# Patient Record
Sex: FEMALE | Race: WHITE | Employment: OTHER | URBAN - METROPOLITAN AREA
[De-identification: names, ages, dates, MRNs, and addresses within clinical notes are randomized per-mention and may not be internally consistent; named-entity substitution may affect disease eponyms.]

---

## 2017-08-23 PROBLEM — R41.82 ALTERED MENTAL STATUS: Status: ACTIVE | Noted: 2017-08-23

## 2017-12-19 PROBLEM — R26.9 GAIT DISTURBANCE: Status: ACTIVE | Noted: 2017-12-19

## 2019-02-18 ENCOUNTER — HOSPITAL ENCOUNTER (INPATIENT)
Age: 79
LOS: 3 days | Discharge: NURSING, REHAB OR LONG-TERM CARE | DRG: 866 | End: 2019-02-21
Attending: EMERGENCY MEDICINE | Admitting: INTERNAL MEDICINE
Payer: MEDICARE

## 2019-02-18 ENCOUNTER — APPOINTMENT (OUTPATIENT)
Dept: CT IMAGING | Age: 79
DRG: 866 | End: 2019-02-18
Payer: MEDICARE

## 2019-02-18 ENCOUNTER — APPOINTMENT (OUTPATIENT)
Dept: GENERAL RADIOLOGY | Age: 79
DRG: 866 | End: 2019-02-18
Payer: MEDICARE

## 2019-02-18 DIAGNOSIS — E11.40 TYPE 2 DIABETES MELLITUS WITH DIABETIC NEUROPATHY, WITHOUT LONG-TERM CURRENT USE OF INSULIN (HCC): ICD-10-CM

## 2019-02-18 DIAGNOSIS — G93.41 METABOLIC ENCEPHALOPATHY: Primary | ICD-10-CM

## 2019-02-18 DIAGNOSIS — E03.9 HYPOTHYROIDISM, UNSPECIFIED TYPE: ICD-10-CM

## 2019-02-18 DIAGNOSIS — Z91.14 NONCOMPLIANCE WITH MEDICATIONS: ICD-10-CM

## 2019-02-18 PROBLEM — I10 HTN (HYPERTENSION), BENIGN: Chronic | Status: ACTIVE | Noted: 2019-02-18

## 2019-02-18 PROBLEM — J10.1 INFLUENZA A: Status: ACTIVE | Noted: 2019-02-18

## 2019-02-18 PROBLEM — R62.7 FAILURE TO THRIVE IN ADULT: Status: ACTIVE | Noted: 2019-02-18

## 2019-02-18 LAB
ACETAMINOPHEN LEVEL: <5 MCG/ML (ref 10–30)
ALBUMIN SERPL-MCNC: 4.2 G/DL (ref 3.5–5.2)
ALP BLD-CCNC: 69 U/L (ref 35–104)
ALT SERPL-CCNC: 15 U/L (ref 0–32)
AMMONIA: 20 UMOL/L (ref 11–51)
AMPHETAMINE SCREEN, URINE: NOT DETECTED
ANION GAP SERPL CALCULATED.3IONS-SCNC: 14 MMOL/L (ref 7–16)
AST SERPL-CCNC: 29 U/L (ref 0–31)
BACTERIA: ABNORMAL /HPF
BARBITURATE SCREEN URINE: NOT DETECTED
BASOPHILS ABSOLUTE: 0 E9/L (ref 0–0.2)
BASOPHILS RELATIVE PERCENT: 0.6 % (ref 0–2)
BENZODIAZEPINE SCREEN, URINE: NOT DETECTED
BILIRUB SERPL-MCNC: 1.1 MG/DL (ref 0–1.2)
BILIRUBIN URINE: NEGATIVE
BLOOD, URINE: NORMAL
BUN BLDV-MCNC: 14 MG/DL (ref 8–23)
CALCIUM SERPL-MCNC: 9.4 MG/DL (ref 8.6–10.2)
CANNABINOID SCREEN URINE: NOT DETECTED
CHLORIDE BLD-SCNC: 98 MMOL/L (ref 98–107)
CHP ED QC CHECK: YES
CLARITY: CLEAR
CO2: 25 MMOL/L (ref 22–29)
COCAINE METABOLITE SCREEN URINE: NOT DETECTED
COLOR: YELLOW
CREAT SERPL-MCNC: 1.3 MG/DL (ref 0.5–1)
EOSINOPHILS ABSOLUTE: 0 E9/L (ref 0.05–0.5)
EOSINOPHILS RELATIVE PERCENT: 0.1 % (ref 0–6)
ETHANOL: <10 MG/DL (ref 0–0.08)
FILM ARRAY ADENOVIRUS: ABNORMAL
FILM ARRAY BORDETELLA PERTUSSIS: ABNORMAL
FILM ARRAY CHLAMYDOPHILIA PNEUMONIAE: ABNORMAL
FILM ARRAY CORONAVIRUS 229E: ABNORMAL
FILM ARRAY CORONAVIRUS HKU1: ABNORMAL
FILM ARRAY CORONAVIRUS NL63: ABNORMAL
FILM ARRAY CORONAVIRUS OC43: ABNORMAL
FILM ARRAY INFLUENZA A VIRUS H3: ABNORMAL
FILM ARRAY INFLUENZA B: ABNORMAL
FILM ARRAY METAPNEUMOVIRUS: ABNORMAL
FILM ARRAY MYCOPLASMA PNEUMONIAE: ABNORMAL
FILM ARRAY PARAINFLUENZA VIRUS 1: ABNORMAL
FILM ARRAY PARAINFLUENZA VIRUS 2: ABNORMAL
FILM ARRAY PARAINFLUENZA VIRUS 3: ABNORMAL
FILM ARRAY PARAINFLUENZA VIRUS 4: ABNORMAL
FILM ARRAY RESPIRATORY SYNCITIAL VIRUS: ABNORMAL
FILM ARRAY RHINOVIRUS/ENTEROVIRUS: ABNORMAL
GFR AFRICAN AMERICAN: 48
GFR NON-AFRICAN AMERICAN: 40 ML/MIN/1.73
GLUCOSE BLD-MCNC: 125 MG/DL (ref 74–99)
GLUCOSE BLD-MCNC: 129 MG/DL
GLUCOSE URINE: NEGATIVE MG/DL
HCT VFR BLD CALC: 40.1 % (ref 34–48)
HEMOGLOBIN: 13.9 G/DL (ref 11.5–15.5)
INFLUENZA A BY PCR: DETECTED
INFLUENZA B BY PCR: NOT DETECTED
INR BLD: 1.2
KETONES, URINE: NEGATIVE MG/DL
LACTIC ACID: 1.3 MMOL/L (ref 0.5–2.2)
LEUKOCYTE ESTERASE, URINE: NEGATIVE
LYMPHOCYTES ABSOLUTE: 0.56 E9/L (ref 1.5–4)
LYMPHOCYTES RELATIVE PERCENT: 7 % (ref 20–42)
MCH RBC QN AUTO: 31.9 PG (ref 26–35)
MCHC RBC AUTO-ENTMCNC: 34.7 % (ref 32–34.5)
MCV RBC AUTO: 92 FL (ref 80–99.9)
METER GLUCOSE: 110 MG/DL (ref 74–99)
METER GLUCOSE: 114 MG/DL (ref 74–99)
METER GLUCOSE: 127 MG/DL (ref 74–99)
METER GLUCOSE: 172 MG/DL (ref 74–99)
METER GLUCOSE: 94 MG/DL (ref 74–99)
METHADONE SCREEN, URINE: NOT DETECTED
MONOCYTES ABSOLUTE: 0.4 E9/L (ref 0.1–0.95)
MONOCYTES RELATIVE PERCENT: 5.2 % (ref 2–12)
MUCUS: PRESENT
NEUTROPHILS ABSOLUTE: 7.04 E9/L (ref 1.8–7.3)
NEUTROPHILS RELATIVE PERCENT: 87.8 % (ref 43–80)
NITRITE, URINE: NEGATIVE
OPIATE SCREEN URINE: NOT DETECTED
ORGANISM: ABNORMAL
PDW BLD-RTO: 13.4 FL (ref 11.5–15)
PH UA: 6 (ref 5–9)
PHENCYCLIDINE SCREEN URINE: NOT DETECTED
PLATELET # BLD: 138 E9/L (ref 130–450)
PMV BLD AUTO: 10.9 FL (ref 7–12)
POTASSIUM SERPL-SCNC: 3.5 MMOL/L (ref 3.5–5)
PROPOXYPHENE SCREEN: NOT DETECTED
PROTEIN UA: NORMAL MG/DL
PROTHROMBIN TIME: 13.7 SEC (ref 9.3–12.4)
RBC # BLD: 4.36 E12/L (ref 3.5–5.5)
RBC # BLD: NORMAL 10*6/UL
RBC UA: ABNORMAL /HPF (ref 0–2)
SALICYLATE, SERUM: <0.3 MG/DL (ref 0–30)
SODIUM BLD-SCNC: 137 MMOL/L (ref 132–146)
SPECIFIC GRAVITY UA: 1.02 (ref 1–1.03)
TOTAL PROTEIN: 7.5 G/DL (ref 6.4–8.3)
TRICYCLIC ANTIDEPRESSANTS SCREEN SERUM: NEGATIVE NG/ML
TROPONIN: 0.02 NG/ML (ref 0–0.03)
TSH SERPL DL<=0.05 MIU/L-ACNC: 28.78 UIU/ML (ref 0.27–4.2)
UROBILINOGEN, URINE: 1 E.U./DL
WBC # BLD: 8 E9/L (ref 4.5–11.5)
WBC UA: ABNORMAL /HPF (ref 0–5)

## 2019-02-18 PROCEDURE — 87633 RESP VIRUS 12-25 TARGETS: CPT

## 2019-02-18 PROCEDURE — 87581 M.PNEUMON DNA AMP PROBE: CPT

## 2019-02-18 PROCEDURE — 6360000002 HC RX W HCPCS: Performed by: INTERNAL MEDICINE

## 2019-02-18 PROCEDURE — 6370000000 HC RX 637 (ALT 250 FOR IP): Performed by: INTERNAL MEDICINE

## 2019-02-18 PROCEDURE — 87486 CHLMYD PNEUM DNA AMP PROBE: CPT

## 2019-02-18 PROCEDURE — 87798 DETECT AGENT NOS DNA AMP: CPT

## 2019-02-18 PROCEDURE — 70450 CT HEAD/BRAIN W/O DYE: CPT

## 2019-02-18 PROCEDURE — 97530 THERAPEUTIC ACTIVITIES: CPT

## 2019-02-18 PROCEDURE — 71045 X-RAY EXAM CHEST 1 VIEW: CPT

## 2019-02-18 PROCEDURE — 97166 OT EVAL MOD COMPLEX 45 MIN: CPT

## 2019-02-18 PROCEDURE — 94664 DEMO&/EVAL PT USE INHALER: CPT

## 2019-02-18 PROCEDURE — 94640 AIRWAY INHALATION TREATMENT: CPT

## 2019-02-18 PROCEDURE — 84484 ASSAY OF TROPONIN QUANT: CPT

## 2019-02-18 PROCEDURE — 80053 COMPREHEN METABOLIC PANEL: CPT

## 2019-02-18 PROCEDURE — 97535 SELF CARE MNGMENT TRAINING: CPT

## 2019-02-18 PROCEDURE — 85610 PROTHROMBIN TIME: CPT

## 2019-02-18 PROCEDURE — 82140 ASSAY OF AMMONIA: CPT

## 2019-02-18 PROCEDURE — G0480 DRUG TEST DEF 1-7 CLASSES: HCPCS

## 2019-02-18 PROCEDURE — 1200000000 HC SEMI PRIVATE

## 2019-02-18 PROCEDURE — 87040 BLOOD CULTURE FOR BACTERIA: CPT

## 2019-02-18 PROCEDURE — 2580000003 HC RX 258: Performed by: INTERNAL MEDICINE

## 2019-02-18 PROCEDURE — 84443 ASSAY THYROID STIM HORMONE: CPT

## 2019-02-18 PROCEDURE — 87088 URINE BACTERIA CULTURE: CPT

## 2019-02-18 PROCEDURE — 99285 EMERGENCY DEPT VISIT HI MDM: CPT

## 2019-02-18 PROCEDURE — 2580000003 HC RX 258: Performed by: EMERGENCY MEDICINE

## 2019-02-18 PROCEDURE — 81001 URINALYSIS AUTO W/SCOPE: CPT

## 2019-02-18 PROCEDURE — 97161 PT EVAL LOW COMPLEX 20 MIN: CPT

## 2019-02-18 PROCEDURE — 36415 COLL VENOUS BLD VENIPUNCTURE: CPT

## 2019-02-18 PROCEDURE — 83605 ASSAY OF LACTIC ACID: CPT

## 2019-02-18 PROCEDURE — 87502 INFLUENZA DNA AMP PROBE: CPT

## 2019-02-18 PROCEDURE — 80307 DRUG TEST PRSMV CHEM ANLYZR: CPT

## 2019-02-18 PROCEDURE — 82962 GLUCOSE BLOOD TEST: CPT

## 2019-02-18 PROCEDURE — 85025 COMPLETE CBC W/AUTO DIFF WBC: CPT

## 2019-02-18 RX ORDER — ONDANSETRON 2 MG/ML
4 INJECTION INTRAMUSCULAR; INTRAVENOUS EVERY 6 HOURS PRN
Status: DISCONTINUED | OUTPATIENT
Start: 2019-02-18 | End: 2019-02-21 | Stop reason: HOSPADM

## 2019-02-18 RX ORDER — SODIUM CHLORIDE 0.9 % (FLUSH) 0.9 %
10 SYRINGE (ML) INJECTION PRN
Status: DISCONTINUED | OUTPATIENT
Start: 2019-02-18 | End: 2019-02-21 | Stop reason: HOSPADM

## 2019-02-18 RX ORDER — ALPRAZOLAM 0.5 MG/1
TABLET ORAL
Status: ON HOLD | COMMUNITY
Start: 2012-10-01 | End: 2019-02-21 | Stop reason: HOSPADM

## 2019-02-18 RX ORDER — RAMIPRIL 5 MG/1
10 CAPSULE ORAL DAILY
Status: DISCONTINUED | OUTPATIENT
Start: 2019-02-18 | End: 2019-02-21 | Stop reason: HOSPADM

## 2019-02-18 RX ORDER — DIPHENOXYLATE HYDROCHLORIDE AND ATROPINE SULFATE 2.5; .025 MG/1; MG/1
TABLET ORAL
Status: ON HOLD | COMMUNITY
Start: 2013-08-23 | End: 2019-02-18 | Stop reason: SDUPTHER

## 2019-02-18 RX ORDER — BUMETANIDE 1 MG/1
TABLET ORAL
Status: ON HOLD | COMMUNITY
Start: 2006-08-05 | End: 2019-02-18 | Stop reason: SDUPTHER

## 2019-02-18 RX ORDER — ANASTROZOLE 1 MG/1
TABLET ORAL
Status: ON HOLD | COMMUNITY
Start: 2014-10-07 | End: 2019-02-18 | Stop reason: SDUPTHER

## 2019-02-18 RX ORDER — ACETAMINOPHEN 325 MG/1
650 TABLET ORAL EVERY 4 HOURS PRN
Status: DISCONTINUED | OUTPATIENT
Start: 2019-02-18 | End: 2019-02-21 | Stop reason: HOSPADM

## 2019-02-18 RX ORDER — RAMIPRIL 5 MG/1
5 CAPSULE ORAL DAILY
Status: DISCONTINUED | OUTPATIENT
Start: 2019-02-18 | End: 2019-02-18

## 2019-02-18 RX ORDER — IPRATROPIUM BROMIDE AND ALBUTEROL SULFATE 2.5; .5 MG/3ML; MG/3ML
1 SOLUTION RESPIRATORY (INHALATION) 4 TIMES DAILY
Status: DISCONTINUED | OUTPATIENT
Start: 2019-02-18 | End: 2019-02-21 | Stop reason: HOSPADM

## 2019-02-18 RX ORDER — NICOTINE POLACRILEX 4 MG
15 LOZENGE BUCCAL PRN
Status: DISCONTINUED | OUTPATIENT
Start: 2019-02-18 | End: 2019-02-21 | Stop reason: HOSPADM

## 2019-02-18 RX ORDER — RAMIPRIL 5 MG/1
CAPSULE ORAL
Status: ON HOLD | COMMUNITY
Start: 2006-07-18 | End: 2019-02-18 | Stop reason: SDUPTHER

## 2019-02-18 RX ORDER — SODIUM CHLORIDE 0.9 % (FLUSH) 0.9 %
10 SYRINGE (ML) INJECTION EVERY 12 HOURS SCHEDULED
Status: DISCONTINUED | OUTPATIENT
Start: 2019-02-18 | End: 2019-02-21 | Stop reason: HOSPADM

## 2019-02-18 RX ORDER — SODIUM CHLORIDE 9 MG/ML
INJECTION, SOLUTION INTRAVENOUS CONTINUOUS
Status: DISCONTINUED | OUTPATIENT
Start: 2019-02-18 | End: 2019-02-18

## 2019-02-18 RX ORDER — GLIMEPIRIDE 1 MG/1
TABLET ORAL
Status: ON HOLD | COMMUNITY
Start: 2014-03-03 | End: 2019-02-18 | Stop reason: SDUPTHER

## 2019-02-18 RX ORDER — HYDROCODONE BITARTRATE AND ACETAMINOPHEN 5; 325 MG/1; MG/1
TABLET ORAL
Status: ON HOLD | COMMUNITY
Start: 2014-02-04 | End: 2019-02-21 | Stop reason: HOSPADM

## 2019-02-18 RX ORDER — MONTELUKAST SODIUM 10 MG/1
TABLET ORAL
Status: ON HOLD | COMMUNITY
Start: 2016-05-16 | End: 2019-02-21 | Stop reason: HOSPADM

## 2019-02-18 RX ORDER — ALBUTEROL SULFATE 90 UG/1
AEROSOL, METERED RESPIRATORY (INHALATION)
Status: ON HOLD | COMMUNITY
Start: 2007-09-05 | End: 2019-02-21 | Stop reason: HOSPADM

## 2019-02-18 RX ORDER — AMOXICILLIN AND CLAVULANATE POTASSIUM 875; 125 MG/1; MG/1
TABLET, FILM COATED ORAL
Status: ON HOLD | COMMUNITY
Start: 2016-05-16 | End: 2019-02-18

## 2019-02-18 RX ORDER — LEVOTHYROXINE SODIUM 0.15 MG/1
150 TABLET ORAL DAILY
Status: DISCONTINUED | OUTPATIENT
Start: 2019-02-18 | End: 2019-02-21 | Stop reason: HOSPADM

## 2019-02-18 RX ORDER — ALBUTEROL SULFATE 2.5 MG/3ML
SOLUTION RESPIRATORY (INHALATION)
Status: ON HOLD | COMMUNITY
Start: 2013-04-30 | End: 2019-02-21 | Stop reason: HOSPADM

## 2019-02-18 RX ORDER — OSELTAMIVIR PHOSPHATE 30 MG/1
30 CAPSULE ORAL 2 TIMES DAILY
Status: DISCONTINUED | OUTPATIENT
Start: 2019-02-18 | End: 2019-02-21 | Stop reason: HOSPADM

## 2019-02-18 RX ORDER — DICYCLOMINE HYDROCHLORIDE 10 MG/1
CAPSULE ORAL
Status: ON HOLD | COMMUNITY
Start: 2013-08-23 | End: 2019-02-18 | Stop reason: SDUPTHER

## 2019-02-18 RX ORDER — DEXTROSE MONOHYDRATE 50 MG/ML
100 INJECTION, SOLUTION INTRAVENOUS PRN
Status: DISCONTINUED | OUTPATIENT
Start: 2019-02-18 | End: 2019-02-21 | Stop reason: HOSPADM

## 2019-02-18 RX ORDER — HYDRALAZINE HYDROCHLORIDE 20 MG/ML
10 INJECTION INTRAMUSCULAR; INTRAVENOUS EVERY 6 HOURS PRN
Status: DISCONTINUED | OUTPATIENT
Start: 2019-02-18 | End: 2019-02-21 | Stop reason: HOSPADM

## 2019-02-18 RX ORDER — DEXTROSE MONOHYDRATE 25 G/50ML
12.5 INJECTION, SOLUTION INTRAVENOUS PRN
Status: DISCONTINUED | OUTPATIENT
Start: 2019-02-18 | End: 2019-02-21 | Stop reason: HOSPADM

## 2019-02-18 RX ORDER — SPIRONOLACTONE 25 MG/1
TABLET ORAL
Status: ON HOLD | COMMUNITY
Start: 2006-07-18 | End: 2019-02-21 | Stop reason: HOSPADM

## 2019-02-18 RX ORDER — SODIUM CHLORIDE 9 MG/ML
INJECTION, SOLUTION INTRAVENOUS CONTINUOUS
Status: DISCONTINUED | OUTPATIENT
Start: 2019-02-18 | End: 2019-02-19

## 2019-02-18 RX ORDER — GUAIFENESIN/DEXTROMETHORPHAN 100-10MG/5
5 SYRUP ORAL EVERY 4 HOURS PRN
Status: DISCONTINUED | OUTPATIENT
Start: 2019-02-18 | End: 2019-02-21 | Stop reason: HOSPADM

## 2019-02-18 RX ORDER — PREDNISONE 2.5 MG
TABLET ORAL
Status: ON HOLD | COMMUNITY
Start: 2008-02-07 | End: 2019-02-18 | Stop reason: SDUPTHER

## 2019-02-18 RX ORDER — 0.9 % SODIUM CHLORIDE 0.9 %
1000 INTRAVENOUS SOLUTION INTRAVENOUS ONCE
Status: COMPLETED | OUTPATIENT
Start: 2019-02-18 | End: 2019-02-18

## 2019-02-18 RX ORDER — PREDNISONE 1 MG/1
2.5 TABLET ORAL DAILY
Status: DISCONTINUED | OUTPATIENT
Start: 2019-02-18 | End: 2019-02-18

## 2019-02-18 RX ORDER — LEVOTHYROXINE SODIUM 0.2 MG/1
TABLET ORAL
Status: ON HOLD | COMMUNITY
Start: 2015-04-28 | End: 2019-02-21 | Stop reason: HOSPADM

## 2019-02-18 RX ORDER — ANASTROZOLE 1 MG/1
1 TABLET ORAL DAILY
Status: DISCONTINUED | OUTPATIENT
Start: 2019-02-18 | End: 2019-02-18

## 2019-02-18 RX ADMIN — Medication 10 ML: at 09:46

## 2019-02-18 RX ADMIN — SODIUM CHLORIDE: 9 INJECTION, SOLUTION INTRAVENOUS at 20:43

## 2019-02-18 RX ADMIN — OSELTAMIVIR PHOSPHATE 30 MG: 30 CAPSULE ORAL at 09:45

## 2019-02-18 RX ADMIN — RAMIPRIL 10 MG: 5 CAPSULE ORAL at 09:45

## 2019-02-18 RX ADMIN — IPRATROPIUM BROMIDE AND ALBUTEROL SULFATE 1 AMPULE: .5; 3 SOLUTION RESPIRATORY (INHALATION) at 12:16

## 2019-02-18 RX ADMIN — SODIUM CHLORIDE: 9 INJECTION, SOLUTION INTRAVENOUS at 09:18

## 2019-02-18 RX ADMIN — LEVOTHYROXINE SODIUM 150 MCG: 150 TABLET ORAL at 09:45

## 2019-02-18 RX ADMIN — ENOXAPARIN SODIUM 40 MG: 40 INJECTION, SOLUTION INTRAVENOUS; SUBCUTANEOUS at 09:46

## 2019-02-18 RX ADMIN — IPRATROPIUM BROMIDE AND ALBUTEROL SULFATE 1 AMPULE: .5; 3 SOLUTION RESPIRATORY (INHALATION) at 15:50

## 2019-02-18 RX ADMIN — OSELTAMIVIR PHOSPHATE 30 MG: 30 CAPSULE ORAL at 20:42

## 2019-02-18 RX ADMIN — IPRATROPIUM BROMIDE AND ALBUTEROL SULFATE 1 AMPULE: .5; 3 SOLUTION RESPIRATORY (INHALATION) at 08:57

## 2019-02-18 RX ADMIN — IPRATROPIUM BROMIDE AND ALBUTEROL SULFATE 1 AMPULE: .5; 3 SOLUTION RESPIRATORY (INHALATION) at 21:16

## 2019-02-18 RX ADMIN — INSULIN LISPRO 1 UNITS: 100 INJECTION, SOLUTION INTRAVENOUS; SUBCUTANEOUS at 12:23

## 2019-02-18 RX ADMIN — SODIUM CHLORIDE 1000 ML: 9 INJECTION, SOLUTION INTRAVENOUS at 02:44

## 2019-02-18 RX ADMIN — GUAIFENESIN AND DEXTROMETHORPHAN 5 ML: 100; 10 SYRUP ORAL at 09:45

## 2019-02-18 ASSESSMENT — PAIN SCALES - GENERAL
PAINLEVEL_OUTOF10: 0

## 2019-02-19 LAB
ANION GAP SERPL CALCULATED.3IONS-SCNC: 12 MMOL/L (ref 7–16)
BUN BLDV-MCNC: 13 MG/DL (ref 8–23)
CALCIUM SERPL-MCNC: 8 MG/DL (ref 8.6–10.2)
CHLORIDE BLD-SCNC: 101 MMOL/L (ref 98–107)
CO2: 23 MMOL/L (ref 22–29)
CREAT SERPL-MCNC: 1.2 MG/DL (ref 0.5–1)
GFR AFRICAN AMERICAN: 53
GFR NON-AFRICAN AMERICAN: 43 ML/MIN/1.73
GLUCOSE BLD-MCNC: 90 MG/DL (ref 74–99)
HCT VFR BLD CALC: 32.2 % (ref 34–48)
HEMOGLOBIN: 10.9 G/DL (ref 11.5–15.5)
MCH RBC QN AUTO: 31.2 PG (ref 26–35)
MCHC RBC AUTO-ENTMCNC: 33.9 % (ref 32–34.5)
MCV RBC AUTO: 92.3 FL (ref 80–99.9)
METER GLUCOSE: 115 MG/DL (ref 74–99)
METER GLUCOSE: 128 MG/DL (ref 74–99)
METER GLUCOSE: 175 MG/DL (ref 74–99)
METER GLUCOSE: 82 MG/DL (ref 74–99)
PDW BLD-RTO: 13.5 FL (ref 11.5–15)
PLATELET # BLD: 86 E9/L (ref 130–450)
PLATELET CONFIRMATION: NORMAL
PMV BLD AUTO: 10.1 FL (ref 7–12)
POTASSIUM SERPL-SCNC: 2.9 MMOL/L (ref 3.5–5)
RBC # BLD: 3.49 E12/L (ref 3.5–5.5)
SODIUM BLD-SCNC: 136 MMOL/L (ref 132–146)
WBC # BLD: 4.8 E9/L (ref 4.5–11.5)

## 2019-02-19 PROCEDURE — 97110 THERAPEUTIC EXERCISES: CPT

## 2019-02-19 PROCEDURE — 1200000000 HC SEMI PRIVATE

## 2019-02-19 PROCEDURE — 2580000003 HC RX 258: Performed by: INTERNAL MEDICINE

## 2019-02-19 PROCEDURE — 97530 THERAPEUTIC ACTIVITIES: CPT

## 2019-02-19 PROCEDURE — 97535 SELF CARE MNGMENT TRAINING: CPT

## 2019-02-19 PROCEDURE — 6360000002 HC RX W HCPCS: Performed by: INTERNAL MEDICINE

## 2019-02-19 PROCEDURE — 6370000000 HC RX 637 (ALT 250 FOR IP): Performed by: INTERNAL MEDICINE

## 2019-02-19 PROCEDURE — 36415 COLL VENOUS BLD VENIPUNCTURE: CPT

## 2019-02-19 PROCEDURE — 94640 AIRWAY INHALATION TREATMENT: CPT

## 2019-02-19 PROCEDURE — 80048 BASIC METABOLIC PNL TOTAL CA: CPT

## 2019-02-19 PROCEDURE — 82962 GLUCOSE BLOOD TEST: CPT

## 2019-02-19 PROCEDURE — 85027 COMPLETE CBC AUTOMATED: CPT

## 2019-02-19 RX ORDER — POTASSIUM CHLORIDE 20 MEQ/1
40 TABLET, EXTENDED RELEASE ORAL 2 TIMES DAILY
Status: COMPLETED | OUTPATIENT
Start: 2019-02-19 | End: 2019-02-19

## 2019-02-19 RX ADMIN — IPRATROPIUM BROMIDE AND ALBUTEROL SULFATE 1 AMPULE: .5; 3 SOLUTION RESPIRATORY (INHALATION) at 20:35

## 2019-02-19 RX ADMIN — RAMIPRIL 10 MG: 5 CAPSULE ORAL at 08:36

## 2019-02-19 RX ADMIN — Medication 10 ML: at 10:27

## 2019-02-19 RX ADMIN — IPRATROPIUM BROMIDE AND ALBUTEROL SULFATE 1 AMPULE: .5; 3 SOLUTION RESPIRATORY (INHALATION) at 16:27

## 2019-02-19 RX ADMIN — POTASSIUM CHLORIDE 40 MEQ: 20 TABLET, EXTENDED RELEASE ORAL at 10:26

## 2019-02-19 RX ADMIN — OSELTAMIVIR PHOSPHATE 30 MG: 30 CAPSULE ORAL at 21:05

## 2019-02-19 RX ADMIN — IPRATROPIUM BROMIDE AND ALBUTEROL SULFATE 1 AMPULE: .5; 3 SOLUTION RESPIRATORY (INHALATION) at 07:43

## 2019-02-19 RX ADMIN — LEVOTHYROXINE SODIUM 150 MCG: 150 TABLET ORAL at 06:49

## 2019-02-19 RX ADMIN — Medication 10 ML: at 21:06

## 2019-02-19 RX ADMIN — SODIUM CHLORIDE: 9 INJECTION, SOLUTION INTRAVENOUS at 06:49

## 2019-02-19 RX ADMIN — GUAIFENESIN AND DEXTROMETHORPHAN 5 ML: 100; 10 SYRUP ORAL at 08:36

## 2019-02-19 RX ADMIN — OSELTAMIVIR PHOSPHATE 30 MG: 30 CAPSULE ORAL at 08:36

## 2019-02-19 RX ADMIN — POTASSIUM CHLORIDE 40 MEQ: 20 TABLET, EXTENDED RELEASE ORAL at 21:05

## 2019-02-19 RX ADMIN — IPRATROPIUM BROMIDE AND ALBUTEROL SULFATE 1 AMPULE: .5; 3 SOLUTION RESPIRATORY (INHALATION) at 11:12

## 2019-02-19 RX ADMIN — ENOXAPARIN SODIUM 40 MG: 40 INJECTION, SOLUTION INTRAVENOUS; SUBCUTANEOUS at 08:35

## 2019-02-19 RX ADMIN — INSULIN LISPRO 1 UNITS: 100 INJECTION, SOLUTION INTRAVENOUS; SUBCUTANEOUS at 11:47

## 2019-02-19 ASSESSMENT — PAIN SCALES - GENERAL
PAINLEVEL_OUTOF10: 0
PAINLEVEL_OUTOF10: 2

## 2019-02-19 ASSESSMENT — PAIN DESCRIPTION - PROGRESSION: CLINICAL_PROGRESSION: NOT CHANGED

## 2019-02-19 ASSESSMENT — PAIN DESCRIPTION - PAIN TYPE: TYPE: ACUTE PAIN

## 2019-02-19 ASSESSMENT — PAIN DESCRIPTION - LOCATION: LOCATION: LEG

## 2019-02-19 ASSESSMENT — PAIN DESCRIPTION - ONSET: ONSET: OTHER (COMMENT)

## 2019-02-19 ASSESSMENT — PAIN - FUNCTIONAL ASSESSMENT: PAIN_FUNCTIONAL_ASSESSMENT: PREVENTS OR INTERFERES SOME ACTIVE ACTIVITIES AND ADLS

## 2019-02-19 ASSESSMENT — PAIN DESCRIPTION - FREQUENCY: FREQUENCY: OTHER (COMMENT)

## 2019-02-19 ASSESSMENT — PAIN DESCRIPTION - ORIENTATION: ORIENTATION: RIGHT

## 2019-02-20 ENCOUNTER — APPOINTMENT (OUTPATIENT)
Dept: GENERAL RADIOLOGY | Age: 79
DRG: 866 | End: 2019-02-20
Payer: MEDICARE

## 2019-02-20 LAB
ANION GAP SERPL CALCULATED.3IONS-SCNC: 8 MMOL/L (ref 7–16)
BUN BLDV-MCNC: 12 MG/DL (ref 8–23)
CALCIUM SERPL-MCNC: 8.3 MG/DL (ref 8.6–10.2)
CHLORIDE BLD-SCNC: 102 MMOL/L (ref 98–107)
CO2: 25 MMOL/L (ref 22–29)
CREAT SERPL-MCNC: 1.1 MG/DL (ref 0.5–1)
GFR AFRICAN AMERICAN: 58
GFR NON-AFRICAN AMERICAN: 48 ML/MIN/1.73
GLUCOSE BLD-MCNC: 87 MG/DL (ref 74–99)
HCT VFR BLD CALC: 31.6 % (ref 34–48)
HEMOGLOBIN: 10.9 G/DL (ref 11.5–15.5)
MCH RBC QN AUTO: 31.5 PG (ref 26–35)
MCHC RBC AUTO-ENTMCNC: 34.5 % (ref 32–34.5)
MCV RBC AUTO: 91.3 FL (ref 80–99.9)
METER GLUCOSE: 134 MG/DL (ref 74–99)
METER GLUCOSE: 223 MG/DL (ref 74–99)
METER GLUCOSE: 264 MG/DL (ref 74–99)
METER GLUCOSE: 91 MG/DL (ref 74–99)
PDW BLD-RTO: 13.3 FL (ref 11.5–15)
PLATELET # BLD: 88 E9/L (ref 130–450)
PLATELET CONFIRMATION: NORMAL
PMV BLD AUTO: 10.5 FL (ref 7–12)
POTASSIUM SERPL-SCNC: 3.9 MMOL/L (ref 3.5–5)
RBC # BLD: 3.46 E12/L (ref 3.5–5.5)
SODIUM BLD-SCNC: 135 MMOL/L (ref 132–146)
URINE CULTURE, ROUTINE: NORMAL
WBC # BLD: 4.3 E9/L (ref 4.5–11.5)

## 2019-02-20 PROCEDURE — 71045 X-RAY EXAM CHEST 1 VIEW: CPT

## 2019-02-20 PROCEDURE — 36415 COLL VENOUS BLD VENIPUNCTURE: CPT

## 2019-02-20 PROCEDURE — 6370000000 HC RX 637 (ALT 250 FOR IP): Performed by: INTERNAL MEDICINE

## 2019-02-20 PROCEDURE — 2580000003 HC RX 258: Performed by: INTERNAL MEDICINE

## 2019-02-20 PROCEDURE — 85027 COMPLETE CBC AUTOMATED: CPT

## 2019-02-20 PROCEDURE — 80048 BASIC METABOLIC PNL TOTAL CA: CPT

## 2019-02-20 PROCEDURE — 6360000002 HC RX W HCPCS: Performed by: INTERNAL MEDICINE

## 2019-02-20 PROCEDURE — 82962 GLUCOSE BLOOD TEST: CPT

## 2019-02-20 PROCEDURE — 1200000000 HC SEMI PRIVATE

## 2019-02-20 PROCEDURE — 94640 AIRWAY INHALATION TREATMENT: CPT

## 2019-02-20 RX ORDER — METHYLPREDNISOLONE SODIUM SUCCINATE 40 MG/ML
40 INJECTION, POWDER, LYOPHILIZED, FOR SOLUTION INTRAMUSCULAR; INTRAVENOUS ONCE
Status: COMPLETED | OUTPATIENT
Start: 2019-02-20 | End: 2019-02-20

## 2019-02-20 RX ADMIN — RAMIPRIL 10 MG: 5 CAPSULE ORAL at 10:56

## 2019-02-20 RX ADMIN — IPRATROPIUM BROMIDE AND ALBUTEROL SULFATE 1 AMPULE: .5; 3 SOLUTION RESPIRATORY (INHALATION) at 08:17

## 2019-02-20 RX ADMIN — INSULIN LISPRO 2 UNITS: 100 INJECTION, SOLUTION INTRAVENOUS; SUBCUTANEOUS at 17:39

## 2019-02-20 RX ADMIN — METHYLPREDNISOLONE SODIUM SUCCINATE 40 MG: 40 INJECTION, POWDER, FOR SOLUTION INTRAMUSCULAR; INTRAVENOUS at 11:03

## 2019-02-20 RX ADMIN — Medication 10 ML: at 21:03

## 2019-02-20 RX ADMIN — OSELTAMIVIR PHOSPHATE 30 MG: 30 CAPSULE ORAL at 10:57

## 2019-02-20 RX ADMIN — LEVOTHYROXINE SODIUM 150 MCG: 150 TABLET ORAL at 06:25

## 2019-02-20 RX ADMIN — INSULIN LISPRO 2 UNITS: 100 INJECTION, SOLUTION INTRAVENOUS; SUBCUTANEOUS at 21:04

## 2019-02-20 RX ADMIN — Medication 10 ML: at 10:57

## 2019-02-20 RX ADMIN — OSELTAMIVIR PHOSPHATE 30 MG: 30 CAPSULE ORAL at 21:03

## 2019-02-20 ASSESSMENT — PAIN SCALES - GENERAL
PAINLEVEL_OUTOF10: 0
PAINLEVEL_OUTOF10: 0

## 2019-02-21 ENCOUNTER — APPOINTMENT (OUTPATIENT)
Dept: GENERAL RADIOLOGY | Age: 79
DRG: 866 | End: 2019-02-21
Payer: MEDICARE

## 2019-02-21 VITALS
BODY MASS INDEX: 35.61 KG/M2 | HEART RATE: 84 BPM | HEIGHT: 63 IN | OXYGEN SATURATION: 97 % | DIASTOLIC BLOOD PRESSURE: 72 MMHG | WEIGHT: 201 LBS | TEMPERATURE: 98.4 F | RESPIRATION RATE: 18 BRPM | SYSTOLIC BLOOD PRESSURE: 145 MMHG

## 2019-02-21 PROBLEM — R26.9 GAIT DISTURBANCE: Status: RESOLVED | Noted: 2017-12-19 | Resolved: 2019-02-21

## 2019-02-21 LAB
METER GLUCOSE: 115 MG/DL (ref 74–99)
METER GLUCOSE: 138 MG/DL (ref 74–99)
METER GLUCOSE: 184 MG/DL (ref 74–99)

## 2019-02-21 PROCEDURE — 97530 THERAPEUTIC ACTIVITIES: CPT

## 2019-02-21 PROCEDURE — 6360000002 HC RX W HCPCS: Performed by: INTERNAL MEDICINE

## 2019-02-21 PROCEDURE — 82962 GLUCOSE BLOOD TEST: CPT

## 2019-02-21 PROCEDURE — 6370000000 HC RX 637 (ALT 250 FOR IP): Performed by: INTERNAL MEDICINE

## 2019-02-21 PROCEDURE — 73590 X-RAY EXAM OF LOWER LEG: CPT

## 2019-02-21 PROCEDURE — 97535 SELF CARE MNGMENT TRAINING: CPT

## 2019-02-21 PROCEDURE — 2580000003 HC RX 258: Performed by: INTERNAL MEDICINE

## 2019-02-21 PROCEDURE — 94640 AIRWAY INHALATION TREATMENT: CPT

## 2019-02-21 RX ORDER — IPRATROPIUM BROMIDE AND ALBUTEROL SULFATE 2.5; .5 MG/3ML; MG/3ML
3 SOLUTION RESPIRATORY (INHALATION) 4 TIMES DAILY
Qty: 360 ML | DISCHARGE
Start: 2019-02-21 | End: 2020-07-21

## 2019-02-21 RX ORDER — OSELTAMIVIR PHOSPHATE 30 MG/1
30 CAPSULE ORAL 2 TIMES DAILY
DISCHARGE
Start: 2019-02-21 | End: 2019-02-23

## 2019-02-21 RX ORDER — LEVOTHYROXINE SODIUM 0.15 MG/1
150 TABLET ORAL DAILY
Qty: 30 TABLET | Refills: 3 | DISCHARGE
Start: 2019-02-22 | End: 2019-04-15 | Stop reason: SDUPTHER

## 2019-02-21 RX ORDER — RAMIPRIL 10 MG/1
10 CAPSULE ORAL DAILY
Qty: 30 CAPSULE | Refills: 3 | DISCHARGE
Start: 2019-02-21 | End: 2019-04-15 | Stop reason: SDUPTHER

## 2019-02-21 RX ADMIN — IPRATROPIUM BROMIDE AND ALBUTEROL SULFATE 1 AMPULE: .5; 3 SOLUTION RESPIRATORY (INHALATION) at 12:12

## 2019-02-21 RX ADMIN — OSELTAMIVIR PHOSPHATE 30 MG: 30 CAPSULE ORAL at 09:00

## 2019-02-21 RX ADMIN — ENOXAPARIN SODIUM 40 MG: 40 INJECTION, SOLUTION INTRAVENOUS; SUBCUTANEOUS at 09:00

## 2019-02-21 RX ADMIN — Medication 10 ML: at 09:00

## 2019-02-21 RX ADMIN — IPRATROPIUM BROMIDE AND ALBUTEROL SULFATE 1 AMPULE: .5; 3 SOLUTION RESPIRATORY (INHALATION) at 15:39

## 2019-02-21 RX ADMIN — LEVOTHYROXINE SODIUM 150 MCG: 150 TABLET ORAL at 06:23

## 2019-02-21 RX ADMIN — IPRATROPIUM BROMIDE AND ALBUTEROL SULFATE 1 AMPULE: .5; 3 SOLUTION RESPIRATORY (INHALATION) at 09:06

## 2019-02-21 RX ADMIN — RAMIPRIL 10 MG: 5 CAPSULE ORAL at 09:00

## 2019-02-21 ASSESSMENT — PAIN SCALES - GENERAL
PAINLEVEL_OUTOF10: 0
PAINLEVEL_OUTOF10: 0

## 2019-02-22 LAB
EKG ATRIAL RATE: 88 BPM
EKG P AXIS: 35 DEGREES
EKG P-R INTERVAL: 192 MS
EKG Q-T INTERVAL: 420 MS
EKG QRS DURATION: 138 MS
EKG QTC CALCULATION (BAZETT): 508 MS
EKG R AXIS: -55 DEGREES
EKG T AXIS: 25 DEGREES
EKG VENTRICULAR RATE: 88 BPM

## 2019-02-23 LAB
BLOOD CULTURE, ROUTINE: NORMAL
CULTURE, BLOOD 2: NORMAL

## 2019-03-23 PROBLEM — J10.1 INFLUENZA A: Status: RESOLVED | Noted: 2019-02-18 | Resolved: 2019-03-23

## 2019-04-09 ENCOUNTER — APPOINTMENT (OUTPATIENT)
Dept: ULTRASOUND IMAGING | Age: 79
End: 2019-04-09
Payer: MEDICARE

## 2019-04-09 ENCOUNTER — APPOINTMENT (OUTPATIENT)
Dept: CT IMAGING | Age: 79
End: 2019-04-09
Payer: MEDICARE

## 2019-04-09 ENCOUNTER — APPOINTMENT (OUTPATIENT)
Dept: GENERAL RADIOLOGY | Age: 79
End: 2019-04-09
Payer: MEDICARE

## 2019-04-09 ENCOUNTER — HOSPITAL ENCOUNTER (OUTPATIENT)
Age: 79
Setting detail: OBSERVATION
Discharge: HOME | End: 2019-04-11
Attending: EMERGENCY MEDICINE | Admitting: INTERNAL MEDICINE
Payer: MEDICARE

## 2019-04-09 DIAGNOSIS — I63.9 CEREBROVASCULAR ACCIDENT (CVA), UNSPECIFIED MECHANISM (HCC): Primary | ICD-10-CM

## 2019-04-09 LAB
ALBUMIN SERPL-MCNC: 3.3 G/DL (ref 3.5–5.2)
ALP BLD-CCNC: 79 U/L (ref 35–104)
ALT SERPL-CCNC: 8 U/L (ref 0–32)
ANION GAP SERPL CALCULATED.3IONS-SCNC: 8 MMOL/L (ref 7–16)
AST SERPL-CCNC: 11 U/L (ref 0–31)
BACTERIA: ABNORMAL /HPF
BASOPHILS ABSOLUTE: 0.06 E9/L (ref 0–0.2)
BASOPHILS RELATIVE PERCENT: 0.9 % (ref 0–2)
BILIRUB SERPL-MCNC: 0.4 MG/DL (ref 0–1.2)
BILIRUBIN URINE: NEGATIVE
BLOOD, URINE: NEGATIVE
BUN BLDV-MCNC: 12 MG/DL (ref 8–23)
CALCIUM SERPL-MCNC: 9.2 MG/DL (ref 8.6–10.2)
CHLORIDE BLD-SCNC: 111 MMOL/L (ref 98–107)
CLARITY: CLEAR
CO2: 25 MMOL/L (ref 22–29)
COLOR: YELLOW
CREAT SERPL-MCNC: 1 MG/DL (ref 0.5–1)
EOSINOPHILS ABSOLUTE: 0.33 E9/L (ref 0.05–0.5)
EOSINOPHILS RELATIVE PERCENT: 4.8 % (ref 0–6)
GFR AFRICAN AMERICAN: >60
GFR NON-AFRICAN AMERICAN: 54 ML/MIN/1.73
GLUCOSE BLD-MCNC: 130 MG/DL (ref 74–99)
GLUCOSE URINE: NEGATIVE MG/DL
HCT VFR BLD CALC: 36.2 % (ref 34–48)
HEMOGLOBIN: 12.1 G/DL (ref 11.5–15.5)
IMMATURE GRANULOCYTES #: 0.02 E9/L
IMMATURE GRANULOCYTES %: 0.3 % (ref 0–5)
INR BLD: 1
KETONES, URINE: NEGATIVE MG/DL
LACTIC ACID: 1.5 MMOL/L (ref 0.5–2.2)
LEUKOCYTE ESTERASE, URINE: ABNORMAL
LIPASE: 21 U/L (ref 13–60)
LYMPHOCYTES ABSOLUTE: 1.33 E9/L (ref 1.5–4)
LYMPHOCYTES RELATIVE PERCENT: 19.5 % (ref 20–42)
MAGNESIUM: 1.6 MG/DL (ref 1.6–2.6)
MCH RBC QN AUTO: 30.5 PG (ref 26–35)
MCHC RBC AUTO-ENTMCNC: 33.4 % (ref 32–34.5)
MCV RBC AUTO: 91.2 FL (ref 80–99.9)
METER GLUCOSE: 139 MG/DL (ref 74–99)
MONOCYTES ABSOLUTE: 0.42 E9/L (ref 0.1–0.95)
MONOCYTES RELATIVE PERCENT: 6.2 % (ref 2–12)
NEUTROPHILS ABSOLUTE: 4.66 E9/L (ref 1.8–7.3)
NEUTROPHILS RELATIVE PERCENT: 68.3 % (ref 43–80)
NITRITE, URINE: POSITIVE
PDW BLD-RTO: 12.9 FL (ref 11.5–15)
PH UA: 6.5 (ref 5–9)
PLATELET # BLD: 170 E9/L (ref 130–450)
PMV BLD AUTO: 9.9 FL (ref 7–12)
POTASSIUM REFLEX MAGNESIUM: 3.5 MMOL/L (ref 3.5–5)
PROTEIN UA: NEGATIVE MG/DL
PROTHROMBIN TIME: 12 SEC (ref 9.3–12.4)
RBC # BLD: 3.97 E12/L (ref 3.5–5.5)
RBC UA: ABNORMAL /HPF (ref 0–2)
SODIUM BLD-SCNC: 144 MMOL/L (ref 132–146)
SPECIFIC GRAVITY UA: 1.01 (ref 1–1.03)
TOTAL PROTEIN: 6.5 G/DL (ref 6.4–8.3)
TROPONIN: <0.01 NG/ML (ref 0–0.03)
TSH SERPL DL<=0.05 MIU/L-ACNC: 4.6 UIU/ML (ref 0.27–4.2)
UROBILINOGEN, URINE: 0.2 E.U./DL
WBC # BLD: 6.8 E9/L (ref 4.5–11.5)
WBC UA: ABNORMAL /HPF (ref 0–5)

## 2019-04-09 PROCEDURE — 84443 ASSAY THYROID STIM HORMONE: CPT

## 2019-04-09 PROCEDURE — 93005 ELECTROCARDIOGRAM TRACING: CPT | Performed by: EMERGENCY MEDICINE

## 2019-04-09 PROCEDURE — 2580000003 HC RX 258: Performed by: EMERGENCY MEDICINE

## 2019-04-09 PROCEDURE — 93880 EXTRACRANIAL BILAT STUDY: CPT

## 2019-04-09 PROCEDURE — 80053 COMPREHEN METABOLIC PANEL: CPT

## 2019-04-09 PROCEDURE — 2580000003 HC RX 258: Performed by: INTERNAL MEDICINE

## 2019-04-09 PROCEDURE — 6370000000 HC RX 637 (ALT 250 FOR IP): Performed by: INTERNAL MEDICINE

## 2019-04-09 PROCEDURE — 83605 ASSAY OF LACTIC ACID: CPT

## 2019-04-09 PROCEDURE — 94664 DEMO&/EVAL PT USE INHALER: CPT

## 2019-04-09 PROCEDURE — 96365 THER/PROPH/DIAG IV INF INIT: CPT

## 2019-04-09 PROCEDURE — 83735 ASSAY OF MAGNESIUM: CPT

## 2019-04-09 PROCEDURE — G0378 HOSPITAL OBSERVATION PER HR: HCPCS

## 2019-04-09 PROCEDURE — 70450 CT HEAD/BRAIN W/O DYE: CPT

## 2019-04-09 PROCEDURE — 94640 AIRWAY INHALATION TREATMENT: CPT

## 2019-04-09 PROCEDURE — 83690 ASSAY OF LIPASE: CPT

## 2019-04-09 PROCEDURE — 71045 X-RAY EXAM CHEST 1 VIEW: CPT

## 2019-04-09 PROCEDURE — 84484 ASSAY OF TROPONIN QUANT: CPT

## 2019-04-09 PROCEDURE — 99285 EMERGENCY DEPT VISIT HI MDM: CPT

## 2019-04-09 PROCEDURE — 81001 URINALYSIS AUTO W/SCOPE: CPT

## 2019-04-09 PROCEDURE — 85610 PROTHROMBIN TIME: CPT

## 2019-04-09 PROCEDURE — 94760 N-INVAS EAR/PLS OXIMETRY 1: CPT

## 2019-04-09 PROCEDURE — 85025 COMPLETE CBC W/AUTO DIFF WBC: CPT

## 2019-04-09 PROCEDURE — 6360000002 HC RX W HCPCS: Performed by: EMERGENCY MEDICINE

## 2019-04-09 PROCEDURE — 82962 GLUCOSE BLOOD TEST: CPT

## 2019-04-09 PROCEDURE — 36415 COLL VENOUS BLD VENIPUNCTURE: CPT

## 2019-04-09 RX ORDER — LEVOTHYROXINE SODIUM 0.15 MG/1
150 TABLET ORAL DAILY
Status: DISCONTINUED | OUTPATIENT
Start: 2019-04-10 | End: 2019-04-11 | Stop reason: HOSPADM

## 2019-04-09 RX ORDER — MIRTAZAPINE 15 MG/1
30 TABLET, FILM COATED ORAL NIGHTLY
Status: DISCONTINUED | OUTPATIENT
Start: 2019-04-09 | End: 2019-04-11 | Stop reason: HOSPADM

## 2019-04-09 RX ORDER — NICOTINE POLACRILEX 4 MG
15 LOZENGE BUCCAL PRN
Status: DISCONTINUED | OUTPATIENT
Start: 2019-04-09 | End: 2019-04-11 | Stop reason: HOSPADM

## 2019-04-09 RX ORDER — MIRTAZAPINE 30 MG/1
1 TABLET, FILM COATED ORAL NIGHTLY
Refills: 0 | COMMUNITY
Start: 2019-04-03 | End: 2019-04-15 | Stop reason: SDUPTHER

## 2019-04-09 RX ORDER — DEXTROSE MONOHYDRATE 25 G/50ML
12.5 INJECTION, SOLUTION INTRAVENOUS PRN
Status: DISCONTINUED | OUTPATIENT
Start: 2019-04-09 | End: 2019-04-11 | Stop reason: HOSPADM

## 2019-04-09 RX ORDER — DEXTROSE MONOHYDRATE 50 MG/ML
100 INJECTION, SOLUTION INTRAVENOUS PRN
Status: DISCONTINUED | OUTPATIENT
Start: 2019-04-09 | End: 2019-04-11 | Stop reason: HOSPADM

## 2019-04-09 RX ORDER — ONDANSETRON 2 MG/ML
4 INJECTION INTRAMUSCULAR; INTRAVENOUS EVERY 6 HOURS PRN
Status: DISCONTINUED | OUTPATIENT
Start: 2019-04-09 | End: 2019-04-11 | Stop reason: HOSPADM

## 2019-04-09 RX ORDER — ACETAMINOPHEN 325 MG/1
650 TABLET ORAL EVERY 4 HOURS PRN
Status: DISCONTINUED | OUTPATIENT
Start: 2019-04-09 | End: 2019-04-11 | Stop reason: HOSPADM

## 2019-04-09 RX ORDER — SODIUM CHLORIDE 0.9 % (FLUSH) 0.9 %
10 SYRINGE (ML) INJECTION PRN
Status: DISCONTINUED | OUTPATIENT
Start: 2019-04-09 | End: 2019-04-11 | Stop reason: HOSPADM

## 2019-04-09 RX ORDER — HYDRALAZINE HYDROCHLORIDE 20 MG/ML
10 INJECTION INTRAMUSCULAR; INTRAVENOUS EVERY 6 HOURS PRN
Status: DISCONTINUED | OUTPATIENT
Start: 2019-04-09 | End: 2019-04-11 | Stop reason: HOSPADM

## 2019-04-09 RX ORDER — SODIUM CHLORIDE 0.9 % (FLUSH) 0.9 %
10 SYRINGE (ML) INJECTION EVERY 12 HOURS SCHEDULED
Status: DISCONTINUED | OUTPATIENT
Start: 2019-04-09 | End: 2019-04-11 | Stop reason: HOSPADM

## 2019-04-09 RX ORDER — IPRATROPIUM BROMIDE AND ALBUTEROL SULFATE 2.5; .5 MG/3ML; MG/3ML
3 SOLUTION RESPIRATORY (INHALATION) 4 TIMES DAILY
Status: DISCONTINUED | OUTPATIENT
Start: 2019-04-09 | End: 2019-04-11 | Stop reason: HOSPADM

## 2019-04-09 RX ORDER — RAMIPRIL 5 MG/1
10 CAPSULE ORAL DAILY
Status: DISCONTINUED | OUTPATIENT
Start: 2019-04-09 | End: 2019-04-11 | Stop reason: HOSPADM

## 2019-04-09 RX ADMIN — CEFTRIAXONE 2 G: 2 INJECTION, POWDER, FOR SOLUTION INTRAMUSCULAR; INTRAVENOUS at 11:25

## 2019-04-09 RX ADMIN — RAMIPRIL 10 MG: 5 CAPSULE ORAL at 23:43

## 2019-04-09 RX ADMIN — MIRTAZAPINE 30 MG: 15 TABLET, FILM COATED ORAL at 23:43

## 2019-04-09 RX ADMIN — IPRATROPIUM BROMIDE AND ALBUTEROL SULFATE 3 ML: .5; 3 SOLUTION RESPIRATORY (INHALATION) at 20:03

## 2019-04-09 RX ADMIN — Medication 10 ML: at 23:37

## 2019-04-09 ASSESSMENT — ENCOUNTER SYMPTOMS
SINUS PRESSURE: 0
WHEEZING: 0
BACK PAIN: 0
EYE REDNESS: 0
EYE PAIN: 0
DIARRHEA: 0
VOMITING: 0
NAUSEA: 0
ABDOMINAL DISTENTION: 0
EYE DISCHARGE: 0
COUGH: 0
SHORTNESS OF BREATH: 0
SORE THROAT: 0

## 2019-04-09 ASSESSMENT — PAIN DESCRIPTION - DESCRIPTORS: DESCRIPTORS: NUMBNESS;TINGLING

## 2019-04-09 ASSESSMENT — PAIN DESCRIPTION - PAIN TYPE: TYPE: ACUTE PAIN

## 2019-04-09 ASSESSMENT — PAIN SCALES - GENERAL
PAINLEVEL_OUTOF10: 3
PAINLEVEL_OUTOF10: 0
PAINLEVEL_OUTOF10: 0

## 2019-04-09 ASSESSMENT — PAIN DESCRIPTION - ORIENTATION: ORIENTATION: RIGHT;LEFT

## 2019-04-09 ASSESSMENT — PAIN DESCRIPTION - LOCATION: LOCATION: HAND;LEG

## 2019-04-09 NOTE — ED PROVIDER NOTES
71-year-old female history of rheumatoid arthritis, diabetic peripheral neuropathy, type II diabetes, hypertension, presenting to the emergency department by EMS for primary complaint of limb numbness and tingling. Patient states she woke around 3 AM this morning with sensation of right upper and lower extremity numbness and tingling. She states that she normally has baseline diabetic peripheral neuropathy, however it was much worse this morning. She denies any chest pain, shortness of breath, nausea or vomiting. She denies any recent illness. Upon evaluation in emergency department, the numbness and tingling are still present. The history is provided by the patient. Review of Systems   Constitutional: Negative for chills and fever. HENT: Negative for ear pain, sinus pressure and sore throat. Eyes: Negative for pain, discharge and redness. Respiratory: Negative for cough, shortness of breath and wheezing. Cardiovascular: Negative for chest pain. Gastrointestinal: Negative for abdominal distention, diarrhea, nausea and vomiting. Genitourinary: Negative for dysuria and frequency. Musculoskeletal: Negative for arthralgias and back pain. Skin: Negative for rash and wound. Neurological: Positive for numbness. Negative for weakness and headaches. Hematological: Negative for adenopathy. All other systems reviewed and are negative. Physical Exam   Constitutional: She is oriented to person, place, and time. She appears well-developed and well-nourished. HENT:   Head: Normocephalic and atraumatic. Eyes: Pupils are equal, round, and reactive to light. Neck: Normal range of motion. Neck supple. Cardiovascular: Normal rate, regular rhythm and normal heart sounds. No murmur heard. Pulmonary/Chest: Effort normal and breath sounds normal. No respiratory distress. She has no wheezes. She has no rales. Abdominal: Soft. Bowel sounds are normal. There is no tenderness.  There is no arthritis) [facility] and RBBB. Past Surgical History:  has a past surgical history that includes Thyroid surgery; Cholecystectomy; Hysterectomy; Carpal tunnel release; lumbar fusion; other surgical history; Breast lumpectomy ([id number]); and joint replacement (Bilateral). Social History:  reports that she has never smoked. She has never used smokeless tobacco. She reports that she does not drink alcohol or use drugs. Family History: family history includes Cancer in her brother and father; Cancer (age of onset: 36) in her mother; Diabetes in her son; High Blood Pressure in her son; High Cholesterol in her son; Hypertension in her son; Neuropathy in her son. The patients home medications have been reviewed.     Allergies: Aspirin    -------------------------------------------------- RESULTS -------------------------------------------------    LABS:  Results for orders placed or performed during the hospital encounter of 04/09/19   CBC Auto Differential   Result Value Ref Range    WBC 6.8 4.5 - 11.5 E9/L    RBC 3.97 3.50 - 5.50 E12/L    Hemoglobin 12.1 11.5 - 15.5 g/dL    Hematocrit 36.2 34.0 - 48.0 %    MCV 91.2 80.0 - 99.9 fL    MCH 30.5 26.0 - 35.0 pg    MCHC 33.4 32.0 - 34.5 %    RDW 12.9 11.5 - 15.0 fL    Platelets 576 242 - 705 E9/L    MPV 9.9 7.0 - 12.0 fL    Neutrophils % 68.3 43.0 - 80.0 %    Immature Granulocytes % 0.3 0.0 - 5.0 %    Lymphocytes % 19.5 (L) 20.0 - 42.0 %    Monocytes % 6.2 2.0 - 12.0 %    Eosinophils % 4.8 0.0 - 6.0 %    Basophils % 0.9 0.0 - 2.0 %    Neutrophils # 4.66 1.80 - 7.30 E9/L    Immature Granulocytes # 0.02 E9/L    Lymphocytes # 1.33 (L) 1.50 - 4.00 E9/L    Monocytes # 0.42 0.10 - 0.95 E9/L    Eosinophils # 0.33 0.05 - 0.50 E9/L    Basophils # 0.06 0.00 - 0.20 E9/L   Comprehensive Metabolic Panel w/ Reflex to MG   Result Value Ref Range    Sodium 144 132 - 146 mmol/L    Potassium reflex Magnesium 3.5 3.5 - 5.0 mmol/L    Chloride 111 (H) 98 - 107 mmol/L    CO2 25 22 - 29 mmol/L    Anion Gap 8 7 - 16 mmol/L    Glucose 130 (H) 74 - 99 mg/dL    BUN 12 8 - 23 mg/dL    CREATININE 1.0 0.5 - 1.0 mg/dL    GFR Non-African American 54 >=60 mL/min/1.73    GFR African American >60     Calcium 9.2 8.6 - 10.2 mg/dL    Total Protein 6.5 6.4 - 8.3 g/dL    Alb 3.3 (L) 3.5 - 5.2 g/dL    Total Bilirubin 0.4 0.0 - 1.2 mg/dL    Alkaline Phosphatase 79 35 - 104 U/L    ALT 8 0 - 32 U/L    AST 11 0 - 31 U/L   Lactic Acid, Plasma   Result Value Ref Range    Lactic Acid 1.5 0.5 - 2.2 mmol/L   Troponin   Result Value Ref Range    Troponin <0.01 0.00 - 0.03 ng/mL   Lipase   Result Value Ref Range    Lipase 21 13 - 60 U/L   Protime-INR   Result Value Ref Range    Protime 12.0 9.3 - 12.4 sec    INR 1.0    TSH without Reflex   Result Value Ref Range    TSH 4.600 (H) 0.270 - 4.200 uIU/mL   Urinalysis, reflex to microscopic   Result Value Ref Range    Color, UA Yellow Straw/Yellow    Clarity, UA Clear Clear    Glucose, Ur Negative Negative mg/dL    Bilirubin Urine Negative Negative    Ketones, Urine Negative Negative mg/dL    Specific Gravity, UA 1.010 1.005 - 1.030    Blood, Urine Negative Negative    pH, UA 6.5 5.0 - 9.0    Protein, UA Negative Negative mg/dL    Urobilinogen, Urine 0.2 <2.0 E.U./dL    Nitrite, Urine POSITIVE (A) Negative    Leukocyte Esterase, Urine SMALL (A) Negative   Magnesium   Result Value Ref Range    Magnesium 1.6 1.6 - 2.6 mg/dL   Microscopic Urinalysis   Result Value Ref Range    WBC, UA 2-5 0 - 5 /HPF    RBC, UA NONE 0 - 2 /HPF    Bacteria, UA FEW (A) /HPF       RADIOLOGY:  Ct Head Wo Contrast    Result Date: 2019  Patient MRN:  [id number] : 1940 Age: 67 years Gender: Female Order Date:  2019 8:30 AM EXAM: CT HEAD WO CONTRAST Technique: Low-dose CT  acquisition technique included one of following options; 1 . Automated exposure control, 2. Adjustment of MA and or KV according to patient's size. 3. Use of interactive reconstruction. Dosage: 1057.6 mGy-cm.  INDICATION: weakness, eval for cva  COMPARISON: 2019 FINDINGS:  There is mild chronic ischemic/degenerative changes in the white matter. There is no mass effect, edema or hemorrhage. No extra-axial fluid collections are noted. There are no acute infarcts. No skull or sinus abnormality is noted. No acute process. There is mild chronic ischemic/degenerative changes in the white matter     Xr Chest Portable    Result Date: 2019  Patient MRN:  [id number] : 1940 Age: 67 years Gender: Female Order Date:  2019 8:30 AM Exam: XR CHEST PORTABLE Number of views: Portable upright AP view of the chest, 1 image(s) Indication: Weakness Comparison: Chest radiograph dated 2019. FINDINGS:  The cardiomediastinal silhouette is unremarkable. There are nodular opacities in the right lung base, similar to the prior study, which may be secondary to atelectasis. Otherwise, no airspace consolidation is appreciated. There is diffuse pulmonary interstitial prominence, which may be due to edema or chronic lung disease. There is no pleural effusion or pneumothorax. Persistent opacities at the right lung base, which may be due to atelectasis. Otherwise, no sign of acute airspace disease. Bilateral diffuse pulmonary interstitial prominence, suggestive of mild pulmonary edema or chronic lung disease.          ------------------------- NURSING NOTES AND VITALS REVIEWED ---------------------------  Date / Time Roomed:  2019  8:14 AM  ED Bed Assignment:      The nursing notes within the ED encounter and vital signs as below have been reviewed.      Patient Vitals for the past 24 hrs:   BP Temp Pulse Resp SpO2 Height Weight   19 1026 139/80 -- 90 16 97 % -- --   19 0822 (!) 149/86 96.7 °F (35.9 °C) 84 -- 98 % 5' 4\" (1.626 m) 190 lb (86.2 kg)       Oxygen Saturation Interpretation: Normal    ------------------------------------------ PROGRESS NOTES ------------------------------------------         Counseling:  RAMIRO have spoken with the patient and discussed todays results, in addition to providing specific details for the plan of care and counseling regarding the diagnosis and prognosis. Their questions are answered at this time and they are agreeable with the plan of admission.    --------------------------------- ADDITIONAL PROVIDER NOTES ---------------------------------  Consultations:  Spoke with Dr. Ezra Knutson. Discussed case. They will admit the patient. This patient's ED course included: a personal history and physicial examination    This patient has remained hemodynamically stable during their ED course. Diagnosis:  1. Cerebrovascular accident (CVA), unspecified mechanism ([facility]        Disposition:  Patient's disposition: Admit to telemetry  Patient's condition is stable.            Luz Elena Topete DO  Resident  04/09/19 [id number]

## 2019-04-10 ENCOUNTER — TELEPHONE (OUTPATIENT)
Dept: ADMINISTRATIVE | Age: 79
End: 2019-04-10

## 2019-04-10 ENCOUNTER — APPOINTMENT (OUTPATIENT)
Dept: MRI IMAGING | Age: 79
End: 2019-04-10
Payer: MEDICARE

## 2019-04-10 PROBLEM — G93.41 METABOLIC ENCEPHALOPATHY: Status: RESOLVED | Noted: 2019-02-18 | Resolved: 2019-04-10

## 2019-04-10 LAB
ANION GAP SERPL CALCULATED.3IONS-SCNC: 10 MMOL/L (ref 7–16)
BASOPHILS ABSOLUTE: 0.06 E9/L (ref 0–0.2)
BASOPHILS RELATIVE PERCENT: 0.8 % (ref 0–2)
BUN BLDV-MCNC: 10 MG/DL (ref 8–23)
CALCIUM SERPL-MCNC: 9.1 MG/DL (ref 8.6–10.2)
CHLORIDE BLD-SCNC: 107 MMOL/L (ref 98–107)
CHOLESTEROL, TOTAL: 148 MG/DL (ref 0–199)
CO2: 26 MMOL/L (ref 22–29)
CREAT SERPL-MCNC: 1 MG/DL (ref 0.5–1)
EKG ATRIAL RATE: 73 BPM
EKG P AXIS: 73 DEGREES
EKG P-R INTERVAL: 182 MS
EKG Q-T INTERVAL: 414 MS
EKG QRS DURATION: 134 MS
EKG QTC CALCULATION (BAZETT): 456 MS
EKG R AXIS: -47 DEGREES
EKG T AXIS: 62 DEGREES
EKG VENTRICULAR RATE: 73 BPM
EOSINOPHILS ABSOLUTE: 0.41 E9/L (ref 0.05–0.5)
EOSINOPHILS RELATIVE PERCENT: 5.6 % (ref 0–6)
GFR AFRICAN AMERICAN: >60
GFR NON-AFRICAN AMERICAN: 54 ML/MIN/1.73
GLUCOSE BLD-MCNC: 102 MG/DL (ref 74–99)
HBA1C MFR BLD: 5.4 % (ref 4–5.6)
HCT VFR BLD CALC: 34.6 % (ref 34–48)
HDLC SERPL-MCNC: 31 MG/DL
HEMOGLOBIN: 11.6 G/DL (ref 11.5–15.5)
IMMATURE GRANULOCYTES #: 0.02 E9/L
IMMATURE GRANULOCYTES %: 0.3 % (ref 0–5)
LDL CHOLESTEROL CALCULATED: 91 MG/DL (ref 0–99)
LYMPHOCYTES ABSOLUTE: 1.49 E9/L (ref 1.5–4)
LYMPHOCYTES RELATIVE PERCENT: 20.4 % (ref 20–42)
MAGNESIUM: 1.6 MG/DL (ref 1.6–2.6)
MCH RBC QN AUTO: 30.6 PG (ref 26–35)
MCHC RBC AUTO-ENTMCNC: 33.5 % (ref 32–34.5)
MCV RBC AUTO: 91.3 FL (ref 80–99.9)
METER GLUCOSE: 111 MG/DL (ref 74–99)
METER GLUCOSE: 136 MG/DL (ref 74–99)
METER GLUCOSE: 152 MG/DL (ref 74–99)
METER GLUCOSE: 200 MG/DL (ref 74–99)
MONOCYTES ABSOLUTE: 0.49 E9/L (ref 0.1–0.95)
MONOCYTES RELATIVE PERCENT: 6.7 % (ref 2–12)
NEUTROPHILS ABSOLUTE: 4.82 E9/L (ref 1.8–7.3)
NEUTROPHILS RELATIVE PERCENT: 66.2 % (ref 43–80)
PDW BLD-RTO: 13 FL (ref 11.5–15)
PLATELET # BLD: 183 E9/L (ref 130–450)
PMV BLD AUTO: 10.5 FL (ref 7–12)
POTASSIUM REFLEX MAGNESIUM: 3.2 MMOL/L (ref 3.5–5)
RBC # BLD: 3.79 E12/L (ref 3.5–5.5)
RHEUMATOID FACTOR: <10 IU/ML (ref 0–13)
SODIUM BLD-SCNC: 143 MMOL/L (ref 132–146)
TRIGL SERPL-MCNC: 129 MG/DL (ref 0–149)
VITAMIN B-12: 278 PG/ML (ref 211–946)
VLDLC SERPL CALC-MCNC: 26 MG/DL
WBC # BLD: 7.3 E9/L (ref 4.5–11.5)

## 2019-04-10 PROCEDURE — 6370000000 HC RX 637 (ALT 250 FOR IP): Performed by: INTERNAL MEDICINE

## 2019-04-10 PROCEDURE — 2580000003 HC RX 258: Performed by: INTERNAL MEDICINE

## 2019-04-10 PROCEDURE — 72141 MRI NECK SPINE W/O DYE: CPT

## 2019-04-10 PROCEDURE — 80061 LIPID PANEL: CPT

## 2019-04-10 PROCEDURE — 94640 AIRWAY INHALATION TREATMENT: CPT

## 2019-04-10 PROCEDURE — 97161 PT EVAL LOW COMPLEX 20 MIN: CPT | Performed by: PHYSICAL THERAPIST

## 2019-04-10 PROCEDURE — 36415 COLL VENOUS BLD VENIPUNCTURE: CPT

## 2019-04-10 PROCEDURE — 96366 THER/PROPH/DIAG IV INF ADDON: CPT

## 2019-04-10 PROCEDURE — 97165 OT EVAL LOW COMPLEX 30 MIN: CPT

## 2019-04-10 PROCEDURE — 96367 TX/PROPH/DG ADDL SEQ IV INF: CPT

## 2019-04-10 PROCEDURE — 96376 TX/PRO/DX INJ SAME DRUG ADON: CPT

## 2019-04-10 PROCEDURE — 86431 RHEUMATOID FACTOR QUANT: CPT

## 2019-04-10 PROCEDURE — 6360000002 HC RX W HCPCS: Performed by: INTERNAL MEDICINE

## 2019-04-10 PROCEDURE — 72146 MRI CHEST SPINE W/O DYE: CPT

## 2019-04-10 PROCEDURE — 70551 MRI BRAIN STEM W/O DYE: CPT

## 2019-04-10 PROCEDURE — G0378 HOSPITAL OBSERVATION PER HR: HCPCS

## 2019-04-10 PROCEDURE — 82607 VITAMIN B-12: CPT

## 2019-04-10 PROCEDURE — 97530 THERAPEUTIC ACTIVITIES: CPT

## 2019-04-10 PROCEDURE — 99218 PR INITIAL OBSERVATION CARE/DAY 30 MINUTES: CPT | Performed by: PSYCHIATRY & NEUROLOGY

## 2019-04-10 PROCEDURE — 72148 MRI LUMBAR SPINE W/O DYE: CPT

## 2019-04-10 PROCEDURE — 80048 BASIC METABOLIC PNL TOTAL CA: CPT

## 2019-04-10 PROCEDURE — 85025 COMPLETE CBC W/AUTO DIFF WBC: CPT

## 2019-04-10 PROCEDURE — 82962 GLUCOSE BLOOD TEST: CPT

## 2019-04-10 PROCEDURE — 83036 HEMOGLOBIN GLYCOSYLATED A1C: CPT

## 2019-04-10 PROCEDURE — 97530 THERAPEUTIC ACTIVITIES: CPT | Performed by: PHYSICAL THERAPIST

## 2019-04-10 PROCEDURE — 83735 ASSAY OF MAGNESIUM: CPT

## 2019-04-10 RX ORDER — ATORVASTATIN CALCIUM 10 MG/1
10 TABLET, FILM COATED ORAL DAILY
Qty: 30 TABLET | Refills: 0 | Status: SHIPPED | OUTPATIENT
Start: 2019-04-10 | End: 2019-04-15 | Stop reason: SDUPTHER

## 2019-04-10 RX ORDER — ASPIRIN 81 MG/1
81 TABLET ORAL DAILY
Qty: 30 TABLET | Refills: 0 | Status: SHIPPED | OUTPATIENT
Start: 2019-04-10 | End: 2020-07-21

## 2019-04-10 RX ORDER — POTASSIUM CHLORIDE 20 MEQ/1
40 TABLET, EXTENDED RELEASE ORAL ONCE
Status: COMPLETED | OUTPATIENT
Start: 2019-04-10 | End: 2019-04-10

## 2019-04-10 RX ORDER — MAGNESIUM SULFATE IN WATER 40 MG/ML
2 INJECTION, SOLUTION INTRAVENOUS ONCE
Status: COMPLETED | OUTPATIENT
Start: 2019-04-10 | End: 2019-04-10

## 2019-04-10 RX ADMIN — IPRATROPIUM BROMIDE AND ALBUTEROL SULFATE 3 ML: .5; 3 SOLUTION RESPIRATORY (INHALATION) at 19:39

## 2019-04-10 RX ADMIN — CEFTRIAXONE SODIUM 1 G: 1 INJECTION, POWDER, FOR SOLUTION INTRAMUSCULAR; INTRAVENOUS at 13:47

## 2019-04-10 RX ADMIN — INSULIN LISPRO 1 UNITS: 100 INJECTION, SOLUTION INTRAVENOUS; SUBCUTANEOUS at 23:42

## 2019-04-10 RX ADMIN — Medication 10 ML: at 23:40

## 2019-04-10 RX ADMIN — IPRATROPIUM BROMIDE AND ALBUTEROL SULFATE 3 ML: .5; 3 SOLUTION RESPIRATORY (INHALATION) at 10:15

## 2019-04-10 RX ADMIN — MIRTAZAPINE 30 MG: 15 TABLET, FILM COATED ORAL at 23:39

## 2019-04-10 RX ADMIN — MAGNESIUM SULFATE HEPTAHYDRATE 2 G: 40 INJECTION, SOLUTION INTRAVENOUS at 13:48

## 2019-04-10 RX ADMIN — IPRATROPIUM BROMIDE AND ALBUTEROL SULFATE 3 ML: .5; 3 SOLUTION RESPIRATORY (INHALATION) at 15:56

## 2019-04-10 RX ADMIN — POTASSIUM CHLORIDE 40 MEQ: 20 TABLET, EXTENDED RELEASE ORAL at 13:47

## 2019-04-10 RX ADMIN — Medication 10 ML: at 10:09

## 2019-04-10 RX ADMIN — METFORMIN HYDROCHLORIDE 500 MG: 500 TABLET ORAL at 10:00

## 2019-04-10 RX ADMIN — LEVOTHYROXINE SODIUM 150 MCG: 150 TABLET ORAL at 06:16

## 2019-04-10 RX ADMIN — RAMIPRIL 10 MG: 5 CAPSULE ORAL at 09:59

## 2019-04-10 ASSESSMENT — PAIN SCALES - GENERAL: PAINLEVEL_OUTOF10: 0

## 2019-04-10 NOTE — CARE COORDINATION
PATRICIA Discharge planning:    SW met with patient. Patient lives with her son and grandson in a 1 story home with no steps to enter. Patient owns a wc, ww, shower chair, and bsc. Patient has no history of conchita or hhc. Patients plan is to return home when medically stable and would like to have [facility] hhc. Will need hhc orders. Patients son to provide the transportation. Will follow.  Tee [place], [place]

## 2019-04-10 NOTE — PROGRESS NOTES
Elina served Dr. Olson Captain left message about patient having discharge order in, and testing not complete at this time. Awaiting return call or orders in chart.

## 2019-04-10 NOTE — PROCEDURES
Called floor to inform RN that we need the MRI screening form completed for this patient but the phone rang more then 7 times with no answer.  4/9/19 at 9:45 pm

## 2019-04-10 NOTE — PROGRESS NOTES
[facility]  Physical Therapy Initial Evaluation    Name: Sonia Rhodes  : [birth year]  MRN: [id number]    Date of Service: 4/10/2019        Evaluating Therapist: Rashida Gerber PT, DPT       Equipment Recommendations: w/w     Room #: 0620/1844-H  DIAGNOSIS: CVa  PRECAUTIONS: fall risk    Social:  Pt lives with son and grandson in a 1st floor plan 7 steps and 1 rails to enter. Prior to admission pt walked with w/w. Initial Evaluation  Date: 4/10 Treatment      Short Term/ Long Term   Goals   Was pt agreeable to Eval/treatment? yes     Does pt have pain? Hands and feet 'now and then'     Bed Mobility  Rolling: SBA  Supine to sit: SBA with HOB completely elevated and with significantly increased time and effort. Sit to supine: SBA  Scooting: NT  Modified independent   Transfers Sit to stand: mod A  Stand to sit: SBA  Stand pivot: NT  CGA   Ambulation    75 feet with w/w with SB/CGA  100+ feet with w/w with SBA   Stair negotiation: ascended and descended  NT  4+ steps with 1 rail with min A    AM-PAC Raw Score 14/24       Pt is alert and Oriented x2   ROM:  L LE grossly WFL  R LE grossly WFL hip and knee, ankle limited. Strength:   L LE grossly 4/5  R LE hip and knee grossly 4/5, ankle DF 2/5, ankle PF 3+/5  Balance: standing SB/CGA with w/w   Sensation: pt reports numbness in hands R worse than L. Pt c/o numbness/tingling in feet but less than in hands. Endurance: fair +       ASSESSMENT  Pt displays functional ability as noted in the objective portion of this evaluation. Comments/Treatment:  Pt found laying in bed agreeable to evaluation. Pt instructed in mobility. Pt demonstrates difficulty with supine to sit. Noted pt has R foot drop with ambulation. Pt reports this is chronic but worse than it was. Pt reports she doesn't have a brace. Recommend continued physical therapy and also orthotist consult. Pt left laying in bed with call button in reach end of evaluation.       Patient education  Pt educated on mobility. Patient response to education:   Pt verbalized understanding Pt demonstrated skill Pt requires further education in this area     x     Rehab potential is Good for reaching above PT goals. Pts/ family goals   1. None stated. Patient and or family understand(s) diagnosis, prognosis, and plan of care. PLAN  PT care will be provided in accordance with the objectives noted above. Whenever appropriate, clear delegation orders will be provided for nursing staff. Exercises and functional mobility practice will be used as well as appropriate assistive devices or modalities to obtain goals. Patient and family education will also be administered as needed. Frequency of treatments will be 2-5x/week x 4-5 days.     Time in: 1315  Time out: 1342  Evaluation + 10  minutes tx time    Rene Lew PT, DPT   License number:  [id number]

## 2019-04-10 NOTE — HOME CARE
Referral received from [facility] for hhc due to CVA. Call placed to patient who verified all demos. Call placed to Dr Naseem Acevedo office and spoke with Shruti Cobian who explained that doctor has discharged patient and is no longer her pcp. Call placed to , Oneil Rizzo, to inform that patient does not have pcp to follow home care and [facility] is unable to see patient until after patient has been established with pcp who will agree to follow home care. Oneil Rizzo verbalized full understanding and explained that she will message nursing to set patient up with pcp appt.  Yuliya Monday dori

## 2019-04-10 NOTE — PROGRESS NOTES
OCCUPATIONAL THERAPY INITIAL EVALUATION      Date:4/10/2019  Patient Name: Pro Duran  MRN: [id number]  : 1940  Room: [address] [place] [address]-A      Evaluating OT: Charlene Min, OTR/L #[id number]    AM-PAC Daily Activity Raw Score:     Recommended Adaptive Equipment: To be further assessed      Diagnosis: CVA       Pertinent Medical History: DM, RA, COPD, OA     Precautions:  Falls,      Home Living: Pt lives with son, grandson and greatgrandson in a 1 story with 7 step(s) to enter from garage,1 rail(s); bed/bath on 1st   Bathroom setup: walk in shower with shower chair and grab bars,   Equipment owned: BSC, ww, w/c,shower chair  Prior Level of Function: Modified Hendricks  with ADLs , Modified Hendricks  with IADLs; using ww for ambulation.      Pain Level: hands and feet  Cognition: A&O: 3/4; Follows 2 step directions   Memory:  fair    Sequencing:  fair    Problem solving:  fair    Judgement/safety:  fair      Functional Assessment:   Initial Eval Status  Date: 4/10/19 Treatment Status  Date: Short Term Goals  Treatment frequency: PRN    Feeding Independent       Grooming Stand by Assist   Modified Hendricks    UB Dressing Stand by Assist   Modified Hendricks    LB Dressing Minimal Assist  Pt has AE at home   Modified Hendricks    Bathing Minimal Assist  Modified Hendricks    Toileting Stand by Assist   Modified Hendricks    Bed Mobility  Supine to sit: Stand by Assist and increased effort with HOB elevated  Sit to supine: Stand by Assist   Supine to sit: Modified Hendricks   Sit to supine: Modified Hendricks    Functional Transfers Sit to stand: Stand by Assist   Stand to sit: Stand by Assist   Stand pivot: Stand by Assist   Modified Hendricks    Functional Mobility SBA with [employer]  Functional distance  Mod indep with ww  Good activity tolerance   Balance Sitting:     Static:  wfl    Dynamic:SBA  Standing: SBA     Activity Tolerance fair  good   Visual/  Perceptual Glasses: yes Hand dominance: R  UE ROM: RUE:  WFL  LUE:  WFL  Strength: RUE: grossly 4/5 LUE: grossly 4/5   Strength: B WFL  Fine Motor Coordination:  B WFL    Hearing: WFL  Sensation:  c/o numbness or tingling B hands and feet  Tone:  WFL  Edema: None noted                            Comments/Treatment: Upon arrival, patient in bed. Therapist educated and facilitated pt on techniques to increase safety and independence with bed mobility, ADLs,  functional transfers, and functional mobility. Pt demonstrated difficulty with transfers supine to sit EOB. Pt demonstrating gppd understanding of education/techniques,  requiring additional training / education to improve indep with self care and mobility. At end of session, patient in bed with call light and phone within reach, all lines and tubes intact. Pt would benefit from continued OT to increase functional independence and quality of life. Eval Complexity: Low    Assessment of current deficits   Functional mobility ? ADLs ? Strength ? Cognition ? Functional transfers  ? IADLs ? Safety Awareness ? Endurance ? Fine Motor Coordination ? Balance ? Vision/perception ? Sensation ? Gross Motor Coordination ? ROM ? Delirium ? Motor Control ? Plan of Care:   ADL retraining ? Equipment needs ? Neuromuscular re-education ? Energy Conservation Techniques ? Functional Transfer training ? Patient and/or Family Education ? Functional Mobility training ? Environmental Modifications ? Cognitive re-training ? Compensatory techniques for ADLs ? Splinting Needs ? Positioning to improve overall function ? Therapeutic Activity ? Therapeutic Exercise  ? Visual/Perceptual: ?    Delirium prevention/treatment  ? Other:  ?    Rehab Potential: Good for established goals    Patient / Family Goal:  Not stated     Patient and/or family were instructed diagnosis, prognosis/goals and plan of care. Demonstrated fair understanding.     ?

## 2019-04-10 NOTE — CONSULTS
Amy Pool is a 67 y.o. right handed female    Neurology was consulted for CVA    Past Medical History:     Past Medical History:   Diagnosis Date    Asthma     Blood transfusion     Breast cancer ([facility]     COPD (chronic obstructive pulmonary disease) ([facility]     Diabetes mellitus ([facility]     Diabetic peripheral neuropathy ([facility]     DM type 2 (diabetes mellitus, type 2) ([facility]     Hypertension     Hypothyroidism     Irritable bowel syndrome     Obesity (BMI 30-39. 9)     Osteoarthritis     rheumatoid and osteo    RA (rheumatoid arthritis) ([facility]     RBBB        Past Surgical History:     Past Surgical History:   Procedure Laterality Date    BREAST LUMPECTOMY  [id number]    RIGHT    CARPAL TUNNEL RELEASE      b/l    CHOLECYSTECTOMY      HYSTERECTOMY      partial    JOINT REPLACEMENT Bilateral     bilateral TKR    LUMBAR FUSION      L4L5    OTHER SURGICAL HISTORY      thumb joint replacement rt     THYROID SURGERY         Allergies:     Aspirin    Medications:     Prior to Admission medications    Medication Sig Start Date End Date Taking?  Authorizing Provider   metFORMIN (GLUCOPHAGE) 500 MG tablet Take 1 tablet by mouth 2 times daily (with meals)  Patient taking differently: Take 500 mg by mouth daily (with breakfast) ONLY TAKES 2ND DOSE IF SUGAR IS HIGH 2/21/19  Yes Vadim Ramos MD   ramipril (ALTACE) 10 MG capsule Take 1 capsule by mouth daily 2/21/19  Yes Vadim Ramos MD   levothyroxine (SYNTHROID) 150 MCG tablet Take 1 tablet by mouth Daily 2/22/19  Yes Vadim Ramos MD   mirtazapine (REMERON) 30 MG tablet Take 1 tablet by mouth nightly 4/3/19   Historical Provider, MD   ipratropium-albuterol (DUONEB) 0.5-2.5 (3) MG/3ML SOLN nebulizer solution Inhale 3 mLs into the lungs 4 times daily 2/21/19   Vadim Ramos MD       Social History:     Denies ETOH, tobacco, or illicit drugs    Review of Systems:     No chest pain or palpitations  No SOB  No vertigo, lightheadedness or loss of consciousness  No falls, tripping or stumbling  No incontinence of bowels or bladder  No itching or bruising appreciated  Positive for bilateral below knees and hand weakness and numbness/tingling    ROS is otherwise negative     Family History:     Family History   Problem Relation Age of Onset    Cancer Mother 36        breast    Cancer Father         bone marrow    Cancer Brother         prostate    Diabetes Son     Neuropathy Son     High Blood Pressure Son     High Cholesterol Son     Hypertension Son         History of Present Illness:     Patient is a 67years old female w/hx of RA, DM, HTN here due to below knees and hand weakness and numbness/tingling. Patient states that starting around 3 am she has weakness below both of her knees worse and her hands worse on the right side. She denies previous similar episodes. She states that nothing made her symptoms worse or better. She states that her symptoms have improved but not resolved. She denies speech difficulties, change in vision/hearing, recent falls/injuries, headache, neck pain/stiffness, N/V, chest pain, or sob. Objective:     BP (!) 148/75   Pulse 77   Temp 97.5 °F (36.4 °C) (Temporal)   Resp 18   Ht 5' 4\" (1.626 m)   Wt 185 lb 6.4 oz (84.1 kg)   SpO2 96%   BMI 31.82 kg/m²     General appearance: alert, appears stated age, cooperative and no distress  Head: normocephalic, without obvious abnormality, atraumatic  Eyes: conjunctivae/corneas clear.  .  Neck: no adenopathy, no carotid bruit, supple, symmetrical, trachea midline and thyroid not enlarged, symmetric, no tenderness/mass/nodules  Lungs: clear to auscultation bilaterally  Heart: regular rate and rhythm, S1, S2 normal  Extremities: edema BLEs, atraumatic, no cyanosis  Pulses: 2+ and symmetric  Skin: color, texture, turgor normal    Mental Status: alert, oriented x1 to self only, thought content appropriate    Speech: no dysarthria  Language: no aphasias      NIH Stroke Scale: 6    1A: Level of Consciousness 0 - alert; keenly responsive   1B: Ask Month and Age 2 - answers neither question correctly   1C:  Tell Patient To Open and Close Eyes, then Hand  Squeeze 0 - performs both tasks correctly   2: Test Horizontal Extraocular Movements 0 - normal   3: Test Visual Fields 0 - no visual loss   4: Test Facial Palsy 0 - normal symmetric movement   5A: Test Left Arm Motor Drift 0 - no drift, limb holds 90 (or 45) degrees for full 10 seconds   5B: Test Right Arm Motor Drift 0 - no drift, limb holds 90 (or 45) degrees for full 10 seconds   6A: Test Left Leg Motor Drift 2 - some effort against gravity; leg falls to bed by 5 seconds but has some effort against gravity   6B: Test Right Leg Motor Drift 2 - some effort against gravity; leg falls to bed by 5 seconds but has some effort against gravity   7: Test Limb Ataxia   (FNF/Heel-Shin) 0 - absent   8: Test Sensation 0 - normal; no sensory loss   9: Test Language/Aphasia 0 - no aphasia, normal   10: Test Dysarthria 0 - normal   11: Test Extinction/Inattention 0 - no abnormality   Total 6       Cranial Nerves:  I: smell    II: visual acuity     II: visual fields Full    II: pupils STIVEN   III,VII: ptosis None   III,IV,VI: extraocular muscles  EOMI without nystagmus    V: mastication Normal   V: facial light touch sensation  Normal       VII: facial muscle function - upper  Normal   VII: facial muscle function - lower Normal   VIII: hearing Normal   IX: soft palate elevation  Normal       XI: trapezius strength  5/5   XI: sternocleidomastoid strength 5/5   XI: neck extension strength  5/5   XII: tongue strength  Normal     Motor:  Muscle strength 5/5 BUEs, 3/5 BLEs    Sensory:  Sensation intact and equal bilateral upper and lower extremities    Coordination:   Finger/nose unremarkable    DTR:   DTRs 2/4 bilateral upper and lower extremities      Laboratory/Radiology:     CBC with Differential:    Lab Results   Component Value Date    WBC 7.3 04/10/2019    RBC 3.79 04/10/2019    HGB 11.6 04/10/2019    HCT 34.6 04/10/2019     04/10/2019    MCV 91.3 04/10/2019    MCH 30.6 04/10/2019    MCHC 33.5 04/10/2019    RDW 13.0 04/10/2019    LYMPHOPCT 20.4 04/10/2019    MONOPCT 6.7 04/10/2019    BASOPCT 0.8 04/10/2019    MONOSABS 0.49 04/10/2019    LYMPHSABS 1.49 04/10/2019    EOSABS 0.41 04/10/2019    BASOSABS 0.06 04/10/2019     CMP:    Lab Results   Component Value Date     04/10/2019    K 3.2 04/10/2019     04/10/2019    CO2 26 04/10/2019    BUN 10 04/10/2019    CREATININE 1.0 04/10/2019    GFRAA >60 04/10/2019    LABGLOM 54 04/10/2019    GLUCOSE 102 04/10/2019    PROT 6.5 04/09/2019    LABALBU 3.3 04/09/2019    CALCIUM 9.1 04/10/2019    BILITOT 0.4 04/09/2019    ALKPHOS 79 04/09/2019    AST 11 04/09/2019    ALT 8 04/09/2019     BMP:    Lab Results   Component Value Date     04/10/2019    K 3.2 04/10/2019     04/10/2019    CO2 26 04/10/2019    BUN 10 04/10/2019    LABALBU 3.3 04/09/2019    CREATININE 1.0 04/10/2019    CALCIUM 9.1 04/10/2019    GFRAA >60 04/10/2019    LABGLOM 54 04/10/2019    GLUCOSE 102 04/10/2019     Hepatic Function Panel:    Lab Results   Component Value Date    ALKPHOS 79 04/09/2019    ALT 8 04/09/2019    AST 11 04/09/2019    PROT 6.5 04/09/2019    BILITOT 0.4 04/09/2019    LABALBU 3.3 04/09/2019     Calcium:    Lab Results   Component Value Date    CALCIUM 9.1 04/10/2019     Ionized Calcium:  No results found for: IONCA  Magnesium:    Lab Results   Component Value Date    MG 1.6 04/10/2019     Phosphorus:  No results found for: PHOS  LDH:  No results found for: LDH  PT/INR:    Lab Results   Component Value Date    PROTIME 12.0 04/09/2019    INR 1.0 04/09/2019     Warfarin PT/INR:  No components found for: PTPATWAR, PTINRWAR  PTT:    Lab Results   Component Value Date    APTT 28.3 08/22/2017   [APTT}  Troponin:    Lab Results   Component Value Date    TROPONINI <0.01 04/09/2019     Last 3 Troponin:    Lab Results   Component Value Date    TROPONINI <0.01 04/09/2019    TROPONINI 0.02 02/18/2019    TROPONINI 0.08 08/22/2017     U/A:    Lab Results   Component Value Date    COLORU Yellow 04/09/2019    PROTEINU Negative 04/09/2019    PHUR 6.5 04/09/2019    LABCAST MODERATE 08/23/2017    WBCUA 2-5 04/09/2019    RBCUA NONE 04/09/2019    MUCUS Present 02/18/2019    BACTERIA FEW 04/09/2019    CLARITYU Clear 04/09/2019    SPECGRAV 1.010 04/09/2019    LEUKOCYTESUR SMALL 04/09/2019    UROBILINOGEN 0.2 04/09/2019    BILIRUBINUR Negative 04/09/2019    BLOODU Negative 04/09/2019    GLUCOSEU Negative 04/09/2019     ABG:  No results found for: PH, PCO2, PO2, HCO3, BE, THGB, TCO2, O2SAT  VBG:  No results found for: Wandalee Bellis, BEVEN, [id number]    I independently reviewed the labs and imaging studies today    Assessment / Plan:     -stroke-like symptoms / hx of DM, HTN, RA  -CT head wo 4/9/19 - unremarkable for acute changes  -MRI brain, c/t/l-spines wo pending  -US duplex bilateral carotids - 0 to 49% bilaterally w/o hemodynamically significant stenosis  -B12 level ordered  -Rheumatoid factor level ordered  -start Anti-platelet therapy  -monitor neurological status  -discuss updates/plan w/patient/family      Champ Bunn DO  10:23 AM  4/10/2019

## 2019-04-10 NOTE — H&P
capsule, Take 1 capsule by mouth daily  levothyroxine (SYNTHROID) 150 MCG tablet, Take 1 tablet by mouth Daily  mirtazapine (REMERON) 30 MG tablet, Take 1 tablet by mouth nightly  ipratropium-albuterol (DUONEB) 0.5-2.5 (3) MG/3ML SOLN nebulizer solution, Inhale 3 mLs into the lungs 4 times daily  [DISCONTINUED] insulin lispro (HUMALOG) 100 UNIT/ML injection vial, Inject 0-6 Units into the skin 3 times daily (with meals) **Low Dose Correction Algorithm** Glucose:  Dose:    No Insulin 140-199  1 Unit 200-249  2 Units 250-299  3 Units 300-349  4 Units 350-399  5 Units 400 and above 6 Units    Allergies:    Aspirin    Social History:    reports that she has never smoked. She has never used smokeless tobacco. She reports that she does not drink alcohol or use drugs. Family History:   family history includes Cancer in her brother and father; Cancer (age of onset: 36) in her mother; Diabetes in her son; High Blood Pressure in her son; High Cholesterol in her son; Hypertension in her son; Neuropathy in her son. REVIEW OF SYSTEMS:  As above in the HPI, otherwise negative    PHYSICAL EXAM:    Vitals:  BP (!) 148/75   Pulse 77   Temp 97.5 °F (36.4 °C) (Temporal)   Resp 18   Ht 5' 4\" (1.626 m)   Wt 185 lb 6.4 oz (84.1 kg)   SpO2 96%   BMI 31.82 kg/m²     General:  Awake, alert, oriented X 3. Well developed, well nourished, well groomed. No apparent distress. HEENT:  Normocephalic, atraumatic. Pupils equal, round, reactive to light. No scleral icterus. No conjunctival injection. Normal lips, teeth, and gums. No nasal discharge. Neck:  Supple  Heart:  RRR, no murmurs, gallops, rubs  Lungs:  CTA bilaterally, bilat symmetrical expansion, no wheeze, rales, or rhonchi  Abdomen:   Bowel sounds present, soft, nontender, no masses, no organomegaly, no peritoneal signs  Extremities:  No clubbing, cyanosis, or edema  Skin:  Warm and dry, no open lesions or rash  Neuro:  Cranial nerves 2-12 intact, no focal deficits. General physical deconditioning. Decreased sensation to both feet to filament testing.     Breast: deferred  Rectal: deferred  Genitalia:  deferred    LABS:    CBC with Differential:    Lab Results   Component Value Date    WBC 7.3 04/10/2019    RBC 3.79 04/10/2019    HGB 11.6 04/10/2019    HCT 34.6 04/10/2019     04/10/2019    MCV 91.3 04/10/2019    MCH 30.6 04/10/2019    MCHC 33.5 04/10/2019    RDW 13.0 04/10/2019    LYMPHOPCT 20.4 04/10/2019    MONOPCT 6.7 04/10/2019    BASOPCT 0.8 04/10/2019    MONOSABS 0.49 04/10/2019    LYMPHSABS 1.49 04/10/2019    EOSABS 0.41 04/10/2019    BASOSABS 0.06 04/10/2019     CMP:    Lab Results   Component Value Date     04/10/2019    K 3.2 04/10/2019     04/10/2019    CO2 26 04/10/2019    BUN 10 04/10/2019    CREATININE 1.0 04/10/2019    GFRAA >60 04/10/2019    LABGLOM 54 04/10/2019    GLUCOSE 102 04/10/2019    PROT 6.5 04/09/2019    LABALBU 3.3 04/09/2019    CALCIUM 9.1 04/10/2019    BILITOT 0.4 04/09/2019    ALKPHOS 79 04/09/2019    AST 11 04/09/2019    ALT 8 04/09/2019     BMP:    Lab Results   Component Value Date     04/10/2019    K 3.2 04/10/2019     04/10/2019    CO2 26 04/10/2019    BUN 10 04/10/2019    LABALBU 3.3 04/09/2019    CREATININE 1.0 04/10/2019    CALCIUM 9.1 04/10/2019    GFRAA >60 04/10/2019    LABGLOM 54 04/10/2019    GLUCOSE 102 04/10/2019     Magnesium:    Lab Results   Component Value Date    MG 1.6 04/10/2019     Phosphorus:  No results found for: PHOS  PT/INR:    Lab Results   Component Value Date    PROTIME 12.0 04/09/2019    INR 1.0 04/09/2019     PTT:    Lab Results   Component Value Date    APTT 28.3 08/22/2017   [APTT}  Troponin:    Lab Results   Component Value Date    TROPONINI <0.01 04/09/2019     Last 3 Troponin:    Lab Results   Component Value Date    TROPONINI <0.01 04/09/2019    TROPONINI 0.02 02/18/2019    TROPONINI 0.08 08/22/2017     U/A:    Lab Results   Component Value Date    COLORU Yellow 04/09/2019    PROTEINU Negative 04/09/2019    PHUR 6.5 04/09/2019    LABCAST MODERATE 08/23/2017    WBCUA 2-5 04/09/2019    RBCUA NONE 04/09/2019    MUCUS Present 02/18/2019    BACTERIA FEW 04/09/2019    CLARITYU Clear 04/09/2019    SPECGRAV 1.010 04/09/2019    LEUKOCYTESUR SMALL 04/09/2019    UROBILINOGEN 0.2 04/09/2019    BILIRUBINUR Negative 04/09/2019    BLOODU Negative 04/09/2019    GLUCOSEU Negative 04/09/2019     HgBA1c:    Lab Results   Component Value Date    LABA1C 5.4 04/10/2019     FLP:    Lab Results   Component Value Date    TRIG 129 04/10/2019    HDL 31 04/10/2019    LDLCALC 91 04/10/2019    LABVLDL 26 04/10/2019     TSH:    Lab Results   Component Value Date    TSH 4.600 04/09/2019       ASSESSMENT:      Patient Active Problem List   Diagnosis    [facility]    [facility]iatic [facility]    RA [facility]rheumatoid arthritis) [facility]    Obesity (BMI 30-39. 9)    Hypothyroidism    Irritable bowel syndrome    HTN (hypertension), benign    Diabetes mellitus type 2, uncontrolled ([address]    Acute cerebrovascular accident (CVA) ([address]         PLAN:    Stable. Stable. Stable. Continue to encourage weight loss. Continue synthroid. Stable  Blood pressure ok, continue current medications  Blood glucose ok, continue to adjust basal/bolus insulin therapy  MRI/MRA head and neck. Carotid US. Risk factor modify. Pt/Ot. Pt/Ot evaluations for discharge planning. Discharge soon.     Aleida Sinclair MD  11:48 AM  4/10/2019

## 2019-04-11 VITALS
HEIGHT: 64 IN | OXYGEN SATURATION: 98 % | DIASTOLIC BLOOD PRESSURE: 71 MMHG | SYSTOLIC BLOOD PRESSURE: 126 MMHG | RESPIRATION RATE: 16 BRPM | HEART RATE: 96 BPM | BODY MASS INDEX: 31.63 KG/M2 | TEMPERATURE: 98.7 F | WEIGHT: 185.3 LBS

## 2019-04-11 PROBLEM — R29.898 BILATERAL LEG WEAKNESS: Status: ACTIVE | Noted: 2019-04-11

## 2019-04-11 LAB
METER GLUCOSE: 111 MG/DL (ref 74–99)
METER GLUCOSE: 116 MG/DL (ref 74–99)
METER GLUCOSE: 171 MG/DL (ref 74–99)

## 2019-04-11 PROCEDURE — G0378 HOSPITAL OBSERVATION PER HR: HCPCS

## 2019-04-11 PROCEDURE — 2580000003 HC RX 258: Performed by: INTERNAL MEDICINE

## 2019-04-11 PROCEDURE — 99204 OFFICE O/P NEW MOD 45 MIN: CPT | Performed by: NEUROLOGICAL SURGERY

## 2019-04-11 PROCEDURE — 99225 PR SBSQ OBSERVATION CARE/DAY 25 MINUTES: CPT | Performed by: PHYSICIAN ASSISTANT

## 2019-04-11 PROCEDURE — 6370000000 HC RX 637 (ALT 250 FOR IP): Performed by: INTERNAL MEDICINE

## 2019-04-11 PROCEDURE — 94640 AIRWAY INHALATION TREATMENT: CPT

## 2019-04-11 PROCEDURE — 82962 GLUCOSE BLOOD TEST: CPT

## 2019-04-11 PROCEDURE — 96376 TX/PRO/DX INJ SAME DRUG ADON: CPT

## 2019-04-11 PROCEDURE — 6360000002 HC RX W HCPCS: Performed by: INTERNAL MEDICINE

## 2019-04-11 RX ADMIN — IPRATROPIUM BROMIDE AND ALBUTEROL SULFATE 3 ML: .5; 3 SOLUTION RESPIRATORY (INHALATION) at 08:09

## 2019-04-11 RX ADMIN — INSULIN LISPRO 1 UNITS: 100 INJECTION, SOLUTION INTRAVENOUS; SUBCUTANEOUS at 11:54

## 2019-04-11 RX ADMIN — LEVOTHYROXINE SODIUM 150 MCG: 150 TABLET ORAL at 06:39

## 2019-04-11 RX ADMIN — Medication 10 ML: at 10:15

## 2019-04-11 RX ADMIN — CEFTRIAXONE SODIUM 1 G: 1 INJECTION, POWDER, FOR SOLUTION INTRAMUSCULAR; INTRAVENOUS at 11:54

## 2019-04-11 RX ADMIN — RAMIPRIL 10 MG: 5 CAPSULE ORAL at 08:38

## 2019-04-11 RX ADMIN — IPRATROPIUM BROMIDE AND ALBUTEROL SULFATE 3 ML: .5; 3 SOLUTION RESPIRATORY (INHALATION) at 15:40

## 2019-04-11 RX ADMIN — METFORMIN HYDROCHLORIDE 500 MG: 500 TABLET ORAL at 08:38

## 2019-04-11 RX ADMIN — IPRATROPIUM BROMIDE AND ALBUTEROL SULFATE 3 ML: .5; 3 SOLUTION RESPIRATORY (INHALATION) at 12:18

## 2019-04-11 ASSESSMENT — PAIN SCALES - GENERAL: PAINLEVEL_OUTOF10: 0

## 2019-04-11 NOTE — CARE COORDINATION
CASE MANAGEMENT/DISCHARGE PLANNING: plan for home when stable. [address] referral was made, but they cannot begin services until after pt see's his new pcp. Initial appt with new pcp already arranged. [address] will follow in the community so that they can begin services under the new pcp's direction.   Per nursing notes, appt arranged with Dr Malachi Ramirez on 4/15

## 2019-04-11 NOTE — PROGRESS NOTES
Subjective: The patient is awake and alert. Up walking in room with walker. No problems overnight. Denies chest pain, angina, and dyspnea. Denies abdominal pain. Tolerating diet. No nausea or vomiting. Objective:    /67   Pulse 83   Temp 97.9 °F (36.6 °C) (Temporal)   Resp 16   Ht 5' 4\" (1.626 m)   Wt 185 lb 4.8 oz (84.1 kg)   SpO2 97%   BMI 31.81 kg/m²     Current medications that patient is taking have been reviewed. Heart:  RRR, no murmurs, gallops, or rubs.   Lungs:  CTA bilaterally, no wheeze, rales or rhonchi  Abd: bowel sounds present, soft, nontender, nondistended, no masses  Extrem:  No cyanosis or edema    CBC with Differential:    Lab Results   Component Value Date    WBC 7.3 04/10/2019    RBC 3.79 04/10/2019    HGB 11.6 04/10/2019    HCT 34.6 04/10/2019     04/10/2019    MCV 91.3 04/10/2019    MCH 30.6 04/10/2019    MCHC 33.5 04/10/2019    RDW 13.0 04/10/2019    LYMPHOPCT 20.4 04/10/2019    MONOPCT 6.7 04/10/2019    BASOPCT 0.8 04/10/2019    MONOSABS 0.49 04/10/2019    LYMPHSABS 1.49 04/10/2019    EOSABS 0.41 04/10/2019    BASOSABS 0.06 04/10/2019     CMP:    Lab Results   Component Value Date     04/10/2019    K 3.2 04/10/2019     04/10/2019    CO2 26 04/10/2019    BUN 10 04/10/2019    CREATININE 1.0 04/10/2019    GFRAA >60 04/10/2019    LABGLOM 54 04/10/2019    GLUCOSE 102 04/10/2019    PROT 6.5 04/09/2019    LABALBU 3.3 04/09/2019    CALCIUM 9.1 04/10/2019    BILITOT 0.4 04/09/2019    ALKPHOS 79 04/09/2019    AST 11 04/09/2019    ALT 8 04/09/2019     BMP:    Lab Results   Component Value Date     04/10/2019    K 3.2 04/10/2019     04/10/2019    CO2 26 04/10/2019    BUN 10 04/10/2019    LABALBU 3.3 04/09/2019    CREATININE 1.0 04/10/2019    CALCIUM 9.1 04/10/2019    GFRAA >60 04/10/2019    LABGLOM 54 04/10/2019    GLUCOSE 102 04/10/2019     Magnesium:    Lab Results   Component Value Date    MG 1.6 04/10/2019     Phosphorus:  No results found for: PHOS  PT/INR:    Lab Results   Component Value Date    PROTIME 12.0 04/09/2019    INR 1.0 04/09/2019     PTT:    Lab Results   Component Value Date    APTT 28.3 08/22/2017   [APTT}     Assessment:    Patient Active Problem List   Diagnosis    [facility] cancer ([facility]    Psoriatic arthritis ([facility]    RA (rheumatoid arthritis) ([facility]    Obesity (BMI 30-39. 9)    Hypothyroidism    Irritable bowel syndrome    HTN (hypertension), benign    Diabetes mellitus type 2, uncontrolled ([facility]    Acute cerebrovascular accident (CVA) ([facility]    Multifactorial gait disorder    Bilateral leg weakness       Plan:  Stable. Stable. Stable. Continue to encourage weight loss. Continue synthroid. Stable. Blood pressure ok, continue current medications  Blood glucose ok, continue to adjust basal/bolus insulin therapy  MRI negative for CVA. Possible TIA. Neurology evaluating for nerve impingement. Stable. Pt/Ot evaluations for discharge planning. Ok to discharge.     Shirley Reis    11:20 AM  4/11/2019

## 2019-04-11 NOTE — PROGRESS NOTES
Jacques Flores is a 67 y.o. female     Neurology is following for lower extremity weakness     PMH significant for RA, prior lumbar spine surgery and C5 anterolisthesis with multiple disc buldges, DMPN, HTN, hypothyroidism, breast cancer     She presented with worsening lower extremity weakness and weakness of the R arm    The weakness in the R arm has resolved    She states the weakness in her legs is the same---reports gradual worsening over the past year which has required her to use her walker more frequently.  Additionally, she states that she has chairs set up all around her house for her to sit down     No new neuro complaints today     MRI brain was negative for acute ischemic events    MRI Cspine with multilevel disc buldges, T spine with degenerative changes and L spine with severe stenosis at L2-L3     She reports numbness in her thumb and 1st and 2nd digits b/l    Reportedly with b/l carpal tunnel surgery in the past     She denied headache, visual disturbance, speech or swallowing difficulty, lightheadedness, vertigo, clumsiness    ROS otherwise negative     No family at bedside     Current Facility-Administered Medications   Medication Dose Route Frequency Provider Last Rate Last Dose    ipratropium-albuterol (DUONEB) nebulizer solution 3 mL  3 mL Inhalation 4x daily Carmella Goyal MD   3 mL at 04/11/19 1218    levothyroxine (SYNTHROID) tablet 150 mcg  150 mcg Oral Daily Carmella Goyal MD   150 mcg at 04/11/19 3110    metFORMIN (GLUCOPHAGE) tablet 500 mg  500 mg Oral Daily with breakfast Carmella Goyal MD   500 mg at 04/11/19 4639    mirtazapine (REMERON) tablet 30 mg  30 mg Oral Nightly Carmella Goyal MD   30 mg at 04/10/19 2339    ramipril (ALTACE) capsule 10 mg  10 mg Oral Daily Carmella Goyal MD   10 mg at 04/11/19 0855    sodium chloride flush 0.9 % injection 10 mL  10 mL Intravenous 2 times per day Carmella Goyal MD   10 mL at 04/11/19 1015    sodium chloride flush 0.9 % injection 10 mL  10 mL Intravenous PRN Casey White MD        magnesium hydroxide (MILK OF MAGNESIA) 400 MG/5ML suspension 30 mL  30 mL Oral Daily PRN Casey White MD        ondansetron [facility]) injection 4 mg  4 mg Intravenous Q6H PRN Casey White MD        enoxaparin (LOVENOX) injection 40 mg  40 mg Subcutaneous Daily Casey White MD        acetaminophen (TYLENOL) tablet 650 mg  650 mg Oral Q4H PRN Casey White MD        insulin lispro (HUMALOG) injection vial 0-6 Units  0-6 Units Subcutaneous TID WC Casey White MD   1 Units at 04/11/19 1154    insulin lispro (HUMALOG) injection vial 0-3 Units  0-3 Units Subcutaneous Nightly Casey White MD   1 Units at 04/10/19 2342    hydrALAZINE (APRESOLINE) injection 10 mg  10 mg Intravenous Q6H PRN Casey White MD        glucose (GLUTOSE) 40 % oral gel 15 g  15 g Oral PRN Casey White MD        dextrose 50 % solution 12.5 g  12.5 g Intravenous PRN Casey White MD        glucagon (rDNA) injection 1 mg  1 mg Intramuscular PRN Casey White MD        dextrose 5 % solution  100 mL/hr Intravenous PRN Casey White MD        cefTRIAXone (ROCEPHIN) 1 g in sterile water 10 mL IV syringe  1 g Intravenous Q24H Casey White MD   1 g at 04/11/19 1154       Objective:       /67   Pulse 83   Temp 97.9 °F (36.6 °C) (Temporal)   Resp 16   Ht 5' 4\" (1.626 m)   Wt 185 lb 4.8 oz (84.1 kg)   SpO2 97%   BMI 31.81 kg/m²     General appearance: alert, appears stated age, cooperative and no distress  Head: normocephalic, without obvious abnormality, atraumatic  Eyes: conjunctivae/corneas clear.  .  Neck: no adenopathy, no carotid bruit, supple, symmetrical, trachea midline and thyroid not enlarged, symmetric, no tenderness/mass/nodules  Lungs: clear to auscultation bilaterally  Heart: regular rate and rhythm, S1, S2 normal  Extremities: edema BLEs, atraumatic, no cyanosis  Pulses: 2+ and symmetric  Skin: color, texture, turgor normal    Mental Status: Alert, oriented, thought content appropriate     Fair attention/concentration  Intact fundus of knowledge  Questionable memories      Speech: Clear   Language: No aphasias     Cranial Nerves:  I: smell    II: visual acuity     II: visual fields Full to confrontation   II: pupils STIVEN   III,VII: ptosis None   III,IV,VI: extraocular muscles  Full ROM   V: mastication Normal   V: facial light touch sensation  Normal   V,VII: corneal reflex     VII: facial muscle function - upper  Normal   VII: facial muscle function - lower Normal   VIII: hearing Normal   IX: soft palate elevation  Normal   IX,X: gag reflex    XI: trapezius strength  5/5   XI: sternocleidomastoid strength 5/5   XI: neck extension strength  5/5   XII: tongue strength  Normal     Motor:  5/5 BUE  4+/5 RLE  3/5 LLE  Normal tone   Obese bulk   No abnormal movements     Sensory:  LT normal in the arms   LT and PP decreased BLE  Vibration markedly decreased ankles    + Tinel's b/l wrists     Coordination:   FN, FFM intact     DTR:   +2 in the arms   Absent in the legs     No Babinskis  No Christiansen's    No pathological reflexes    Laboratory/Radiology:     CBC with Differential:    Lab Results   Component Value Date    WBC 7.3 04/10/2019    RBC 3.79 04/10/2019    HGB 11.6 04/10/2019    HCT 34.6 04/10/2019     04/10/2019    MCV 91.3 04/10/2019    MCH 30.6 04/10/2019    MCHC 33.5 04/10/2019    RDW 13.0 04/10/2019    LYMPHOPCT 20.4 04/10/2019    MONOPCT 6.7 04/10/2019    BASOPCT 0.8 04/10/2019    MONOSABS 0.49 04/10/2019    LYMPHSABS 1.49 04/10/2019    EOSABS 0.41 04/10/2019    BASOSABS 0.06 04/10/2019     CMP:    Lab Results   Component Value Date     04/10/2019    K 3.2 04/10/2019     04/10/2019    CO2 26 04/10/2019    BUN 10 04/10/2019    CREATININE 1.0 04/10/2019    GFRAA >60 04/10/2019    LABGLOM 54 04/10/2019    GLUCOSE 102 04/10/2019    PROT 6.5 04/09/2019    LABALBU 3.3 04/09/2019    CALCIUM 9.1 04/10/2019    BILITOT 0.4 04/09/2019    ALKPHOS 79 04/09/2019    AST 11 04/09/2019    ALT 8

## 2019-04-11 NOTE — CONSULTS
[address]                  [address] [id number],  [facility]                                  CONSULTATION    PATIENT NAME: Cecilia Lawton               :        1940  MED REC NO:   [id number]                            ROOM:       8204  ACCOUNT NO:   [de-identified]                           ADMIT DATE: 2019  PROVIDER:     Vinod Gonzalez MD    CONSULT DATE:  2019    REASON FOR CONSULT:  Bilateral lower extremity weakness. HISTORY OF PRESENT ILLNESS:  The patient is a 76-year-old lady who  presented to [facility] with right-sided numbness and  tingling. At that point in time, it was thought that she has having a  stroke. She was evaluated by Neurology Service, and multiple imaging  studies were obtained. She had a lumbar MRI that showed lumbar  stenosis. As a result of that, Neurosurgery Service was consulted. PAST MEDICAL HISTORY:  Positive for rheumatoid arthritis,  osteoarthritis, right bundle-branch block, irritable bowel syndrome,  hypothyroidism, hypertension, diabetes with diabetic peripheral  neuropathy, COPD, breast cancer, and asthma. PAST SURGICAL HISTORY:  Positive for thyroid surgery, lumbar fusion,  bilateral knee replacement surgery, hysterectomy, cholecystectomy,  carpal tunnel release, and lumpectomy. SOCIAL HISTORY:  Negative for tobacco or alcohol use. ALLERGIES:  Include ASPIRIN. FAMILY HISTORY:  Unknown as both her parents passed when she was about  9years old. HOME MEDICATIONS:  Include Synthroid, Glucophage, Altace, and Remeron. PHYSICAL EXAMINATION:  VITAL SIGNS:  She is currently afebrile with a T-current of 36.6 degrees  Celsius, respiratory rate 16, pulse 83, blood pressure is 129/67. GENERAL:  She is resting in bed, does not appear to be in any acute  distress, appears her stated age. HEENT:  Her head is normocephalic and atraumatic. Pupils are 3 to 2 mm  and reactive. She has no drainage out of her eyes, ears, nose, or  throat. SKIN:  Her skin is warm and dry. MUSCULOSKELETAL:  She has got good range of motion of bilateral upper  and lower extremities. ABDOMEN:  Soft, nontender, nondistended. RESPIRATORY:  She is not using any accessory muscles of respiration. NEUROLOGIC:  Rest of her neurologic exam, she is awake, alert, and  oriented x3. Cranial nerves II through XII are intact bilaterally. Motor exam reveals 5/5 strength in her bilateral upper extremity. In  her left lower extremity, she has about 4-/5 strength, and in her right  lower extremity, she has 5/5 strength. Sensation is grossly intact to  light touch. Reflexes are 1+ and symmetric. Toes are going down. REVIEW OF IMAGING:  She had an MRI of her lumbar spine that shows that  she has got a prior L4-L5 fusion. She does have adjacent segment  stenosis at L2-L3 and L3-L4. ASSESSMENT AND PLAN:  The patient is a 71-year-old lady with bilateral  lower extremity weakness and lumbar stenosis. She is neurologically  stable. From her neurologic exam, it does not appear that her lumbar  stenosis is responsible for the weakness that she has in her legs. As a  result of that, I have advised her that no neurosurgical intervention is  recommended at this time. I am recommending that she try some physical  therapy, and if symptoms do not improve after she has had an exhausted  course of physical therapy, she is to follow up with me in the office.         Lolita Boykin MD    D: 04/11/2019 10:53:04       T: 04/11/2019 12:27:07     [email or web address]  Job#: [id number]     Doc#: [id number]    CC:

## 2019-04-12 NOTE — DISCHARGE SUMMARY
Physician Discharge Summary     Patient ID:  Eli Abebe  [id number]  30 y.o.  1940    Admit date: 4/9/2019    Discharge date and time:  4/11/2019    Admission Diagnoses:   Patient Active Problem List   Diagnosis    Breast cancer ([facility]    Psoriatic arthritis ([facility]    RA (rheumatoid arthritis) ([facility]    Obesity (BMI 30-39. 9)    Hypothyroidism    Irritable bowel syndrome    HTN (hypertension), benign    Diabetes mellitus type 2, uncontrolled ([facility]    Acute cerebrovascular accident (CVA) ([facility]    Multifactorial gait disorder    Bilateral leg weakness       Discharge Diagnoses: as above    Consults: neurology    Procedures: see chart    Hospital Course: patient was admitted with right sided numbness. She was evaluated for TIA/CVA. Neurology was consulted. MRI brain was negative for acute pathology. She was discharged in stable condition.       Discharge Exam:  See progress note from today    Condition:  stable    Disposition: home    Patient Instructions:   Discharge Medication List as of 4/11/2019  2:46 PM      START taking these medications    Details   aspirin EC 81 MG EC tablet Take 1 tablet by mouth daily, Disp-30 tablet, R-0Normal      atorvastatin (LIPITOR) 10 MG tablet Take 1 tablet by mouth daily, Disp-30 tablet, R-0Normal         CONTINUE these medications which have NOT CHANGED    Details   metFORMIN (GLUCOPHAGE) 500 MG tablet Take 1 tablet by mouth 2 times daily (with meals), Disp-60 tablet, R-3DC to SNF      ramipril (ALTACE) 10 MG capsule Take 1 capsule by mouth daily, Disp-30 capsule, R-3DC to SNF      levothyroxine (SYNTHROID) 150 MCG tablet Take 1 tablet by mouth Daily, Disp-30 tablet, R-3DC to SNF      mirtazapine (REMERON) 30 MG tablet Take 1 tablet by mouth nightly, R-0Historical Med      ipratropium-albuterol (DUONEB) 0.5-2.5 (3) MG/3ML SOLN nebulizer solution Inhale 3 mLs into the lungs 4 times daily, Disp-360 mLDC to SNF         STOP taking these medications       insulin lispro (HUMALOG) 100 UNIT/ML injection vial Comments:   Reason for Stopping:         Glucose Blood (BLOOD GLUCOSE TEST STRIPS) STRP Comments:   Reason for Stopping:             Activity: activity as tolerated  Diet: low fat, low cholesterol diet    Follow up with dr David pope in 1 week. Follow up with dr Delmi Ley in 1 month.       Note that over 30 minutes was spent in preparing discharge papers, discussing discharge with patient, medication review, etc.    Signed:  Chato Wiley    4/12/2019  12:02 PM

## 2019-04-15 ENCOUNTER — OFFICE VISIT (OUTPATIENT)
Dept: FAMILY MEDICINE CLINIC | Age: 79
End: 2019-04-15
Payer: MEDICARE

## 2019-04-15 VITALS
WEIGHT: 190 LBS | BODY MASS INDEX: 32.44 KG/M2 | HEIGHT: 64 IN | TEMPERATURE: 97.3 F | HEART RATE: 90 BPM | OXYGEN SATURATION: 98 % | DIASTOLIC BLOOD PRESSURE: 70 MMHG | SYSTOLIC BLOOD PRESSURE: 130 MMHG | RESPIRATION RATE: 18 BRPM

## 2019-04-15 DIAGNOSIS — L40.50 PSORIATIC ARTHRITIS (HCC): ICD-10-CM

## 2019-04-15 DIAGNOSIS — I10 ESSENTIAL HYPERTENSION: Primary | ICD-10-CM

## 2019-04-15 DIAGNOSIS — E78.2 MIXED HYPERLIPIDEMIA: ICD-10-CM

## 2019-04-15 DIAGNOSIS — E03.9 HYPOTHYROIDISM, UNSPECIFIED TYPE: ICD-10-CM

## 2019-04-15 DIAGNOSIS — M06.9 RHEUMATOID ARTHRITIS, INVOLVING UNSPECIFIED SITE, UNSPECIFIED RHEUMATOID FACTOR PRESENCE: ICD-10-CM

## 2019-04-15 DIAGNOSIS — E11.40 TYPE 2 DIABETES MELLITUS WITH DIABETIC NEUROPATHY, WITHOUT LONG-TERM CURRENT USE OF INSULIN (HCC): ICD-10-CM

## 2019-04-15 DIAGNOSIS — F41.9 ANXIETY: ICD-10-CM

## 2019-04-15 PROCEDURE — 1123F ACP DISCUSS/DSCN MKR DOCD: CPT | Performed by: FAMILY MEDICINE

## 2019-04-15 PROCEDURE — G8417 CALC BMI ABV UP PARAM F/U: HCPCS | Performed by: FAMILY MEDICINE

## 2019-04-15 PROCEDURE — G8427 DOCREV CUR MEDS BY ELIG CLIN: HCPCS | Performed by: FAMILY MEDICINE

## 2019-04-15 PROCEDURE — 99214 OFFICE O/P EST MOD 30 MIN: CPT | Performed by: FAMILY MEDICINE

## 2019-04-15 PROCEDURE — 1036F TOBACCO NON-USER: CPT | Performed by: FAMILY MEDICINE

## 2019-04-15 PROCEDURE — G8598 ASA/ANTIPLAT THER USED: HCPCS | Performed by: FAMILY MEDICINE

## 2019-04-15 PROCEDURE — G8399 PT W/DXA RESULTS DOCUMENT: HCPCS | Performed by: FAMILY MEDICINE

## 2019-04-15 PROCEDURE — 1090F PRES/ABSN URINE INCON ASSESS: CPT | Performed by: FAMILY MEDICINE

## 2019-04-15 PROCEDURE — 4040F PNEUMOC VAC/ADMIN/RCVD: CPT | Performed by: FAMILY MEDICINE

## 2019-04-15 ASSESSMENT — PATIENT HEALTH QUESTIONNAIRE - PHQ9
SUM OF ALL RESPONSES TO PHQ QUESTIONS 1-9: 2
1. LITTLE INTEREST OR PLEASURE IN DOING THINGS: 1
SUM OF ALL RESPONSES TO PHQ QUESTIONS 1-9: 2
2. FEELING DOWN, DEPRESSED OR HOPELESS: 1
SUM OF ALL RESPONSES TO PHQ9 QUESTIONS 1 & 2: 2

## 2019-04-16 RX ORDER — RAMIPRIL 10 MG/1
10 CAPSULE ORAL DAILY
Qty: 90 CAPSULE | Refills: 1 | Status: SHIPPED
Start: 2019-04-16 | End: 2020-07-21 | Stop reason: SDUPTHER

## 2019-04-16 RX ORDER — LEVOTHYROXINE SODIUM 0.15 MG/1
150 TABLET ORAL DAILY
Qty: 90 TABLET | Refills: 1 | Status: SHIPPED
Start: 2019-04-16 | End: 2020-07-21 | Stop reason: SDUPTHER

## 2019-04-16 RX ORDER — MIRTAZAPINE 30 MG/1
30 TABLET, FILM COATED ORAL NIGHTLY
Qty: 90 TABLET | Refills: 1 | Status: SHIPPED
Start: 2019-04-16 | End: 2020-07-21 | Stop reason: SDUPTHER

## 2019-04-16 RX ORDER — ATORVASTATIN CALCIUM 10 MG/1
10 TABLET, FILM COATED ORAL DAILY
Qty: 90 TABLET | Refills: 1 | Status: SHIPPED
Start: 2019-04-16 | End: 2020-07-21 | Stop reason: SDUPTHER

## 2019-04-21 ASSESSMENT — ENCOUNTER SYMPTOMS
PHOTOPHOBIA: 0
EYE ITCHING: 1
NAUSEA: 0
CONSTIPATION: 0
RECTAL PAIN: 0
VOICE CHANGE: 0
ALLERGIC/IMMUNOLOGIC NEGATIVE: 1
ANAL BLEEDING: 0
SINUS PRESSURE: 0
ABDOMINAL DISTENTION: 0
TROUBLE SWALLOWING: 0
CHEST TIGHTNESS: 0
FACIAL SWELLING: 0
RESPIRATORY NEGATIVE: 1
SHORTNESS OF BREATH: 0
ABDOMINAL PAIN: 0
COLOR CHANGE: 0
CHOKING: 0
COUGH: 0
RHINORRHEA: 0
STRIDOR: 0
SINUS PAIN: 0
VOMITING: 0
BACK PAIN: 1
WHEEZING: 0
SORE THROAT: 0
EYE DISCHARGE: 0
DIARRHEA: 0
EYE REDNESS: 0
EYE PAIN: 0
BLOOD IN STOOL: 0

## 2019-04-22 NOTE — PROGRESS NOTES
pain and neck stiffness. Skin: Negative. Negative for color change, pallor, rash and wound. Allergic/Immunologic: Negative. Neurological: Positive for weakness and numbness. Negative for dizziness, tremors, seizures, syncope, facial asymmetry, speech difficulty, light-headedness and headaches. Hematological: Negative. Negative for adenopathy. Does not bruise/bleed easily. Psychiatric/Behavioral: Negative for agitation, behavioral problems, confusion, decreased concentration, dysphoric mood, hallucinations, self-injury, sleep disturbance and suicidal ideas. The patient is nervous/anxious. The patient is not hyperactive. Past Medical/Surgical Hx;  Reviewed with patient      Diagnosis Date    Asthma     Blood transfusion     [facility]     COPD (chronic obstructive pulmonary disease) [facility]     Diabetes mellitus [facility]     Diabetic peripheral neuropathy [facility]     DM type 2 (diabetes mellitus, type 2) ([facility]     Hypertension     Hypothyroidism     Irritable bowel syndrome     Obesity (BMI 30-39. 9)     Osteoarthritis     rheumatoid and osteo    RA [facility]rheumatoid arthritis) [facility]     RBBB      Past Surgical History:   Procedure Laterality Date    BREAST LUMPECTOMY  [id number]    RIGHT    CARPAL TUNNEL RELEASE      b/l    CHOLECYSTECTOMY      HYSTERECTOMY      partial    JOINT REPLACEMENT Bilateral     bilateral TKR    LUMBAR FUSION      L4L5    OTHER SURGICAL HISTORY      thumb joint replacement rt     THYROID SURGERY         Past Family Hx:  Reviewed with patient      Problem Relation Age of Onset    Cancer Mother 36        breast    Cancer Father         bone marrow    Cancer Brother         prostate    Diabetes Son     Neuropathy Son     High Blood Pressure Son     High Cholesterol Son     Hypertension Son        Social Hx:  Reviewed with patient  Social History     Tobacco Use    Smoking status: Never Smoker    Smokeless tobacco: Never Used   Substance Use Topics  Alcohol use: No       OBJECTIVE  /70   Pulse 90   Temp 97.3 °F (36.3 °C)   Resp 18   Ht 5' 4.02\" (1.626 m)   Wt 190 lb (86.2 kg)   SpO2 98%   Breastfeeding? No   BMI 32.60 kg/m²     Problem List:  Viktoria Tay does not have any pertinent problems on file. PHYS EX:  Physical Exam   Constitutional: She is oriented to person, place, and time. She appears well-developed and well-nourished. No distress. HENT:   Head: Normocephalic and atraumatic. Right Ear: External ear normal.   Left Ear: External ear normal.   Nose: Nose normal.   Mouth/Throat: Oropharynx is clear and moist. No oropharyngeal exudate. Eyes: Pupils are equal, round, and reactive to light. Conjunctivae and EOM are normal. Right eye exhibits no discharge. Left eye exhibits no discharge. No scleral icterus. Neck: Normal range of motion. Neck supple. No JVD present. No tracheal deviation present. No thyromegaly present. Cardiovascular: Normal rate, regular rhythm, normal heart sounds and intact distal pulses. Exam reveals no gallop and no friction rub. No murmur heard. Pulmonary/Chest: Effort normal and breath sounds normal. No stridor. No respiratory distress. She has no wheezes. She has no rales. She exhibits no tenderness. Abdominal: Soft. Bowel sounds are normal. She exhibits no distension and no mass. There is no tenderness. There is no rebound and no guarding. No hernia. Musculoskeletal: She exhibits tenderness. She exhibits no edema or deformity. Pain and decreased ROM multiple joints. Lymphadenopathy:     She has no cervical adenopathy. Neurological: She is alert and oriented to person, place, and time. She has normal reflexes. She displays normal reflexes. No cranial nerve deficit or sensory deficit. She exhibits normal muscle tone. Coordination abnormal.   Pt is in wheelchair. Pt has fabiana upper and lower extremity weakness. Skin: Skin is warm. No rash noted. She is not diaphoretic. No erythema.  No pallor. Psychiatric: She has a normal mood and affect. Her behavior is normal. Judgment and thought content normal.   Nursing note and vitals reviewed. ASSESSMENT/PLAN  Viktoria Tay was seen today for new patient and follow-up from hospital.    Diagnoses and all orders for this visit:    Essential hypertension  -     ramipril (ALTACE) 10 MG capsule; Take 1 capsule by mouth daily Indications: High Blood Pressure Disorder  Controlled. Ace, statin, aspirin, low salt diet. Hypothyroidism, unspecified type  -     levothyroxine (SYNTHROID) 150 MCG tablet; Take 1 tablet by mouth Daily  Controlled. Synthroid. Type 2 diabetes mellitus with diabetic neuropathy, without long-term current use of insulin (HCC)  -     metFORMIN (GLUCOPHAGE) 500 MG tablet; Take 1 tablet by mouth 2 times daily (with meals)  -     ramipril (ALTACE) 10 MG capsule; Take 1 capsule by mouth daily Indications: High Blood Pressure Disorder  Stable. Metformin, ace, statin, aspirin, ADA diet. Mixed hyperlipidemia  -     atorvastatin (LIPITOR) 10 MG tablet; Take 1 tablet by mouth daily  Not controlled. Low chol. Diet, statin. Anxiety  -     mirtazapine (REMERON) 30 MG tablet; Take 1 tablet by mouth nightly  Stable. Remeron. Psoriatic arthritis ([address]  Stable. Rheumatoid arthritis, involving unspecified site, unspecified rheumatoid factor presence ([address]  Stable. Pt instructed if any worse go ED ASAP.         Outpatient Encounter Medications as of 4/15/2019   Medication Sig Dispense Refill    atorvastatin (LIPITOR) 10 MG tablet Take 1 tablet by mouth daily 90 tablet 1    mirtazapine (REMERON) 30 MG tablet Take 1 tablet by mouth nightly 90 tablet 1    metFORMIN (GLUCOPHAGE) 500 MG tablet Take 1 tablet by mouth 2 times daily (with meals) 180 tablet 1    ramipril (ALTACE) 10 MG capsule Take 1 capsule by mouth daily Indications: High Blood Pressure Disorder 90 capsule 1    levothyroxine (SYNTHROID) 150 MCG tablet Take 1 tablet by mouth Daily 90 tablet 1    aspirin EC 81 MG EC tablet Take 1 tablet by mouth daily 30 tablet 0    ipratropium-albuterol (DUONEB) 0.5-2.5 (3) MG/3ML SOLN nebulizer solution Inhale 3 mLs into the lungs 4 times daily 360 mL     [DISCONTINUED] atorvastatin (LIPITOR) 10 MG tablet Take 1 tablet by mouth daily 30 tablet 0    [DISCONTINUED] mirtazapine (REMERON) 30 MG tablet Take 1 tablet by mouth nightly  0    [DISCONTINUED] metFORMIN (GLUCOPHAGE) 500 MG tablet Take 1 tablet by mouth 2 times daily (with meals) (Patient taking differently: Take 500 mg by mouth daily (with breakfast) ONLY TAKES 2ND DOSE IF SUGAR IS HIGH) 60 tablet 3    [DISCONTINUED] ramipril (ALTACE) 10 MG capsule Take 1 capsule by mouth daily 30 capsule 3    [DISCONTINUED] levothyroxine (SYNTHROID) 150 MCG tablet Take 1 tablet by mouth Daily 30 tablet 3     No facility-administered encounter medications on file as of 4/15/2019. Return in about 1 month (around 5/13/2019).         Reviewed recent labs related to Timur's current problems      Discussed importance of regular Health Maintenance follow up  Health Maintenance   Topic    DTaP/Tdap/Td vaccine (1 - Tdap)    Shingles Vaccine (1 of 2)    Pneumococcal 65+ years Vaccine (1 of 2 - PCV13)    Flu vaccine (Season Ended)    TSH testing     Potassium monitoring     Creatinine monitoring     DEXA (modify frequency per FRAX score)

## 2019-12-03 RX ORDER — GLIMEPIRIDE 1 MG/1
1 TABLET ORAL 2 TIMES DAILY
COMMUNITY
End: 2020-07-21

## 2019-12-03 RX ORDER — ALBUTEROL SULFATE 2.5 MG/3ML
2.5 SOLUTION RESPIRATORY (INHALATION) 4 TIMES DAILY
COMMUNITY
End: 2020-09-21

## 2019-12-03 RX ORDER — HYDROCODONE BITARTRATE AND ACETAMINOPHEN 5; 325 MG/1; MG/1
1 TABLET ORAL 4 TIMES DAILY
COMMUNITY
End: 2020-07-21

## 2019-12-03 RX ORDER — MONTELUKAST SODIUM 10 MG/1
10 TABLET ORAL DAILY
COMMUNITY
End: 2020-07-21

## 2019-12-03 RX ORDER — ALPRAZOLAM 0.5 MG/1
0.5 TABLET ORAL 2 TIMES DAILY
COMMUNITY
End: 2020-07-21

## 2019-12-03 RX ORDER — LEVOTHYROXINE SODIUM 0.2 MG/1
200 TABLET ORAL DAILY
COMMUNITY
End: 2020-07-21

## 2019-12-03 RX ORDER — SPIRONOLACTONE 25 MG/1
25 TABLET ORAL DAILY
COMMUNITY
End: 2020-07-21

## 2019-12-03 RX ORDER — ANASTROZOLE 1 MG/1
1 TABLET ORAL DAILY
COMMUNITY
End: 2020-07-21

## 2019-12-03 RX ORDER — DICYCLOMINE HYDROCHLORIDE 10 MG/1
10 CAPSULE ORAL 4 TIMES DAILY
COMMUNITY
End: 2020-07-21

## 2019-12-03 RX ORDER — DIPHENOXYLATE HYDROCHLORIDE AND ATROPINE SULFATE 2.5; .025 MG/1; MG/1
1 TABLET ORAL 2 TIMES DAILY
COMMUNITY
End: 2020-07-21

## 2020-04-13 ENCOUNTER — TELEPHONE (OUTPATIENT)
Dept: FAMILY MEDICINE CLINIC | Age: 80
End: 2020-04-13

## 2020-04-22 ENCOUNTER — VIRTUAL VISIT (OUTPATIENT)
Dept: FAMILY MEDICINE CLINIC | Age: 80
End: 2020-04-22
Payer: MEDICARE

## 2020-04-22 VITALS — BODY MASS INDEX: 32.44 KG/M2 | HEIGHT: 64 IN | WEIGHT: 190 LBS

## 2020-04-22 PROBLEM — I87.8 VENOUS STASIS: Status: ACTIVE | Noted: 2020-04-22

## 2020-04-22 PROBLEM — S21.002A WOUND OF LEFT BREAST: Status: ACTIVE | Noted: 2020-04-22

## 2020-04-22 PROCEDURE — G8399 PT W/DXA RESULTS DOCUMENT: HCPCS | Performed by: FAMILY MEDICINE

## 2020-04-22 PROCEDURE — 4040F PNEUMOC VAC/ADMIN/RCVD: CPT | Performed by: FAMILY MEDICINE

## 2020-04-22 PROCEDURE — 1090F PRES/ABSN URINE INCON ASSESS: CPT | Performed by: FAMILY MEDICINE

## 2020-04-22 PROCEDURE — 99213 OFFICE O/P EST LOW 20 MIN: CPT | Performed by: FAMILY MEDICINE

## 2020-04-22 PROCEDURE — G8427 DOCREV CUR MEDS BY ELIG CLIN: HCPCS | Performed by: FAMILY MEDICINE

## 2020-04-22 PROCEDURE — 1123F ACP DISCUSS/DSCN MKR DOCD: CPT | Performed by: FAMILY MEDICINE

## 2020-04-22 PROCEDURE — 1036F TOBACCO NON-USER: CPT | Performed by: FAMILY MEDICINE

## 2020-04-22 PROCEDURE — G8419 CALC BMI OUT NRM PARAM NOF/U: HCPCS | Performed by: FAMILY MEDICINE

## 2020-04-22 ASSESSMENT — PATIENT HEALTH QUESTIONNAIRE - PHQ9
2. FEELING DOWN, DEPRESSED OR HOPELESS: 0
SUM OF ALL RESPONSES TO PHQ QUESTIONS 1-9: 0
1. LITTLE INTEREST OR PLEASURE IN DOING THINGS: 0
SUM OF ALL RESPONSES TO PHQ9 QUESTIONS 1 & 2: 0
SUM OF ALL RESPONSES TO PHQ QUESTIONS 1-9: 0

## 2020-04-22 NOTE — PROGRESS NOTES
threatening disease. Patient and/or guardian verbalizes understanding and agrees with above counseling, assessment and plan. All questions answered. Please note this report has been partially produced using speech recognition software  and may contain errors related to that system including grammar, punctuation and spelling as well as words and phrases that may seem inappropriate. If there are questions or concerns please feel free to contact me to clarify. Time spent: Greater than Not billed by time    This visit was completed virtually using D[employer]. me

## 2020-04-29 ASSESSMENT — ENCOUNTER SYMPTOMS
NAUSEA: 0
DIARRHEA: 0
WHEEZING: 0
CONSTIPATION: 0
SHORTNESS OF BREATH: 0
COUGH: 0
VOMITING: 0
ABDOMINAL PAIN: 0

## 2020-07-21 ENCOUNTER — HOSPITAL ENCOUNTER (OUTPATIENT)
Age: 80
Discharge: HOME | End: 2020-07-23
Payer: MEDICARE

## 2020-07-21 ENCOUNTER — OFFICE VISIT (OUTPATIENT)
Dept: FAMILY MEDICINE CLINIC | Age: 80
End: 2020-07-21
Payer: MEDICARE

## 2020-07-21 VITALS
SYSTOLIC BLOOD PRESSURE: 138 MMHG | DIASTOLIC BLOOD PRESSURE: 84 MMHG | WEIGHT: 206 LBS | OXYGEN SATURATION: 96 % | HEART RATE: 84 BPM | BODY MASS INDEX: 35.17 KG/M2 | RESPIRATION RATE: 18 BRPM | HEIGHT: 64 IN

## 2020-07-21 PROBLEM — I10 ESSENTIAL HYPERTENSION: Status: ACTIVE | Noted: 2020-07-21

## 2020-07-21 PROBLEM — E78.2 MIXED HYPERLIPIDEMIA: Status: ACTIVE | Noted: 2020-07-21

## 2020-07-21 PROBLEM — Z85.3 HISTORY OF BREAST CANCER: Status: ACTIVE | Noted: 2020-07-21

## 2020-07-21 PROBLEM — M19.90 ARTHRITIS: Status: ACTIVE | Noted: 2020-07-21

## 2020-07-21 PROBLEM — N63.0 BREAST LUMP: Status: ACTIVE | Noted: 2020-07-21

## 2020-07-21 PROBLEM — E11.40 TYPE 2 DIABETES MELLITUS WITH DIABETIC NEUROPATHY, WITHOUT LONG-TERM CURRENT USE OF INSULIN (HCC): Status: ACTIVE | Noted: 2020-07-21

## 2020-07-21 PROBLEM — E55.9 VITAMIN D DEFICIENCY: Status: ACTIVE | Noted: 2020-07-21

## 2020-07-21 LAB
ALBUMIN SERPL-MCNC: 4.2 G/DL (ref 3.5–5.2)
ALP BLD-CCNC: 62 U/L (ref 35–104)
ALT SERPL-CCNC: 6 U/L (ref 0–32)
ANION GAP SERPL CALCULATED.3IONS-SCNC: 14 MMOL/L (ref 7–16)
AST SERPL-CCNC: 13 U/L (ref 0–31)
BILIRUB SERPL-MCNC: 0.7 MG/DL (ref 0–1.2)
BUN BLDV-MCNC: 14 MG/DL (ref 8–23)
CALCIUM SERPL-MCNC: 9.7 MG/DL (ref 8.6–10.2)
CHLORIDE BLD-SCNC: 102 MMOL/L (ref 98–107)
CHOLESTEROL, TOTAL: 263 MG/DL (ref 0–199)
CO2: 26 MMOL/L (ref 22–29)
CREAT SERPL-MCNC: 1.2 MG/DL (ref 0.5–1)
GFR AFRICAN AMERICAN: 52
GFR NON-AFRICAN AMERICAN: 43 ML/MIN/1.73
GLUCOSE BLD-MCNC: 112 MG/DL (ref 74–99)
HBA1C MFR BLD: 6.5 % (ref 4–5.6)
HCT VFR BLD CALC: 43.3 % (ref 34–48)
HDLC SERPL-MCNC: 46 MG/DL
HEMOGLOBIN: 14.4 G/DL (ref 11.5–15.5)
LDL CHOLESTEROL CALCULATED: 191 MG/DL (ref 0–99)
MCH RBC QN AUTO: 31.2 PG (ref 26–35)
MCHC RBC AUTO-ENTMCNC: 33.3 % (ref 32–34.5)
MCV RBC AUTO: 93.9 FL (ref 80–99.9)
PDW BLD-RTO: 13.5 FL (ref 11.5–15)
PLATELET # BLD: 150 E9/L (ref 130–450)
PMV BLD AUTO: 11.2 FL (ref 7–12)
POTASSIUM SERPL-SCNC: 3.9 MMOL/L (ref 3.5–5)
RBC # BLD: 4.61 E12/L (ref 3.5–5.5)
SODIUM BLD-SCNC: 142 MMOL/L (ref 132–146)
TOTAL PROTEIN: 7.2 G/DL (ref 6.4–8.3)
TRIGL SERPL-MCNC: 128 MG/DL (ref 0–149)
TSH SERPL DL<=0.05 MIU/L-ACNC: 45.2 UIU/ML (ref 0.27–4.2)
VITAMIN D 25-HYDROXY: 12 NG/ML (ref 30–100)
VLDLC SERPL CALC-MCNC: 26 MG/DL
WBC # BLD: 7.5 E9/L (ref 4.5–11.5)

## 2020-07-21 PROCEDURE — 80053 COMPREHEN METABOLIC PANEL: CPT

## 2020-07-21 PROCEDURE — 84443 ASSAY THYROID STIM HORMONE: CPT

## 2020-07-21 PROCEDURE — 80061 LIPID PANEL: CPT

## 2020-07-21 PROCEDURE — 83036 HEMOGLOBIN GLYCOSYLATED A1C: CPT

## 2020-07-21 PROCEDURE — 82306 VITAMIN D 25 HYDROXY: CPT

## 2020-07-21 PROCEDURE — G8417 CALC BMI ABV UP PARAM F/U: HCPCS | Performed by: FAMILY MEDICINE

## 2020-07-21 PROCEDURE — 1090F PRES/ABSN URINE INCON ASSESS: CPT | Performed by: FAMILY MEDICINE

## 2020-07-21 PROCEDURE — G8399 PT W/DXA RESULTS DOCUMENT: HCPCS | Performed by: FAMILY MEDICINE

## 2020-07-21 PROCEDURE — 85027 COMPLETE CBC AUTOMATED: CPT

## 2020-07-21 PROCEDURE — 99215 OFFICE O/P EST HI 40 MIN: CPT | Performed by: FAMILY MEDICINE

## 2020-07-21 PROCEDURE — 1036F TOBACCO NON-USER: CPT | Performed by: FAMILY MEDICINE

## 2020-07-21 PROCEDURE — 1123F ACP DISCUSS/DSCN MKR DOCD: CPT | Performed by: FAMILY MEDICINE

## 2020-07-21 PROCEDURE — G8427 DOCREV CUR MEDS BY ELIG CLIN: HCPCS | Performed by: FAMILY MEDICINE

## 2020-07-21 PROCEDURE — 4040F PNEUMOC VAC/ADMIN/RCVD: CPT | Performed by: FAMILY MEDICINE

## 2020-07-21 RX ORDER — LEVOTHYROXINE SODIUM 0.15 MG/1
150 TABLET ORAL DAILY
Qty: 90 TABLET | Refills: 1 | Status: SHIPPED
Start: 2020-07-21 | End: 2020-08-04 | Stop reason: DRUGHIGH

## 2020-07-21 RX ORDER — MIRTAZAPINE 30 MG/1
30 TABLET, FILM COATED ORAL NIGHTLY
Qty: 90 TABLET | Refills: 1 | Status: SHIPPED
Start: 2020-07-21 | End: 2020-07-21

## 2020-07-21 RX ORDER — ATORVASTATIN CALCIUM 10 MG/1
10 TABLET, FILM COATED ORAL DAILY
Qty: 90 TABLET | Refills: 1 | Status: SHIPPED
Start: 2020-07-21 | End: 2020-08-04 | Stop reason: DRUGHIGH

## 2020-07-21 RX ORDER — RAMIPRIL 10 MG/1
10 CAPSULE ORAL DAILY
Qty: 90 CAPSULE | Refills: 1 | Status: SHIPPED
Start: 2020-07-21 | End: 2021-03-26 | Stop reason: SDUPTHER

## 2020-07-21 RX ORDER — MIRTAZAPINE 30 MG/1
30 TABLET, FILM COATED ORAL NIGHTLY
Qty: 90 TABLET | Refills: 1
Start: 2020-07-21 | End: 2021-03-26 | Stop reason: SDUPTHER

## 2020-07-27 ENCOUNTER — HOSPITAL ENCOUNTER (OUTPATIENT)
Dept: GENERAL RADIOLOGY | Age: 80
Discharge: HOME | End: 2020-07-29
Payer: MEDICARE

## 2020-07-27 PROCEDURE — G0279 TOMOSYNTHESIS, MAMMO: HCPCS

## 2020-07-27 PROCEDURE — 76642 ULTRASOUND BREAST LIMITED: CPT

## 2020-07-31 ENCOUNTER — HOSPITAL ENCOUNTER (OUTPATIENT)
Dept: GENERAL RADIOLOGY | Age: 80
Discharge: HOME | End: 2020-08-02
Payer: MEDICARE

## 2020-07-31 PROCEDURE — 88305 TISSUE EXAM BY PATHOLOGIST: CPT

## 2020-07-31 PROCEDURE — A4648 IMPLANTABLE TISSUE MARKER: HCPCS

## 2020-07-31 PROCEDURE — 2500000003 HC RX 250 WO HCPCS

## 2020-07-31 PROCEDURE — 77065 DX MAMMO INCL CAD UNI: CPT

## 2020-07-31 PROCEDURE — 88341 IMHCHEM/IMCYTCHM EA ADD ANTB: CPT

## 2020-07-31 PROCEDURE — 88360 TUMOR IMMUNOHISTOCHEM/MANUAL: CPT

## 2020-07-31 PROCEDURE — 88342 IMHCHEM/IMCYTCHM 1ST ANTB: CPT

## 2020-07-31 NOTE — PROGRESS NOTES
Met with patient prior to her breast biopsy. Instructed on role of breast navigator and on breast biopsy procedure. Denies use of blood thinners or aspirin products within the past 5 days. I remained with her during the biopsy to provide instruction and emotional support. Upon questioning regarding results notification, patient indicates that she would like to receive breast biopsy results by phone via the breast navigator. Instructed that results will be available in approximately 3-5 days. Instructed that her physician will also be notified of results. Provided with folder containing my contact information, monthly breast self exam card, and post biopsy discharge instructions. Instructed to call me if she has any questions or concerns about her biopsy. Verbalizes understanding.  KIRSTIN Escudero, OCN, CN-BN

## 2020-08-01 ASSESSMENT — ENCOUNTER SYMPTOMS
CONSTIPATION: 0
DIARRHEA: 0
ABDOMINAL PAIN: 0
COLOR CHANGE: 0
WHEEZING: 0
SHORTNESS OF BREATH: 0
COUGH: 0
VOMITING: 0
NAUSEA: 0

## 2020-08-04 RX ORDER — LEVOTHYROXINE SODIUM 175 UG/1
150 TABLET ORAL DAILY
Qty: 30 TABLET | Refills: 1 | Status: SHIPPED
Start: 2020-08-04 | End: 2020-09-21 | Stop reason: DRUGHIGH

## 2020-08-04 RX ORDER — ATORVASTATIN CALCIUM 20 MG/1
10 TABLET, FILM COATED ORAL DAILY
Qty: 30 TABLET | Refills: 3 | Status: SHIPPED
Start: 2020-08-04 | End: 2020-10-09 | Stop reason: SDUPTHER

## 2020-08-06 ENCOUNTER — TELEPHONE (OUTPATIENT)
Dept: BREAST CENTER | Age: 80
End: 2020-08-06

## 2020-08-06 NOTE — TELEPHONE ENCOUNTER
Attempted to call patient in reference to her breast biopsy results and schedule a consultation. Voicemail full at this time. I will try again later. Breast, right at 9:00, biopsy:   Invasive ductal carcinoma, see comment. Comment:   The carcinoma is focal, embedded within markedly fibrotic tissue, with a   Cumming grade of 2 (score of 7): tubule formation score 3, nuclear   pleomorphism score 2, mitotic activity score 2. E-cadherin immunostain is positive. P63 immunostain shows absence of myoepithelial layer. Breast Cancer Marker Studies:   Estrogen Receptors (ER):   -Positive (>10%of cells demonstrate nuclear positivity):   Percentage of cells positive: 99%   Intensity: strong     Progesterone Receptors (IN):   -Positive:   Percentage of cells positive: 70%   Intensity: moderate-to-strong     Hormone receptor studies are performed by immunohistochemistry on   formalin-fixed, paraffin-embedded tissue (Roche Benchmark Immunostainer,   [place] anti-ER clone [id number], anti-IN clone [id number], polymer-based detection   chemistry). ER and IN are evaluated based on the percentage of cells   showing nuclear staining with >1% considered positive for each.      Her-2/anni (c-erb B-2) protein expression: Negative (score 1+)

## 2020-08-06 NOTE — TELEPHONE ENCOUNTER
Attempted to call patient again in reference to her breast biopsy results and schedule a consultation. Unable to leave voicemail.

## 2020-08-07 ENCOUNTER — TELEPHONE (OUTPATIENT)
Dept: BREAST CENTER | Age: 80
End: 2020-08-07

## 2020-08-07 ENCOUNTER — TELEPHONE (OUTPATIENT)
Dept: GENERAL RADIOLOGY | Age: 80
End: 2020-08-07

## 2020-08-07 NOTE — TELEPHONE ENCOUNTER
Call regarding breast biopsy results and appointment with surgeon. Unable to reach patient. No answer and unable to leave message on home number (memory full). Number listed as mobile number is not a working number.  Electronically signed by Trina Sheikh RN, BSN on 8/7/2020 at 11:47 AM

## 2020-08-10 ENCOUNTER — TELEPHONE (OUTPATIENT)
Dept: GENERAL RADIOLOGY | Age: 80
End: 2020-08-10

## 2020-08-10 NOTE — TELEPHONE ENCOUNTER
Per patient request, I called with her breast biopsy results. Instructed that her recent right breast biopsy indicates breast cancer. I spoke with her about next steps including a consultation with a surgeon. Her physician referred her to Dr. Janessa Reyes. Tracey Sanabria states her son - Naheed Harris takes her to her appointments and she requests that an appointment date be discussed with him. His number is [phone]. Instructed that I will have 's office schedule an appointment through him.   Molly at [facility] office notified of request. Electronically signed by Trina Sheikh RN, BSN on 8/10/2020 at 1:56 PM

## 2020-08-11 ENCOUNTER — TELEPHONE (OUTPATIENT)
Dept: CASE MANAGEMENT | Age: 80
End: 2020-08-11

## 2020-08-11 NOTE — TELEPHONE ENCOUNTER
Patient breast surgery education packet assembled. Nurse Navigator is scheduled to met with patient at new patient appointment on 8/14/20 with Dr. Ike Merlin.

## 2020-08-14 ENCOUNTER — OFFICE VISIT (OUTPATIENT)
Dept: BREAST CENTER | Age: 80
End: 2020-08-14
Payer: MEDICARE

## 2020-08-14 ENCOUNTER — TELEPHONE (OUTPATIENT)
Dept: BREAST CENTER | Age: 80
End: 2020-08-14

## 2020-08-14 ENCOUNTER — HOSPITAL ENCOUNTER (OUTPATIENT)
Dept: GENERAL RADIOLOGY | Age: 80
Discharge: HOME | End: 2020-08-16
Payer: MEDICARE

## 2020-08-14 ENCOUNTER — TELEPHONE (OUTPATIENT)
Dept: CASE MANAGEMENT | Age: 80
End: 2020-08-14

## 2020-08-14 VITALS
TEMPERATURE: 98.3 F | HEART RATE: 86 BPM | SYSTOLIC BLOOD PRESSURE: 131 MMHG | OXYGEN SATURATION: 97 % | BODY MASS INDEX: 36.19 KG/M2 | HEIGHT: 64 IN | WEIGHT: 212 LBS | RESPIRATION RATE: 16 BRPM | DIASTOLIC BLOOD PRESSURE: 84 MMHG

## 2020-08-14 PROCEDURE — 99203 OFFICE O/P NEW LOW 30 MIN: CPT | Performed by: SURGERY

## 2020-08-14 PROCEDURE — G8427 DOCREV CUR MEDS BY ELIG CLIN: HCPCS | Performed by: SURGERY

## 2020-08-14 PROCEDURE — 1090F PRES/ABSN URINE INCON ASSESS: CPT | Performed by: SURGERY

## 2020-08-14 PROCEDURE — 99205 OFFICE O/P NEW HI 60 MIN: CPT | Performed by: SURGERY

## 2020-08-14 PROCEDURE — 71046 X-RAY EXAM CHEST 2 VIEWS: CPT

## 2020-08-14 PROCEDURE — G8417 CALC BMI ABV UP PARAM F/U: HCPCS | Performed by: SURGERY

## 2020-08-14 ASSESSMENT — ENCOUNTER SYMPTOMS
VOMITING: 0
ANAL BLEEDING: 0
BACK PAIN: 1
BLOOD IN STOOL: 0
COUGH: 0
CONSTIPATION: 0
ALLERGIC/IMMUNOLOGIC NEGATIVE: 1
DIARRHEA: 0
NAUSEA: 0
SHORTNESS OF BREATH: 0

## 2020-08-14 NOTE — PATIENT INSTRUCTIONS
MA will contact with imaging dates and times. You will hold your metformin day of and 2 days after imaging. Any questions or concerns, please contact my medical assistant Sunshine at [phone].

## 2020-08-14 NOTE — PROGRESS NOTES
not breast feed. Is patient interested in fertility information about fertility preservation? No    CANCER SURVEILLANCE HISTORY:  Mammograms: Yes -- last one in our system is 2014  Breast MRI's: No   Breast Biopsies: Yes   Colonoscopy: unknown  GI Polyps: Not Applicable   EGD: unknown   Pelvic Exam: No  Pap Smear: No   Dermatology: No   Lung screening: no        Estimated body mass index is 36.39 kg/m² as calculated from the following:    Height as of this encounter: 5' 4\" (1.626 m). Weight as of this encounter: 212 lb (96.2 kg). Bra Size: unknown    Because violence is so common, we ask all our patients: are you in a relationship or do you live with a person who threatens, hurts, or controls you:  no    Patient drinks little caffeinated beverages. Patient does not smoke cigarettes. Patient does not use recreational drugs. Past Medical History:   Diagnosis Date    Asthma     Blood transfusion     [facility] [address]     COPD (chronic obstructive pulmonary disease) ([facility] [address]     Diabetes mellitus ([facility] [address]     Diabetic peripheral neuropathy ([facility] [address]     DM type 2 (diabetes mellitus, type 2) ([address]     Hypertension     Hypothyroidism     Irritable bowel syndrome     Mixed hyperlipidemia 7/21/2020    Obesity (BMI 30-39. 9)     Osteoarthritis     rheumatoid and osteo    RA (rheumatoid arthritis) ([address]     RBBB        Past Surgical History:   Procedure Laterality Date    BREAST LUMPECTOMY  [id number]    RIGHT    CARPAL TUNNEL RELEASE      b/l    CHOLECYSTECTOMY      HYSTERECTOMY      partial    JOINT REPLACEMENT Bilateral     bilateral TKR    LUMBAR FUSION      L4L5    OTHER SURGICAL HISTORY      thumb joint replacement rt     THYROID SURGERY      US BREAST NEEDLE BIOPSY RIGHT  7/31/2020    US BREAST NEEDLE BIOPSY RIGHT 7/31/2020 [facility]       Current Outpatient Medications   Medication Sig Dispense Refill    atorvastatin (LIPITOR) 20 MG tablet Take 0.5 tablets by mouth daily 30 tablet 3  levothyroxine (SYNTHROID) 175 MCG tablet Take 1 tablet by mouth Daily 30 tablet 1    ramipril (ALTACE) 10 MG capsule Take 1 capsule by mouth daily Indications: High Blood Pressure Disorder 90 capsule 1    metFORMIN (GLUCOPHAGE) 500 MG tablet Take 1 tablet by mouth 2 times daily (with meals) 180 tablet 1    mirtazapine (REMERON) 30 MG tablet Take 1 tablet by mouth nightly 90 tablet 1    albuterol (PROVENTIL) (2.5 MG/3ML) 0.083% nebulizer solution Take 2.5 mg by nebulization 4 times daily 1 per aerosol four times a day       No current facility-administered medications for this visit. Allergies   Allergen Reactions    Aspirin Other (See Comments)     Asthma        Family History   Problem Relation Age of Onset    Cancer Mother 36        breast    Cancer Father         bone marrow    Cancer Brother         prostate    Diabetes Son     Neuropathy Son     High Blood Pressure Son     High Cholesterol Son     Hypertension Son      Ashkenazi Faith Ancestry: No    Social History     Socioeconomic History    Marital status:      Spouse name: Not on file    Number of children: Not on file    Years of education: Not on file    Highest education level: Not on file   Occupational History    Not on file   Social Needs    Financial resource strain: Not on file    Food insecurity     Worry: Not on file     Inability: Not on file    Transportation needs     Medical: Not on file     Non-medical: Not on file   Tobacco Use    Smoking status: Never Smoker    Smokeless tobacco: Never Used   Substance and Sexual Activity    Alcohol use: No    Drug use: No    Sexual activity: Never     Partners: Male     Comment: ^/1/16):    since 2010   Lifestyle    Physical activity     Days per week: Not on file     Minutes per session: Not on file    Stress: Not on file   Relationships    Social connections     Talks on phone: Not on file     Gets together: Not on file     Attends Taoist service: Not on file     Active member of club or organization: Not on file     Attends meetings of clubs or organizations: Not on file     Relationship status: Not on file    Intimate partner violence     Fear of current or ex partner: Not on file     Emotionally abused: Not on file     Physically abused: Not on file     Forced sexual activity: Not on file   Other Topics Concern    Not on file   Social History Narrative    Not on file       Occupation: retired; psychiatric nursing    ECOG PS 2; very poor historian, ambulates with walker    Review of Systems   Constitutional: Positive for activity change (uses a walker for several years). Negative for unexpected weight change. HENT: Negative. Respiratory: Negative for cough and shortness of breath. Cardiovascular: Positive for leg swelling. Negative for chest pain. Gastrointestinal: Negative for anal bleeding, blood in stool, constipation, diarrhea, nausea and vomiting. Endocrine: Negative. Genitourinary: Negative. Musculoskeletal: Positive for arthralgias (knees and hands, chronic), back pain (had L4/5 fused, chronic), gait problem (ambulates with walker) and joint swelling (chronic). Allergic/Immunologic: Negative. Neurological: Negative for light-headedness and headaches. Hematological: Negative. Psychiatric/Behavioral: Negative. /84 (Site: Left Upper Arm, Position: Sitting, Cuff Size: Medium Adult)   Pulse 86   Temp 98.3 °F (36.8 °C) (Infrared)   Resp 16   Ht 5' 4\" (1.626 m)   Wt 212 lb (96.2 kg)   SpO2 97%   BMI 36.39 kg/m²   Physical Exam  Constitutional:       General: She is not in acute distress. Appearance: She is obese. HENT:      Head: Normocephalic and atraumatic. Nose: Nose normal.      Mouth/Throat:      Mouth: Mucous membranes are moist.      Pharynx: Oropharynx is clear. Eyes:      Extraocular Movements: Extraocular movements intact. Pupils: Pupils are equal, round, and reactive to light. Neck:      Musculoskeletal: Normal range of motion and neck supple. Cardiovascular:      Rate and Rhythm: Normal rate and regular rhythm. Pulses: Normal pulses. Heart sounds: Normal heart sounds. Pulmonary:      Effort: Pulmonary effort is normal.      Breath sounds: Normal breath sounds. Chest:      Breasts:         Right: Mass and skin change present. No swelling, bleeding, inverted nipple, nipple discharge or tenderness. Left: No swelling, bleeding, inverted nipple, mass, nipple discharge, skin change or tenderness. Comments: Examination performed while sitting in chair, not ideal, but patient unable to get to examination table. Abdominal:      General: There is no distension. Palpations: Abdomen is soft. Tenderness: There is no abdominal tenderness. Comments: Luis Wells incisional wound--healed   Musculoskeletal:      Right lower leg: Edema present. Left lower leg: Edema present. Lymphadenopathy:      Cervical: No cervical adenopathy. Right cervical: No superficial cervical adenopathy. Left cervical: No superficial cervical adenopathy. Upper Body:      Right upper body: No supraclavicular or axillary adenopathy. Left upper body: No supraclavicular or axillary adenopathy. Skin:     General: Skin is warm and dry. Neurological:      Mental Status: She is alert. Comments: Oriented to self and place   Psychiatric:         Mood and Affect: Mood normal.         Behavior: Behavior normal.      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 The patient has the following family history of breast cancer:  mother, at age 36.  The patient has a  history of right Excisional Biopsy more than 10 years ago - benign.         MAMMOGRAM VIEWS:    The following mammographic views where obtained: bilateral craniocaudal; bilateral craniocaudal with tomosynthesis; bilateral mediolateral oblique; and bilateral mediolateral oblique with tomosynthesis         ULTRASOUND TECHNIQUE:    High-resolution real-time ultrasound scanning was performed. Additional elastography and doppler color flow analysis of any finding was also obtained.         TOMOSYNTHESIS:    Tomosynthesis (3 Dimensional Breast Imaging) was used on this examination to aid in evaluation.         COMPARISON:    The present examination has been compared to prior imaging studies performed at [address] [place] [address] on 09/07/2012 and 09/15/2014.         CAD:    This exam was reviewed using the [place] [employer] Computer Aided Detection (CAD)         TISSUE DENSITY:    The breasts are heterogeneously dense (Type 3 density).         MAMMOGRAM FINDINGS:    In the left breast, no suspicious masses, areas of suspicious architectural distortion, suspicious calcifications, or additional suspicious findings are identified.              Finding 1:    There is an irregular mass seen in the right breast at 9 o'clock located 4 centimeters from the nipple.         ULTRASOUND FINDINGS:              Finding 1:    Sonography was performed in the right breast using a radial and anti-radial approach. Ultrasound shows an irregular solid mass with angular margins measuring 20 x 13 x 14 mm. Internal echogenicity is hypoechoic.         There is no axillary lymphadenopathy.         IMPRESSION:    Solid mass in the right breast is suspicious. An ultrasound guided biopsy is recommended.         =======================================    BI-RADS Category 4:  Suspicious Abnormality    =======================================      PATHOLOGY:    Diagnosis:   Breast, right at 9:00, biopsy:   Invasive ductal carcinoma, see comment. breast, 9 o'clock, core needle biopsy-   Infiltrating   ductal carcinoma, poorly differentiated.       Comment-              The tumor demonstrates architectural grade 3,   nuclear grade 3,   mitotic grade 2-overall grade 8/9- poorly differentiated.       Intradepartmental consultation is obtained.       Breast Cancer Maker Studies   Estrogen Receptor (ER)- Positive (>95%) nuclei staining   Progesterone Receptor (VT)- Positive (60%) nuclei staining       Hormone receptor studies are performed by immunohistochemistry   (Binger Benchmark Immunostainer, Binger anti-Er clone [id number], anti-   VT clone [id number]). ER and VT are evaluated based on ther percentage of   cells showing nuclear staining with greater than or equal to 1%   considered positive for each.       Her-2/anni- Negative (1+)       ONCOTYPE DX FROM 2012:  Diagnosis-          Oncotype DX Breast Cancer Assay-  Breast Cancer   Recurrence Score =   18. Average rate of distant recurrence 11%.       Comment-              See separate report from [address] [place] [address] for full   details.         ASSESSMENT/PLAN:  Right breast cancer, recurrent, IDC, Stage IIIB, ER/VT+ HER2-, grade 2 based from the ulceration of skin  --CT C/A/P and bone scan ordered  --I discussed genetic testing, but patient refuses at this time.    --CBC, CMP, CXR reviewed  --referral to Dr. Dharmesh Luna already placed by PCP  --patient will be best served with a mastectomy since this is a recurrence and I don't know that she will have the support to go through radiation again or would tolerate given her comorbidities. --will discuss at multi-disciplinary tumor board. I spent 86 minutes personally with the patient/family/staff/resident(s) in examination, chart and imaging review, discussing natural history and prognosis, differential diagnosis, risks and benefits of treatment and instructions of which more than 50% of the time was spent for counseling and coordinating care.     Anu Guzman MD, MSc, FACS  8/14/2020  4:59 PM

## 2020-08-14 NOTE — TELEPHONE ENCOUNTER
Met with patient regarding her recent breast cancer diagnosis. Instructed in detail on her breast biopsy pathology findings including cancer type right (IDC) and hormone receptor status (ER+, ND+, Her2-). This is a recurrent cancer for her. Instructed on next steps including breast surgery options, lymph node biopsy procedures and additional imaging that may be required. Informed patient that this is the beginning of their breast cancer journey. Provided with extensive literature including Your Guide to Your Breast Cancer Pathology Report, American Cancer society Exercises after breast surgery and Lymphedema Early Signs and Symptoms. Written materials on local and national support and informational groups. Today patient received copies of their pathology and imaging reports (if available) as well as a list of local medical oncology providers. Provided patient with my contact information, office hours, and encouragement to call me with questions or concerns. Patient verbalizes understanding and appreciative of nurse navigator visit. Emotional support provided and greater than 30 minutes spent with patient. Nurse navigator will continue to follow.

## 2020-08-14 NOTE — LETTER
[facility]  [address] [address]  Phone: [phone]  Fax: Julita Cordoba MD        August 14, 2020     Guerita Lagos MD  [address] [address]    Patient: Marlin Ovalles  MR Number: <[id number]>  YOB: 1940  Date of Visit: 8/14/2020    Dear Dr. Guerita Lagos: Thank you for the request for consultation for Stacie Free to me for the evaluation of right recurrent breast cancer. Below are the relevant portions of my assessment and plan of care. Xiomara Magallanes was seen today. I have ordered CT C/A/P and bone scan since this is recurrent cancer. This cancer is also eroding through her skin. I think she will be best served with a mastectomy. Will discuss her case at tumor board. I noticed her TSH was very elevated, this will need to be addressed prior to her CT scans as she will get contrast for these. If you have questions, please do not hesitate to call me. I look forward to following Xiomara Magallanes along with you.     Sincerely,        Starlett Kehr, MD

## 2020-08-14 NOTE — TELEPHONE ENCOUNTER
FARAZ Taylor called and spoke to [facility] and scheduled PT for CT C/A/P and NM Bone Scan on 8/25/2020 @ [facility]. Pt NM injection is @ 9am, CTs @ 9:30am and NM scan @ 12pm. PT prep instructions are no metformin day of or for 48 hours after test, and NPO after midnight. MA attempt to contact patient to notify of scheduled procedures. Pt phone just rang and then said no one was available and hung up. MA will attempt to reach patient again on Monday if doesn't get a return call from patient. MA contacted Medicare via [email or web address]. Per the physician fee schedule the procedure is a coverable procedure.          Electronically signed by Verna Watson MA on 8/14/20 at 1:50 PM EDT

## 2020-08-17 NOTE — TELEPHONE ENCOUNTER
MA received a VM from patient son in regards to patient upcoming appointments and wondering when they were scheduled for. Aubree Hernandez left number [phone] to be reached at. MA attempt to contact patient son to discuss upcoming appointments. MA reached VM and left detailed message of appointment information and office number for Franco to call back if has any other questions.        Electronically signed by Raza Sosa MA on 8/17/20 at 3:46 PM EDT

## 2020-08-20 ENCOUNTER — TELEPHONE (OUTPATIENT)
Dept: CASE MANAGEMENT | Age: 80
End: 2020-08-20

## 2020-08-20 NOTE — TELEPHONE ENCOUNTER
Patient is a recurrent breast cancer, not eligible for SCP, navigation services will be discontinued.

## 2020-08-25 ENCOUNTER — HOSPITAL ENCOUNTER (OUTPATIENT)
Dept: CT IMAGING | Age: 80
Discharge: HOME | End: 2020-08-27
Payer: MEDICARE

## 2020-08-25 ENCOUNTER — HOSPITAL ENCOUNTER (OUTPATIENT)
Dept: NUCLEAR MEDICINE | Age: 80
Discharge: HOME | End: 2020-08-27
Payer: MEDICARE

## 2020-08-25 PROCEDURE — A9503 TC99M MEDRONATE: HCPCS | Performed by: RADIOLOGY

## 2020-08-25 PROCEDURE — 71260 CT THORAX DX C+: CPT

## 2020-08-25 PROCEDURE — 2580000003 HC RX 258: Performed by: RADIOLOGY

## 2020-08-25 PROCEDURE — 6360000004 HC RX CONTRAST MEDICATION: Performed by: RADIOLOGY

## 2020-08-25 PROCEDURE — 3430000000 HC RX DIAGNOSTIC RADIOPHARMACEUTICAL: Performed by: RADIOLOGY

## 2020-08-25 PROCEDURE — 74177 CT ABD & PELVIS W/CONTRAST: CPT

## 2020-08-25 PROCEDURE — 78306 BONE IMAGING WHOLE BODY: CPT

## 2020-08-25 RX ORDER — SODIUM CHLORIDE 0.9 % (FLUSH) 0.9 %
10 SYRINGE (ML) INJECTION ONCE
Status: COMPLETED | OUTPATIENT
Start: 2020-08-25 | End: 2020-08-25

## 2020-08-25 RX ORDER — TC 99M MEDRONATE 20 MG/10ML
25 INJECTION, POWDER, LYOPHILIZED, FOR SOLUTION INTRAVENOUS ONCE
Status: COMPLETED | OUTPATIENT
Start: 2020-08-25 | End: 2020-08-25

## 2020-08-25 RX ADMIN — TC 99M MEDRONATE 25 MILLICURIE: 20 INJECTION, POWDER, LYOPHILIZED, FOR SOLUTION INTRAVENOUS at 09:41

## 2020-08-25 RX ADMIN — IOHEXOL 50 ML: 240 INJECTION, SOLUTION INTRATHECAL; INTRAVASCULAR; INTRAVENOUS; ORAL at 10:59

## 2020-08-25 RX ADMIN — IOPAMIDOL 110 ML: 755 INJECTION, SOLUTION INTRAVENOUS at 10:59

## 2020-08-25 RX ADMIN — Medication 10 ML: at 10:59

## 2020-08-31 ENCOUNTER — TELEPHONE (OUTPATIENT)
Dept: SURGERY | Age: 80
End: 2020-08-31

## 2020-08-31 ENCOUNTER — TELEPHONE (OUTPATIENT)
Dept: FAMILY MEDICINE CLINIC | Age: 80
End: 2020-08-31

## 2020-08-31 NOTE — TELEPHONE ENCOUNTER
I have attempted twice now to call patient to discuss her CT scans and schedule surgery. Her mail box is full and I am unable to leave a message. I called her son Franco's cell phone and was able to reach him. He says his mom does not get up from her chair to answer her phone. I went over the CT scans and bone scans with him and the tumor board discussion. I discussed right mastectomy and SLNB but the possibility of ALND as well. He understands and would like to move forward with this.

## 2020-09-08 ENCOUNTER — TELEPHONE (OUTPATIENT)
Dept: BREAST CENTER | Age: 80
End: 2020-09-08

## 2020-09-08 NOTE — TELEPHONE ENCOUNTER
FARAZ Arnett called and spoke to Andrey Bill and scheduled PT for right breast mastectomy W SLNB, poss ALND on 09/23/2020 @ 10am with Dr. Endy Ratliff. PT denied taking any ASA/blood thinner products. PT son verbalized he understood prep instructions, and NPO after midnight. PT son verbalized that he understood appointment date/time, as well as to arrive 2 hours prior to SLN procedure. PT son advised on where to park and enter the hospital at for procedure. PT has been told to make sure they have a ride to and from procedure as they are not allowed to drive after procedure. PT procedure letter was mailed to them with all instructions also on it. MA instructed PT to call the office at [phone] with any questions, comments, or concerns about the procedure. MA contacted Medicare via [email or web address]. Per the physician fee schedule the procedure is a coverable procedure. MA scheduled patient post-op appt for 10/9/2020 @ 12:30pm [facility] with . MA faxed over BMS post surgical bra RX and received confirmation that was received. MA scanned RX under media tab in patient chart.         Electronically signed by Lois Glover MA on 9/8/20 at 2:46 PM EDT

## 2020-09-18 ENCOUNTER — OFFICE VISIT (OUTPATIENT)
Dept: FAMILY MEDICINE CLINIC | Age: 80
End: 2020-09-18
Payer: MEDICARE

## 2020-09-18 ENCOUNTER — HOSPITAL ENCOUNTER (OUTPATIENT)
Age: 80
Discharge: HOME | End: 2020-09-20
Payer: MEDICARE

## 2020-09-18 ENCOUNTER — PREP FOR PROCEDURE (OUTPATIENT)
Dept: SURGERY | Age: 80
End: 2020-09-18

## 2020-09-18 VITALS
HEART RATE: 78 BPM | OXYGEN SATURATION: 99 % | HEIGHT: 64 IN | DIASTOLIC BLOOD PRESSURE: 76 MMHG | WEIGHT: 212 LBS | TEMPERATURE: 97.8 F | SYSTOLIC BLOOD PRESSURE: 132 MMHG | BODY MASS INDEX: 36.19 KG/M2

## 2020-09-18 PROBLEM — E66.01 MORBIDLY OBESE (HCC): Status: ACTIVE | Noted: 2020-09-18

## 2020-09-18 LAB
ANION GAP SERPL CALCULATED.3IONS-SCNC: 19 MMOL/L (ref 7–16)
BASOPHILS ABSOLUTE: 0.07 E9/L (ref 0–0.2)
BASOPHILS RELATIVE PERCENT: 0.9 % (ref 0–2)
BUN BLDV-MCNC: 14 MG/DL (ref 8–23)
CALCIUM SERPL-MCNC: 9.7 MG/DL (ref 8.6–10.2)
CHLORIDE BLD-SCNC: 100 MMOL/L (ref 98–107)
CO2: 23 MMOL/L (ref 22–29)
CREAT SERPL-MCNC: 1.1 MG/DL (ref 0.5–1)
EOSINOPHILS ABSOLUTE: 0.32 E9/L (ref 0.05–0.5)
EOSINOPHILS RELATIVE PERCENT: 3.9 % (ref 0–6)
GFR AFRICAN AMERICAN: 58
GFR NON-AFRICAN AMERICAN: 48 ML/MIN/1.73
GLUCOSE BLD-MCNC: 118 MG/DL (ref 74–99)
HCT VFR BLD CALC: 44.4 % (ref 34–48)
HEMOGLOBIN: 14.3 G/DL (ref 11.5–15.5)
IMMATURE GRANULOCYTES #: 0.02 E9/L
IMMATURE GRANULOCYTES %: 0.2 % (ref 0–5)
LYMPHOCYTES ABSOLUTE: 1.68 E9/L (ref 1.5–4)
LYMPHOCYTES RELATIVE PERCENT: 20.7 % (ref 20–42)
MCH RBC QN AUTO: 31.4 PG (ref 26–35)
MCHC RBC AUTO-ENTMCNC: 32.2 % (ref 32–34.5)
MCV RBC AUTO: 97.4 FL (ref 80–99.9)
MONOCYTES ABSOLUTE: 0.36 E9/L (ref 0.1–0.95)
MONOCYTES RELATIVE PERCENT: 4.4 % (ref 2–12)
NEUTROPHILS ABSOLUTE: 5.67 E9/L (ref 1.8–7.3)
NEUTROPHILS RELATIVE PERCENT: 69.9 % (ref 43–80)
PDW BLD-RTO: 14.1 FL (ref 11.5–15)
PLATELET # BLD: 152 E9/L (ref 130–450)
PMV BLD AUTO: 11.2 FL (ref 7–12)
POTASSIUM SERPL-SCNC: 3.8 MMOL/L (ref 3.5–5)
RBC # BLD: 4.56 E12/L (ref 3.5–5.5)
SODIUM BLD-SCNC: 142 MMOL/L (ref 132–146)
TSH SERPL DL<=0.05 MIU/L-ACNC: 40.52 UIU/ML (ref 0.27–4.2)
WBC # BLD: 8.1 E9/L (ref 4.5–11.5)

## 2020-09-18 PROCEDURE — 1123F ACP DISCUSS/DSCN MKR DOCD: CPT | Performed by: FAMILY MEDICINE

## 2020-09-18 PROCEDURE — 1090F PRES/ABSN URINE INCON ASSESS: CPT | Performed by: FAMILY MEDICINE

## 2020-09-18 PROCEDURE — U0003 INFECTIOUS AGENT DETECTION BY NUCLEIC ACID (DNA OR RNA); SEVERE ACUTE RESPIRATORY SYNDROME CORONAVIRUS 2 (SARS-COV-2) (CORONAVIRUS DISEASE [COVID-19]), AMPLIFIED PROBE TECHNIQUE, MAKING USE OF HIGH THROUGHPUT TECHNOLOGIES AS DESCRIBED BY CMS-2020-01-R: HCPCS

## 2020-09-18 PROCEDURE — 1036F TOBACCO NON-USER: CPT | Performed by: FAMILY MEDICINE

## 2020-09-18 PROCEDURE — G8417 CALC BMI ABV UP PARAM F/U: HCPCS | Performed by: FAMILY MEDICINE

## 2020-09-18 PROCEDURE — G8427 DOCREV CUR MEDS BY ELIG CLIN: HCPCS | Performed by: FAMILY MEDICINE

## 2020-09-18 PROCEDURE — G8399 PT W/DXA RESULTS DOCUMENT: HCPCS | Performed by: FAMILY MEDICINE

## 2020-09-18 PROCEDURE — 80048 BASIC METABOLIC PNL TOTAL CA: CPT

## 2020-09-18 PROCEDURE — 36415 COLL VENOUS BLD VENIPUNCTURE: CPT

## 2020-09-18 PROCEDURE — 4040F PNEUMOC VAC/ADMIN/RCVD: CPT | Performed by: FAMILY MEDICINE

## 2020-09-18 PROCEDURE — 85025 COMPLETE CBC W/AUTO DIFF WBC: CPT

## 2020-09-18 PROCEDURE — 99214 OFFICE O/P EST MOD 30 MIN: CPT | Performed by: FAMILY MEDICINE

## 2020-09-18 PROCEDURE — 84443 ASSAY THYROID STIM HORMONE: CPT

## 2020-09-18 RX ORDER — SODIUM CHLORIDE 0.9 % (FLUSH) 0.9 %
10 SYRINGE (ML) INJECTION PRN
Status: CANCELLED | OUTPATIENT
Start: 2020-09-18

## 2020-09-18 RX ORDER — SODIUM CHLORIDE 0.9 % (FLUSH) 0.9 %
10 SYRINGE (ML) INJECTION EVERY 12 HOURS SCHEDULED
Status: CANCELLED | OUTPATIENT
Start: 2020-09-18

## 2020-09-18 RX ORDER — SODIUM CHLORIDE 9 MG/ML
INJECTION, SOLUTION INTRAVENOUS CONTINUOUS
Status: CANCELLED | OUTPATIENT
Start: 2020-09-18

## 2020-09-18 NOTE — H&P
[facility]  HISTORY & PHYSICAL  ATTENDING NOTE          Chief Complaint   Patient presents with    Consultation       NEW BREAST CANCER - recurrent IDC, er/pr+ her2 negative. Imaging/biopsy [facility]. labs recently drawn.  Breast Cancer       Pt had lumpectomy 10/24/2012 with      History and Physical     Patient's Name/Date of Birth: Mikaela Siegel / [id number]     Date: 2020      Referred by:  Dr. Shirley Navsa presents for evaluation of a mammographic abnormality.     PCP: Benito Cesar MD. Randall Candelaria.     The mammogram was performed at [address] [place] [address] Dr Orozco on 2020. The patient has not noted a change in BSE since presentation. Patient denies nipple discharge. Patient admits to a personal history of breast cancer. Breast cancer risk factors include mom with breast CA, menarche before age 16, previous breast CA, infrequent SMEs and age and gender. Ashkenazi Moravian Ancestry: No.     Patient is a poor historian. Some of the history obtained from prior records, some from recent PCP office. Patient noticed a wound on her breast for about 6 months. She was referred to wound care, but I don't see records of her going. She had breast cancer in  and underwent a lumpectomy with SLNB (6 nodes removed--see path below). She saw Dr. Daysi Spence for oncology. She was placed on endocrine therapy, unknown if she completed. She did undergo WBRT from 13 to 14. She does not remember any of this. She was stage IIA back then. She lives with her son and her   from MRSA prior to her first breast cancer. In our records, she last saw oncology in , saw a PCP in . There was no further mention or attention to the cancer since .       OBSTETRIC RELATED HISTORY:  Age of menarche was 7. Age of menopause was 33.  hysterectomy  Patient admits to hormonal therapy. x2 years  Patient is .    Age of first live birth was 19.   Patient did not breast feed. Is patient interested in fertility information about fertility preservation? No     CANCER SURVEILLANCE HISTORY:  Mammograms: Yes -- last one in our system is 2014  Breast MRI's: No   Breast Biopsies: Yes   Colonoscopy: unknown  GI Polyps: Not Applicable   EGD: unknown   Pelvic Exam: No  Pap Smear: No   Dermatology: No   Lung screening: no           Estimated body mass index is 36.39 kg/m² as calculated from the following:    Height as of this encounter: 5' 4\" (1.626 m). Weight as of this encounter: 212 lb (96.2 kg). Bra Size: unknown     Because violence is so common, we ask all our patients: are you in a relationship or do you live with a person who threatens, hurts, or controls you:  no     Patient drinks little caffeinated beverages. Patient does not smoke cigarettes. Patient does not use recreational drugs.                    Past Medical History        Past Medical History:   Diagnosis Date    Asthma      Blood transfusion      Breast cancer ([facility]      COPD (chronic obstructive pulmonary disease) ([facility]      Diabetes mellitus ([facility]      Diabetic peripheral neuropathy (HCC)      DM type 2 (diabetes mellitus, type 2) (HCC)      Hypertension      Hypothyroidism      Irritable bowel syndrome      Mixed hyperlipidemia 7/21/2020    Obesity (BMI 30-39. 9)      Osteoarthritis       rheumatoid and osteo    RA (rheumatoid arthritis) (HCC)      RBBB             Past Surgical History         Past Surgical History:   Procedure Laterality Date    BREAST LUMPECTOMY   [id number]     RIGHT    CARPAL TUNNEL RELEASE         b/l    CHOLECYSTECTOMY        HYSTERECTOMY         partial    JOINT REPLACEMENT Bilateral       bilateral TKR    LUMBAR FUSION         L4L5    OTHER SURGICAL HISTORY         thumb joint replacement rt     THYROID SURGERY        US BREAST NEEDLE BIOPSY RIGHT   7/31/2020     US BREAST NEEDLE BIOPSY RIGHT 7/31/2020 [facility]           Current Facility-Administered Medications          Current Outpatient Medications   Medication Sig Dispense Refill    atorvastatin (LIPITOR) 20 MG tablet Take 0.5 tablets by mouth daily 30 tablet 3    levothyroxine (SYNTHROID) 175 MCG tablet Take 1 tablet by mouth Daily 30 tablet 1    ramipril (ALTACE) 10 MG capsule Take 1 capsule by mouth daily Indications: High Blood Pressure Disorder 90 capsule 1    metFORMIN (GLUCOPHAGE) 500 MG tablet Take 1 tablet by mouth 2 times daily (with meals) 180 tablet 1    mirtazapine (REMERON) 30 MG tablet Take 1 tablet by mouth nightly 90 tablet 1    albuterol (PROVENTIL) (2.5 MG/3ML) 0.083% nebulizer solution Take 2.5 mg by nebulization 4 times daily 1 per aerosol four times a day          No current facility-administered medications for this visit.                  Allergies   Allergen Reactions    Aspirin Other (See Comments)       Asthma         Family History         Family History   Problem Relation Age of Onset    Cancer Mother 36         breast    Cancer Father           bone marrow    Cancer Brother           prostate    Diabetes Son      Neuropathy Son      High Blood Pressure Son      High Cholesterol Son      Hypertension Son          Ashkenazi Bahai Ancestry: No     Social History               Socioeconomic History    Marital status:        Spouse name: Not on file    Number of children: Not on file    Years of education: Not on file    Highest education level: Not on file   Occupational History    Not on file   Social Needs    Financial resource strain: Not on file    Food insecurity       Worry: Not on file       Inability: Not on file    Transportation needs       Medical: Not on file       Non-medical: Not on file   Tobacco Use    Smoking status: Never Smoker    Smokeless tobacco: Never Used   Substance and Sexual Activity    Alcohol use: No    Drug use: No    Sexual activity: Never       Partners: Male       Comment: ^/1/16):    since 2010   Lifestyle    Physical activity       Days per week: Not on file       Minutes per session: Not on file    Stress: Not on file   Relationships    Social connections       Talks on phone: Not on file       Gets together: Not on file       Attends Buddhist service: Not on file       Active member of club or organization: Not on file       Attends meetings of clubs or organizations: Not on file       Relationship status: Not on file    Intimate partner violence       Fear of current or ex partner: Not on file       Emotionally abused: Not on file       Physically abused: Not on file       Forced sexual activity: Not on file   Other Topics Concern    Not on file   Social History Narrative    Not on file           Occupation: retired; psychiatric nursing     ECOG PS 2; very poor historian, ambulates with walker     Review of Systems   Constitutional: Positive for activity change (uses a walker for several years). Negative for unexpected weight change. HENT: Negative. Respiratory: Negative for cough and shortness of breath. Cardiovascular: Positive for leg swelling. Negative for chest pain. Gastrointestinal: Negative for anal bleeding, blood in stool, constipation, diarrhea, nausea and vomiting. Endocrine: Negative. Genitourinary: Negative. Musculoskeletal: Positive for arthralgias (knees and hands, chronic), back pain (had L4/5 fused, chronic), gait problem (ambulates with walker) and joint swelling (chronic). Allergic/Immunologic: Negative. Neurological: Negative for light-headedness and headaches. Hematological: Negative. Psychiatric/Behavioral: Negative.       /84 (Site: Left Upper Arm, Position: Sitting, Cuff Size: Medium Adult)   Pulse 86   Temp 98.3 °F (36.8 °C) (Infrared)   Resp 16   Ht 5' 4\" (1.626 m)   Wt 212 lb (96.2 kg)   SpO2 97%   BMI 36.39 kg/m²   Physical Exam  Constitutional:       General: She is not in acute distress.      Appearance: She is obese.   HENT:      Head: Normocephalic and atraumatic. Nose: Nose normal.      Mouth/Throat:      Mouth: Mucous membranes are moist.      Pharynx: Oropharynx is clear. Eyes:      Extraocular Movements: Extraocular movements intact. Pupils: Pupils are equal, round, and reactive to light. Neck:      Musculoskeletal: Normal range of motion and neck supple. Cardiovascular:      Rate and Rhythm: Normal rate and regular rhythm. Pulses: Normal pulses. Heart sounds: Normal heart sounds. Pulmonary:      Effort: Pulmonary effort is normal.      Breath sounds: Normal breath sounds. Chest:      Breasts:         Right: Mass and skin change present. No swelling, bleeding, inverted nipple, nipple discharge or tenderness. Left: No swelling, bleeding, inverted nipple, mass, nipple discharge, skin change or tenderness. Comments: Examination performed while sitting in chair, not ideal, but patient unable to get to examination table. Abdominal:      General: There is no distension. Palpations: Abdomen is soft. Tenderness: There is no abdominal tenderness. Comments: Padilla Burr incisional wound--healed   Musculoskeletal:      Right lower leg: Edema present. Left lower leg: Edema present. Lymphadenopathy:      Cervical: No cervical adenopathy. Right cervical: No superficial cervical adenopathy. Left cervical: No superficial cervical adenopathy. Upper Body:      Right upper body: No supraclavicular or axillary adenopathy. Left upper body: No supraclavicular or axillary adenopathy. Skin:     General: Skin is warm and dry. Neurological:      Mental Status: She is alert.       Comments: Oriented to self and place   Psychiatric:         Mood and Affect: Mood normal.         Behavior: Behavior normal.      Comments: Dementia present         MAMMOGRAM:  Narrative    INDICATION:    Bilateral Diagnostic,Hx of Breast Ca, Lump and skin thickening or retraction in the right breast         HISTORY:    The patient has a history of dCIS and Invasive Ductal in the right breast in October, 2012 and invasive ductal right breast carcinoma in September, 2012. The patient has the following family history of breast cancer:  mother, at age 36. The patient has a     history of right Excisional Biopsy more than 10 years ago - benign.         MAMMOGRAM VIEWS:    The following mammographic views where obtained: bilateral craniocaudal; bilateral craniocaudal with tomosynthesis; bilateral mediolateral oblique; and bilateral mediolateral oblique with tomosynthesis         ULTRASOUND TECHNIQUE:    High-resolution real-time ultrasound scanning was performed. Additional elastography and doppler color flow analysis of any finding was also obtained.         TOMOSYNTHESIS:    Tomosynthesis (3 Dimensional Breast Imaging) was used on this examination to aid in evaluation.         COMPARISON:    The present examination has been compared to prior imaging studies performed at [address] [place] [address] on 09/07/2012 and 09/15/2014.         CAD:    This exam was reviewed using the iPosition Computer Aided Detection (CAD)         TISSUE DENSITY:    The breasts are heterogeneously dense (Type 3 density).         MAMMOGRAM FINDINGS:    In the left breast, no suspicious masses, areas of suspicious architectural distortion, suspicious calcifications, or additional suspicious findings are identified.              Finding 1:    There is an irregular mass seen in the right breast at 9 o'clock located 4 centimeters from the nipple.         ULTRASOUND FINDINGS:              Finding 1:    Sonography was performed in the right breast using a radial and anti-radial approach. Ultrasound shows an irregular solid mass with angular margins measuring 20 x 13 x 14 mm.  Internal echogenicity is hypoechoic.         There is no axillary lymphadenopathy.         IMPRESSION:    Solid mass in the right breast is suspicious. An ultrasound guided biopsy is recommended.         =======================================    BI-RADS Category 4:  Suspicious Abnormality    =======================================       PATHOLOGY:    Diagnosis:   Breast, right at 9:00, biopsy:   Invasive ductal carcinoma, see comment. Comment:   The carcinoma is focal, embedded within markedly fibrotic tissue, with a   Sawyer grade of 2 (score of 7): tubule formation score 3, nuclear   pleomorphism score 2, mitotic activity score 2. E-cadherin immunostain is positive. P63 immunostain shows absence of myoepithelial layer. Breast Cancer Marker Studies:   Estrogen Receptors (ER):   -Positive (>10%of cells demonstrate nuclear positivity):   Percentage of cells positive: 99%   Intensity: strong     Progesterone Receptors (MN):   -Positive:   Percentage of cells positive: 70%   Intensity: moderate-to-strong     Hormone receptor studies are performed by immunohistochemistry on   formalin-fixed, paraffin-embedded tissue (Roche Benchmark Immunostainer,   [place] anti-ER clone [id number], anti-MN clone [id number], polymer-based detection   chemistry). ER and MN are evaluated based on the percentage of cells   showing nuclear staining with >1% considered positive for each. Her-2/anni (c-erb B-2) protein expression: Negative (score 1+)         PATHOLOGY FROM PRIOR CANCER IN 2012:      Diagnosis-          A.  [place] lymph node, right, #1, excision-  Three    lymph nodes,   negative for metastatic carcinoma. Pankeratin immunostain negative for metastatic carcinoma   (performed on 3 tissue blocks).       B.  [place] lymph node, right, #2, excision-  Three lymph nodes,   negative for metastatic carcinoma.    Pankeratin immunostain negative for metastatic carcinoma   (performed on 3 tissue blocks).       C.  Breast, right, lumpectomy-  Invasive, poorly differentiated   ductal carcinoma.     Ductal carcinoma in situ, solid type, high nuclear grade with areas of comedo necrosis. Focal lymphovascular invasion identified. Margins of excision negative for malignancy. Previous biopsy site.     Fibrocystic change. Microcalcifications associated with benign breast tissue and DCIS.      BIOPSY FROM PRIOR CANCER IN 2012:      Diagnosis-          Mass, right breast, 9 o'clock, core needle biopsy-   Infiltrating   ductal carcinoma, poorly differentiated.       Comment-              The tumor demonstrates architectural grade 3,   nuclear grade 3,   mitotic grade 2-overall grade 8/9- poorly differentiated.       Intradepartmental consultation is obtained.       Breast Cancer Maker Studies   Estrogen Receptor (ER)- Positive (>95%) nuclei staining   Progesterone Receptor (OR)- Positive (60%) nuclei staining       Hormone receptor studies are performed by immunohistochemistry   ([place] Benchmark Immunostainer, [place] anti-Er clone Sp1, anti-   OR clone [id number]). ER and OR are evaluated based on ther percentage of   cells showing nuclear staining with greater than or equal to 1%   considered positive for each.       Her-2/anni- Negative (1+)        ONCOTYPE DX FROM 2012:  Diagnosis-          Oncotype DX Breast Cancer Assay-  Breast Cancer   Recurrence Score =   18. Average rate of distant recurrence 11%.       Comment-              See separate report from [address] [place] [address] for full   details.          ASSESSMENT/PLAN:  Right breast cancer, recurrent, IDC, Stage IIIB, ER/OR+ HER2-, grade 2 based from the ulceration of skin  --CT C/A/P and bone scan ordered--reviewed  --I discussed genetic testing, but patient refuses at this time.    --CBC, CMP, CXR reviewed  --referral to Dr. Angel Jorge already placed by PCP   --will discuss at multi-disciplinary tumor board.  --done  --plan for right mastectomy with SLNBx possible ALND    Naheed Gutierrez MD, MSc, FACS  9/18/2020  8:02 AM

## 2020-09-20 LAB
SARS-COV-2: NOT DETECTED
SOURCE: NORMAL

## 2020-09-21 ENCOUNTER — TELEPHONE (OUTPATIENT)
Dept: SURGERY | Age: 80
End: 2020-09-21

## 2020-09-21 RX ORDER — LEVOTHYROXINE SODIUM 175 UG/1
TABLET ORAL
Qty: 30 TABLET | Refills: 1 | Status: SHIPPED
Start: 2020-09-21 | End: 2021-06-14 | Stop reason: SDUPTHER

## 2020-09-21 NOTE — TELEPHONE ENCOUNTER
MA received a call from PAT stating that pt does not have required cardiac clearance for surgery with Dr. Leida Blackburn on Wednesday 9/23/20, per PAT, patients last PCP note is requiring cardiac clearance prior to surgery, MA routing to [id number] for informational purposes.   Electronically signed by Yuli Mahajan on 9/21/20 at 9:23 AM EDT

## 2020-09-22 NOTE — TELEPHONE ENCOUNTER
MA contacted surgery scheduling and spoke to [facility] and canceled patient procedure. MA also called Nuclear and spoke to Bertha Blanca to cancel patient SLN injection. MA will reschedule patient surgery once clearance is received.         Electronically signed by Renate Kruger MA on 9/22/20 at 8:18 AM EDT

## 2020-09-24 PROBLEM — I45.10 RBBB: Status: ACTIVE | Noted: 2020-09-24

## 2020-09-24 ASSESSMENT — ENCOUNTER SYMPTOMS
EYE REDNESS: 0
EYE DISCHARGE: 0
VOMITING: 0
EYE ITCHING: 0
CHEST TIGHTNESS: 0
COUGH: 0
SORE THROAT: 0
SHORTNESS OF BREATH: 0
NAUSEA: 0
RECTAL PAIN: 0
ABDOMINAL PAIN: 0
ABDOMINAL DISTENTION: 0
EYES NEGATIVE: 1
PHOTOPHOBIA: 0
BLOOD IN STOOL: 0
RESPIRATORY NEGATIVE: 1
RHINORRHEA: 0
EYE PAIN: 0
ALLERGIC/IMMUNOLOGIC NEGATIVE: 1
VOICE CHANGE: 0
SINUS PRESSURE: 0
CONSTIPATION: 0
DIARRHEA: 0
FACIAL SWELLING: 0
CHOKING: 0
WHEEZING: 0
BACK PAIN: 0
COLOR CHANGE: 0
ANAL BLEEDING: 0
SINUS PAIN: 0
TROUBLE SWALLOWING: 0
STRIDOR: 0

## 2020-09-25 NOTE — PROGRESS NOTES
Marika Jones is a 79 y.o. female. HPI/Chief C/O:  Chief Complaint   Patient presents with    Pre-op Exam     patient having breast surgery due to breast cancer; needs medical clearance     Allergies   Allergen Reactions    Aspirin Other (See Comments)     Asthma    this 79year old female presents for pre-op clearance for breast cancer. Pt has psoriatic arthritis, morbid obesity, RBBB, hypertension, hyperlipidemia, hypothyroid, acute cerebrovascular accident [facility]), and malignant neoplasm breast.     ROS:  Review of Systems   Constitutional: Positive for fatigue. Negative for activity change, appetite change, chills, diaphoresis, fever and unexpected weight change. HENT: Negative. Negative for congestion, dental problem, drooling, ear discharge, ear pain, facial swelling, hearing loss, mouth sores, nosebleeds, postnasal drip, rhinorrhea, sinus pressure, sinus pain, sneezing, sore throat, tinnitus, trouble swallowing and voice change. Eyes: Negative. Negative for photophobia, pain, discharge, redness, itching and visual disturbance. Respiratory: Negative. Negative for cough, choking, chest tightness, shortness of breath, wheezing and stridor. Cardiovascular: Negative. Negative for chest pain, palpitations and leg swelling. Gastrointestinal: Negative for abdominal distention, abdominal pain, anal bleeding, blood in stool, constipation, diarrhea, nausea, rectal pain and vomiting. Endocrine: Negative. Negative for cold intolerance, heat intolerance, polydipsia, polyphagia and polyuria. Genitourinary: Negative. Negative for decreased urine volume, difficulty urinating, dysuria, flank pain, frequency, genital sores, hematuria, menstrual problem, pelvic pain and urgency. Musculoskeletal: Positive for arthralgias and myalgias. Negative for back pain, gait problem, joint swelling, neck pain and neck stiffness. Skin: Negative.   Negative for color change, pallor, rash and Smoker    Smokeless tobacco: Never Used   Substance Use Topics    Alcohol use: No       OBJECTIVE  /76   Pulse 78   Temp 97.8 °F (36.6 °C)   Ht 5' 4\" (1.626 m)   Wt 212 lb (96.2 kg)   SpO2 99%   Breastfeeding No   BMI 36.39 kg/m²     Problem List:  Chavez Mir does not have any pertinent problems on file. PHYS EX:  Physical Exam  Vitals signs and nursing note reviewed. Constitutional:       General: She is not in acute distress. Appearance: Normal appearance. She is well-developed. She is obese. She is not ill-appearing, toxic-appearing or diaphoretic. Comments: Patient has morbid obesity. Patient instructed on low calorie, healthy diet. HENT:      Head: Normocephalic and atraumatic. Right Ear: Tympanic membrane, ear canal and external ear normal.      Left Ear: Tympanic membrane, ear canal and external ear normal.      Nose: Nose normal. No congestion or rhinorrhea. Mouth/Throat:      Mouth: Mucous membranes are moist.      Pharynx: Oropharynx is clear. No oropharyngeal exudate or posterior oropharyngeal erythema. Eyes:      General: No scleral icterus. Right eye: No discharge. Left eye: No discharge. Conjunctiva/sclera: Conjunctivae normal.      Pupils: Pupils are equal, round, and reactive to light. Neck:      Musculoskeletal: Normal range of motion and neck supple. No neck rigidity or muscular tenderness. Thyroid: No thyromegaly. Vascular: No carotid bruit or JVD. Trachea: No tracheal deviation. Cardiovascular:      Rate and Rhythm: Normal rate and regular rhythm. Pulses: Normal pulses. Heart sounds: Normal heart sounds. No murmur. No friction rub. No gallop. Pulmonary:      Effort: Pulmonary effort is normal. No respiratory distress. Breath sounds: Normal breath sounds. No stridor. No wheezing, rhonchi or rales. Chest:      Chest wall: No tenderness.    Abdominal:      General: Bowel sounds are normal. There is no distension. Palpations: Abdomen is soft. There is no mass. Tenderness: There is no abdominal tenderness. There is no guarding or rebound. Hernia: No hernia is present. Musculoskeletal:         General: No swelling, tenderness, deformity or signs of injury. Right lower leg: No edema. Left lower leg: No edema. Lymphadenopathy:      Cervical: No cervical adenopathy. Skin:     General: Skin is warm. Coloration: Skin is not jaundiced or pale. Findings: No bruising, erythema, lesion or rash. Neurological:      General: No focal deficit present. Mental Status: She is alert and oriented to person, place, and time. Cranial Nerves: No cranial nerve deficit. Sensory: No sensory deficit. Motor: Weakness present. No abnormal muscle tone. Coordination: Coordination abnormal.      Gait: Gait normal.      Deep Tendon Reflexes: Reflexes are normal and symmetric. Reflexes normal.   Psychiatric:         Mood and Affect: Mood normal.         Behavior: Behavior normal.         Thought Content: Thought content normal.         Judgment: Judgment normal.         ASSESSMENT/PLAN  Jack Reid was seen today for pre-op exam.    Diagnoses and all orders for this visit:    Malignant neoplasm of female breast, unspecified estrogen receptor status, unspecified laterality, unspecified site of breast ([address]  Not controlled. Specialist.   Pre-op exam  -     Sanjeev Aguilera MD, Cardiology, [facility]  Pre-op exam. Cardiology clearance. Psoriatic arthritis ([address]  Stable. Specialist.   Morbidly obese ([address]  Not controlled. Patient has morbid obesity. Patient instructed on low calorie, healthy diet. RBBB  -     Sanjeev Aguilera MD, Cardiology, [facility]. Essential hypertension  Controlled. Ace, statin, low salt diet. Acute cerebrovascular accident (CVA) ([address]  Stable. Hypothyroidism, unspecified type  Controlled. Synthroid.      Pt instructed if any worse go ED ASAP. Outpatient Encounter Medications as of 9/18/2020   Medication Sig Dispense Refill    atorvastatin (LIPITOR) 20 MG tablet Take 0.5 tablets by mouth daily 30 tablet 3    [DISCONTINUED] levothyroxine (SYNTHROID) 175 MCG tablet Take 1 tablet by mouth Daily 30 tablet 1    ramipril (ALTACE) 10 MG capsule Take 1 capsule by mouth daily Indications: High Blood Pressure Disorder 90 capsule 1    metFORMIN (GLUCOPHAGE) 500 MG tablet Take 1 tablet by mouth 2 times daily (with meals) 180 tablet 1    mirtazapine (REMERON) 30 MG tablet Take 1 tablet by mouth nightly 90 tablet 1    [DISCONTINUED] albuterol (PROVENTIL) (2.5 MG/3ML) 0.083% nebulizer solution Take 2.5 mg by nebulization 4 times daily 1 per aerosol four times a day       No facility-administered encounter medications on file as of 9/18/2020. Return in about 4 weeks (around 10/16/2020).         Reviewed recent labs related to Timur's current problems      Discussed importance of regular Health Maintenance follow up  Health Maintenance   Topic    DTaP/Tdap/Td vaccine (1 - Tdap)    Shingles Vaccine (1 of 2)    Pneumococcal 65+ years Vaccine (1 of 1 - PPSV23)    Annual Wellness Visit (AWV)     Flu vaccine (1)    Lipid screen     Statin Therapy     TSH testing     Potassium monitoring     Creatinine monitoring     DEXA (modify frequency per FRAX score)     Hepatitis A vaccine     Hib vaccine     Meningococcal (ACWY) vaccine

## 2020-10-02 ENCOUNTER — OFFICE VISIT (OUTPATIENT)
Dept: CARDIOLOGY CLINIC | Age: 80
End: 2020-10-02
Payer: MEDICARE

## 2020-10-02 VITALS
HEART RATE: 85 BPM | SYSTOLIC BLOOD PRESSURE: 136 MMHG | RESPIRATION RATE: 16 BRPM | WEIGHT: 207.6 LBS | BODY MASS INDEX: 35.44 KG/M2 | DIASTOLIC BLOOD PRESSURE: 70 MMHG | HEIGHT: 64 IN | OXYGEN SATURATION: 97 %

## 2020-10-02 PROBLEM — I25.10 CORONARY ARTERY CALCIFICATION SEEN ON CT SCAN: Status: ACTIVE | Noted: 2020-10-02

## 2020-10-02 PROCEDURE — 93000 ELECTROCARDIOGRAM COMPLETE: CPT | Performed by: INTERNAL MEDICINE

## 2020-10-02 PROCEDURE — 99215 OFFICE O/P EST HI 40 MIN: CPT | Performed by: INTERNAL MEDICINE

## 2020-10-02 RX ORDER — METOPROLOL SUCCINATE 25 MG/1
25 TABLET, EXTENDED RELEASE ORAL DAILY
Qty: 90 TABLET | Refills: 1 | Status: SHIPPED
Start: 2020-10-02 | End: 2021-06-21 | Stop reason: SDUPTHER

## 2020-10-02 NOTE — PROGRESS NOTES
[address] [facility]   Heart and Vascular [place]   Clinic Note     Date:10/2/20   [facility]  YOB: 1940  Age: 79 y.o. Primary Care Provider: Adam Pang MD    Subjective     This is a 73-year-old  female referred to us for preoperative risk stratification prior to right-sided mastectomy for breast cancer. Of note she is a very difficult historian. She endorses a history of myocardial infarction although without revascularization. She says that it might of been heart attack or stroke. She does endorse residual right-sided weakness. She does not recall any history of intervention on her stroke. She does not recall any history of chemotherapy or radiation to her prior breast cancer. She walks with a walker very slowly and she does not do much in terms of physical activity as she is very sedentary. She describes no history of angina or chest discomfort and no dyspnea on exertion. She has no orthopnea or PND but endorses lower extremity edema. She denies palpitations and syncope/presyncope. A focused history review includes:  1. Chronic right bundle branch block  2. Hypertension  3. Type 2 diabetes on orals  4. Dyslipidemia  5. COPD  6. Aspirin allergy -bronchospasm  7. Never smoker  8. Breast cancer, recurrent invasive ductal carcinoma, stage IIIb, ER positive, TX positive, HER-2 negative    Past History    Past Medical History:         Diagnosis Date    Asthma     Blood transfusion     Breast cancer ([address]     COPD (chronic obstructive pulmonary disease) ([address]     Diabetes mellitus ([address]     Diabetic peripheral neuropathy ([address]     DM type 2 (diabetes mellitus, type 2) ([address]     Hypertension     Hypothyroidism     Irritable bowel syndrome     Mixed hyperlipidemia 7/21/2020    Obesity (BMI 30-39. 9)     Osteoarthritis     rheumatoid and osteo    RA (rheumatoid arthritis) ([address]     RBBB           Social History: Social History     Tobacco History     Smoking Status  Never Smoker    Smokeless Tobacco Use  Never Used          Alcohol History     Alcohol Use Status  No          Drug Use     Drug Use Status  No          Sexual Activity     Sexually Active  Never Comment  ^/1/16):  since 2010                    Family History:       Problem Relation Age of Onset    Cancer Mother 36        breast    Cancer Father         bone marrow    Cancer Brother         prostate    Diabetes Son     Neuropathy Son     High Blood Pressure Son     High Cholesterol Son     Hypertension Son          Review of Systems   General ROS: No weight loss fevers or chills  Psychological ROS: No new depression or anxiety or altered mood  Ophthalmic ROS: No new vision changes or diplopia  Respiratory ROS: No cough, wheezing, shortness of breath, or hemoptysis  Cardiovascular ROS: See HPI  Gastrointestinal ROS: No nausea, vomiting, constipation, diarrhea, hematemesis, melena, or hematochezia  Genito-Urinary ROS: No dysuria, hematuria, or new incontinence  Musculoskeletal ROS: No new muscle pain, joint pain, joint swelling, or back pain  Neurological ROS: No new numbness or paresthesias, no focal weakness, no altered speech, no new memory loss  Dermatological ROS: No new rashes, no pruritus, no skin masses        Medications     Current Outpatient Medications   Medication Sig Dispense Refill    metoprolol succinate (TOPROL XL) 25 MG extended release tablet Take 1 tablet by mouth daily 90 tablet 1    levothyroxine (SYNTHROID) 175 MCG tablet Take one tablet Mon-Fri and 2 tabs Sat/Sun.  30 tablet 1    atorvastatin (LIPITOR) 20 MG tablet Take 0.5 tablets by mouth daily 30 tablet 3    ramipril (ALTACE) 10 MG capsule Take 1 capsule by mouth daily Indications: High Blood Pressure Disorder 90 capsule 1    metFORMIN (GLUCOPHAGE) 500 MG tablet Take 1 tablet by mouth 2 times daily (with meals) 180 tablet 1    mirtazapine (REMERON) 30 MG tablet Take 1 tablet by mouth nightly 90 tablet 1     No current facility-administered medications for this visit. Physical Examination      /70   Pulse 85   Resp 16   Ht 5' 4\" (1.626 m)   Wt 207 lb 9.6 oz (94.2 kg)   SpO2 97%   BMI 35.63 kg/m²     General: No acute distress, appears as stated age, nonicteric  Head: Atraumatic, no gross abnormalities or bruises  Neck: Supple and nontender, no carotid bruits, no JVP  Lungs: Clear to auscultation bilaterally, no wheezes, rales, or rhonchi  Heart: Regular rate and rhythm, no murmurs, rubs, or gallops  Abdomen: Soft, nontender, nondistended, normal bowel sounds  Extremities: No obvious deformities, no cyanosis, trace edema  Neurological: Alert and oriented x3, EOMI, moving all extremities x4  Psychological: Normal mood and affect, cooperative  Skin: Color, texture, and turgor normal for age          Labs/Imaging/Diagnostics     Lab Results   Component Value Date     09/18/2020    K 3.8 09/18/2020    K 3.2 04/10/2019     09/18/2020    CO2 23 09/18/2020    BUN 14 09/18/2020    CREATININE 1.1 09/18/2020    GLUCOSE 118 09/18/2020    CALCIUM 9.7 09/18/2020        Estimated Creatinine Clearance: 46 mL/min (A) (based on SCr of 1.1 mg/dL (H)).     Lab Results   Component Value Date    WBC 8.1 09/18/2020    HGB 14.3 09/18/2020    HCT 44.4 09/18/2020    MCV 97.4 09/18/2020     09/18/2020       Lab Results   Component Value Date    ALT 6 07/21/2020    AST 13 07/21/2020    ALKPHOS 62 07/21/2020    BILITOT 0.7 07/21/2020       Lab Results   Component Value Date    LABALBU 4.2 07/21/2020       Lab Results   Component Value Date    CHOL 263 (H) 07/21/2020    CHOL 148 04/10/2019    CHOL 236 (H) 06/06/2016     Lab Results   Component Value Date    TRIG 128 07/21/2020    TRIG 129 04/10/2019    TRIG 171 (H) 06/06/2016     Lab Results   Component Value Date    HDL 46 07/21/2020    HDL 31 04/10/2019    HDL 42 06/06/2016     Lab Results   Component Value

## 2020-10-02 NOTE — Clinical Note
Dr. Clair Rojas, you mentioned you are planning on mastectomy for this lady with stage IIIb recurrent breast cancer. I am planning a coronary angiogram for risk stratification. I wanted to clarify with you ahead of time whether you would operate on single or dual antiplatelet therapy (aspirin and/or Plavix).

## 2020-10-02 NOTE — PATIENT INSTRUCTIONS
1. Echocardiogram (heart ultrasound)  2. Heart cath  3. Toprol 25 mg once daily  4.  Return in one year

## 2020-10-08 NOTE — PROGRESS NOTES
[facility] [employer], 1940,     Patient scheduled on 10 9 2020 1130am with DR Eliezer Gitelman for  Cath possible , Son kirill Colbert given procedure instructions, mask requirement and temperature check  instructions. Patient son answered all checklist questions and appointment confirmed. Also emailed [facility] [employer] cristina Saini instructions and BUN/Cr scripts for post cath.    Kelvin RN BNS CHFN

## 2020-10-09 ENCOUNTER — HOSPITAL ENCOUNTER (OUTPATIENT)
Dept: CARDIAC CATH/INVASIVE PROCEDURES | Age: 80
Discharge: HOME | End: 2020-10-09
Payer: MEDICARE

## 2020-10-09 ENCOUNTER — TELEPHONE (OUTPATIENT)
Dept: CARDIOLOGY | Age: 80
End: 2020-10-09

## 2020-10-09 VITALS
RESPIRATION RATE: 20 BRPM | OXYGEN SATURATION: 96 % | DIASTOLIC BLOOD PRESSURE: 96 MMHG | TEMPERATURE: 97.2 F | HEIGHT: 64 IN | SYSTOLIC BLOOD PRESSURE: 191 MMHG | BODY MASS INDEX: 34.49 KG/M2 | WEIGHT: 202 LBS

## 2020-10-09 LAB
ABO/RH: NORMAL
ANION GAP SERPL CALCULATED.3IONS-SCNC: 11 MMOL/L (ref 7–16)
ANTIBODY SCREEN: NORMAL
BUN BLDV-MCNC: 14 MG/DL (ref 8–23)
CALCIUM SERPL-MCNC: 9.7 MG/DL (ref 8.6–10.2)
CHLORIDE BLD-SCNC: 104 MMOL/L (ref 98–107)
CO2: 29 MMOL/L (ref 22–29)
CREAT SERPL-MCNC: 1.2 MG/DL (ref 0.5–1)
GFR AFRICAN AMERICAN: 52
GFR NON-AFRICAN AMERICAN: 43 ML/MIN/1.73
GLUCOSE BLD-MCNC: 122 MG/DL (ref 74–99)
HCT VFR BLD CALC: 40.8 % (ref 34–48)
HEMOGLOBIN: 13.6 G/DL (ref 11.5–15.5)
INR BLD: 1
MCH RBC QN AUTO: 31.8 PG (ref 26–35)
MCHC RBC AUTO-ENTMCNC: 33.3 % (ref 32–34.5)
MCV RBC AUTO: 95.3 FL (ref 80–99.9)
PDW BLD-RTO: 13.5 FL (ref 11.5–15)
PLATELET # BLD: 126 E9/L (ref 130–450)
PMV BLD AUTO: 10.4 FL (ref 7–12)
POTASSIUM SERPL-SCNC: 3.9 MMOL/L (ref 3.5–5)
PROTHROMBIN TIME: 11 SEC (ref 9.3–12.4)
RBC # BLD: 4.28 E12/L (ref 3.5–5.5)
SODIUM BLD-SCNC: 144 MMOL/L (ref 132–146)
WBC # BLD: 7.4 E9/L (ref 4.5–11.5)

## 2020-10-09 PROCEDURE — 86850 RBC ANTIBODY SCREEN: CPT

## 2020-10-09 PROCEDURE — C1894 INTRO/SHEATH, NON-LASER: HCPCS

## 2020-10-09 PROCEDURE — 6370000000 HC RX 637 (ALT 250 FOR IP)

## 2020-10-09 PROCEDURE — 93458 L HRT ARTERY/VENTRICLE ANGIO: CPT | Performed by: INTERNAL MEDICINE

## 2020-10-09 PROCEDURE — 86901 BLOOD TYPING SEROLOGIC RH(D): CPT

## 2020-10-09 PROCEDURE — 6360000002 HC RX W HCPCS

## 2020-10-09 PROCEDURE — 85610 PROTHROMBIN TIME: CPT

## 2020-10-09 PROCEDURE — 93571 IV DOP VEL&/PRESS C FLO 1ST: CPT | Performed by: INTERNAL MEDICINE

## 2020-10-09 PROCEDURE — 93458 L HRT ARTERY/VENTRICLE ANGIO: CPT

## 2020-10-09 PROCEDURE — 85027 COMPLETE CBC AUTOMATED: CPT

## 2020-10-09 PROCEDURE — 2500000003 HC RX 250 WO HCPCS

## 2020-10-09 PROCEDURE — 86900 BLOOD TYPING SEROLOGIC ABO: CPT

## 2020-10-09 PROCEDURE — 2709999900 HC NON-CHARGEABLE SUPPLY

## 2020-10-09 PROCEDURE — C1725 CATH, TRANSLUMIN NON-LASER: HCPCS

## 2020-10-09 PROCEDURE — 93571 IV DOP VEL&/PRESS C FLO 1ST: CPT

## 2020-10-09 PROCEDURE — C1769 GUIDE WIRE: HCPCS

## 2020-10-09 PROCEDURE — 36415 COLL VENOUS BLD VENIPUNCTURE: CPT

## 2020-10-09 PROCEDURE — C1887 CATHETER, GUIDING: HCPCS

## 2020-10-09 PROCEDURE — 80048 BASIC METABOLIC PNL TOTAL CA: CPT

## 2020-10-09 RX ORDER — ACETAMINOPHEN 325 MG/1
650 TABLET ORAL EVERY 4 HOURS PRN
Status: DISCONTINUED | OUTPATIENT
Start: 2020-10-09 | End: 2020-10-10 | Stop reason: HOSPADM

## 2020-10-09 RX ORDER — SODIUM CHLORIDE 0.9 % (FLUSH) 0.9 %
10 SYRINGE (ML) INJECTION EVERY 12 HOURS SCHEDULED
Status: DISCONTINUED | OUTPATIENT
Start: 2020-10-09 | End: 2020-10-10 | Stop reason: HOSPADM

## 2020-10-09 RX ORDER — ATORVASTATIN CALCIUM 20 MG/1
40 TABLET, FILM COATED ORAL DAILY
Qty: 30 TABLET | Refills: 3 | Status: SHIPPED | OUTPATIENT
Start: 2020-10-09 | End: 2021-06-21 | Stop reason: SDUPTHER

## 2020-10-09 RX ORDER — SODIUM CHLORIDE 0.9 % (FLUSH) 0.9 %
10 SYRINGE (ML) INJECTION PRN
Status: DISCONTINUED | OUTPATIENT
Start: 2020-10-09 | End: 2020-10-10 | Stop reason: HOSPADM

## 2020-10-09 NOTE — PROGRESS NOTES
Ok to reschedule GeorgeDarlington's surgery. She is ok to stay on her aspirin. She has been cleared by cardiology.

## 2020-10-09 NOTE — PROCEDURES
CARDIAC CATHETERIZATION REPORT    : Jus Solorio MD     Date of procedure:10/09/20       Indication:  1. Preop risk evaluation  2. CAD on CT chest    Procedures:  Left heart catheterization and Coronary angiogram  iFR    Sedation/analgesia:  Midazolam IV and Fentanyl IV     Time sedation was administered: 1036. I was present in the room when sedation was administered. Procedure end time: 7174  Time spent with face to face monitoring of moderate sedation: 38 minutes    Brief history:  71-year-old  female who is a poor historian and significantly frail. She is being worked up for right-sided mastectomy for stage IIIb recurrent invasive ductal carcinoma. Her chest CT reveals focal calcification in the proximal mid LAD. Her functional capacity is unknown and she has bifascicular block and possible age-indeterminate anterolateral infarct on her EKG    Description of procedure: The patient presented to the Cath Lab in a(n) elective fashion. The patient was prepped and draped in a sterile manner. Timeout, airway and ASA assessment were completed. Local anesthesia with 1% lidocaine was administered and sedation/analgesia were administered as above. Access was obtained using the modified Seldinger technique and a micropuncture needle in the right radial artery. A 6Fshort sheath was inserted over a wire. Diagnostic angiography was performed using 5F JL3.5 and AR2.0 diagnostic catheters. Coronary anatomy:  Dominance: Right  1. Left main: Angiographically normal  2. LAD: Segmental stenosis 60% in the proximal LAD. One dominant diagonal.  The proximal LAD was evaluated with IFR 0.97 (Ikari L3.5 6F guide was used)  3. Left circumflex: Angiographically normal.  Gives off a large OM1, moderate sized OM 2, large LPL.   4. RCA: Angiographically normal. gives off a moderate sized PDA      Hemodynamics:  LVEDP 17  Ao: 160/85 with a mean of 118      Hemostasis:  Transradial band was applied for patent arterial hemostasis. Complications: None  Estimated blood loss: 10 mL  Contrast used: 83 mL  Air kerma: 1114 mGy    Conclusions:  1. Moderate proximal LAD disease, nonsignificant by IFR  2. Otherwise no significant CAD  3. Elevated left filling pressure  4. Significant radial spasm and difficulty advancing catheters. Not suitable for future catheterization. PLAN:  1. May proceed to surgery without further cardiac testing  2. Aspirin 81 mg daily if safe  3. Increase atorvastatin to 40 mg daily  4.  Follow-up with me in the office in 12 months    Maya Narayan MD  Interventional Cardiology/Structural Heart Disease  Cell: [phone]

## 2020-10-09 NOTE — TELEPHONE ENCOUNTER
Patient having Heart Cath today, have been unable to reach to schedule Echocardiogram, should I proceed with trying to schedule.

## 2020-10-12 ENCOUNTER — TELEPHONE (OUTPATIENT)
Dept: CARDIOLOGY CLINIC | Age: 80
End: 2020-10-12

## 2020-10-12 RX ORDER — ASPIRIN 81 MG/1
81 TABLET ORAL DAILY
Qty: 90 TABLET | Refills: 3 | Status: SHIPPED
Start: 2020-10-12 | End: 2021-07-29

## 2020-10-12 NOTE — TELEPHONE ENCOUNTER
Dr Genoveva Barron,  please review and place ASA order after reviewing allergies if you still want ASA 81mg daily. Per your Heart Cath Plan 10-9-2020  1. Aspirin 81 mg daily if safe       Florian Pompa MD (Physician) [facility] to reschedule [facility]'s surgery. She is ok to stay on her aspirin. She has been cleared by cardiology.

## 2020-10-12 NOTE — TELEPHONE ENCOUNTER
· TTE to rule out LV systolic dysfunction and pulmonary hypertension given history of malignancy and the trace leg edema and the apparent MI on her EKG        Please read DR Chery's office note ording TTE and Cath. Please procede with TTE if not done.

## 2020-10-14 ENCOUNTER — TELEPHONE (OUTPATIENT)
Dept: BREAST CENTER | Age: 80
End: 2020-10-14

## 2020-10-14 NOTE — TELEPHONE ENCOUNTER
FARAZ Alexandra called and spoke to Irving roberto and scheduled PT for right breast mastectomy W SLNB, poss ALND on 11/3/2020 @ 12pm with Dr. Clair Rojas. PT has SLN @ 9:30am in [facility]. PT is taking ASA/blood thinner products, per  pt ok to remain on Aspirin. PT verbalized she understood prep instructions, and NPO after midnight. PT verbalized that she understood appointment date/time, as well as to arrive 2 hours prior to SLN procedure. PT advised on where to park and enter the hospital at for procedure. PT has been told to make sure they have a ride to and from procedure as they are not allowed to drive after procedure. PT procedure letter was mailed to them with all instructions also on it. MA instructed PT to call the office at [phone] with any questions, comments, or concerns about the procedure. MA contacted Medicare via [email or web address]. Per the physician fee schedule the procedure is a coverable procedure. MA scheduled patient post op appointment for 11/20/2020 @ 10:30am in [facility].       Electronically signed by Javon Reyes MA on 10/14/20 at 10:59 AM EDT

## 2020-10-27 NOTE — PROGRESS NOTES
Patients son notified. Patient having covid testing at [facility] on 10/29/20. Patient asked to bring ID and to self quarantine until day of procedure.

## 2020-10-29 ENCOUNTER — HOSPITAL ENCOUNTER (OUTPATIENT)
Age: 80
Discharge: HOME | End: 2020-10-31
Payer: MEDICARE

## 2020-10-29 PROCEDURE — U0003 INFECTIOUS AGENT DETECTION BY NUCLEIC ACID (DNA OR RNA); SEVERE ACUTE RESPIRATORY SYNDROME CORONAVIRUS 2 (SARS-COV-2) (CORONAVIRUS DISEASE [COVID-19]), AMPLIFIED PROBE TECHNIQUE, MAKING USE OF HIGH THROUGHPUT TECHNOLOGIES AS DESCRIBED BY CMS-2020-01-R: HCPCS

## 2020-10-29 NOTE — PROGRESS NOTES
Domin[facility] 37 PRE-ADMISSION TESTING GENERAL INSTRUCTIONS- [facility]-phone number:[phone]    GENERAL INSTRUCTIONS  [x] Antibacterial Soap shower Night before and/or AM of Surgery  [] Gerald wipe instruction sheet and wipes given. [x] Nothing by mouth after midnight, including gum, candy, mints, or water. [x] You may brush your teeth, gargle, but do NOT swallow water. []Hibiclens shower  the night before and the morning of surgery. Do not use             Hibiclens on your face or head. []No smoking, chewing tobacco, illegal drugs, or alcohol within 24 hours of your surgery. [x] Jewelry, valuables or body piercing's should not be brought to the hospital. All body and/or tongue piercing's must be removed prior to arriving to hospital.  ALL hair pins must be removed. [x] Do not wear makeup, lotions, powders, deodorant. Nail polish as directed by the nurse. [x] Arrange transportation with a responsible adult  to and from the hospital. If you do not have a responsible adult  to transport you, you will need to make arrangements with a medical transportation company (i.e. [employer]. A Uber/taxi/bus is not appropriate unless you are accompanied by a responsible adult ). Arrange for someone to be with you for the remainder of the day and for 24 hours after your procedure due to having had anesthesia. Who will be your  for transportation?_________son_________   Who will be staying with you for 24 hrs after your procedure?___________son_______  [x] Bring insurance card and photo ID.  [] Transfusion Bracelet: Please bring with you to hospital, day of surgery  [] Bring urine specimen day of surgery. Any small container is acceptable. [] Use inhalers the morning of surgery and bring with you to hospital.  [] Bring copy of living will or healthcare power of  papers to be placed in your electronic record.   [] CPAP/BI-PAP: Please bring your machine if you are to spend the night in the hospital.     PARKING INSTRUCTIONS:   [x] Arrival Time:___0800_________  · [] Parking lot '\"I\"  is located on [facility] (the corner of [facility] and [facility]). To enter, press the button and the gate will lift. A free token will be provided to exit the lot. One car per patient is allowed to park in this lot. All other cars are to park on [address] [place] [address] either in the parking garage or the handicap lot. [x] To reach the [facility] lobby from [address] [place] [address], upon entering the hospital, take elevator B to the 3rd floor. EDUCATION INSTRUCTIONS:      [] Knee or hip replacement booklet & exercise pamphlets given. [] [address] placed in chart. [] Pre-admission Testing educational folder given  [] Incentive Spirometry,coughing & deep breathing exercises reviewed. []Medication information sheet(s)   []Fluoroscopy-Xray used in surgery reviewed with patient. Educational pamphlet placed in chart. []Pain: Post-op pain is normal and to be expected. You will be asked to rate your pain from 0-10(a zero is not acceptable-education is needed). Your post-op pain goal is:  [] Ask your nurse for your pain medication. [] Joint camp offered. [] Joint replacement booklets given. [] Other:___________________________    MEDICATION INSTRUCTIONS:   [x]Bring a complete list of your medications, please write the last time you took the medicine, give this list to the nurse. [x] Take the following medications the morning of surgery with 1-2 ounces of water: see med sheet    [] Stop herbal supplements and vitamins 5 days before your surgery. [x] DO NOT take any diabetic medicine the morning of surgery. Follow instructions for insulin the day before surgery. [] If you are diabetic and your blood sugar is low or you feel symptomatic, you may drink 1-2 ounces of apple juice or take a glucose tablet.   The morning of your procedure, you may call the pre-op area if you have concerns about your blood sugar [phone]. [] Use your inhalers the morning of surgery. Bring your emergency inhaler with you day of surgery. [x] Follow physician instructions regarding any blood thinners you may be taking. WHAT TO EXPECT:  [x] The day of surgery you will be greeted and checked in by the [employer].  In addition, you will be registered in the [facility] by a Patient Access Representative. Please bring your photo ID and insurance card. A nurse will greet you in accordance to the time you are needed in the pre-op area to prepare you for surgery. Please do not be discouraged if you are not greeted in the order you arrive as there are many variables that are involved in patient preparation. Your patience is greatly appreciated as you wait for your nurse. Please bring in items such as: books, magazines, newspapers, electronics, or any other items  to occupy your time in the waiting area. []  Delays may occur with surgery and staff will make a sincere effort to keep you informed of delays. If any delays occur with your procedure, we apologize ahead of time for your inconvenience as we recognize the value of your time.

## 2020-10-31 LAB
SARS-COV-2: NOT DETECTED
SOURCE: NORMAL

## 2020-11-02 ENCOUNTER — PREP FOR PROCEDURE (OUTPATIENT)
Dept: SURGERY | Age: 80
End: 2020-11-02

## 2020-11-02 RX ORDER — SODIUM CHLORIDE 0.9 % (FLUSH) 0.9 %
10 SYRINGE (ML) INJECTION EVERY 12 HOURS SCHEDULED
Status: CANCELLED | OUTPATIENT
Start: 2020-11-02

## 2020-11-02 RX ORDER — SODIUM CHLORIDE 9 MG/ML
INJECTION, SOLUTION INTRAVENOUS CONTINUOUS
Status: CANCELLED | OUTPATIENT
Start: 2020-11-02

## 2020-11-02 RX ORDER — SODIUM CHLORIDE 0.9 % (FLUSH) 0.9 %
10 SYRINGE (ML) INJECTION PRN
Status: CANCELLED | OUTPATIENT
Start: 2020-11-02

## 2020-11-02 NOTE — H&P
[facility]  HISTORY & PHYSICAL  ATTENDING NOTE             Chief Complaint   Patient presents with    Consultation       NEW BREAST CANCER - recurrent IDC, er/pr+ her2 negative. Imaging/biopsy [facility]. labs recently drawn.  Breast Cancer       Pt had lumpectomy 10/24/2012 with       History and Physical     Patient's Name/Date of Noy Colbert / 1940     Date: 2020      Referred by:  Dr. Flower Colbert presents for evaluation of a mammographic abnormality.     PCP: Brigida Claudio MD. Gynecologist:none.     The mammogram was performed at [facility] on 2020. The patient has not noted a change in BSE since presentation.  Patient denies nipple discharge. Patient admits to a personal history of breast cancer.  Breast cancer risk factors include mom with breast CA, menarche before age 16, previous breast CA, infrequent SMEs and age and gender. Lauren Ramos Ancestry: No.     Patient is a poor historian.  Some of the history obtained from prior records, some from recent PCP office. Filomena Torres noticed a wound on her breast for about 6 months. Lindsay Mayo was referred to wound care, but I don't see records of her going. Lindsay Mayo had breast cancer in  and underwent a lumpectomy with SLNB (6 nodes removed--see path below).  She saw Dr. Carmelo Hankins for oncology. Lindsay Mayo was placed on endocrine therapy, unknown if she completed. Lindsay Mayo did undergo WBRT from 13 to 14. Lindsay Mayo does not remember any of this. Lindsay Mayo was stage IIA back then.  She lives with her son and her   from MRSA prior to her first breast cancer.  In our records, she last saw oncology in , saw a PCP in 2016. Dustin Spatz was no further mention or attention to the cancer since .       OBSTETRIC RELATED HISTORY:  Age of menarche was 12. Age of menopause was 28.  hysterectomy  Patient admits to hormonal therapy. x2 years  Patient is .    Age of first live birth was 19. Patient did not breast feed. Is patient interested in fertility information about fertility preservation? No     CANCER SURVEILLANCE HISTORY:  Mammograms: Yes -- last one in our system is 2014  Breast MRI's: No   Breast Biopsies: Yes   Colonoscopy: unknown  GI Polyps: Not Applicable   EGD: unknown   Pelvic Exam: No  Pap Smear: No   Dermatology: No   Lung screening: no           Estimated body mass index is 36.39 kg/m² as calculated from the following:    Height as of this encounter: 5' 4\" (1.626 m).    Weight as of this encounter: 212 lb (96.2 kg). Bra Size: unknown     Because violence is so common, we ask all our patients: are you in a relationship or do you live with a person who threatens, hurts, or controls you:  no     Patient drinks little caffeinated beverages. Patient does not smoke cigarettes. Patient does not use recreational drugs.                    Past Medical History           Past Medical History:   Diagnosis Date    Asthma      Blood transfusion      [facility] cancer ([facility]      COPD (chronic obstructive pulmonary disease) ([facility]      Diabetes mellitus ([facility]      Diabetic peripheral neuropathy ([facility]      DM type 2 (diabetes mellitus, type 2) ([facility])      Hypertension      Hypothyroidism      Irritable bowel syndrome      Mixed hyperlipidemia 7/21/2020    Obesity (BMI 30-39. 9)      Osteoarthritis       rheumatoid and osteo    RA (rheumatoid arthritis) (HCC)      RBBB              Past Surgical History             Past Surgical History:   Procedure Laterality Date    BREAST LUMPECTOMY   [id number]     RIGHT    CARPAL TUNNEL RELEASE         b/l    CHOLECYSTECTOMY        HYSTERECTOMY         partial    JOINT REPLACEMENT Bilateral       bilateral TKR    LUMBAR FUSION         L4L5    OTHER SURGICAL HISTORY         thumb joint replacement rt     THYROID SURGERY        US BREAST NEEDLE BIOPSY RIGHT   7/31/2020     US BREAST NEEDLE BIOPSY RIGHT 7/31/2020 [facility]       Current Facility-Administered Medications               Current Outpatient Medications   Medication Sig Dispense Refill    atorvastatin (LIPITOR) 20 MG tablet Take 0.5 tablets by mouth daily 30 tablet 3    levothyroxine (SYNTHROID) 175 MCG tablet Take 1 tablet by mouth Daily 30 tablet 1    ramipril (ALTACE) 10 MG capsule Take 1 capsule by mouth daily Indications: High Blood Pressure Disorder 90 capsule 1    metFORMIN (GLUCOPHAGE) 500 MG tablet Take 1 tablet by mouth 2 times daily (with meals) 180 tablet 1    mirtazapine (REMERON) 30 MG tablet Take 1 tablet by mouth nightly 90 tablet 1    albuterol (PROVENTIL) (2.5 MG/3ML) 0.083% nebulizer solution Take 2.5 mg by nebulization 4 times daily 1 per aerosol four times a day          No current facility-administered medications for this visit.                       Allergies   Allergen Reactions    Aspirin Other (See Comments)       Asthma          Family History             Family History   Problem Relation Age of Onset    Cancer Mother 36         breast    Cancer Father           bone marrow    Cancer Brother           prostate    Diabetes Son      Neuropathy Son      High Blood Pressure Son      High Cholesterol Son      Hypertension Son           [employer] Ancestry: No     Social History                   Socioeconomic History    Marital status:         Spouse name: Not on file    Number of children: Not on file    Years of education: Not on file    Highest education level: Not on file   Occupational History    Not on file   Social Needs    Financial resource strain: Not on file    Food insecurity       Worry: Not on file       Inability: Not on file    Transportation needs       Medical: Not on file       Non-medical: Not on file   Tobacco Use    Smoking status: Never Smoker    Smokeless tobacco: Never Used   Substance and Sexual Activity    Alcohol use: No    Drug use: No    Sexual activity: Never       Partners: Male     Comment: ^/1/16):   since 2010   Lifestyle    Physical activity       Days per week: Not on file       Minutes per session: Not on file    Stress: Not on file   Relationships    Social connections       Talks on phone: Not on file       Gets together: Not on file       Attends Nondenominational service: Not on file       Active member of club or organization: Not on file       Attends meetings of clubs or organizations: Not on file       Relationship status: Not on file    Intimate partner violence       Fear of current or ex partner: Not on file       Emotionally abused: Not on file       Physically abused: Not on file       Forced sexual activity: Not on file   Other Topics Concern    Not on file   Social History Narrative    Not on file            Occupation: retired; psychiatric nursing     ECOG PS 2; very poor historian, ambulates with walker     Review of Systems   Constitutional: Positive for activity change (uses a walker for several years). Negative for unexpected weight change. HENT: Negative.    Respiratory: Negative for cough and shortness of breath.    Cardiovascular: Positive for leg swelling. Negative for chest pain. Gastrointestinal: Negative for anal bleeding, blood in stool, constipation, diarrhea, nausea and vomiting. Endocrine: Negative.    Genitourinary: Negative.    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 Allergic/Immunologic: Negative.    Neurological: Negative for light-headedness and headaches.    Hematological: Negative.    Psychiatric/Behavioral: Negative.       /84 (Site: Left Upper Arm, Position: Sitting, Cuff Size: Medium Adult)   Pulse 86   Temp 98.3 °F (36.8 °C) (Infrared)   Resp 16   Ht 5' 4\" (1.626 m)   Wt 212 lb (96.2 kg)   SpO2 97%   BMI 36.39 kg/m²   Physical Exam  Constitutional:       General: She is not in acute distress.     Appearance: She is obese. HENT:      Head: Normocephalic and atraumatic.      Nose: Nose normal.      Mouth/Throat:      Mouth: Mucous membranes are moist.      Pharynx: Oropharynx is clear. Eyes:      Extraocular Movements: Extraocular movements intact.      Pupils: Pupils are equal, round, and reactive to light. Neck:      Musculoskeletal: Normal range of motion and neck supple. Cardiovascular:      Rate and Rhythm: Normal rate and regular rhythm.      Pulses: Normal pulses.      Heart sounds: Normal heart sounds. Pulmonary:      Effort: Pulmonary effort is normal.      Breath sounds: Normal breath sounds. Chest:      Breasts:         Right: Mass and skin change present. No swelling, bleeding, inverted nipple, nipple discharge or tenderness.         Left: No swelling, bleeding, inverted nipple, mass, nipple discharge, skin change or tenderness.          Comments: Examination performed while sitting in chair, not ideal, but patient unable to get to examination table.    Abdominal:      General: There is no distension.      Palpations: Abdomen is soft.      Tenderness: There is no abdominal tenderness.      Comments: Kocher incisional wound--healed   Musculoskeletal:      Right lower leg: Edema present.      Left lower leg: Edema present. Lymphadenopathy:      Cervical: No cervical adenopathy.      Right cervical: No superficial cervical adenopathy.     Left cervical: No superficial cervical adenopathy.      Upper Body:      Right upper body: No supraclavicular or axillary adenopathy.      Left upper body: No supraclavicular or axillary adenopathy. Skin:     General: Skin is warm and dry.    Neurological:      Mental Status: She is alert.      Comments: Oriented to self and place   Psychiatric:         Mood and Affect: Mood normal.         Behavior: Behavior normal.      Comments: Dementia present         MAMMOGRAM:  Narrative    INDICATION:    Bilateral Diagnostic,Hx of Breast Ca, Lump and skin thickening or retraction in the right breast         HISTORY:    The patient has a history of dCIS and Invasive Ductal in the right breast in October, 2012 and invasive ductal right breast carcinoma in September, 2012. The patient has the following family history of breast cancer:  mother, at age 36. The patient has a     history of right Excisional Biopsy more than 10 years ago - benign.         MAMMOGRAM VIEWS:    The following mammographic views where obtained: bilateral craniocaudal; bilateral craniocaudal with tomosynthesis; bilateral mediolateral oblique; and bilateral mediolateral oblique with tomosynthesis         ULTRASOUND TECHNIQUE:    High-resolution real-time ultrasound scanning was performed. Additional elastography and doppler color flow analysis of any finding was also obtained.         TOMOSYNTHESIS:    Tomosynthesis (3 Dimensional Breast Imaging) was used on this examination to aid in evaluation.         COMPARISON:    The present examination has been compared to prior imaging studies performed at [address] [place] [address] on 09/07/2012 and 09/15/2014.         CAD:    This exam was reviewed using the [email or web address] Computer Aided Detection (CAD)         TISSUE DENSITY:    The breasts are heterogeneously dense (Type 3 density).      MAMMOGRAM FINDINGS:    In the left breast, no suspicious masses, areas of suspicious architectural distortion, suspicious calcifications, or additional suspicious findings are identified.              Finding 1:    There is an irregular mass seen in the right breast at 9 o'clock located 4 centimeters from the nipple.         ULTRASOUND FINDINGS:              Finding 1:    Sonography was performed in the right breast using a radial and anti-radial approach. Ultrasound shows an irregular solid mass with angular margins measuring 20 x 13 x 14 mm.  Internal echogenicity is hypoechoic.         There is no axillary lymphadenopathy.         IMPRESSION:    Solid mass in the right breast is suspicious. An ultrasound guided biopsy is recommended.         =======================================    BI-RADS Category 4:  Suspicious Abnormality    =======================================       PATHOLOGY:    Diagnosis:   Breast, right at 9:00, biopsy:   Invasive ductal carcinoma, see comment. Comment:   The carcinoma is focal, embedded within markedly fibrotic tissue, with a   Abigail grade of 2 (score of 7): tubule formation score 3, nuclear   pleomorphism score 2, mitotic activity score 2. E-cadherin immunostain is positive. P63 immunostain shows absence of myoepithelial layer. Breast Cancer Marker Studies:   Estrogen Receptors (ER):   -Positive (>10%of cells demonstrate nuclear positivity):   Percentage of cells positive: 99%   Intensity: strong     Progesterone Receptors (NJ):   -Positive:   Percentage of cells positive: 70%   Intensity: moderate-to-strong     Hormone receptor studies are performed by immunohistochemistry on   formalin-fixed, paraffin-embedded tissue (Roche Benchmark Immunostainer,   [place] anti-ER clone [id number], anti-NJ clone [id number], polymer-based detection   chemistry). ER and NJ are evaluated based on the percentage of cells   showing nuclear staining with >1% considered positive for each. Her-2/anni (c-erb B-2) protein expression: Negative (score 1+)         PATHOLOGY FROM PRIOR CANCER IN 2012:      Diagnosis-          A.  [place] lymph node, right, #1, excision-  Three    lymph nodes,   negative for metastatic carcinoma. Pankeratin immunostain negative for metastatic carcinoma   (performed on 3 tissue blocks).       B.  [place] lymph node, right, #2, excision-  Three lymph nodes,   negative for metastatic carcinoma.    Pankeratin immunostain negative for metastatic carcinoma   (performed on 3 tissue blocks).       C.  Breast, right, lumpectomy-  Invasive, poorly differentiated   ductal carcinoma.     Ductal carcinoma in situ, solid type, high nuclear grade with   areas of comedo necrosis. Focal lymphovascular invasion identified. Margins of excision negative for malignancy. Previous biopsy site.     Fibrocystic change. Microcalcifications associated with benign breast tissue and DCIS.      BIOPSY FROM PRIOR CANCER IN 2012:      Diagnosis-          Mass, right breast, 9 o'clock, core needle biopsy-   Infiltrating   ductal carcinoma, poorly differentiated.       Comment-              DYL tumor demonstrates architectural grade 3,   nuclear grade 3,   mitotic grade 2-overall grade 8/9- poorly differentiated.       Intradepartmental consultation is obtained.       Breast Cancer Maker Studies   Estrogen Receptor (ER)- Positive (>95%) nuclei staining   Progesterone Receptor (AK)- Positive (60%) nuclei staining       Hormone receptor studies are performed by immunohistochemistry   ([place] Benchmark Immunostainer, [place] anti-Er clone Sp1, anti-   AK clone [id number]). ER and AK are evaluated based on ther percentage of   cells showing nuclear staining with greater than or equal to 1%   considered positive for each.       Her-2/anni- Negative (1+)        ONCOTYPE DX FROM 2012:  Diagnosis-          Oncotype DX Breast Cancer Assay-  Breast Cancer   Recurrence Score =   18.    Average rate of distant recurrence 11%.       Comment-              See separate report from [address] [place] [address] for full   details.          ASSESSMENT/PLAN:  Right breast cancer, recurrent, IDC, Stage IIIB, ER/AK+ HER2-, grade 2 based from the ulceration of skin  --CT C/A/P and bone scan ordered--reviewed  --I discussed genetic testing, but patient refuses at this time.    --CBC, CMP, CXR reviewed  --referral to Dr. Jolene Gayle already placed by PCP   --will discuss at multi-disciplinary tumor board. --done  --plan for right mastectomy with SLNBx possible ALND  --she has completed her cardiac clearance    Irma Mascorro MD, MSc, FACS  11/2/2020  9:08 AM

## 2020-11-02 NOTE — H&P (VIEW-ONLY)
[facility]/Health system  HISTORY & PHYSICAL  ATTENDING NOTE             Chief Complaint   Patient presents with    Consultation       NEW BREAST CANCER - recurrent IDC, er/pr+ her2 negative. Imaging/biopsy Health system. labs recently drawn.  Breast Cancer       Pt had lumpectomy 10/24/2012 with       History and Physical     Patient's Name/Date of Ivory Colbert / 1940     Date: 2020      Referred by:  Dr. Carl Colbert presents for evaluation of a mammographic abnormality.     PCP: Brigida Claudio MD. Gynecologist:none.     The mammogram was performed at Health system on 2020. The patient has not noted a change in BSE since presentation.  Patient denies nipple discharge. Patient admits to a personal history of breast cancer.  Breast cancer risk factors include mom with breast CA, menarche before age 16, previous breast CA, infrequent SMEs and age and gender. Kate Garcias Ancestry: No.     Patient is a poor historian.  Some of the history obtained from prior records, some from recent PCP office. Kait Molina noticed a wound on her breast for about 6 months. Gunjan Jenkins was referred to wound care, but I don't see records of her going. Gunjan Jenkins had breast cancer in  and underwent a lumpectomy with SLNB (6 nodes removed--see path below).  She saw Dr. Yale Patel for oncology. Gunjan Jenkins was placed on endocrine therapy, unknown if she completed. Gunjan Jenkins did undergo WBRT from 13 to 14. Gunjan Jenkins does not remember any of this. Gunjan Jenkins was stage IIA back then.  She lives with her son and her   from MRSA prior to her first breast cancer.  In our records, she last saw oncology in , saw a PCP in 2016. Wendy Ojeda was no further mention or attention to the cancer since .       OBSTETRIC RELATED HISTORY:  Age of menarche was 12. Age of menopause was 28.  hysterectomy  Patient admits to hormonal therapy. x2 years  Patient is .    Age of first live birth       Current Facility-Administered Medications               Current Outpatient Medications   Medication Sig Dispense Refill    atorvastatin (LIPITOR) 20 MG tablet Take 0.5 tablets by mouth daily 30 tablet 3    levothyroxine (SYNTHROID) 175 MCG tablet Take 1 tablet by mouth Daily 30 tablet 1    ramipril (ALTACE) 10 MG capsule Take 1 capsule by mouth daily Indications: High Blood Pressure Disorder 90 capsule 1    metFORMIN (GLUCOPHAGE) 500 MG tablet Take 1 tablet by mouth 2 times daily (with meals) 180 tablet 1    mirtazapine (REMERON) 30 MG tablet Take 1 tablet by mouth nightly 90 tablet 1    albuterol (PROVENTIL) (2.5 MG/3ML) 0.083% nebulizer solution Take 2.5 mg by nebulization 4 times daily 1 per aerosol four times a day          No current facility-administered medications for this visit.                       Allergies   Allergen Reactions    Aspirin Other (See Comments)       Asthma          Family History             Family History   Problem Relation Age of Onset    Cancer Mother 36         breast    Cancer Father           bone marrow    Cancer Brother           prostate    Diabetes Son      Neuropathy Son      High Blood Pressure Son      High Cholesterol Son      Hypertension Son           [employer] Ancestry: No     Social History                   Socioeconomic History    Marital status:         Spouse name: Not on file    Number of children: Not on file    Years of education: Not on file    Highest education level: Not on file   Occupational History    Not on file   Social Needs    Financial resource strain: Not on file    Food insecurity       Worry: Not on file       Inability: Not on file    Transportation needs       Medical: Not on file       Non-medical: Not on file   Tobacco Use    Smoking status: Never Smoker    Smokeless tobacco: Never Used   Substance and Sexual Activity    Alcohol use: No    Drug use: No    Sexual activity: Never       Partners: Male     Comment: ^/1/16):   since 2010   Lifestyle    Physical activity       Days per week: Not on file       Minutes per session: Not on file    Stress: Not on file   Relationships    Social connections       Talks on phone: Not on file       Gets together: Not on file       Attends Bahai service: Not on file       Active member of club or organization: Not on file       Attends meetings of clubs or organizations: Not on file       Relationship status: Not on file    Intimate partner violence       Fear of current or ex partner: Not on file       Emotionally abused: Not on file       Physically abused: Not on file       Forced sexual activity: Not on file   Other Topics Concern    Not on file   Social History Narrative    Not on file            Occupation: retired; psychiatric nursing     ECOG PS 2; very poor historian, ambulates with walker     Review of Systems   Constitutional: Positive for activity change (uses a walker for several years). Negative for unexpected weight change. HENT: Negative.    Respiratory: Negative for cough and shortness of breath.    Cardiovascular: Positive for leg swelling. Negative for chest pain. Gastrointestinal: Negative for anal bleeding, blood in stool, constipation, diarrhea, nausea and vomiting. Endocrine: Negative.    Genitourinary: Negative.    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 Allergic/Immunologic: Negative.    Neurological: Negative for light-headedness and headaches.    Hematological: Negative.    Psychiatric/Behavioral: Negative.       /84 (Site: Left Upper Arm, Position: Sitting, Cuff Size: Medium Adult)   Pulse 86   Temp 98.3 °F (36.8 °C) (Infrared)   Resp 16   Ht 5' 4\" (1.626 m)   Wt 212 lb (96.2 kg)   SpO2 97%   BMI 36.39 kg/m²   Physical Exam  Constitutional:       General: She is not in acute distress.     Appearance: She is obese. HENT:      Head: Normocephalic and atraumatic.      Nose: Nose normal.      Mouth/Throat:      Mouth: Mucous membranes are moist.      Pharynx: Oropharynx is clear. Eyes:      Extraocular Movements: Extraocular movements intact.      Pupils: Pupils are equal, round, and reactive to light. Neck:      Musculoskeletal: Normal range of motion and neck supple. Cardiovascular:      Rate and Rhythm: Normal rate and regular rhythm.      Pulses: Normal pulses.      Heart sounds: Normal heart sounds. Pulmonary:      Effort: Pulmonary effort is normal.      Breath sounds: Normal breath sounds. Chest:      Breasts:         Right: Mass and skin change present. No swelling, bleeding, inverted nipple, nipple discharge or tenderness.         Left: No swelling, bleeding, inverted nipple, mass, nipple discharge, skin change or tenderness.          Comments: Examination performed while sitting in chair, not ideal, but patient unable to get to examination table.    Abdominal:      General: There is no distension.      Palpations: Abdomen is soft.      Tenderness: There is no abdominal tenderness.      Comments: Kocher incisional wound--healed   Musculoskeletal:      Right lower leg: Edema present.      Left lower leg: Edema present. Lymphadenopathy:      Cervical: No cervical adenopathy.      Right cervical: No superficial cervical adenopathy.     Left cervical: No superficial cervical adenopathy.      Upper Body:      Right upper body: No supraclavicular or axillary adenopathy.      Left upper body: No supraclavicular or axillary adenopathy. Skin:     General: Skin is warm and dry.    Neurological:      Mental Status: She is alert.      Comments: Oriented to self and place   Psychiatric:         Mood and Affect: Mood normal.         Behavior: Behavior normal.      Comments: Dementia present         MAMMOGRAM:  Narrative    INDICATION:    Bilateral Diagnostic,Hx of Breast Ca, Lump and skin thickening or retraction in the right breast         HISTORY:    The patient has a history of dCIS and Invasive Ductal in the right breast in October, 2012 and invasive ductal right breast carcinoma in September, 2012. The patient has the following family history of breast cancer:  mother, at age 36. The patient has a     history of right Excisional Biopsy more than 10 years ago - benign.         MAMMOGRAM VIEWS:    The following mammographic views where obtained: bilateral craniocaudal; bilateral craniocaudal with tomosynthesis; bilateral mediolateral oblique; and bilateral mediolateral oblique with tomosynthesis         ULTRASOUND TECHNIQUE:    High-resolution real-time ultrasound scanning was performed. Additional elastography and doppler color flow analysis of any finding was also obtained.         TOMOSYNTHESIS:    Tomosynthesis (3 Dimensional Breast Imaging) was used on this examination to aid in evaluation.         COMPARISON:    The present examination has been compared to prior imaging studies performed at [address] [place] [address] on 09/07/2012 and 09/15/2014.         CAD:    This exam was reviewed using the [employer] Computer Aided Detection (CAD)         TISSUE DENSITY:    The breasts are heterogeneously dense (Type 3 density).      MAMMOGRAM FINDINGS:    In the left breast, no suspicious masses, areas of suspicious architectural distortion, suspicious calcifications, or additional suspicious findings are identified.              Finding 1:    There is an irregular mass seen in the right breast at 9 o'clock located 4 centimeters from the nipple.         ULTRASOUND FINDINGS:              Finding 1:    Sonography was performed in the right breast using a radial and anti-radial approach. Ultrasound shows an irregular solid mass with angular margins measuring 20 x 13 x 14 mm.  Internal echogenicity is hypoechoic.         There is no axillary lymphadenopathy.         IMPRESSION:    Solid mass in the right breast is suspicious. An ultrasound guided biopsy is recommended.         =======================================    BI-RADS Category 4:  Suspicious Abnormality    =======================================       PATHOLOGY:    Diagnosis:   Breast, right at 9:00, biopsy:   Invasive ductal carcinoma, see comment. Comment:   The carcinoma is focal, embedded within markedly fibrotic tissue, with a   Abigail grade of 2 (score of 7): tubule formation score 3, nuclear   pleomorphism score 2, mitotic activity score 2. E-cadherin immunostain is positive. P63 immunostain shows absence of myoepithelial layer. Breast Cancer Marker Studies:   Estrogen Receptors (ER):   -Positive (>10%of cells demonstrate nuclear positivity):   Percentage of cells positive: 99%   Intensity: strong     Progesterone Receptors ([place]):   -Positive:   Percentage of cells positive: 70%   Intensity: moderate-to-strong     Hormone receptor studies are performed by immunohistochemistry on   formalin-fixed, paraffin-embedded tissue (Roche Benchmark Immunostainer,   [place] anti-ER clone SP1, anti-[place] clone [id number], polymer-based detection   chemistry). ER and [place] are evaluated based on the percentage of cells   showing nuclear staining with >1% considered positive for each. Her-2/anni (c-erb B-2) protein expression: Negative (score 1+)         PATHOLOGY FROM PRIOR CANCER IN 2012:      Diagnosis-          A.  [place] lymph node, right, #1, excision-  Three    lymph nodes,   negative for metastatic carcinoma. Pankeratin immunostain negative for metastatic carcinoma   (performed on 3 tissue blocks).       B.  [place] lymph node, right, #2, excision-  Three lymph nodes,   negative for metastatic carcinoma.    Pankeratin immunostain negative for metastatic carcinoma   (performed on 3 tissue blocks).       C.  Breast, right, lumpectomy-  Invasive, poorly differentiated   ductal carcinoma.     Ductal carcinoma in situ, solid type, high nuclear grade with   areas of comedo necrosis. Focal lymphovascular invasion identified. Margins of excision negative for malignancy. Previous biopsy site.     Fibrocystic change. Microcalcifications associated with benign breast tissue and DCIS.      BIOPSY FROM PRIOR CANCER IN 2012:      Diagnosis-          Mass, right breast, 9 o'clock, core needle biopsy-   Infiltrating   ductal carcinoma, poorly differentiated.       Comment-              GYI tumor demonstrates architectural grade 3,   nuclear grade 3,   mitotic grade 2-overall grade 8/9- poorly differentiated.       Intradepartmental consultation is obtained.       Breast Cancer Maker Studies   Estrogen Receptor (ER)- Positive (>95%) nuclei staining   Progesterone Receptor (WI)- Positive (60%) nuclei staining       Hormone receptor studies are performed by immunohistochemistry   ([place] Benchmark Immunostainer, [place] anti-Er clone Sp1, anti-   [place] clone [id number]). ER and [place] are evaluated based on ther percentage of   cells showing nuclear staining with greater than or equal to 1%   considered positive for each.       Her-2/anni- Negative (1+)        ONCOTYPE DX FROM 2012:  Diagnosis-          Oncotype DX Breast Cancer Assay-  Breast Cancer   Recurrence Score =   18.    Average rate of distant recurrence 11%.       Comment-              See separate report from [address] [place] [address] for full   details.          ASSESSMENT/PLAN:  Right breast cancer, recurrent, IDC, Stage IIIB, ER/WI+ HER2-, grade 2 based from the ulceration of skin  --CT C/A/P and bone scan ordered--reviewed  --I discussed genetic testing, but patient refuses at this time.    --CBC, CMP, CXR reviewed  --referral to Dr. Allison Bettencourt already placed by PCP   --will discuss at multi-disciplinary tumor board. --done  --plan for right mastectomy with SLNBx possible ALND  --she has completed her cardiac clearance    Germaine Warren MD, MSc, FACS  11/2/2020  9:08 AM

## 2020-11-03 ENCOUNTER — HOSPITAL ENCOUNTER (OUTPATIENT)
Dept: NUCLEAR MEDICINE | Age: 80
Discharge: HOME | End: 2020-11-05
Payer: MEDICARE

## 2020-11-03 ENCOUNTER — ANESTHESIA (OUTPATIENT)
Dept: OPERATING ROOM | Age: 80
End: 2020-11-03
Payer: MEDICARE

## 2020-11-03 ENCOUNTER — ANESTHESIA EVENT (OUTPATIENT)
Dept: OPERATING ROOM | Age: 80
End: 2020-11-03
Payer: MEDICARE

## 2020-11-03 ENCOUNTER — APPOINTMENT (OUTPATIENT)
Dept: GENERAL RADIOLOGY | Age: 80
End: 2020-11-03
Attending: SURGERY
Payer: MEDICARE

## 2020-11-03 ENCOUNTER — HOSPITAL ENCOUNTER (OUTPATIENT)
Age: 80
Setting detail: OBSERVATION
Discharge: HOME | End: 2020-11-04
Attending: SURGERY | Admitting: SURGERY
Payer: MEDICARE

## 2020-11-03 VITALS — OXYGEN SATURATION: 99 % | TEMPERATURE: 97.5 F | DIASTOLIC BLOOD PRESSURE: 107 MMHG | SYSTOLIC BLOOD PRESSURE: 162 MMHG

## 2020-11-03 PROBLEM — Z90.10 ACUTE PAIN AFTER MASTECTOMY: Status: ACTIVE | Noted: 2020-11-03

## 2020-11-03 PROBLEM — Z90.11 S/P MASTECTOMY, RIGHT: Status: ACTIVE | Noted: 2020-11-03

## 2020-11-03 PROBLEM — G89.18 ACUTE PAIN AFTER MASTECTOMY: Status: ACTIVE | Noted: 2020-11-03

## 2020-11-03 LAB
ANION GAP SERPL CALCULATED.3IONS-SCNC: 10 MMOL/L (ref 7–16)
BUN BLDV-MCNC: 13 MG/DL (ref 8–23)
CALCIUM SERPL-MCNC: 8.7 MG/DL (ref 8.6–10.2)
CHLORIDE BLD-SCNC: 106 MMOL/L (ref 98–107)
CO2: 28 MMOL/L (ref 22–29)
CREAT SERPL-MCNC: 1 MG/DL (ref 0.5–1)
GFR AFRICAN AMERICAN: >60
GFR NON-AFRICAN AMERICAN: 53 ML/MIN/1.73
GLUCOSE BLD-MCNC: 166 MG/DL (ref 74–99)
HCT VFR BLD CALC: 36.6 % (ref 34–48)
HEMOGLOBIN: 12.6 G/DL (ref 11.5–15.5)
MCH RBC QN AUTO: 31.2 PG (ref 26–35)
MCHC RBC AUTO-ENTMCNC: 34.4 % (ref 32–34.5)
MCV RBC AUTO: 90.6 FL (ref 80–99.9)
METER GLUCOSE: 127 MG/DL (ref 74–99)
PDW BLD-RTO: 13.2 FL (ref 11.5–15)
PLATELET # BLD: 150 E9/L (ref 130–450)
PMV BLD AUTO: 10.1 FL (ref 7–12)
POTASSIUM SERPL-SCNC: 3.7 MMOL/L (ref 3.5–5)
RBC # BLD: 4.04 E12/L (ref 3.5–5.5)
SODIUM BLD-SCNC: 144 MMOL/L (ref 132–146)
WBC # BLD: 9.5 E9/L (ref 4.5–11.5)

## 2020-11-03 PROCEDURE — 88307 TISSUE EXAM BY PATHOLOGIST: CPT

## 2020-11-03 PROCEDURE — 3700000000 HC ANESTHESIA ATTENDED CARE: Performed by: SURGERY

## 2020-11-03 PROCEDURE — 6370000000 HC RX 637 (ALT 250 FOR IP): Performed by: SURGERY

## 2020-11-03 PROCEDURE — 2580000003 HC RX 258: Performed by: STUDENT IN AN ORGANIZED HEALTH CARE EDUCATION/TRAINING PROGRAM

## 2020-11-03 PROCEDURE — 80048 BASIC METABOLIC PNL TOTAL CA: CPT

## 2020-11-03 PROCEDURE — 3700000001 HC ADD 15 MINUTES (ANESTHESIA): Performed by: SURGERY

## 2020-11-03 PROCEDURE — A9520 TC99 TILMANOCEPT DIAG 0.5MCI: HCPCS | Performed by: RADIOLOGY

## 2020-11-03 PROCEDURE — 19307 MAST MOD RAD: CPT | Performed by: SURGERY

## 2020-11-03 PROCEDURE — 7100000001 HC PACU RECOVERY - ADDTL 15 MIN: Performed by: SURGERY

## 2020-11-03 PROCEDURE — 6360000002 HC RX W HCPCS

## 2020-11-03 PROCEDURE — 6360000002 HC RX W HCPCS: Performed by: SURGERY

## 2020-11-03 PROCEDURE — 82962 GLUCOSE BLOOD TEST: CPT

## 2020-11-03 PROCEDURE — 2720000010 HC SURG SUPPLY STERILE: Performed by: SURGERY

## 2020-11-03 PROCEDURE — A9520 TC99 TILMANOCEPT DIAG 0.5MCI: HCPCS

## 2020-11-03 PROCEDURE — 2709999900 HC NON-CHARGEABLE SUPPLY: Performed by: SURGERY

## 2020-11-03 PROCEDURE — 3600000003 HC SURGERY LEVEL 3 BASE: Performed by: SURGERY

## 2020-11-03 PROCEDURE — 3430000000 HC RX DIAGNOSTIC RADIOPHARMACEUTICAL: Performed by: RADIOLOGY

## 2020-11-03 PROCEDURE — 3430000000 HC RX DIAGNOSTIC RADIOPHARMACEUTICAL

## 2020-11-03 PROCEDURE — 2500000003 HC RX 250 WO HCPCS: Performed by: SURGERY

## 2020-11-03 PROCEDURE — G0378 HOSPITAL OBSERVATION PER HR: HCPCS

## 2020-11-03 PROCEDURE — 6360000002 HC RX W HCPCS: Performed by: ANESTHESIOLOGY

## 2020-11-03 PROCEDURE — 38900 IO MAP OF SENT LYMPH NODE: CPT | Performed by: SURGERY

## 2020-11-03 PROCEDURE — 2580000003 HC RX 258: Performed by: SURGERY

## 2020-11-03 PROCEDURE — 38792 RA TRACER ID OF SENTINL NODE: CPT

## 2020-11-03 PROCEDURE — 85027 COMPLETE CBC AUTOMATED: CPT

## 2020-11-03 PROCEDURE — 7100000000 HC PACU RECOVERY - FIRST 15 MIN: Performed by: SURGERY

## 2020-11-03 PROCEDURE — 3600000013 HC SURGERY LEVEL 3 ADDTL 15MIN: Performed by: SURGERY

## 2020-11-03 PROCEDURE — 36415 COLL VENOUS BLD VENIPUNCTURE: CPT

## 2020-11-03 PROCEDURE — 2500000003 HC RX 250 WO HCPCS

## 2020-11-03 PROCEDURE — 73110 X-RAY EXAM OF WRIST: CPT

## 2020-11-03 RX ORDER — PROMETHAZINE HYDROCHLORIDE 25 MG/ML
6.25 INJECTION, SOLUTION INTRAMUSCULAR; INTRAVENOUS
Status: DISCONTINUED | OUTPATIENT
Start: 2020-11-03 | End: 2020-11-03 | Stop reason: HOSPADM

## 2020-11-03 RX ORDER — METHYLENE BLUE 10 MG/ML
INJECTION INTRAVENOUS PRN
Status: DISCONTINUED | OUTPATIENT
Start: 2020-11-03 | End: 2020-11-03 | Stop reason: ALTCHOICE

## 2020-11-03 RX ORDER — DEXAMETHASONE SODIUM PHOSPHATE 10 MG/ML
INJECTION, SOLUTION INTRAMUSCULAR; INTRAVENOUS PRN
Status: DISCONTINUED | OUTPATIENT
Start: 2020-11-03 | End: 2020-11-03 | Stop reason: SDUPTHER

## 2020-11-03 RX ORDER — HYDRALAZINE HYDROCHLORIDE 20 MG/ML
10 INJECTION INTRAMUSCULAR; INTRAVENOUS
Status: DISCONTINUED | OUTPATIENT
Start: 2020-11-03 | End: 2020-11-04 | Stop reason: HOSPADM

## 2020-11-03 RX ORDER — POLYETHYLENE GLYCOL 3350 17 G/17G
17 POWDER, FOR SOLUTION ORAL DAILY PRN
Status: DISCONTINUED | OUTPATIENT
Start: 2020-11-03 | End: 2020-11-04 | Stop reason: HOSPADM

## 2020-11-03 RX ORDER — OXYCODONE HYDROCHLORIDE AND ACETAMINOPHEN 5; 325 MG/1; MG/1
2 TABLET ORAL EVERY 4 HOURS PRN
Status: DISCONTINUED | OUTPATIENT
Start: 2020-11-03 | End: 2020-11-04 | Stop reason: HOSPADM

## 2020-11-03 RX ORDER — MORPHINE SULFATE 4 MG/ML
4 INJECTION, SOLUTION INTRAMUSCULAR; INTRAVENOUS
Status: DISCONTINUED | OUTPATIENT
Start: 2020-11-03 | End: 2020-11-04 | Stop reason: HOSPADM

## 2020-11-03 RX ORDER — OXYCODONE HYDROCHLORIDE AND ACETAMINOPHEN 5; 325 MG/1; MG/1
1 TABLET ORAL EVERY 4 HOURS PRN
Status: DISCONTINUED | OUTPATIENT
Start: 2020-11-03 | End: 2020-11-04 | Stop reason: HOSPADM

## 2020-11-03 RX ORDER — NEOSTIGMINE METHYLSULFATE 1 MG/ML
INJECTION, SOLUTION INTRAVENOUS PRN
Status: DISCONTINUED | OUTPATIENT
Start: 2020-11-03 | End: 2020-11-03 | Stop reason: SDUPTHER

## 2020-11-03 RX ORDER — PROMETHAZINE HYDROCHLORIDE 25 MG/1
12.5 TABLET ORAL EVERY 6 HOURS PRN
Status: DISCONTINUED | OUTPATIENT
Start: 2020-11-03 | End: 2020-11-04 | Stop reason: HOSPADM

## 2020-11-03 RX ORDER — BUPIVACAINE HYDROCHLORIDE AND EPINEPHRINE 2.5; 5 MG/ML; UG/ML
INJECTION, SOLUTION EPIDURAL; INFILTRATION; INTRACAUDAL; PERINEURAL PRN
Status: DISCONTINUED | OUTPATIENT
Start: 2020-11-03 | End: 2020-11-03 | Stop reason: ALTCHOICE

## 2020-11-03 RX ORDER — SODIUM CHLORIDE 0.9 % (FLUSH) 0.9 %
10 SYRINGE (ML) INJECTION EVERY 12 HOURS SCHEDULED
Status: DISCONTINUED | OUTPATIENT
Start: 2020-11-03 | End: 2020-11-04 | Stop reason: HOSPADM

## 2020-11-03 RX ORDER — LABETALOL HYDROCHLORIDE 5 MG/ML
10 INJECTION, SOLUTION INTRAVENOUS
Status: DISCONTINUED | OUTPATIENT
Start: 2020-11-03 | End: 2020-11-04 | Stop reason: HOSPADM

## 2020-11-03 RX ORDER — ACETAMINOPHEN 325 MG/1
650 TABLET ORAL EVERY 4 HOURS PRN
Status: DISCONTINUED | OUTPATIENT
Start: 2020-11-03 | End: 2020-11-03

## 2020-11-03 RX ORDER — PROPOFOL 10 MG/ML
INJECTION, EMULSION INTRAVENOUS PRN
Status: DISCONTINUED | OUTPATIENT
Start: 2020-11-03 | End: 2020-11-03 | Stop reason: SDUPTHER

## 2020-11-03 RX ORDER — SODIUM CHLORIDE 0.9 % (FLUSH) 0.9 %
10 SYRINGE (ML) INJECTION EVERY 12 HOURS SCHEDULED
Status: DISCONTINUED | OUTPATIENT
Start: 2020-11-03 | End: 2020-11-03 | Stop reason: HOSPADM

## 2020-11-03 RX ORDER — HEPARIN SODIUM 10000 [USP'U]/ML
5000 INJECTION, SOLUTION INTRAVENOUS; SUBCUTANEOUS 2 TIMES DAILY
Status: DISCONTINUED | OUTPATIENT
Start: 2020-11-03 | End: 2020-11-04 | Stop reason: HOSPADM

## 2020-11-03 RX ORDER — GLYCOPYRROLATE 1 MG/5 ML
SYRINGE (ML) INTRAVENOUS PRN
Status: DISCONTINUED | OUTPATIENT
Start: 2020-11-03 | End: 2020-11-03 | Stop reason: SDUPTHER

## 2020-11-03 RX ORDER — FENTANYL CITRATE 50 UG/ML
INJECTION, SOLUTION INTRAMUSCULAR; INTRAVENOUS PRN
Status: DISCONTINUED | OUTPATIENT
Start: 2020-11-03 | End: 2020-11-03 | Stop reason: SDUPTHER

## 2020-11-03 RX ORDER — METOPROLOL SUCCINATE 25 MG/1
25 TABLET, EXTENDED RELEASE ORAL DAILY
Status: DISCONTINUED | OUTPATIENT
Start: 2020-11-04 | End: 2020-11-04 | Stop reason: HOSPADM

## 2020-11-03 RX ORDER — SODIUM CHLORIDE 0.9 % (FLUSH) 0.9 %
10 SYRINGE (ML) INJECTION PRN
Status: DISCONTINUED | OUTPATIENT
Start: 2020-11-03 | End: 2020-11-04 | Stop reason: HOSPADM

## 2020-11-03 RX ORDER — RAMIPRIL 5 MG/1
10 CAPSULE ORAL DAILY
Status: DISCONTINUED | OUTPATIENT
Start: 2020-11-04 | End: 2020-11-04 | Stop reason: HOSPADM

## 2020-11-03 RX ORDER — ASPIRIN 81 MG/1
81 TABLET ORAL DAILY
Status: DISCONTINUED | OUTPATIENT
Start: 2020-11-04 | End: 2020-11-04 | Stop reason: HOSPADM

## 2020-11-03 RX ORDER — ONDANSETRON 2 MG/ML
INJECTION INTRAMUSCULAR; INTRAVENOUS PRN
Status: DISCONTINUED | OUTPATIENT
Start: 2020-11-03 | End: 2020-11-03 | Stop reason: SDUPTHER

## 2020-11-03 RX ORDER — PHENYLEPHRINE HYDROCHLORIDE 10 MG/ML
INJECTION INTRAVENOUS PRN
Status: DISCONTINUED | OUTPATIENT
Start: 2020-11-03 | End: 2020-11-03 | Stop reason: SDUPTHER

## 2020-11-03 RX ORDER — ACETAMINOPHEN 325 MG/1
650 TABLET ORAL EVERY 6 HOURS PRN
Status: DISCONTINUED | OUTPATIENT
Start: 2020-11-03 | End: 2020-11-04 | Stop reason: HOSPADM

## 2020-11-03 RX ORDER — ACETAMINOPHEN 650 MG/1
650 SUPPOSITORY RECTAL EVERY 6 HOURS PRN
Status: DISCONTINUED | OUTPATIENT
Start: 2020-11-03 | End: 2020-11-04 | Stop reason: HOSPADM

## 2020-11-03 RX ORDER — ONDANSETRON 2 MG/ML
4 INJECTION INTRAMUSCULAR; INTRAVENOUS
Status: DISCONTINUED | OUTPATIENT
Start: 2020-11-03 | End: 2020-11-03 | Stop reason: HOSPADM

## 2020-11-03 RX ORDER — MORPHINE SULFATE 2 MG/ML
2 INJECTION, SOLUTION INTRAMUSCULAR; INTRAVENOUS
Status: DISCONTINUED | OUTPATIENT
Start: 2020-11-03 | End: 2020-11-04 | Stop reason: HOSPADM

## 2020-11-03 RX ORDER — MIRTAZAPINE 15 MG/1
30 TABLET, FILM COATED ORAL NIGHTLY
Status: DISCONTINUED | OUTPATIENT
Start: 2020-11-03 | End: 2020-11-04 | Stop reason: HOSPADM

## 2020-11-03 RX ORDER — ROCURONIUM BROMIDE 10 MG/ML
INJECTION, SOLUTION INTRAVENOUS PRN
Status: DISCONTINUED | OUTPATIENT
Start: 2020-11-03 | End: 2020-11-03 | Stop reason: SDUPTHER

## 2020-11-03 RX ORDER — LEVOTHYROXINE SODIUM 175 UG/1
175 TABLET ORAL DAILY
Status: DISCONTINUED | OUTPATIENT
Start: 2020-11-04 | End: 2020-11-04 | Stop reason: HOSPADM

## 2020-11-03 RX ORDER — ONDANSETRON 2 MG/ML
4 INJECTION INTRAMUSCULAR; INTRAVENOUS EVERY 6 HOURS PRN
Status: DISCONTINUED | OUTPATIENT
Start: 2020-11-03 | End: 2020-11-04 | Stop reason: HOSPADM

## 2020-11-03 RX ORDER — SODIUM CHLORIDE 0.9 % (FLUSH) 0.9 %
10 SYRINGE (ML) INJECTION PRN
Status: DISCONTINUED | OUTPATIENT
Start: 2020-11-03 | End: 2020-11-03 | Stop reason: HOSPADM

## 2020-11-03 RX ORDER — ATORVASTATIN CALCIUM 40 MG/1
40 TABLET, FILM COATED ORAL NIGHTLY
Status: DISCONTINUED | OUTPATIENT
Start: 2020-11-03 | End: 2020-11-04 | Stop reason: HOSPADM

## 2020-11-03 RX ORDER — SODIUM CHLORIDE 9 MG/ML
INJECTION, SOLUTION INTRAVENOUS CONTINUOUS
Status: DISCONTINUED | OUTPATIENT
Start: 2020-11-03 | End: 2020-11-03

## 2020-11-03 RX ORDER — ONDANSETRON 2 MG/ML
4 INJECTION INTRAMUSCULAR; INTRAVENOUS EVERY 6 HOURS PRN
Status: DISCONTINUED | OUTPATIENT
Start: 2020-11-03 | End: 2020-11-03

## 2020-11-03 RX ORDER — MIDAZOLAM HYDROCHLORIDE 1 MG/ML
INJECTION INTRAMUSCULAR; INTRAVENOUS PRN
Status: DISCONTINUED | OUTPATIENT
Start: 2020-11-03 | End: 2020-11-03 | Stop reason: SDUPTHER

## 2020-11-03 RX ORDER — LIDOCAINE HYDROCHLORIDE 20 MG/ML
INJECTION, SOLUTION INTRAVENOUS PRN
Status: DISCONTINUED | OUTPATIENT
Start: 2020-11-03 | End: 2020-11-03 | Stop reason: SDUPTHER

## 2020-11-03 RX ADMIN — SODIUM CHLORIDE: 9 INJECTION, SOLUTION INTRAVENOUS at 14:52

## 2020-11-03 RX ADMIN — PHENYLEPHRINE HYDROCHLORIDE 100 MCG: 10 INJECTION INTRAVENOUS at 14:56

## 2020-11-03 RX ADMIN — HYDROMORPHONE HYDROCHLORIDE 0.5 MG: 1 INJECTION, SOLUTION INTRAMUSCULAR; INTRAVENOUS; SUBCUTANEOUS at 18:30

## 2020-11-03 RX ADMIN — PROPOFOL 100 MG: 10 INJECTION, EMULSION INTRAVENOUS at 14:36

## 2020-11-03 RX ADMIN — SODIUM CHLORIDE: 9 INJECTION, SOLUTION INTRAVENOUS at 10:13

## 2020-11-03 RX ADMIN — DEXAMETHASONE SODIUM PHOSPHATE 10 MG: 10 INJECTION, SOLUTION INTRAMUSCULAR; INTRAVENOUS at 14:42

## 2020-11-03 RX ADMIN — OXYCODONE AND ACETAMINOPHEN 2 TABLET: 5; 325 TABLET ORAL at 23:40

## 2020-11-03 RX ADMIN — ONDANSETRON HYDROCHLORIDE 4 MG: 2 INJECTION, SOLUTION INTRAMUSCULAR; INTRAVENOUS at 17:36

## 2020-11-03 RX ADMIN — Medication 0.2 MG: at 15:00

## 2020-11-03 RX ADMIN — FENTANYL CITRATE 100 MCG: 50 INJECTION, SOLUTION INTRAMUSCULAR; INTRAVENOUS at 14:36

## 2020-11-03 RX ADMIN — ROCURONIUM BROMIDE 10 MG: 10 INJECTION, SOLUTION INTRAVENOUS at 14:50

## 2020-11-03 RX ADMIN — ROCURONIUM BROMIDE 5 MG: 10 INJECTION, SOLUTION INTRAVENOUS at 16:36

## 2020-11-03 RX ADMIN — HYDROMORPHONE HYDROCHLORIDE 0.5 MG: 1 INJECTION, SOLUTION INTRAMUSCULAR; INTRAVENOUS; SUBCUTANEOUS at 18:49

## 2020-11-03 RX ADMIN — FENTANYL CITRATE 50 MCG: 50 INJECTION, SOLUTION INTRAMUSCULAR; INTRAVENOUS at 16:20

## 2020-11-03 RX ADMIN — TILMANOCEPT 0.55 MILLICURIE: KIT at 10:01

## 2020-11-03 RX ADMIN — LIDOCAINE HYDROCHLORIDE 80 MG: 20 INJECTION, SOLUTION INTRAVENOUS at 14:36

## 2020-11-03 RX ADMIN — Medication 2 G: at 14:25

## 2020-11-03 RX ADMIN — MIDAZOLAM 2 MG: 1 INJECTION INTRAMUSCULAR; INTRAVENOUS at 14:25

## 2020-11-03 RX ADMIN — Medication 3 MG: at 17:41

## 2020-11-03 RX ADMIN — HEPARIN SODIUM 5000 UNITS: 10000 INJECTION INTRAVENOUS; SUBCUTANEOUS at 22:45

## 2020-11-03 RX ADMIN — PHENYLEPHRINE HYDROCHLORIDE 100 MCG: 10 INJECTION INTRAVENOUS at 14:47

## 2020-11-03 RX ADMIN — PHENYLEPHRINE HYDROCHLORIDE 100 MCG: 10 INJECTION INTRAVENOUS at 15:00

## 2020-11-03 RX ADMIN — ROCURONIUM BROMIDE 50 MG: 10 INJECTION, SOLUTION INTRAVENOUS at 14:36

## 2020-11-03 RX ADMIN — Medication 0.6 MG: at 17:41

## 2020-11-03 RX ADMIN — Medication 10 ML: at 22:50

## 2020-11-03 ASSESSMENT — PULMONARY FUNCTION TESTS
PIF_VALUE: 24
PIF_VALUE: 15
PIF_VALUE: 24
PIF_VALUE: 22
PIF_VALUE: 22
PIF_VALUE: 23
PIF_VALUE: 3
PIF_VALUE: 24
PIF_VALUE: 0
PIF_VALUE: 24
PIF_VALUE: 2
PIF_VALUE: 24
PIF_VALUE: 24
PIF_VALUE: 22
PIF_VALUE: 24
PIF_VALUE: 24
PIF_VALUE: 25
PIF_VALUE: 24
PIF_VALUE: 22
PIF_VALUE: 24
PIF_VALUE: 24
PIF_VALUE: 21
PIF_VALUE: 24
PIF_VALUE: 0
PIF_VALUE: 15
PIF_VALUE: 2
PIF_VALUE: 24
PIF_VALUE: 21
PIF_VALUE: 23
PIF_VALUE: 25
PIF_VALUE: 24
PIF_VALUE: 22
PIF_VALUE: 24
PIF_VALUE: 11
PIF_VALUE: 24
PIF_VALUE: 1
PIF_VALUE: 24
PIF_VALUE: 24
PIF_VALUE: 23
PIF_VALUE: 15
PIF_VALUE: 24
PIF_VALUE: 2
PIF_VALUE: 24
PIF_VALUE: 1
PIF_VALUE: 22
PIF_VALUE: 24
PIF_VALUE: 23
PIF_VALUE: 22
PIF_VALUE: 22
PIF_VALUE: 24
PIF_VALUE: 23
PIF_VALUE: 24
PIF_VALUE: 15
PIF_VALUE: 24
PIF_VALUE: 24
PIF_VALUE: 22
PIF_VALUE: 24
PIF_VALUE: 22
PIF_VALUE: 15
PIF_VALUE: 22
PIF_VALUE: 24
PIF_VALUE: 23
PIF_VALUE: 21
PIF_VALUE: 25
PIF_VALUE: 1
PIF_VALUE: 24
PIF_VALUE: 21
PIF_VALUE: 24
PIF_VALUE: 24
PIF_VALUE: 20
PIF_VALUE: 24
PIF_VALUE: 24
PIF_VALUE: 3
PIF_VALUE: 2
PIF_VALUE: 22
PIF_VALUE: 24
PIF_VALUE: 2
PIF_VALUE: 24
PIF_VALUE: 24
PIF_VALUE: 23
PIF_VALUE: 22
PIF_VALUE: 24
PIF_VALUE: 24
PIF_VALUE: 22
PIF_VALUE: 23
PIF_VALUE: 24
PIF_VALUE: 23
PIF_VALUE: 22
PIF_VALUE: 24
PIF_VALUE: 17
PIF_VALUE: 24
PIF_VALUE: 22
PIF_VALUE: 23
PIF_VALUE: 24
PIF_VALUE: 24
PIF_VALUE: 25
PIF_VALUE: 22
PIF_VALUE: 24
PIF_VALUE: 23
PIF_VALUE: 21
PIF_VALUE: 24
PIF_VALUE: 22
PIF_VALUE: 24
PIF_VALUE: 25
PIF_VALUE: 24
PIF_VALUE: 24
PIF_VALUE: 22
PIF_VALUE: 24
PIF_VALUE: 2
PIF_VALUE: 23
PIF_VALUE: 24
PIF_VALUE: 22
PIF_VALUE: 24
PIF_VALUE: 1
PIF_VALUE: 21
PIF_VALUE: 1
PIF_VALUE: 1
PIF_VALUE: 23
PIF_VALUE: 15
PIF_VALUE: 24
PIF_VALUE: 15
PIF_VALUE: 24
PIF_VALUE: 24
PIF_VALUE: 23
PIF_VALUE: 24
PIF_VALUE: 24
PIF_VALUE: 22
PIF_VALUE: 24
PIF_VALUE: 2
PIF_VALUE: 23
PIF_VALUE: 1
PIF_VALUE: 24
PIF_VALUE: 24
PIF_VALUE: 1
PIF_VALUE: 22
PIF_VALUE: 1
PIF_VALUE: 1
PIF_VALUE: 15
PIF_VALUE: 23
PIF_VALUE: 24
PIF_VALUE: 32
PIF_VALUE: 1
PIF_VALUE: 24
PIF_VALUE: 1
PIF_VALUE: 24
PIF_VALUE: 1
PIF_VALUE: 24
PIF_VALUE: 24
PIF_VALUE: 15
PIF_VALUE: 21
PIF_VALUE: 23
PIF_VALUE: 24
PIF_VALUE: 21
PIF_VALUE: 24
PIF_VALUE: 24
PIF_VALUE: 23
PIF_VALUE: 22
PIF_VALUE: 2
PIF_VALUE: 22
PIF_VALUE: 15
PIF_VALUE: 24
PIF_VALUE: 22
PIF_VALUE: 24
PIF_VALUE: 15
PIF_VALUE: 25
PIF_VALUE: 2
PIF_VALUE: 15
PIF_VALUE: 22
PIF_VALUE: 15
PIF_VALUE: 24
PIF_VALUE: 2
PIF_VALUE: 0
PIF_VALUE: 24

## 2020-11-03 ASSESSMENT — PAIN - FUNCTIONAL ASSESSMENT
PAIN_FUNCTIONAL_ASSESSMENT: 0-10
PAIN_FUNCTIONAL_ASSESSMENT: PREVENTS OR INTERFERES SOME ACTIVE ACTIVITIES AND ADLS

## 2020-11-03 ASSESSMENT — PAIN DESCRIPTION - ORIENTATION
ORIENTATION: RIGHT

## 2020-11-03 ASSESSMENT — PAIN SCALES - GENERAL
PAINLEVEL_OUTOF10: 6
PAINLEVEL_OUTOF10: 7
PAINLEVEL_OUTOF10: 8
PAINLEVEL_OUTOF10: 8
PAINLEVEL_OUTOF10: 4
PAINLEVEL_OUTOF10: 0

## 2020-11-03 ASSESSMENT — PAIN DESCRIPTION - DESCRIPTORS
DESCRIPTORS: ACHING;DISCOMFORT;SORE
DESCRIPTORS: ACHING;DISCOMFORT
DESCRIPTORS: DISCOMFORT

## 2020-11-03 ASSESSMENT — PAIN DESCRIPTION - PAIN TYPE
TYPE: SURGICAL PAIN

## 2020-11-03 ASSESSMENT — PAIN DESCRIPTION - LOCATION
LOCATION: BREAST

## 2020-11-03 ASSESSMENT — PAIN DESCRIPTION - ONSET: ONSET: ON-GOING

## 2020-11-03 ASSESSMENT — PAIN DESCRIPTION - FREQUENCY: FREQUENCY: CONTINUOUS

## 2020-11-03 NOTE — PROGRESS NOTES
Dr Sandrine Giron here speaking with patient about her left wrist/hand pain patient had x ray of earlier today

## 2020-11-03 NOTE — OP NOTE
[address] [facility]  [birth year]      DATE OF PROCEDURE: 11/3/2020     SURGEON: Jeni Nesbitt MD, MSc, FACS     ASSISTANT: Sagar Ken MD, PGY-I, Carmen Schneider, MS-3     PREOPERATIVE DIAGNOSIS:  Recurrent right breast cancer, ER/DE + HER2-; Stage IIIB, grade 2. POSTOPERATIVE DIAGNOSIS: Same    OPERATION: right modified radical mastectomy, blue dye injection     ANESTHESIA: GETA     ESTIMATED BLOOD LOSS: 90KN     COMPLICATIONS: None    SPECIMEN:  Right breast, axillary contents    DRAINS:  15F prabhakar x x    HISTORY:  This is a 79 y. o.female who presented with recurrent right breast cancer. She had previous radiation and has multiple comorbidities. It was decided to proceed with mastectomy with SLNBx. DESCRIPTION OF PROCEDURE: The patient was identified and the procedure was confirmed. Ancef 2g IV was given, bilateral SCDs in placed, lower Braydon Hugger applied. She was prepped and draped in the standard surgical fashion. 5cc of methylene blue was injected into the retroareolar space. Breast massage performed x 5 minutes. We then mapped out our elliptical incision around the areola. Using the PEAK plasma blade we made our incision and continued our dissection. We carried the dissection to the clavicle superiorly, the sternum medially, down onto the rectus inferiorly including the inframammary fold, and out to the axilla laterally. We removed the breast from the chest wall including the pectoralis fascia. Once the breast was removed it was marked with 6 colors of paint using the Vector marking kit. We then turned our attention to the axilla. She had a previous sentinel lymph node biopsy and had copious scar tissue. We also saw previous clips. We opened the scar tissue to enter the axilla. We did not identify any blue nodes or nodes with counts with the TruNode, therefore we proceeded with a full axillary lymph node dissection.   We carried out our dissection including Level I and II nodes. We went superiorly to the axillary vein. We dissected our to the chest wall and posterior to the pectoralis minor, laterally to the latissimus dorsi and inferiorly to the apex of the axilla. All palpable lymph node were removed. The long thoracic nerve, thoracodorsal bundle and intercostal brachial nerve were all identified. We irrigated copiously with saline. Achieved hemostasis with the PEAK plasma blade. We placed 2 x 15F prabhakar drains, one to the axilla and one to the mastectomy wound, and secured with 3-0 Nylon. We placed 5g of Arlet. The wound was closed in the usual fashion with 4-0 and 5-0 Vicryl. The wound covered with Dermabond, 1/2\" steristrips. Gauze and ace wrap were applied. Needle, sponge, and instrument counts were reported as correct x2. The patient tolerated the procedure and was transferred to the recovery area in satisfactory condition. Her son was called after the procedure.       Axillary lymph node dissection was performed within the boundaries of the axillary vein, chest wall (serratus anterior) and latissimus dorsi: Yes  The long thoracic and thoracodorsal nerves were spared during the dissection: Yes  Attempts were made to spare the intercostobrachial nerve during the dissection: Yes  If one or more level III nodes is/are removed, document why: Not Jeanie Khalil MD, MSc, FACS  11/3/2020  6:23 PM

## 2020-11-03 NOTE — PROGRESS NOTES
Admitted to Same Day Surgery Unit. Preop instructions given to patient & family. Covid Test done: Yes, 10/29/20    Results: NEGATIVE    Self-quarantine guidelines followed since tested? Yes    Any unusual S/S or concerns expressed or observed?  No

## 2020-11-03 NOTE — ANESTHESIA PRE PROCEDURE
Department of Anesthesiology  Preprocedure Note       Name:  Michele Lagunas   Age:  79 y.o.  :  1940                                          MRN:  [id number]         Date:  11/3/2020      Surgeon: Lizbet Sexton):  Carline Ramirez MD    Procedure: Procedure(s):  RIGHT BREAST MASTECTOMY WITH SENTINEL NODE BIOPSY, POSSIBLE AXILLARY LYMPHNODE  DISSECTION -- NEOPROBE    Medications prior to admission:   Prior to Admission medications    Medication Sig Start Date End Date Taking?  Authorizing Provider   aspirin EC 81 MG EC tablet Take 1 tablet by mouth daily 10/12/20  Yes Juan Luis Gilbert MD   atorvastatin (LIPITOR) 20 MG tablet Take 2 tablets by mouth daily 10/9/20  Yes Juan Luis Gilbert MD   metoprolol succinate (TOPROL XL) 25 MG extended release tablet Take 1 tablet by mouth daily 10/2/20  Yes Juan Luis Gilbert MD   levothyroxine (SYNTHROID) 175 MCG tablet Take one tablet Mon-Fri and 2 tabs Sat/Sun. 20  Yes Beatriz William DO   ramipril (ALTACE) 10 MG capsule Take 1 capsule by mouth daily Indications: High Blood Pressure Disorder 20  Yes Dave Sykes MD   metFORMIN (GLUCOPHAGE) 500 MG tablet Take 1 tablet by mouth 2 times daily (with meals) 20  Yes Dave Sykes MD   mirtazapine (REMERON) 30 MG tablet Take 1 tablet by mouth nightly 20  Yes Dave Sykes MD       Current medications:    Current Facility-Administered Medications   Medication Dose Route Frequency Provider Last Rate Last Dose    0.9 % sodium chloride infusion   Intravenous Continuous Carline Ramirez  mL/hr at 20 1013      ceFAZolin (ANCEF) 2 g in sterile water 20 mL IV syringe  2 g Intravenous On Call to [address], MD        sodium chloride flush 0.9 % injection 10 mL  10 mL Intravenous 2 times per day Carline Ramirez MD        sodium chloride flush 0.9 % injection 10 mL  10 mL Intravenous PRN Carline Ramirez MD         Facility-Administered Medications Ordered in Other Encounters RELEASE      b/l    CHOLECYSTECTOMY      HYSTERECTOMY      partial    JOINT REPLACEMENT Bilateral     bilateral TKR    LUMBAR FUSION      L4L5    OTHER SURGICAL HISTORY      thumb joint replacement rt     THYROID SURGERY      US BREAST NEEDLE BIOPSY RIGHT  7/31/2020    US BREAST NEEDLE BIOPSY RIGHT 7/31/2020 [facility]       Social History:    Social History     Tobacco Use    Smoking status: Never Smoker    Smokeless tobacco: Never Used   Substance Use Topics    Alcohol use: No                                Counseling given: Not Answered      Vital Signs (Current):   Vitals:    11/03/20 0859   BP: 129/80   Pulse: 94   Resp: 20   Temp: 98.2 °F (36.8 °C)   TempSrc: Temporal   SpO2: 94%   Weight: 202 lb (91.6 kg)   Height: 5' 4\" (1.626 m)                                              BP Readings from Last 3 Encounters:   11/03/20 129/80   10/09/20 (!) 191/96   10/02/20 136/70       NPO Status: Time of last liquid consumption: 2000                        Time of last solid consumption: 2000                        Date of last liquid consumption: 11/02/20                        Date of last solid food consumption: 11/02/20    BMI:   Wt Readings from Last 3 Encounters:   11/03/20 202 lb (91.6 kg)   10/09/20 202 lb (91.6 kg)   10/02/20 207 lb 9.6 oz (94.2 kg)     Body mass index is 34.67 kg/m².     CBC:   Lab Results   Component Value Date    WBC 9.5 11/03/2020    RBC 4.04 11/03/2020    HGB 12.6 11/03/2020    HCT 36.6 11/03/2020    MCV 90.6 11/03/2020    RDW 13.2 11/03/2020     11/03/2020       CMP:   Lab Results   Component Value Date     11/03/2020    K 3.7 11/03/2020    K 3.2 04/10/2019     11/03/2020    CO2 28 11/03/2020    BUN 13 11/03/2020    CREATININE 1.0 11/03/2020    GFRAA >60 11/03/2020    LABGLOM 53 11/03/2020    GLUCOSE 166 11/03/2020    PROT 7.2 07/21/2020    CALCIUM 8.7 11/03/2020    BILITOT 0.7 07/21/2020    ALKPHOS 62 07/21/2020    AST 13 07/21/2020    ALT 6 07/21/2020 POC Tests: No results for input(s): POCGLU, POCNA, POCK, POCCL, POCBUN, POCHEMO, POCHCT in the last 72 hours. Coags:   Lab Results   Component Value Date    PROTIME 11.0 10/09/2020    INR 1.0 10/09/2020    APTT 28.3 08/22/2017       HCG (If Applicable): No results found for: PREGTESTUR, PREGSERUM, HCG, HCGQUANT     ABGs: No results found for: PHART, PO2ART, RJJ0GTY, JZU7FDT, BEART, B7XBVDFS     Type & Screen (If Applicable):  No results found for: LABABO, LABRH    Drug/Infectious Status (If Applicable):  No results found for: HIV, HEPCAB    COVID-19 Screening (If Applicable):   Lab Results   Component Value Date    COVID19 Not Detected 10/29/2020         Anesthesia Evaluation    Airway: Mallampati: II  TM distance: >3 FB   Neck ROM: full   Dental:    (+) upper dentures      Pulmonary:normal exam  breath sounds clear to auscultation                             Cardiovascular:            Rhythm: regular  Rate: normal                    Neuro/Psych:               GI/Hepatic/Renal:             Endo/Other:                     Abdominal:   (+) obese,         Vascular:                                        Anesthesia Plan      general     ASA 3       Induction: intravenous. MIPS: Postoperative opioids intended and Prophylactic antiemetics administered. Anesthetic plan and risks discussed with patient. Use of blood products discussed with patient whom.      Attending anesthesiologist reviewed and agrees with Sunil Zavala MD   11/3/2020

## 2020-11-03 NOTE — PROGRESS NOTES
Dr Yayo Vega notifed patient c/o pain left wrist 5/10, date of onset approx. 2 weeks ago. She is not aware of any injury.

## 2020-11-04 VITALS
DIASTOLIC BLOOD PRESSURE: 57 MMHG | HEIGHT: 64 IN | TEMPERATURE: 98 F | HEART RATE: 68 BPM | BODY MASS INDEX: 34.49 KG/M2 | RESPIRATION RATE: 15 BRPM | SYSTOLIC BLOOD PRESSURE: 103 MMHG | WEIGHT: 202 LBS | OXYGEN SATURATION: 97 %

## 2020-11-04 PROCEDURE — 99024 POSTOP FOLLOW-UP VISIT: CPT | Performed by: SURGERY

## 2020-11-04 PROCEDURE — 6370000000 HC RX 637 (ALT 250 FOR IP): Performed by: SURGERY

## 2020-11-04 PROCEDURE — G0378 HOSPITAL OBSERVATION PER HR: HCPCS

## 2020-11-04 PROCEDURE — 6360000002 HC RX W HCPCS: Performed by: SURGERY

## 2020-11-04 PROCEDURE — 2700000000 HC OXYGEN THERAPY PER DAY

## 2020-11-04 PROCEDURE — 2580000003 HC RX 258: Performed by: STUDENT IN AN ORGANIZED HEALTH CARE EDUCATION/TRAINING PROGRAM

## 2020-11-04 PROCEDURE — 96372 THER/PROPH/DIAG INJ SC/IM: CPT

## 2020-11-04 RX ORDER — OXYCODONE HYDROCHLORIDE AND ACETAMINOPHEN 5; 325 MG/1; MG/1
1 TABLET ORAL EVERY 6 HOURS PRN
Qty: 20 TABLET | Refills: 0 | Status: SHIPPED | OUTPATIENT
Start: 2020-11-04 | End: 2020-11-09

## 2020-11-04 RX ADMIN — OXYCODONE AND ACETAMINOPHEN 2 TABLET: 5; 325 TABLET ORAL at 03:42

## 2020-11-04 RX ADMIN — HEPARIN SODIUM 5000 UNITS: 10000 INJECTION INTRAVENOUS; SUBCUTANEOUS at 08:51

## 2020-11-04 RX ADMIN — Medication 10 ML: at 08:51

## 2020-11-04 RX ADMIN — OXYCODONE AND ACETAMINOPHEN 1 TABLET: 5; 325 TABLET ORAL at 08:58

## 2020-11-04 RX ADMIN — ASPIRIN 81 MG: 81 TABLET, FILM COATED ORAL at 08:51

## 2020-11-04 RX ADMIN — RAMIPRIL 10 MG: 5 CAPSULE ORAL at 08:51

## 2020-11-04 RX ADMIN — METOPROLOL SUCCINATE 25 MG: 25 TABLET, EXTENDED RELEASE ORAL at 08:51

## 2020-11-04 RX ADMIN — METFORMIN HYDROCHLORIDE 500 MG: 500 TABLET ORAL at 08:21

## 2020-11-04 RX ADMIN — LEVOTHYROXINE SODIUM 175 MCG: 0.17 TABLET ORAL at 06:42

## 2020-11-04 ASSESSMENT — PAIN - FUNCTIONAL ASSESSMENT: PAIN_FUNCTIONAL_ASSESSMENT: PREVENTS OR INTERFERES SOME ACTIVE ACTIVITIES AND ADLS

## 2020-11-04 ASSESSMENT — PAIN SCALES - GENERAL
PAINLEVEL_OUTOF10: 3
PAINLEVEL_OUTOF10: 4
PAINLEVEL_OUTOF10: 3
PAINLEVEL_OUTOF10: 6
PAINLEVEL_OUTOF10: 8

## 2020-11-04 ASSESSMENT — PAIN DESCRIPTION - ORIENTATION: ORIENTATION: RIGHT

## 2020-11-04 ASSESSMENT — PAIN DESCRIPTION - LOCATION: LOCATION: BREAST

## 2020-11-04 ASSESSMENT — PAIN DESCRIPTION - ONSET: ONSET: ON-GOING

## 2020-11-04 ASSESSMENT — PAIN DESCRIPTION - PAIN TYPE: TYPE: SURGICAL PAIN

## 2020-11-04 ASSESSMENT — PAIN DESCRIPTION - FREQUENCY: FREQUENCY: CONTINUOUS

## 2020-11-04 ASSESSMENT — PAIN DESCRIPTION - DESCRIPTORS: DESCRIPTORS: ACHING;CONSTANT;DISCOMFORT;SORE

## 2020-11-04 NOTE — PROGRESS NOTES
[facility]   ATTENDING PHYSICIAN PROGRESS NOTE     I have examined the patient, reviewed the record, and discussed the case with the APN/ Resident. I have reviewed all relevant labs and imaging data. The following summarizes my clinical findings and independent assessment. CC: s/p mastectomy    Pt denies pain. Tolerating diet. Awake and alert  Follows commands  Hrt:  Regular  Lungs:  Fairly clear bilaterally  Abd:  Soft; BS active; NT/ND  Skin:  Warm/dry; drains with serosang drainage    Patient Active Problem List    Diagnosis Date Noted    Acute pain after mastectomy 11/03/2020    S/P mastectomy, right 11/03/2020    Coronary artery calcification seen on CT scan 10/02/2020    Right bundle branch block 09/24/2020    Morbidly obese ([address] 09/18/2020    History of breast cancer 07/21/2020    Breast lump 07/21/2020    Mixed hyperlipidemia 07/21/2020    Arthritis 07/21/2020    Type 2 diabetes mellitus with diabetic neuropathy, without long-term current use of insulin ([address] 07/21/2020    Essential hypertension 07/21/2020    Vitamin D deficiency 07/21/2020    Venous stasis 04/22/2020    Wound of left breast 04/22/2020    Bilateral leg weakness 04/11/2019    Multifactorial gait disorder     Acute cerebrovascular accident (CVA) ([address] 04/09/2019    HTN (hypertension), benign 02/18/2019    Diabetes mellitus type 2, uncontrolled ([address] 02/18/2019    RA (rheumatoid arthritis) (HCC)     Obesity (BMI 30-39. 9)     Hypothyroidism     Irritable bowel syndrome     Psoriatic arthritis ([address] 11/28/2012    Breast cancer ([address] 09/27/2012       S/p right modified radical mastectomy    Pain control  Diet as tolerated  OOB/ambulate  Discharge planning    Justine Munson MD, FACS  11/4/2020  1:10 PM

## 2020-11-04 NOTE — DISCHARGE INSTR - COC
Continuity of Care Form    Patient Name: Mariam Sorensen   :  [birth year]  MRN:  [id number]    Admit date:  11/3/2020  Discharge date:  ***    Code Status Order: Full Code   Advance Directives:   Advance Care Flowsheet Documentation     Date/Time Healthcare Directive Type of Healthcare Directive Copy in [address] Agent's Name Healthcare Agent's Phone Number    [phone]  No, patient does not have an advance directive for healthcare treatment -- -- -- -- --          Admitting Physician:  Jaret Luther MD  PCP: Mir Bedolla MD    Discharging Nurse: [facility] Unit/Room#: 5210/5210-A  Discharging Unit Phone Number: ***    Emergency Contact:   Extended Emergency Contact Information  Primary Emergency Contact: Franco Colbert  Address: [address] Phone: [phone]  Mobile Phone: [phone]  Relation: Child    Past Surgical History:  Past Surgical History:   Procedure Laterality Date    BREAST LUMPECTOMY  [id number]    RIGHT    CARDIAC CATHETERIZATION  10/09/2020    Dr. Anabel Canseco      partial    JOINT REPLACEMENT Bilateral     bilateral TKR    LUMBAR FUSION      L4L5    MASTECTOMY, MODIFIED RADICAL Right 11/3/2020    MODIFIED RADICAL RIGHT BREAST MASTECTOMY performed by Jaret Luther MD at [place] [address]      thumb joint replacement rt     THYROID SURGERY      US BREAST NEEDLE BIOPSY RIGHT  2020    US BREAST NEEDLE BIOPSY RIGHT 2020 [facility]       Immunization History:   Immunization History   Administered Date(s) Administered    Influenza Vaccine, unspecified formulation 10/07/2014, 10/07/2015    Influenza Virus Vaccine 10/07/2014, 10/07/2015    Influenza, High Dose (Fluzone 65 yrs and older) 10/24/2017       Active Problems:  Patient Active Problem List   Diagnosis Code    Breast cancer ([facility] C50.919    Psoriatic arthritis ([facility] L40.50    RA (rheumatoid arthritis) ([facility] M06.9    Obesity (BMI 30-39. 9) E66.9    Hypothyroidism E03.9    Irritable bowel syndrome K58.9    HTN (hypertension), benign I10    Diabetes mellitus type 2, uncontrolled ([facility]) E11.65    Acute cerebrovascular accident (CVA) ([facility] I63.9    Multifactorial gait disorder R26.89    Bilateral leg weakness R29.898    Venous stasis I87.8    Wound of left breast S21.002A    History of breast cancer Z85.3    Breast lump N63.0    Mixed hyperlipidemia E78.2    Arthritis M19.90    Type 2 diabetes mellitus with diabetic neuropathy, without long-term current use of insulin ([facility]) E11.40    Essential hypertension I10    Vitamin D deficiency E55.9    Morbidly obese ([facility]) E66.01    Right bundle branch block I45.10    Coronary artery calcification seen on CT scan I25.10    Acute pain after mastectomy G89.18, Z90.10    S/P mastectomy, right Z90.11       Isolation/Infection:   Isolation          No Isolation        Patient Infection Status     Infection Onset Added Last Indicated Last Indicated By Review Planned Expiration Resolved Resolved By    None active    Resolved    COVID-19 Rule Out 10/29/20 10/29/20 10/29/20 Covid-19 Ambulatory (Ordered)   10/31/20 Rule-Out Test Resulted    COVID-19 Rule Out 09/18/20 09/18/20 09/18/20 Covid-19 Ambulatory (Ordered)   09/20/20 Rule-Out Test Resulted          Nurse Assessment:  Last Vital Signs: BP (!) 103/57   Pulse 68   Temp 98 °F (36.7 °C)   Resp 15   Ht 5' 4\" (1.626 m)   Wt 202 lb (91.6 kg)   SpO2 97%   BMI 34.67 kg/m²     Last documented pain score (0-10 scale): Pain Level: 4  Last Weight:   Wt Readings from Last 1 Encounters:   11/03/20 202 lb (91.6 kg)     Mental Status:  {IP PT MENTAL STATUS:20030}    IV Access:  {[facility] IV ACCESS:039817194}    Nursing Mobility/ADLs:  Walking   {[facility] DME EUTY:774415760}  Transfer  {IRMA METCALF DVEC:056726443}  Bathing  {IRMA DME PSNM:221857612}  Dressing  {CHP DME PRDE:015088380}  Toileting  {CHP DME PTDC:366816979}  Feeding  {CHP DME UXKD:420075686}  Med Admin  {CHP DME UEXY:313370817}  Med Delivery   { ARSENIO MED Delivery:896537597}    Wound Care Documentation and Therapy:        Elimination:  Continence:   · Bowel: {YES / SI:03300}  · Bladder: {YES / HV:29290}  Urinary Catheter: {Urinary Catheter:699708910}   Colostomy/Ileostomy/Ileal Conduit: {YES / PT:29468}       Date of Last BM: ***    Intake/Output Summary (Last 24 hours) at 2020 1248  Last data filed at 2020 1144  Gross per 24 hour   Intake 1750 ml   Output 370 ml   Net 1380 ml     I/O last 3 completed shifts:   In: 1510 [I.V.:1510]  Out: 26 [Drains:170; Blood:50]    Safety Concerns:     [address] Safety Concerns:097258021}    Impairments/Disabilities:      [address] Impairments/Disabilities:978134181}    Nutrition Therapy:  Current Nutrition Therapy:   [address] Diet List:091410786}    Routes of Feeding: {[facility] DME Other Feedings:596390201}  Liquids: {Slp liquid thickness:67645}  Daily Fluid Restriction: {CHP DME Yes amt example:203013779}  Last Modified Barium Swallow with Video (Video Swallowing Test): {Done Not Done VWGF:311447733}    Treatments at the Time of Hospital Discharge:   Respiratory Treatments: ***  Oxygen Therapy:  {Therapy; copd oxygen:55048}  Ventilator:    { CC Vent ZWOA:900559775}    Rehab Therapies: {THERAPEUTIC INTERVENTION:0776267402}  Weight Bearing Status/Restrictions: [address] Weight Bearin}  Other Medical Equipment (for information only, NOT a DME order):  {EQUIPMENT:071900508}  Other Treatments: ***    Patient's personal belongings (please select all that are sent with patient):  {[facility] DME Belongings:547060741}    RN SIGNATURE:  {Esignature:197306393}    CASE MANAGEMENT/SOCIAL WORK SECTION    Inpatient Status Date: ***    Readmission Risk Assessment Score:  Readmission Risk              Risk of Unplanned Readmission:        0

## 2020-11-04 NOTE — PROGRESS NOTES
[facility]   Department of Pharmacy   Pharmacist Transition of Care Services         Patient Demographics  Name:  Kenny Santana   Medical Record Number:  [id number]  Gender:  female   Age:  79 y.o. YOB: 1940    Primary Care Physician: Christen Grayson MD  Primary Care Physician phone number:  [phone]  Readmission Risk (% from [facility] Patient List): N/A%       Pharmacist Review and Summary of Medications     Date of last reviewed/update: 11/4/20     Category Comments   New Medication Started   1. Percocet       Change in Outpatient Medication        Discontinued/On hold Outpatient Medication         Other        Documentation of Pharmacist Interventions and Follow-up Plan:     The following Pharmacist Transition of Care Services were completed:   [x]  Reviewed and summarized medication changes  []  Entire home medication list was reviewed for accuracy (sources: **)  []  Home medication list was updated or corrected     []  Reviewed discharge medication reconciliation  []  Discharge medication list was updated or corrected    []  Added information to AVS   []  Patient education was provided on new medications  []  Patient education was provided on medication changes  []  Reviewed the After Visit Summary (AVS) with patient    Additional Interventions:  []  Inpatient prescriber was contacted and the following pharmacy recommendations        were accepted: **     [] Other interventions: **        Pharmacist: Tony Gonzalez PharmD, [facility]  Date:  11/4/2020 1:52 PM

## 2020-11-04 NOTE — PLAN OF CARE
Problem: Falls - Risk of:  Goal: Will remain free from falls  Description: Will remain free from falls  11/4/2020 1406 by Jae Rausch RN  Outcome: Completed  11/4/2020 0133 by Diann Kothari RN  Outcome: Met This Shift  Goal: Absence of physical injury  Description: Absence of physical injury  Outcome: Completed     Problem: Pain:  Goal: Pain level will decrease  Description: Pain level will decrease  11/4/2020 1406 by Jae Rausch RN  Outcome: Completed  11/4/2020 0133 by Diann Kothari RN  Outcome: Ongoing  Goal: Control of acute pain  Description: Control of acute pain  11/4/2020 1406 by Jae Rausch RN  Outcome: Completed  11/4/2020 0133 by Diann Kothari RN  Outcome: Met This Shift  Goal: Control of chronic pain  Description: Control of chronic pain  Outcome: Completed

## 2020-11-04 NOTE — CARE COORDINATION
11/4/2020 - Care Coordination - S/P right mastectomy. Placed call to son Karla Alonso to discuss transition of care. He reports pt PCP is Dr Florentino Ojeda and pharmacy is [facility] in [facility]. Karla Alonso lives with pt in her 1 floor home with a ramp to enter. DME - wc, fww, shower chair, bsc and raised toilet seat. Hx [facility] at  [facility]. Hx [facility]. [facility] orders on chart. Went over [place] [address] choices with Karla Alonso - he chose [facility]. [facility] unable to accomodate pt. Placed call to Jaun Walls from [facility] - they can accept pt and will follow. Plan is to return home with [facility] when medically stable. Son will provide transportation home. SW/CM will follow.

## 2020-11-04 NOTE — ANESTHESIA POSTPROCEDURE EVALUATION
Department of Anesthesiology  Postprocedure Note    Patient: Jacob Santo  MRN: [id number]  YOB: 1940  Date of evaluation: 11/3/2020  Time:  7:22 PM     Procedure Summary     Date:  11/03/20 Room / Location:  [address] [place] [address] 20 / [facility]    Anesthesia Start:  6117 Anesthesia Stop:  9019    Procedure:  MODIFIED RADICAL RIGHT BREAST MASTECTOMY (Right Breast) Diagnosis:  (BREAST CA)    Surgeon:  Tracy Rivera MD Responsible Provider:  Bailey Lopez MD    Anesthesia Type:  general ASA Status:  3          Anesthesia Type: general    Mik Phase I: Mik Score: 9    Mik Phase II:      Last vitals: Reviewed and per EMR flowsheets.        Anesthesia Post Evaluation    Patient location during evaluation: PACU  Patient participation: complete - patient participated  Level of consciousness: awake and alert  Airway patency: patent  Nausea & Vomiting: no vomiting and no nausea  Complications: no  Cardiovascular status: hemodynamically stable  Respiratory status: acceptable  Hydration status: stable

## 2020-11-04 NOTE — CONSULTS
Department of Orthopedic Surgery  Resident Consult Note          Reason for Consult: Left wrist pain    HISTORY OF PRESENT ILLNESS:       Patient is a 79 y.o. female who presents with left wrist pain which has been ongoing for roughly 2 weeks according to chart. Patient was seen and examined in the PACU following radical mastectomy surgery. When asked about the pain in her left wrist and hand, patient does not recall any prior fall or trauma. There is suspicion that patient is heavily reliant on her upper extremities when utilizing a wheelchair which may be contributing to her current left wrist pain etiology. She is unable to localize the pain in her left wrist and hand but she denies any associated numbness/tingling/paresthesias. Denies any other orthopedic complaints at this time. Past Medical History:        Diagnosis Date    Asthma     Blood transfusion     [facility]     COPD (chronic obstructive pulmonary disease) ([facility]     Diabetes mellitus ([facility]     Diabetic peripheral neuropathy ([facility]     DM type 2 (diabetes mellitus, type 2) ([facility]     Hypertension     Hypothyroidism     Irritable bowel syndrome     Mixed hyperlipidemia 7/21/2020    Obesity (BMI 30-39. 9)     Osteoarthritis     rheumatoid and osteo    RA (rheumatoid arthritis) ([facility]     RBBB      Past Surgical History:        Procedure Laterality Date    BREAST LUMPECTOMY  [id number]    RIGHT    CARDIAC CATHETERIZATION  10/09/2020    Dr. Madhavi Ward      partial    JOINT REPLACEMENT Bilateral     bilateral TKR    LUMBAR FUSION      L4L5    OTHER SURGICAL HISTORY      thumb joint replacement rt     THYROID SURGERY      US BREAST NEEDLE BIOPSY RIGHT  7/31/2020    US BREAST NEEDLE BIOPSY RIGHT 7/31/2020 [facility]     Current Medications:   Current Facility-Administered Medications: 0.9 % sodium chloride infusion, , Intravenous, Continuous  sodium PT/INR:    Lab Results   Component Value Date    PROTIME 11.0 10/09/2020    INR 1.0 10/09/2020       Radiology Review:  X-ray left wrist: Dorsal cortical fracture of the left triquetrum, most noticeable on the lateral view. Significant left thumb CMC arthritis. Diffuse arthritic changes noted within the hand and wrist joints as well. IMPRESSION:  · Left triquetrum fracture, closed  · Left thumb CMC arthritis    PLAN:  · Removable splint placed to the left wrist  · Ice and elevation as needed for comfort  · Pain control per primary  · Avoid weightbearing to the left wrist and hand at this time.   Okay for gentle range of motion about the left wrist and hand while out of wrist splint  · No acute orthopedic intervention at this time  · Patient to follow-up with Dr. Jw Ramos in office, please call for appointment  · Discuss with Dr. Jw Ramos

## 2020-11-04 NOTE — PROGRESS NOTES
CLINICAL PHARMACY: DISCHARGE MED RECONCILIATION/REVIEW    [facility]) Select Patient?: Yes  Total # of Interventions Recommended: 0   -   Total # Interventions Accepted: 0  Intervention Severity:   - Level 1 Intervention Present?: No   - Level 2 #: 0   - Level 3 #: 0   Time Spent (min): 5    Additional Documentation: See progress note.

## 2020-11-04 NOTE — PROGRESS NOTES
Patients son not in 3rd floor waiting area called x2 phone goes straight to voice mail left message .

## 2020-11-05 ENCOUNTER — TELEPHONE (OUTPATIENT)
Dept: BREAST CENTER | Age: 80
End: 2020-11-05

## 2020-11-05 ENCOUNTER — CARE COORDINATION (OUTPATIENT)
Dept: CASE MANAGEMENT | Age: 80
End: 2020-11-05

## 2020-11-05 PROCEDURE — 1111F DSCHRG MED/CURRENT MED MERGE: CPT | Performed by: FAMILY MEDICINE

## 2020-11-05 NOTE — TELEPHONE ENCOUNTER
MA received a VM from [facility] at [facility] who was admitting patient for home care s/p right mastectomy 11/3/2020. Pt was D/C home last night 11/4/2020. [facility] states when went to see patient her drains were out. [facility] calling to get orders on what to do. [facility] can be reached at [phone]    MA routing message to [address] [place] [address].       Electronically signed by Tiarra Martinez MA on 11/5/20 at 10:02 AM EST

## 2020-11-05 NOTE — TELEPHONE ENCOUNTER
MA scheduled patient for wound check on 11/12/2020 @ 1pm in [facility] with  per her request.      Electronically signed by Johnie Duckworth MA on 11/5/20 at 10:37 AM EST

## 2020-11-05 NOTE — TELEPHONE ENCOUNTER
I called Aiden back. Patient removed drains herself. Please have Tresa Crump come to [facility] on Thursday for a check-up. Please have her come in earlier if she is having a lot of swelling. We will just have to drain her in the office or with US when the fluid accumulates.

## 2020-11-05 NOTE — CARE COORDINATION
[facility] Transitions Initial Follow Up Call    Call within 2 business days of discharge: Yes    Patient: Kim Rodriguez Patient : 1940   MRN: [id number]  Reason for Admission: s/p right mastectomy  Discharge Date: 20 RARS: No data recorded    Last Discharge [facility]       Complaint Diagnosis Description Type Department Provider    11/3/20  COVID-19 . .. Admission (Discharged) Russel Feliz MD        Challenges to be reviewed by the provider   Additional needs identified to be addressed with provider No  none    Discussed COVID-19 related testing which was available at this time. Test results were negative. Patient informed of results, if available? Yes         Method of communication with provider : none    Advance Care Planning:   Does patient have an Advance Directive:  decision maker updated. Was this a readmission? No  Patient stated reason for admission:  right masectomy  Patients top risk factors for readmission: medical condition and polypharmacy    Care Transition Nurse (CTN) contacted the family by telephone to perform post hospital discharge assessment. Verified name and  with family as identifiers. Provided introduction to self, and explanation of the CTN role. CTN reviewed discharge instructions, medical action plan and red flags with family who verbalized understanding. Family given an opportunity to ask questions and does not have any further questions or concerns at this time. Were discharge instructions available to patient? Yes. Reviewed appropriate site of care based on symptoms and resources available to patient including: PCP, Specialist, Urgent care clinics, Home health and When to call 911. The family agrees to contact the PCP office for questions related to their healthcare. Medication reconciliation was performed with family, who verbalizes understanding of administration of home medications.  Advised obtaining a 90-day supply of all daily and as-needed medications. Covid Risk Education    Patient has following risk factors of: diabetes and Breast Cancer. Education provided regarding infection prevention, and signs and symptoms of COVID-19 and when to seek medical attention with family who verbalized understanding. Discussed exposure protocols and quarantine From CDC: Are you at higher risk for severe illness?   and given an opportunity for questions and concerns. The family agrees to contact the COVID-19 hotline [phone] or PCP office for questions related to COVID-19. For more information on steps you can take to protect yourself, see CDC's How to Protect Yourself     Patient/family/caregiver given information for [employer] Loop and agrees to enroll yes  Patient's preferred e-mail: [email or web address]  Patient's preferred phone number: [phone]    Discussed follow-up appointments. If no appointment was previously scheduled, appointment scheduling offered: No. Is follow up appointment scheduled within 7 days of discharge? Yes, CTN advised patient cristina Keller) that patient is scheduled to follow up with Dr. Mauricio Dukes (Gen Surg) on 11/12/20 at 1 pm.     Non-face-to-face services provided:  Scheduled appointment with PCP-CTN confirmed with patient cristina Keller) he will follow up with PCP if needed.    Scheduled appointment with Specialist-CTN confirmed follow up appt with Dr. Mauricio Dukes (Gen Surg) on 11/12/20 at 1 pm.   Obtained and reviewed discharge summary and/or continuity of care documents  Establishment or re-establishment of referrals-CTN confirmed with patient cristina Keller) that nurse from [facility] had first visit today for [address] Transitions 24 Hour Call    Do you have any ongoing symptoms?:  Yes  Patient-reported symptoms:  Pain, Other (Comment: drainage from drain falling out from s/p right mastectomy)  Do you have a copy of your discharge instructions?:  Yes  Do you have all of your prescriptions and are they filled?:  Yes  Have you been contacted by a [address]?:  No  Have you scheduled your follow up appointment?:  Yes  How are you going to get to your appointment?:  Car - family or friend to transport  Were you discharged with any Home Care or Post Acute Services:  Yes  Post Acute Services:  Home Health (Comment: [address])  Do you feel like you have everything you need to keep you well at home?:  Yes  Care Transitions Interventions  No Identified Needs       Spoke with patient son Osman Taylor) on [facility] form dated 4/15/19 regarding hospital discharge/COVZID-19 risk follow up. Confirmed patient was admitted to [facility] for s/p right mastectomy. Lorenzo Rodriguez reports patient drain fell out while sleeping. Confirmed [address] [place] [address] from [facility] had visit today and called Dr. Flores Carrion ([facility] S[facility]) to report. Note on record from Dr. Flores Carrion called nurse back advising for patient to follow up in office at [facility]. CTN advised Lorenzo Rodriguez that appointment is scheduled for 11/12/20 1 pm and call Dr. Flores Carrion office sooner if noticing increased swelling (fluid accumulation) if needed to be drained sooner in office which he verbalizes understanding. Lorenzo Rodriguez reports drainage is \"reddish brown\" in color. Advised to keep wound clean and can wipe drainage with gauze pads which he acknowledges. States patient having post op pain which she rates 7/10 in severity on pain scale. States getting pain relief from prescribed Percocet. Denies any shortness of breath, chest pain, chest discomfort, nausea, vomiting, diarrhea, chills or fever. Noted patient tested negative for COVID-19 on 10/29/20. Denies any other complaints or needs at this time. Patient enrolled into Loop Program for continued monitoring.           Follow Up  Future Appointments   Date Time Provider Priscila Morgan   11/12/2020  1:00 PM Monster Kevin MD [address]   11/20/2020 10:30 AM Monster Kevin MD [facility]   12/3/2020 11:00 AM [facility]     CTN provided contact information for future needs    PABLO Lao

## 2020-11-10 ENCOUNTER — TELEPHONE (OUTPATIENT)
Dept: SURGERY | Age: 80
End: 2020-11-10

## 2020-11-10 NOTE — TELEPHONE ENCOUNTER
MA contacted [facility] home care nurse and she states that area has no signs of infection. MA verbalized understanding. MA attempt to contact patient son on mobile number to discuss no need to move appt. MA reached patient son VM and left detailed message of why was calling and office number for patient or patient son to call office back if has further questions. MA contacted patient on home number and advised no need to move appt we would see her on Thursday @ 1pm in [facility]. Pt verbalized understanding.         Electronically signed by José Luis Cobian MA on 11/10/20 at 4:06 PM EST

## 2020-11-10 NOTE — TELEPHONE ENCOUNTER
MA received phone call from [address] [place] [address] from [address] [facility]. Kera states she's with the patient today and had some concerns that her temperature is slightly elevated at 99.3 degrees farenheit. Kera states the patient is not having any nausea, vomiting, or chills. Patient stated that she feels better today than she has the last few days. Kera states [place], another home health nurse was out on Thursday and the drains had become dislodged from the patient. Kera states she is unsure if there is more swelling today as this is her first time visiting the patient and does not know what she looked like the days prior. Kera states the patient is still keeping her chest wrapped. Kera wondering if the patient should be seen sooner than her upcoming appointment on 11/12/20 or if there are any addition advisements for the patient. Kera can be reached at [phone]. Kera ask the patient be notified as well at [phone]. or [phone]. MA routing message to Dr Rip Sexton for advisement. MA routing message to [place], [place] informatively.      Electronically signed by Gabrile Santos on 11/10/20 at 3:33 PM EST

## 2020-11-12 ENCOUNTER — OFFICE VISIT (OUTPATIENT)
Dept: BREAST CENTER | Age: 80
End: 2020-11-12
Payer: MEDICARE

## 2020-11-12 VITALS
HEART RATE: 93 BPM | BODY MASS INDEX: 34.49 KG/M2 | TEMPERATURE: 97.7 F | HEIGHT: 64 IN | DIASTOLIC BLOOD PRESSURE: 82 MMHG | OXYGEN SATURATION: 98 % | RESPIRATION RATE: 16 BRPM | SYSTOLIC BLOOD PRESSURE: 128 MMHG | WEIGHT: 202 LBS

## 2020-11-12 PROCEDURE — 99024 POSTOP FOLLOW-UP VISIT: CPT | Performed by: SURGERY

## 2020-11-12 NOTE — PROGRESS NOTES
[facility]  PROGRESS NOTE  ATTENDING NOTE    POSTOP APPOINTMENT    Chief Complaint   Patient presents with    Post-Op Check     P/O right mastectomy DOS 11/3/2020, drains removed 11/4/2020    Breast Pain     Pt states has pain all the time. Patient states pain is a 7/10         S:  79 y. o.female s/p right MRM. She pulled out her drains POD#2. She denies fevers or chills. She has mild intermittent pain of the axilla. She has numbness of the RUE, however she has RA and she states this numbness is chronic. /82 (Site: Left Upper Arm, Position: Sitting, Cuff Size: Medium Adult)   Pulse 93   Temp 97.7 °F (36.5 °C) (Temporal)   Resp 16   Ht 5' 4\" (1.626 m)   Wt 202 lb (91.6 kg)   SpO2 98%   BMI 34.67 kg/m²   Physical Exam  Constitutional:       Appearance: Normal appearance. She is obese. HENT:      Head: Normocephalic and atraumatic. Nose: Nose normal.      Mouth/Throat:      Mouth: Mucous membranes are dry. Eyes:      Extraocular Movements: Extraocular movements intact. Pupils: Pupils are equal, round, and reactive to light. Chest:      Breasts:         Right: Absent. Neurological:      General: No focal deficit present. Mental Status: She is alert and oriented to person, place, and time. Psychiatric:         Behavior: Behavior normal.         Thought Content: Thought content normal.         Judgment: Judgment normal.           PATHOLOGY:  Diagnosis: A. Right axillary contents:   6 lymph nodes with no evidence of carcinoma. 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    Focally involves dermal lymphatics and invades dermis/epidermis without   ulceration.        Fibrous scar with calcification, in association with carcinoma.        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         All margins are negative for invasive carcinoma IV, venipuncture, BPs on right side    RTC one week to check seroma    Reji Hubbard MD, MSc, FACS  11/12/2020  2:56 PM

## 2020-11-20 ENCOUNTER — OFFICE VISIT (OUTPATIENT)
Dept: BREAST CENTER | Age: 80
End: 2020-11-20
Payer: MEDICARE

## 2020-11-20 VITALS
TEMPERATURE: 97.3 F | SYSTOLIC BLOOD PRESSURE: 145 MMHG | RESPIRATION RATE: 16 BRPM | OXYGEN SATURATION: 98 % | BODY MASS INDEX: 34.49 KG/M2 | DIASTOLIC BLOOD PRESSURE: 95 MMHG | HEIGHT: 64 IN | HEART RATE: 105 BPM | WEIGHT: 202 LBS

## 2020-11-20 PROCEDURE — 99024 POSTOP FOLLOW-UP VISIT: CPT | Performed by: SURGERY

## 2020-11-20 NOTE — PROGRESS NOTES
[facility]  PROGRESS NOTE  ATTENDING NOTE    POSTOP APPOINTMENT    Chief Complaint   Patient presents with    Post-Op Check     P/O right breast mastectomy DOS 11/3/2020, pt states having a little discomfort in breast area         S:  79 y. o.female s/p right MRM. She pulled out her drains POD#2. She is telling me her aid did it and not her. She denies any complaints today      BP (!) 145/95 (Site: Left Upper Arm, Position: Sitting, Cuff Size: Medium Adult)   Pulse 105   Temp 97.3 °F (36.3 °C) (Temporal)   Resp 16   Ht 5' 4\" (1.626 m)   Wt 202 lb (91.6 kg)   SpO2 98%   BMI 34.67 kg/m²   Physical Exam  Constitutional:       Appearance: Normal appearance. She is obese. HENT:      Head: Normocephalic and atraumatic. Eyes:      Extraocular Movements: Extraocular movements intact. Pupils: Pupils are equal, round, and reactive to light. Chest:       Musculoskeletal:         General: No tenderness or signs of injury. Skin:     General: Skin is warm and dry. Neurological:      General: No focal deficit present. Mental Status: She is alert and oriented to person, place, and time. Psychiatric:         Mood and Affect: Mood normal.         Behavior: Behavior normal.         Thought Content: Thought content normal.         Judgment: Judgment normal.           PATHOLOGY:                                 Additional Physicians:KALEB STALEY       Diagnosis: A. Right axillary contents:   6 lymph nodes with no evidence of carcinoma. 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    Focally involves dermal lymphatics and invades dermis/epidermis without   ulceration.        Fibrous scar with calcification, in association with carcinoma.        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      All margins are negative for invasive carcinoma and negative for   DCIS. Nipple/areola with no significant pathologic findings. Vessels with Monckeberg medial calcific sclerosis. CANCER CASE SUMMARY:   Procedure: Modified radical right breast mastectomy   Specimen laterality: Right   Tumor site: 8-9 o'clock position in the breast per gross description   Tumor size: Greatest dimension of approximately 3.0 cm   Histologic type: Invasive carcinoma of no special type (ductal)   Histologic grade (Washington histologic score): glandular differentiation   score 3, nuclear pleomorphism score 2, mitotic rate score 3, overall   grade of grade 3 (score of 8)   Ductal carcinoma in situ (DCIS): Present   Tumor extension:    Skin: Invasive carcinoma directly invades into the dermis/epidermis   without skin ulceration    Nipple: DCIS does not involve the nipple epidermis    Skeletal muscle: No skeletal muscle is present   Margins:    Invasive carcinoma margins: Uninvolved by invasive carcinoma     Distance from closest margin: Anterior margin, 9.0 mm away    DCIS margins: Uninvolved by DCIS     Distance from closest margin: Posterior margin, 10.0 mm away   Regional lymph nodes: Uninvolved by tumor cells    Total number of lymph nodes examined: 6    Total number of sentinel nodes examined: 0   Treatment effect in the breast: No recent presurgical   Dermal lymphovascular invasion: Present   Pathologic stage classification (pTNM, AJCC eighth edition):    r (recurrent) pT2    pN0   Ancillary studies: Performed on prior biopsy specimen, HES-, and   summarized as estrogen receptor positive, progesterone receptor positive,   and HER-2/anni negative       Comment:   It is noted from the electronic medical record and prior   pathology history that the patient had breast invasive ductal carcinoma   in 2012, and this current carcinoma is considered recurrent. Intradepartmental consultation is obtained (slides [id number]).        GENETIC TESTING:  Refused or N/A    LAST

## 2020-12-01 ENCOUNTER — TELEPHONE (OUTPATIENT)
Dept: OCCUPATIONAL THERAPY | Age: 80
End: 2020-12-01

## 2020-12-08 ENCOUNTER — EVALUATION (OUTPATIENT)
Dept: OCCUPATIONAL THERAPY | Age: 80
End: 2020-12-08
Payer: MEDICARE

## 2020-12-08 PROCEDURE — 97165 OT EVAL LOW COMPLEX 30 MIN: CPT | Performed by: OCCUPATIONAL THERAPIST

## 2020-12-08 NOTE — PROGRESS NOTES
OCCUPATIONAL THERAPY INITIAL LYMPHEDEMA EVALUATION     Phone: [phone]  Fax: [phone]     Date:  2020  Initial Evaluation Date: 2020    Patient Name:  Alma Hadley    :  1940    Restrictions/Precautions:  fall risk, left wrist support splint  Diagnosis:  Lymphedema (I89.0)       Insurance/Certification information:  Medicare Part A and B  Referring Physician:  Ester Yeager MD  Plan of care signed (Y/N):  No  Visit# / total visits: Evaluation    Time In: 1130                 Time Out: 9905    Reason for Referral:  Pt was referred to [place] [address] due to  Intractable lymphedema of the flank/chest and decreased range of motion right shoulder, unrelieved by elevation and general range of motion. Chief Complaint: none noted by patient  Triggers:  None noted by patient    Past Medical History: patient is a [de-identified] year old woman who was referred to the lymphedema clinic secondary to pain right upper extremity and increased swelling (seroma) of the right chest.  Patient is a poor historian for current medical issues as well as past medical issues. Patient has a history of right breast cancer and underwent a lumpectomy with lymph node dissection and radiation therapy. This information is from a chart review as patient did not mention. Patient unable to provide current breast cancer issue. This information was retrieved from the chart review. Patient was diagnosed with recurrence of right breast cancer and underwent mastectomy to include lymph node dissection. Patient did not have chemotherapy or radiation therapy and does not know if these treatments will be scheduled in the future.   Patient further past medical history includes:    Past Medical History           Past Medical History:   Diagnosis Date    Asthma      Blood transfusion      [facility] cancer ([facility]      COPD (chronic obstructive pulmonary disease) ([facility]      Diabetes [facility]    Diabetic peripheral neuropathy ([facility]      DM type 2 (diabetes mellitus, type 2) ([facility])      Hypertension      Hypothyroidism      Irritable bowel syndrome      Mixed hyperlipidemia 7/21/2020    Obesity (BMI 30-39. 9)      Osteoarthritis       rheumatoid and osteo    RA (rheumatoid arthritis) ([facility])      RBBB              Past Surgical History             Past Surgical History:   Procedure Laterality Date    BREAST LUMPECTOMY   [id number]     RIGHT    CARPAL TUNNEL RELEASE         b/l    CHOLECYSTECTOMY        HYSTERECTOMY         partial    JOINT REPLACEMENT Bilateral       bilateral TKR    LUMBAR FUSION         L4L5    OTHER SURGICAL HISTORY         thumb joint replacement rt     THYROID SURGERY        US BREAST NEEDLE BIOPSY RIGHT   7/31/2020     US BREAST NEEDLE BIOPSY RIGHT 7/31/2020 [facility]            []Surgery: lumpectomy 2012 and mastectomy 2020   []Lymph node removal: six in 2012 and six in 2020  []Radiation Therapy: completed 2012  []Chemotherapy: none  []History of Cellulitis/Infection: none noted  []Family history of lymphedema: none noted  []Previous treatment for lymphedema: none noted  []Current compression garment use: none noted    Home Living: patient lives with son in a one floor home with basement (not use) and ramp entry. Patient uses a wheeled walker and has shower chair, wheelchair, bedside commode, raised commode seat. Patient has a shower stall and has a home health aide to assist with bathing three days a week and home health nurse one day per week. Patient stated she is independent with dressing.     Prior Level of Function: patient has assistance with all adl's and iadl's  Patient has assist from son and home health aide    IADL History  Homemaking Responsibility: patient assist as able  Shopping Responsibility: family performs  Mode of Transportation: patient does not drive  Leisure & Hobbies: sewing  Work: patient does not work        Pain Level: no pain noted at time of evaluation  Pitting Edema: none noted at time of evaluation  Fibrotic tissue:  None noted at time of evaluation  Skin Integrity: patient with fair skin integrity at chest incision site. Patient has two healing areas on incision site moving medial to lateral from one to three cm and from ten to twelve cm. Both areas healing well. Patient total incision site healing well. Patient with increased swelling in this area. Patient with increased skin issues and healing areas each digit tip where patient is biting her finger nails. Patient educated on potential infection risk by doing this. Patient verbalized understanding. Therapist will continue education. Lymphedema Upper Extremity Measurements    Measurements are made in 4cm increments and are circumferential.      CM Follow up #4  Left - 7Dec. 2020 Follow up #3  Left -  Follow up #2  Left -  Follow up #1  Left -  Evaluation -   Left - 7Dec. 2020 Follow up #4  Right -  Follow up #3  Right -  Follow up #2  Right -  Follow up #1 Right -  Evaluation - Right -                   wrist 18.5    19        4cm 19    19.25        8cm 21.25    22        12cm 25    24.5        16cm 28    27.25        20cm 28    28        24cm 34.25    34.5        28cm 36    36        32cm 37.5    37        36cm 38.5    37        40cm 39    36        44cm                                       plam 20.5    20          Fingers are measured in cm and between mp and pip and between pip and dip each digit.     Digit Follow up #4  Left -  Follow up #3  Left -  Follow up #2  Left -  Follow up #1  Left -  Evaluation -  Left -  Follow up #4  Right -  Follow up #3  Right -  Follow up #2  Right -  Follow up #1  Right -  Evaluation - Right -                              First Digit 7.5, 5.75    7, 6        Second Digit 7.75, 6.5    7.25, 6.25        Third Digit 6.75, 6    6.5, 6        Fourth Digit 6, 5.25    6.25, 5.25        Fifth Digit 7    7                         Cognition: Alert/Oriented x3 grossly with increased concern regarding cognition    Vision: within functional limits for daily life tasks. Patient has glasses        Hearing: within functional limits     ADL  Feeding:  independent  Grooming:  independent  Bathing:  Minimal assistnace  UE Dressing:  independent  LE Dressing:  independent  Toileting:  independent  Transfer:  Modified Independent    UE ASSESSMENT: Hand Dominance is right handed  ROM within functional limits with exception of left active/passive shoulder flexion/abduction 0 - 130 degrees and right active/passive shoulder flexion/abduction 0 - 115 degrees. Patient complains of increased pulling pain with right shoulder flexion/abduction. Patient found to have cording of the right axilla  Strength within functional limits with exception of bilateral shoulder strength 3-/5    Sensation: within functional limits for light touch         Assessment: Secondary Lymphedema, shoulder range of motion impairment, Stage 2,   Affecting the Right Upper Extremity, chest, and flank. Patient will benefit from a Complete Decongestive Therapy protocol using manual lymphatic drainage techniques, skilled multilayer compression bandaging using short stretch bandages and foam padding, instruction in a home program of self massage and exercise, compression garment fitting and instruction in independent management strategies for lymphedema. Assessment of current deficits   []Pain  []Skin Integrity   [x]Lymphedema   []Functional transfers/mobility   []ADLs   []Strength    []Cognition  []IADLs   []Safety Awareness   []  Motor Endurance  []Fine Motor Coordination   []Balance   []Vision/perception  []Sensation []Gross Motor Coordination  [x]ROM     This patient demonstrates the ability to follow the plan of care and progress towards goals. Rehab potential is good    Plan   Plan of care initiated. Pt will be seen 2-3x a week for 6 weeks or 18 sessions if needed.  Duration: From: 7Dec.  through 2021    Plan of Care:   [x]97140-Manual Lymph Drainage and Combined Decongestive Therapy  [x]01966- Skilled Multilayer Short Stretch Compression Bandaging/ Therapeutic Exercise  [x]Skin Care Education [x]HEP including Self MLD Education and/or Self Bandaging  [x]Education for Lymphedema Risks/Precautions     [x] Caregiver Education   []other:    During this plan of care, this patient will demonstrate optimal reduction in girth, joint mobility, muscle performance, ROM and highest level of function in home, community, recreation and work activities    GOALS  Patient Goal: to be able to do more for herself    STG: 3 weeks  1. Patient will demonstrate knowledge and understanding for lymphedema precautions, skin care and self management to decrease progression of lymphedema and risk of infection. 2.  Patient will present with decreased limb volume in the involved extremity from mild to trace for improved functional mobility. 3.  Patient will demonstrate compliance with compression therapy for independent management of lymphedema. LT weeks  1. Patient will be independent with self management of lymphedema including understanding garment wear schedule, self compression bandaging, HEP and self care. 2.  Patient will be fitted for appropriate compression garments for long term management of lymphedema. 3.  Patient will present with decreased limb volume in the involved extremity from trace to none  for improved functional mobility. 4.  Patient will maximize right shoulder flexion/abduction to maximize independence with daily life tasks.         Eval Complexity: Low Complexity        OT G-codes:  Carrying, Handling, Moving objects   (Current status): CI   (Discharge Goal):  CH  Lymphedema Life Impact Scale Score:  2  Percent Impairment:  3%       Time In: 1130  Time Out: 1235  Timed Code Treatment Minutes: 0      Min Units   OT Eval Low 62097  65  1   OT Eval Medium [id number] OT Eval High 0496 97 06 31       OT Re-Eval [id number]       Therapeutic Ex ([phone]       Manual [phone]      Therapeutic Activities        ADL/Self Care 88 [phone]       Orthotic Management 43217       Neuro Re-Ed 38763       Non-Billable Time                 Dear Practitioner,  Thank you for this referral.  If you have questions about this patient please contact me at [phone]. The patient and I have planned the above goals and s/he will be provided with a treatment protocol and home program to help achieve them. JEANNE Schroeder/HARVINDER, CLT-SHYAM  Date: 7Dec. 2020        Practitioner Signature: _______________________________________ Date:_________ ___________________________________  Practitioner Printed Name: _____________________________      I CERTIFY THE ABOVE PRESCRIBED SERVICE ARE REQUIRED FOR THIS PATIENT AND ARE MEDICALLY NECESSARY. THE ABOVE PLAN OF CARE HAS BEEN DEVELOPED IN CONJUNCTION WITH THE LYMPHEDEMA/OCCUPATIONAL THERAPIST.

## 2020-12-10 ENCOUNTER — TREATMENT (OUTPATIENT)
Dept: OCCUPATIONAL THERAPY | Age: 80
End: 2020-12-10
Payer: MEDICARE

## 2020-12-10 PROCEDURE — 97110 THERAPEUTIC EXERCISES: CPT | Performed by: OCCUPATIONAL THERAPIST

## 2020-12-10 PROCEDURE — 97530 THERAPEUTIC ACTIVITIES: CPT | Performed by: OCCUPATIONAL THERAPIST

## 2020-12-10 PROCEDURE — 97140 MANUAL THERAPY 1/> REGIONS: CPT | Performed by: OCCUPATIONAL THERAPIST

## 2020-12-10 NOTE — PROGRESS NOTES
OCCUPATIONAL THERAPY PROGRESS NOTE  Phone: [phone]             Fax: [phone]     Date:  12/10/2020  Initial Evaluation Date: 2020     Patient Name:  Mariam Sorensen                    :  1940     Restrictions/Precautions:  fall risk, left wrist support splint  Diagnosis:  Lymphedema (I89.0)                                                        Insurance/Certification information:  Medicare Part A and B  Referring Physician:  Jaret Luther MD  Plan of care signed (Y/N):  No  Visit# / total visits: Evaluation     Time In: 1421                 Time Out: 4704     Restrictions/Precautions:  [] No blood pressure/blood draw in: [] Left Upper Extremity [] Right Upper Extremity                        [x] Fall Risk       Intervention:   [x]Other:  cognition    Pain:  [] No    [x] Yes  Location: right axilla / chest  Pain Rating (0-10 pain scale): does not rate  Pain Description: stretch  Interruption of Treatment [] Yes  [x] No    Came to Clinic:  [] Bandaged    []Unbandaged    [] With Stocking     [] With Sleeve     [] Kinesiotaped    [] [employer]    [] With Alternative Compression:    Skin Integrity:  [] Normal [] Dry  []Rough []Moist []Rash  Location of problem area/s:  [] Wound: Location/Description   [x] Fibrosis: across right chest at / above / below incision site  [] Keratosis: Location/Description  [] Papillomas: Location/Description  [] Other    Color:  [] Normal [] Mottled [] Flushed [] Other/Description  Location of problem area/s:  Color changes along incision site. Healing well    Edema:  Increased edema / seroma along chest incision site    Subjective:  Patient attended treatment session alone. Patient son remained in the car. Patient with no new issues noted.     Objective:  [] Measurement [x]Manual Lymph Drainage  []Bandaging   []Kinesiotaping [x] Education    []Self Massage   []Self Bandaging [x] Exercise    []Wound Care  []Hygiene  [] Other      Assessment:  Knowledge of home program:   [] Good [] Fair  [] Poor  Patient is programming at home:  [] Yes  [] No  Family is assisting with programming: [] Yes  [] No  Home programming is consistent: [] Yes  [] No  Other:   Response to treatment:  good  Instructions for patient:   [x] Verbal [x] Written  Instructions addressed:    Patient Goal: to be able to do more for herself     STG: 3 weeks  1. Patient will demonstrate knowledge and understanding for lymphedema precautions, skin care and self management to decrease progression of lymphedema and risk of infection. Therapist initiated patient education regarding lymphedema precautions and skin care / self management. Patient able to verbalize understanding. Therapist questions patient ability to follow through secondary to cognition. Patient verbalized concerns to patient son at end of session. Patient progressing toward goal.  2.  Patient will present with decreased limb volume in the involved extremity from mild to trace for improved functional mobility. Therapist initiated patient education regarding self manual lymphatic drainage massage sequence for chest involvement. Patient currently with wrist fracture noted in chart at time of surgery. Chart noted mentioned follow up with Dr. Camila Fong but no further notes noted. Discussed with patient son who will need to follow up with doctor. Patient tolerated lymphatic massage well and therapist noted decreased hardness in areas along the incision site. Patient progressing toward goal.  3.  Patient will demonstrate compliance with compression therapy for independent management of lymphedema.        LT weeks  1. Patient will be independent with self management of lymphedema including understanding garment wear schedule, self compression bandaging, HEP and self care. 2.  Patient will be fitted for appropriate compression garments for long term management of lymphedema.   3.  Patient will present with decreased limb volume in the involved extremity from trace to none  for improved functional mobility. 4.  Patient will maximize right shoulder flexion/abduction to maximize independence with daily life tasks. Therapist initiated patient education regarding range of motion / stretch exercises. Patient provided hand out for each exercise. Patient performed each exercise with verbal cues and hand over hand assistance of therapist.  Patient tolerated well. Therapist gave hand outs to patient son. Therapist encouraged family to assist as able.   Patient progressing toward goal.         Plan: Continue to address:  []Bandaging  [x] Self Bandaging/Family Assist  [x]MLD  [x]Self Massage  [x]Exercise  [x]Education  []Obtain referral for garments  []Medical Hold  []Discharge to [facility] OTR/L, CLT

## 2020-12-15 ENCOUNTER — TELEPHONE (OUTPATIENT)
Dept: OCCUPATIONAL THERAPY | Age: 80
End: 2020-12-15

## 2020-12-15 NOTE — TELEPHONE ENCOUNTER
Patient is a no call no show for scheduled lymphedema appointment. Therapist attempted to call patient. Therapist received no answer and unable to leave a message.

## 2020-12-16 ENCOUNTER — TELEPHONE (OUTPATIENT)
Dept: CARDIOLOGY | Age: 80
End: 2020-12-16

## 2020-12-17 ENCOUNTER — TELEPHONE (OUTPATIENT)
Dept: OCCUPATIONAL THERAPY | Age: 80
End: 2020-12-17

## 2020-12-17 ENCOUNTER — HOSPITAL ENCOUNTER (OUTPATIENT)
Dept: CARDIOLOGY | Age: 80
Discharge: HOME | End: 2020-12-17
Payer: MEDICARE

## 2020-12-17 LAB
LV EF: 60 %
LVEF MODALITY: NORMAL

## 2020-12-17 PROCEDURE — 93306 TTE W/DOPPLER COMPLETE: CPT

## 2020-12-17 NOTE — TELEPHONE ENCOUNTER
Patient son called [facility] and cancelled patient scheduled lymphedema appointment. No reason give. Patient son stated she will attend next scheduled appointment.

## 2020-12-22 ENCOUNTER — TELEPHONE (OUTPATIENT)
Dept: OCCUPATIONAL THERAPY | Age: 80
End: 2020-12-22

## 2020-12-22 NOTE — TELEPHONE ENCOUNTER
Therapist attempted to call patient son regarding rescheduling missed appointment. No one answered and therapist unable to leave message. Therapist will attempt at a later time.

## 2021-01-05 ENCOUNTER — TREATMENT (OUTPATIENT)
Dept: OCCUPATIONAL THERAPY | Age: 81
End: 2021-01-05
Payer: MEDICARE

## 2021-01-05 DIAGNOSIS — E11.649 UNCONTROLLED TYPE 2 DIABETES MELLITUS WITH HYPOGLYCEMIA WITHOUT COMA (HCC): Primary | Chronic | ICD-10-CM

## 2021-01-05 DIAGNOSIS — I89.0 LYMPHEDEMA: ICD-10-CM

## 2021-01-05 PROCEDURE — 97110 THERAPEUTIC EXERCISES: CPT | Performed by: OCCUPATIONAL THERAPIST

## 2021-01-05 PROCEDURE — 97530 THERAPEUTIC ACTIVITIES: CPT | Performed by: OCCUPATIONAL THERAPIST

## 2021-01-05 RX ORDER — LANCETS 30 GAUGE
1 EACH MISCELLANEOUS DAILY
Qty: 100 EACH | Refills: 3 | Status: SHIPPED
Start: 2021-01-05 | End: 2022-06-07

## 2021-01-05 RX ORDER — BLOOD-GLUCOSE METER
1 KIT MISCELLANEOUS DAILY
Qty: 1 KIT | Refills: 0 | Status: SHIPPED | OUTPATIENT
Start: 2021-01-05 | End: 2022-06-07

## 2021-01-05 RX ORDER — GLUCOSAMINE HCL/CHONDROITIN SU 500-400 MG
CAPSULE ORAL
Qty: 100 STRIP | Refills: 3 | Status: SHIPPED
Start: 2021-01-05 | End: 2022-06-07

## 2021-01-07 NOTE — PROGRESS NOTES
OCCUPATIONAL THERAPY PROGRESS NOTE  Phone: [phone]             Fax: [phone]     Date:  2021  Initial Evaluation Date: 2020     Patient Name:  Nacho Denise                    :  1940     Restrictions/Precautions:  fall risk, left wrist support splint  Diagnosis:  Lymphedema (I89.0)                                                        Insurance/Certification information:  Medicare Part A and B  Referring Physician:  Earlean Klinefelter, MD  Plan of care signed (Y/N):  No  Visit# / total visits: visit #3     Time In: 1300                 Time Out: 1400     Restrictions/Precautions:  [] No blood pressure/blood draw in: [] Left Upper Extremity [] Right Upper Extremity                        [x] Fall Risk       Intervention:   [x]Other:  cognition    Pain:  [] No    [x] Yes  Location: right axilla / chest  Pain Rating (0-10 pain scale): does not rate  Pain Description: stretch  Interruption of Treatment [] Yes  [x] No    Came to Clinic:  [] Bandaged    []Unbandaged    [] With Stocking     [] With Sleeve     [] Kinesiotaped    [] Gwendloyn Dayne    [] With Alternative Compression:    Skin Integrity:  [] Normal [] Dry  []Rough []Moist []Rash  Location of problem area/s:  [] Wound: Location/Description   [x] Fibrosis: across right chest at / above / below incision site  [] Keratosis: Location/Description  [] Papillomas: Location/Description  [] Other    Color:  [] Normal [] Mottled [] Flushed [] Other/Description  Location of problem area/s:  Color changes along incision site.   Healing well    Edema:  Increased edema along chest incision site continues Subjective:  Patient attended treatment session alone. Patient son remained in the car. Patient stated she did not perform exercises since last seen. Patient did not know if she has paperwork on exercises provided last treatment session. Therapist questioned son who was going to assist patient but he did not have any reason why she did not perform exercises or why he did not assist.      Objective:  [] Measurement [x]Manual Lymph Drainage  []Bandaging   []Kinesiotaping [x] Education    []Self Massage   []Self Bandaging [x] Exercise    []Wound Care  []Hygiene  [] Other      Assessment:  Knowledge of home program:   [] Good [] Fair  [] Poor  Patient is programming at home:  [] Yes  [] No  Family is assisting with programming: [] Yes  [] No  Home programming is consistent: [] Yes  [] No  Other:   Response to treatment:  good  Instructions for patient:   [x] Verbal [x] Written  Instructions addressed:    Patient Goal: to be able to do more for herself     STG: 3 weeks  1. Patient will demonstrate knowledge and understanding for lymphedema precautions, skin care and self management to decrease progression of lymphedema and risk of infection. Therapist continued patient education regarding lymphedema precautions and skin care / self management. Patient progressing toward goal.  2.  Patient will present with decreased limb volume in the involved extremity from mild to trace for improved functional mobility. Patient progressing toward goal.  3.  Patient will demonstrate compliance with compression therapy for independent management of lymphedema.        LT weeks  1. Patient will be independent with self management of lymphedema including understanding garment wear schedule, self compression bandaging, HEP and self care. 2.  Patient will be fitted for appropriate compression garments for long term management of lymphedema. 3.  Patient will present with decreased limb volume in the involved extremity from trace to none  for improved functional mobility. 4.  Patient will maximize right shoulder flexion/abduction to maximize independence with daily life tasks. Therapist continued patient education regarding range of motion / stretch exercises. Patient stated she did not perform exercises since last seen. Patient did not know where her exercise papers are at home. Therapist did question son following treatment and he did state he had exercises but did not answer why exercises were not performed. Therapist provided patient with a second copy of exercises for shoulder range of motion / stretch. Therapist reviewed each exercise with patient and patient able to perform each exercise with verbal cues. Patient does have a history of spinal fusion in the past and she does have trouble with twisting motions. Patient performs to tolerance. Therapist discussed with son following treatment importance to assist patient with exercises at home. Patient son verbalized understanding.   Patient progressing toward goal.         Plan: Continue to address:  []Bandaging  [x] Self Bandaging/Family Assist  [x]MLD  [x]Self Massage  [x]Exercise  [x]Education  []Obtain referral for garments  []Medical Hold  []Discharge to [facility]., OTR/L, CLT

## 2021-01-12 ENCOUNTER — TREATMENT (OUTPATIENT)
Dept: OCCUPATIONAL THERAPY | Age: 81
End: 2021-01-12
Payer: MEDICARE

## 2021-01-12 DIAGNOSIS — I89.0 LYMPHEDEMA: ICD-10-CM

## 2021-01-12 PROCEDURE — 97110 THERAPEUTIC EXERCISES: CPT | Performed by: OCCUPATIONAL THERAPIST

## 2021-01-12 PROCEDURE — 97140 MANUAL THERAPY 1/> REGIONS: CPT | Performed by: OCCUPATIONAL THERAPIST

## 2021-01-12 PROCEDURE — 97530 THERAPEUTIC ACTIVITIES: CPT | Performed by: OCCUPATIONAL THERAPIST

## 2021-01-12 NOTE — PROGRESS NOTES
[]Self Bandaging [x] Exercise    []Wound Care  []Hygiene  [x] Other:  Skin mobilization      Assessment:  Knowledge of home program:   [] Good [] Fair  [] Poor  Patient is programming at home:  [] Yes  [] No  Family is assisting with programming: [] Yes  [] No  Home programming is consistent: [] Yes  [] No  Other:   Response to treatment:  good  Instructions for patient:   [x] Verbal [x] Written  Instructions addressed:    Patient Goal: to be able to do more for herself     STG: 3 weeks  1. Patient will demonstrate knowledge and understanding for lymphedema precautions, skin care and self management to decrease progression of lymphedema and risk of infection. Therapist continued patient education regarding lymphedema precautions and skin care / self management. Therapist discussed with patient issues when she is biting her fingernails and finger tips. Patient stated she does this when she is nervous and is a lifelong habit. Therapist encouraged patient to maintain good nail and skin care. Patient able to verbalize understanding. Patient progressing toward goal.  2.  Patient will present with decreased limb volume in the involved extremity from mild to trace for improved functional mobility. Patient progressing toward goal.  3.  Patient will demonstrate compliance with compression therapy for independent management of lymphedema.        LT weeks  1. Patient will be independent with self management of lymphedema including understanding garment wear schedule, self compression bandaging, HEP and self care. 2.  Patient will be fitted for appropriate compression garments for long term management of lymphedema. 3.  Patient will present with decreased limb volume in the involved extremity from trace to none  for improved functional mobility. 4.  Patient will maximize right shoulder flexion/abduction to maximize independence with daily life tasks. Therapist continued patient education regarding range of motion / stretch exercises. Patient stated she did  perform exercises several times since last seen. Therapist reviewed each exercise with patient and patient able to perform each exercise with verbal cues five reps times one set each exercise. Patient provided increased rest breaks between exerchises. Patient exhibited left active shoulder flexion 0 - 90 degrees prior to performing exercises. Patient exhibited left active shoulder flexion 0 - 130 degrees following performing exercises. Therapist performed skin mobilization of upper right arm and axilla. Patient tolerated well. Therapist discussed with son following treatment importance to continue to assist patient with exercises at home. Patient son verbalized understanding.   Patient progressing toward goal.         Plan: Continue to address:  []Bandaging  [x] Self Bandaging/Family Assist  [x]MLD  [x]Self Massage  [x]Exercise  [x]Education  []Obtain referral for garments  []Medical Hold  []Discharge to [facility]., OTR/L, CLT

## 2021-01-14 ENCOUNTER — TREATMENT (OUTPATIENT)
Dept: OCCUPATIONAL THERAPY | Age: 81
End: 2021-01-14
Payer: MEDICARE

## 2021-01-14 DIAGNOSIS — I89.0 LYMPHEDEMA: ICD-10-CM

## 2021-01-14 PROCEDURE — 97110 THERAPEUTIC EXERCISES: CPT | Performed by: OCCUPATIONAL THERAPIST

## 2021-01-14 PROCEDURE — 97140 MANUAL THERAPY 1/> REGIONS: CPT | Performed by: OCCUPATIONAL THERAPIST

## 2021-01-14 PROCEDURE — 97530 THERAPEUTIC ACTIVITIES: CPT | Performed by: OCCUPATIONAL THERAPIST

## 2021-01-15 ENCOUNTER — HOSPITAL ENCOUNTER (OUTPATIENT)
Dept: GENERAL RADIOLOGY | Age: 81
Discharge: HOME | End: 2021-01-17
Payer: MEDICARE

## 2021-01-15 ENCOUNTER — OFFICE VISIT (OUTPATIENT)
Dept: BREAST CENTER | Age: 81
End: 2021-01-15
Payer: MEDICARE

## 2021-01-15 ENCOUNTER — TELEPHONE (OUTPATIENT)
Dept: BREAST CENTER | Age: 81
End: 2021-01-15

## 2021-01-15 VITALS
DIASTOLIC BLOOD PRESSURE: 76 MMHG | BODY MASS INDEX: 34.49 KG/M2 | SYSTOLIC BLOOD PRESSURE: 160 MMHG | TEMPERATURE: 97.3 F | WEIGHT: 202 LBS | RESPIRATION RATE: 15 BRPM | HEART RATE: 83 BPM | OXYGEN SATURATION: 98 % | HEIGHT: 64 IN

## 2021-01-15 DIAGNOSIS — Z12.31 ENCOUNTER FOR SCREENING MAMMOGRAM FOR MALIGNANT NEOPLASM OF BREAST: ICD-10-CM

## 2021-01-15 DIAGNOSIS — C50.511 MALIGNANT NEOPLASM OF LOWER-OUTER QUADRANT OF RIGHT BREAST OF FEMALE, ESTROGEN RECEPTOR POSITIVE (HCC): Primary | Chronic | ICD-10-CM

## 2021-01-15 DIAGNOSIS — M81.0 AGE-RELATED OSTEOPOROSIS WITHOUT CURRENT PATHOLOGICAL FRACTURE: ICD-10-CM

## 2021-01-15 DIAGNOSIS — Z17.0 MALIGNANT NEOPLASM OF LOWER-OUTER QUADRANT OF RIGHT BREAST OF FEMALE, ESTROGEN RECEPTOR POSITIVE (HCC): Primary | Chronic | ICD-10-CM

## 2021-01-15 PROCEDURE — 99024 POSTOP FOLLOW-UP VISIT: CPT | Performed by: SURGERY

## 2021-01-15 PROCEDURE — 77080 DXA BONE DENSITY AXIAL: CPT

## 2021-01-15 NOTE — TELEPHONE ENCOUNTER
Notified Rita Rogers, son, that patient has an appointment with medical oncology on January 21, 2021 at 11:30am. The appointment is scheduled with Dr. Kwame Conrad at the [facility] in [facility]. I provided Rita Ken Ross office number to switch the appointment to be in [place], [place]. Franco verbalized his understanding and will call to switch it.

## 2021-01-15 NOTE — PROGRESS NOTES
OCCUPATIONAL THERAPY PROGRESS NOTE  Phone: [phone]             Fax: [phone]     Date:  1/15/2021  Initial Evaluation Date: 2020     Patient Name:  Ariana Dodson                    :  1940     Restrictions/Precautions:  fall risk, left wrist support splint  Diagnosis:  Lymphedema (I89.0)                                                        Insurance/Certification information:  Medicare Part A and B  Referring Physician:  Riky Joyce MD  Plan of care signed (Y/N):  No  Visit# / total visits: visit #5     Time In: 0885                 Time Out: 1300     Restrictions/Precautions:  [] No blood pressure/blood draw in: [] Left Upper Extremity [] Right Upper Extremity                        [x] Fall Risk       Intervention:   [x]Other:  cognition    Pain:  [x] No    [] Yes  Location:   Pain Rating (0-10 pain scale):   Pain Description:   Interruption of Treatment [] Yes  [x] No    Came to Clinic:  [] Bandaged    []Unbandaged    [] With Stocking     [] With Sleeve     [] Kinesiotaped    [] Holly Diaz    [] With Alternative Compression:    Skin Integrity:  [] Normal [] Dry  []Rough []Moist []Rash  Location of problem area/s:  [] Wound: Location/Description   [x] Fibrosis: across right chest at / above / below incision site  [] Keratosis: Location/Description  [] Papillomas: Location/Description  [] Other    Color:  [] Normal [] Mottled [] Flushed [] Other/Description  Location of problem area/s:  Color changes along incision site. Healing well    Edema:  Increased edema along chest incision site continues    Subjective:  Patient attended treatment session alone. Patient son remained in the car.   Patient stated she performed exercises since last seen by this therapist.     Objective:  [] Measurement []Manual Lymph Drainage  []Bandaging   []Kinesiotaping [x] Education    []Self Massage   []Self Bandaging [x] Exercise    []Wound Care  []Hygiene  [x] Other:  Skin mobilization Assessment:  Knowledge of home program:   [] Good [] Fair  [] Poor  Patient is programming at home:  [] Yes  [] No  Family is assisting with programming: [] Yes  [] No  Home programming is consistent: [] Yes  [] No  Other:   Response to treatment:  good  Instructions for patient:   [x] Verbal [x] Written  Instructions addressed:    Patient Goal: to be able to do more for herself     STG: 3 weeks  1. Patient will demonstrate knowledge and understanding for lymphedema precautions, skin care and self management to decrease progression of lymphedema and risk of infection. Therapist continued patient education regarding lymphedema precautions and skin care / self management. Therapist continued to discuss problems with biting her fingernails and finger tips. Patient able to verbalize undersanding. Patient progressing toward goal.  2.  Patient will present with decreased limb volume in the involved extremity from mild to trace for improved functional mobility. Patient progressing toward goal.  3.  Patient will demonstrate compliance with compression therapy for independent management of lymphedema.        LT weeks  1. Patient will be independent with self management of lymphedema including understanding garment wear schedule, self compression bandaging, HEP and self care. 2.  Patient will be fitted for appropriate compression garments for long term management of lymphedema. 3.  Patient will present with decreased limb volume in the involved extremity from trace to none  for improved functional mobility. 4.  Patient will maximize right shoulder flexion/abduction to maximize independence with daily life tasks.

## 2021-01-18 ENCOUNTER — TELEPHONE (OUTPATIENT)
Dept: BREAST CENTER | Age: 81
End: 2021-01-18

## 2021-01-18 NOTE — TELEPHONE ENCOUNTER
Received Long Tail Oncotype Dx Results from November 2012 from Medical Records at the [facility]. Breast Cancer Recurrence Score: 18.    Results scanned to patient's chart. They do not have any other Oncotype Dx results.

## 2021-01-19 ENCOUNTER — TREATMENT (OUTPATIENT)
Dept: OCCUPATIONAL THERAPY | Age: 81
End: 2021-01-19
Payer: MEDICARE

## 2021-01-19 DIAGNOSIS — I89.0 LYMPHEDEMA: ICD-10-CM

## 2021-01-19 PROCEDURE — 97110 THERAPEUTIC EXERCISES: CPT | Performed by: OCCUPATIONAL THERAPIST

## 2021-01-19 PROCEDURE — 97530 THERAPEUTIC ACTIVITIES: CPT | Performed by: OCCUPATIONAL THERAPIST

## 2021-01-19 PROCEDURE — 97140 MANUAL THERAPY 1/> REGIONS: CPT | Performed by: OCCUPATIONAL THERAPIST

## 2021-01-19 NOTE — PROGRESS NOTES
OCCUPATIONAL THERAPY PROGRESS NOTE  Phone: [phone]             Fax: [phone]     Date:  2021  Initial Evaluation Date: 2020     Patient Name:  Shira Garner                    :  1940     Restrictions/Precautions:  fall risk, left wrist support splint  Diagnosis:  Lymphedema (I89.0)                                                        Insurance/Certification information:  Medicare Part A and B  Referring Physician:  Dominique Rucker MD  Plan of care signed (Y/N):  No  Visit# / total visits: visit #5     Time In: 1200                 Time Out: 1300     Restrictions/Precautions:  [] No blood pressure/blood draw in: [] Left Upper Extremity [] Right Upper Extremity                        [x] Fall Risk       Intervention:   [x]Other:  cognition    Pain:  [x] No    [] Yes  Location:   Pain Rating (0-10 pain scale):   Pain Description:   Interruption of Treatment [] Yes  [x] No    Came to Clinic:  [] Bandaged    []Unbandaged    [] With Stocking     [] With Sleeve     [] Kinesiotaped    [] Doroteo Trimble    [] With Alternative Compression:    Skin Integrity:  [] Normal [] Dry  []Rough []Moist []Rash  Location of problem area/s:  [] Wound: Location/Description   [x] Fibrosis: across right chest at / above / below incision site  [] Keratosis: Location/Description  [] Papillomas: Location/Description  [] Other    Color:  [] Normal [] Mottled [] Flushed [] Other/Description  Location of problem area/s:  Color changes along incision site. Healing well    Edema:  Increased edema along chest incision site continues    Subjective:  Patient attended treatment session alone. Patient son remained in the car.   Patient stated she performed exercises since last seen by this therapist.     Objective:  [] Measurement []Manual Lymph Drainage  []Bandaging   []Kinesiotaping [x] Education    []Self Massage   []Self Bandaging [x] Exercise    []Wound Care  []Hygiene  [x] Other:  Skin mobilization Assessment:  Knowledge of home program:   [] Good [] Fair  [] Poor  Patient is programming at home:  [] Yes  [] No  Family is assisting with programming: [] Yes  [] No  Home programming is consistent: [] Yes  [] No  Other:   Response to treatment:  good  Instructions for patient:   [x] Verbal [x] Written  Instructions addressed:    Patient Goal: to be able to do more for herself     STG: 3 weeks  1. Patient will demonstrate knowledge and understanding for lymphedema precautions, skin care and self management to decrease progression of lymphedema and risk of infection. Therapist continued patient education regarding lymphedema precautions and skin care / self management. Patient progressing toward goal.  2.  Patient will present with decreased limb volume in the involved extremity from mild to trace for improved functional mobility. Patient progressing toward goal.  3.  Patient will demonstrate compliance with compression therapy for independent management of lymphedema.        LT weeks  1. Patient will be independent with self management of lymphedema including understanding garment wear schedule, self compression bandaging, HEP and self care. 2.  Patient will be fitted for appropriate compression garments for long term management of lymphedema. 3.  Patient will present with decreased limb volume in the involved extremity from trace to none  for improved functional mobility. 4.  Patient will maximize right shoulder flexion/abduction to maximize independence with daily life tasks. Therapist continued patient education regarding range of motion / stretch exercises. Patient stated she did  perform exercises since last seen. Therapist reviewed each exercise with patient and patient able to perform each exercise with verbal cues eight reps times one set each exercise. Patient provided increased rest breaks between exercises. Patient exhibited left active shoulder flexion 0 - 130 degrees prior to performing exercises. Patient right active shoulder flexion 0 - 110 degrees prior to treatment and 0 - 130 following treatment. Therapist performed skin mobilization of upper right arm and axilla. Patient tolerated well.   Patient progressing toward goal.         Plan: Continue to address:  []Bandaging  [x] Self Bandaging/Family Assist  [x]MLD  [x]Self Massage  [x]Exercise  [x]Education  []Obtain referral for garments  []Medical Hold  []Discharge to [facility]., OTR/L, CLT

## 2021-01-21 ENCOUNTER — TELEPHONE (OUTPATIENT)
Dept: OCCUPATIONAL THERAPY | Age: 81
End: 2021-01-21

## 2021-01-21 NOTE — TELEPHONE ENCOUNTER
Patient was a no show no call for scheduled appointment this p.m. Therapist called patient and no answer. Therapist asked patient to return call to [address] [place] [address] at [phone] to confirm appointments next week for Tuesday at 1500 and Thursday at 1200.

## 2021-01-26 ENCOUNTER — TREATMENT (OUTPATIENT)
Dept: OCCUPATIONAL THERAPY | Age: 81
End: 2021-01-26
Payer: MEDICARE

## 2021-01-26 DIAGNOSIS — I89.0 LYMPHEDEMA: ICD-10-CM

## 2021-01-26 PROCEDURE — 97110 THERAPEUTIC EXERCISES: CPT | Performed by: OCCUPATIONAL THERAPIST

## 2021-01-26 PROCEDURE — 97530 THERAPEUTIC ACTIVITIES: CPT | Performed by: OCCUPATIONAL THERAPIST

## 2021-01-27 NOTE — PROGRESS NOTES
OCCUPATIONAL THERAPY PROGRESS NOTE  Phone: [phone]             Fax: [phone]     Date:  2021  Initial Evaluation Date: 2020     Patient Name:  Yo Vargas                    :  1940     Restrictions/Precautions:  fall risk, left wrist support splint  Diagnosis:  Lymphedema (I89.0)                                                        Insurance/Certification information:  Medicare Part A and B  Referring Physician:  Dean Waldron MD  Plan of care signed (Y/N):  No  Visit# / total visits: visit #5     Time In: 1510                 Time Out: 1600     Restrictions/Precautions:  [] No blood pressure/blood draw in: [] Left Upper Extremity [] Right Upper Extremity                        [x] Fall Risk       Intervention:   [x]Other:  cognition    Pain:  [x] No    [] Yes  Location:   Pain Rating (0-10 pain scale):   Pain Description:   Interruption of Treatment [] Yes  [x] No    Came to Clinic:  [] Bandaged    []Unbandaged    [] With Stocking     [] With Sleeve     [] Kinesiotaped    [] Karlyne Soha    [] With Alternative Compression:    Skin Integrity:  [] Normal [] Dry  []Rough []Moist []Rash  Location of problem area/s:  [] Wound: Location/Description   [x] Fibrosis: across right chest at / above / below incision site  [] Keratosis: Location/Description  [] Papillomas: Location/Description  [] Other    Color:  [] Normal [] Mottled [] Flushed [] Other/Description  Location of problem area/s:  Color changes along incision site. Healing well    Edema:  Increased edema along chest incision site continues    Subjective:  Patient attended treatment session alone. Patient son remained in the car. Patient stated she continues to perform exercises at home independently with hand out.      Objective:  [] Measurement []Manual Lymph Drainage  []Bandaging   []Kinesiotaping [x] Education    []Self Massage   []Self Bandaging [x] Exercise    []Wound Care  []Hygiene  [] Other: Assessment:  Knowledge of home program:   [] Good [] Fair  [] Poor  Patient is programming at home:  [] Yes  [] No  Family is assisting with programming: [] Yes  [] No  Home programming is consistent: [] Yes  [] No  Other:   Response to treatment:  good  Instructions for patient:   [x] Verbal [x] Written  Instructions addressed:    Patient Goal: to be able to do more for herself     STG: 3 weeks  1. Patient will demonstrate knowledge and understanding for lymphedema precautions, skin care and self management to decrease progression of lymphedema and risk of infection. Therapist continued patient education regarding lymphedema precautions and skin care / self management. Patient continues to bite nails and finger tips when nervous. Patient understands infection issues with this behavior. Patient progressing toward goal.  2.  Patient will present with decreased limb volume in the involved extremity from mild to trace for improved functional mobility. Patient progressing toward goal.  3.  Patient will demonstrate compliance with compression therapy for independent management of lymphedema.        LT weeks  1. Patient will be independent with self management of lymphedema including understanding garment wear schedule, self compression bandaging, HEP and self care. 2.  Patient will be fitted for appropriate compression garments for long term management of lymphedema. Therapist continued patient education regarding being fitted by a certified fitter. Patient stated she gave her size to her son and he went to [address] and picked up garments. She has not looked at what he received but will check. Therapist discussed need to be seen by fitter to determine brand / type of garment needed to cover area of fluid. 3.  Patient will present with decreased limb volume in the involved extremity from trace to none  for improved functional mobility. 4.  Patient will maximize right shoulder flexion/abduction to maximize independence with daily life tasks. Therapist continued patient education regarding range of motion / stretch exercises. Patient stated she did  perform exercises since last seen. Therapist reviewed each exercise with patient and patient able to perform each exercise with verbal cues ten reps times one set each exercise. Patient provided increased rest breaks between exercises. Patient exhibited bilateral active shoulder flexion/abduction 0 - 135 degrees. Patient tolerated exercises well.   Patient progressing toward goal.         Plan: Continue to address:  []Bandaging  [x] Self Bandaging/Family Assist  [x]MLD  [x]Self Massage  [x]Exercise  [x]Education  []Obtain referral for garments  []Medical Hold  []Discharge to [facility] OTR/L, CLT

## 2021-01-28 ENCOUNTER — TELEPHONE (OUTPATIENT)
Dept: FAMILY MEDICINE CLINIC | Age: 81
End: 2021-01-28

## 2021-01-28 ENCOUNTER — TREATMENT (OUTPATIENT)
Dept: OCCUPATIONAL THERAPY | Age: 81
End: 2021-01-28
Payer: MEDICARE

## 2021-01-28 DIAGNOSIS — I89.0 LYMPHEDEMA: ICD-10-CM

## 2021-01-28 PROCEDURE — 97530 THERAPEUTIC ACTIVITIES: CPT | Performed by: OCCUPATIONAL THERAPIST

## 2021-01-28 PROCEDURE — 97110 THERAPEUTIC EXERCISES: CPT | Performed by: OCCUPATIONAL THERAPIST

## 2021-01-28 NOTE — TELEPHONE ENCOUNTER
[place] called in from [employer] she said pt has a rash under her left arm pit that resembles a yeast infection, She wanted doctor to be notified, she said pts son Kelsie Quevedo is concerned about it.

## 2021-01-29 RX ORDER — NYSTATIN 100000 [USP'U]/G
POWDER TOPICAL
Qty: 60 G | Refills: 3 | Status: SHIPPED | OUTPATIENT
Start: 2021-01-29

## 2021-02-09 ENCOUNTER — TELEPHONE (OUTPATIENT)
Dept: FAMILY MEDICINE CLINIC | Age: 81
End: 2021-02-09

## 2021-02-09 NOTE — TELEPHONE ENCOUNTER
Elizabeth from [address] called in and she went to visit the pt and said she hasn't been taking her pills , she said she didn't take any pills for January and she hasn't been checking her blood sugars. She wanted to let doctor know, she said they cannot make the pt take them, they can only lay them out for her. She discussed this with her son.

## 2021-02-10 NOTE — TELEPHONE ENCOUNTER
I called and spoke to Kelsie Quevedo, her son and scheduled a check up for her on 2/16/2021 with Dr Mary Hernández.

## 2021-03-02 ENCOUNTER — TELEPHONE (OUTPATIENT)
Dept: FAMILY MEDICINE CLINIC | Age: 81
End: 2021-03-02

## 2021-03-02 NOTE — TELEPHONE ENCOUNTER
Ritu Amaya' Nurse Supervisor called to inform us that patient is having worsening of hearing, she is not taking her medications, even though they are being put out for her by skilled nuses and family. She seems to be experiencing confusion also.

## 2021-03-10 ENCOUNTER — TELEPHONE (OUTPATIENT)
Dept: FAMILY MEDICINE CLINIC | Age: 81
End: 2021-03-10

## 2021-03-10 NOTE — TELEPHONE ENCOUNTER
Left message on home phone for patient's son to return call to the office. Attempted to reach son on mobile phone-voicemail is full and cannot accept messages. Also left message for Mary Espinoza from [employer] with instructions to go to ER regarding the inflammation in her arm.   Electronically signed by Venkatesh Espinosa on 3/10/2021 at 3:33 PM

## 2021-03-10 NOTE — TELEPHONE ENCOUNTER
[facility] from [facility] called in over patient. Pt's right arm is red and warm from her arm pit to her elbow it is the same side she just recently had the mastectomy on. She said she has nurses come in 3 times a week and the patient is refusing to take a bath and she isnt taking her pills on a regular basis, her pillls are in pill packs but she refusing them and the nurses cannot make her take them. Pt is  also also a diabetic and the sore on her right foot is getting bigger and her toenail is bruised. Her son Emmanuel Baltazar is very concerned and they aren't sure what they should do next if the patient keeps refusing treatment and not taking her pills.

## 2021-03-11 ENCOUNTER — APPOINTMENT (OUTPATIENT)
Dept: GENERAL RADIOLOGY | Age: 81
End: 2021-03-11
Payer: MEDICARE

## 2021-03-11 ENCOUNTER — APPOINTMENT (OUTPATIENT)
Dept: ULTRASOUND IMAGING | Age: 81
End: 2021-03-11
Payer: MEDICARE

## 2021-03-11 ENCOUNTER — HOSPITAL ENCOUNTER (EMERGENCY)
Age: 81
Discharge: HOME | End: 2021-03-11
Payer: MEDICARE

## 2021-03-11 VITALS
TEMPERATURE: 97.1 F | OXYGEN SATURATION: 96 % | HEART RATE: 83 BPM | SYSTOLIC BLOOD PRESSURE: 148 MMHG | BODY MASS INDEX: 34.67 KG/M2 | DIASTOLIC BLOOD PRESSURE: 85 MMHG | RESPIRATION RATE: 18 BRPM | WEIGHT: 202 LBS

## 2021-03-11 DIAGNOSIS — M79.601 RIGHT ARM PAIN: Primary | ICD-10-CM

## 2021-03-11 PROCEDURE — 71046 X-RAY EXAM CHEST 2 VIEWS: CPT

## 2021-03-11 PROCEDURE — 93971 EXTREMITY STUDY: CPT

## 2021-03-11 PROCEDURE — 99282 EMERGENCY DEPT VISIT SF MDM: CPT

## 2021-03-11 PROCEDURE — 93971 EXTREMITY STUDY: CPT | Performed by: RADIOLOGY

## 2021-03-11 NOTE — ED PROVIDER NOTES
[facility]  Department of Emergency Medicine   ED  Encounter Note  Admit Date/RoomTime: 3/11/2021 10:17 AM  ED Room: [place] [address]    NAME: Patsy Bowers  : 1940  MRN: [id number]     Chief Complaint:  Arm Pain (right upper arm/under arm swellng x1 year, caregiver concerened for blood clot)    History of Present Illness        Patsy Bowers is a 79 y.o. old female with PMH of recurrent right breast cancer. She had previous radiation with multiple comorbidities and underwent right mastectomy with SLNBx by Dr. Maribel Holm on 11/3/2020. Today she presents to the emergency department by private vehicle, for non-traumatic persistent of Right upper extremity pain, swelling and erythema, which originally began 1 year ago but has worsened over past several week(s) prior to arrival. She had been treated for a chest rash that extended into her left armpit in January and was suspected of having a yeast infection. Since onset the symptoms are persistent and are mild-moderate in severity. No shortness of breath, chest pain, fever, chills or cough. Associated Signs & Symptoms:     []  Fever     []  Cough     []  Dyspnea     []  Hemoptysis     []  Chest Pain     ROS   Pertinent positives and negatives are stated within HPI, all other systems reviewed and are negative. Past Medical History:  has a past medical history of Asthma, Blood transfusion, Breast cancer ([address] COPD (chronic obstructive pulmonary disease) ([address] Diabetes mellitus ([address] Diabetic peripheral neuropathy ([address] DM type 2 (diabetes mellitus, type 2) ([address] Hypertension, Hypothyroidism, Irritable bowel syndrome, Mixed hyperlipidemia, Obesity (BMI 30-39.9), Osteoarthritis, RA (rheumatoid arthritis) ([address] and RBBB. Surgical History:  has a past surgical history that includes Thyroid surgery; Cholecystectomy; Hysterectomy;  Carpal tunnel release; lumbar fusion; other surgical history; Breast lumpectomy ([id number]); joint replacement (Bilateral); US BREAST BIOPSY W LOC DEVICE 1ST LESION RIGHT (7/31/2020); Cardiac catheterization (10/09/2020); and Mastectomy, modified radical (Right, 11/3/2020). Social History:  reports that she has never smoked. She has never used smokeless tobacco. She reports that she does not drink alcohol or use drugs. Family History: family history includes Cancer in her brother and father; Cancer (age of onset: 36) in her mother; Diabetes in her son; High Blood Pressure in her son; High Cholesterol in her son; Hypertension in her son; Neuropathy in her son. Allergies: Patient has no known allergies. Physical Exam   Oxygen Saturation Interpretation: Normal.        ED Triage Vitals   BP Temp Temp Source Pulse Resp SpO2 Height Weight   03/11/21 1016 03/11/21 1005 03/11/21 1005 03/11/21 1005 03/11/21 1005 03/11/21 1005 -- 03/11/21 1016   (!) 148/85 97.1 °F (36.2 °C) Infrared 83 15 94 %  202 lb (91.6 kg)         Constitutional:  Alert, development consistent with age. HEENT:  NC/NT. Airway patent. Neck:  Normal ROM. Supple. Respiratory:  Clear to auscultation and breath sounds equal.  CV:  Regular rate and rhythm, normal heart sounds, without pathological murmurs, ectopy, gallops, or rubs. GI: Abdomen Soft, nontender, good bowel sounds. No firm or pulsatile mass. Upper Ext.:  Right: upper arm. Tenderness: Mild. Swelling: Mild. Deformity: No.             ROM: diminished range with pain. Edema:  non-pitting Right upper extremity(s). Skin:  normal exam; no wounds, erythema. .       Distal Function:              Motor deficit: none. Sensory deficit: none. Pulse deficit: none. Capillary refill: normal.  Integument:  Normal turgor. Warm, dry, without visible rash, unless noted elsewhere. Neurological:  Oriented. Motor functions intact.     Lab / Imaging Results   (All laboratory and radiology results have been personally reviewed by myself)  Labs:  No results found for this visit on 03/11/21. Imaging: All Radiology results interpreted by Radiologist unless otherwise noted. XR CHEST (2 VW)   Final Result   No acute process. US DUP UPPER EXTREMITY RIGHT VENOUS   Final Result   Within the visualized vessels there is no evidence for deep venous   thrombosis                   ED Course / Medical Decision Making   Medications - No data to display     Consult(s):   None    Procedure(s):   none    MDM:   Imaging was obtained based on low suspicion for possible DVT as per history/physical findings. Plan of Care/Counseling:  I reviewed today's visit with the patient in addition to providing specific details for the plan of care and counseling regarding the diagnosis and prognosis. Questions are answered at this time and are agreeable with the plan. Assessment      1. Right arm pain      Plan   Discharge home. Patient condition is good    New Medications     Discharge Medication List as of 3/11/2021  1:19 PM        Electronically signed by MELINA Pleitez   DD: 3/11/21  **This report was transcribed using voice recognition software. Every effort was made to ensure accuracy; however, inadvertent computerized transcription errors may be present.   END OF ED PROVIDER NOTE       Carlyle Peraza  03/13/21 3119

## 2021-03-11 NOTE — TELEPHONE ENCOUNTER
Lizzeth Neff from [facility] got a hold of Fanny Cheeks and they are taking pt to ED for blood clots in her arms.

## 2021-03-16 ENCOUNTER — OFFICE VISIT (OUTPATIENT)
Dept: FAMILY MEDICINE CLINIC | Age: 81
End: 2021-03-16
Payer: MEDICARE

## 2021-03-16 VITALS
SYSTOLIC BLOOD PRESSURE: 130 MMHG | HEIGHT: 64 IN | DIASTOLIC BLOOD PRESSURE: 88 MMHG | HEART RATE: 78 BPM | WEIGHT: 203 LBS | RESPIRATION RATE: 16 BRPM | OXYGEN SATURATION: 97 % | TEMPERATURE: 97.6 F | BODY MASS INDEX: 34.66 KG/M2

## 2021-03-16 DIAGNOSIS — E11.40 TYPE 2 DIABETES MELLITUS WITH DIABETIC NEUROPATHY, WITHOUT LONG-TERM CURRENT USE OF INSULIN (HCC): ICD-10-CM

## 2021-03-16 DIAGNOSIS — Z11.4 ENCOUNTER FOR SCREENING FOR HIV: ICD-10-CM

## 2021-03-16 DIAGNOSIS — R41.3 MEMORY IMPAIRMENT: ICD-10-CM

## 2021-03-16 DIAGNOSIS — M25.473 ANKLE SWELLING, UNSPECIFIED LATERALITY: ICD-10-CM

## 2021-03-16 DIAGNOSIS — H61.23 IMPACTED CERUMEN OF BOTH EARS: ICD-10-CM

## 2021-03-16 DIAGNOSIS — E03.9 ACQUIRED HYPOTHYROIDISM: ICD-10-CM

## 2021-03-16 DIAGNOSIS — Z11.59 ENCOUNTER FOR HEPATITIS C SCREENING TEST FOR LOW RISK PATIENT: ICD-10-CM

## 2021-03-16 DIAGNOSIS — H91.93 BILATERAL HEARING LOSS, UNSPECIFIED HEARING LOSS TYPE: ICD-10-CM

## 2021-03-16 DIAGNOSIS — M19.90 ARTHRITIS: ICD-10-CM

## 2021-03-16 DIAGNOSIS — D22.9 ATYPICAL NEVI: ICD-10-CM

## 2021-03-16 DIAGNOSIS — C50.919 RECURRENT MALIGNANT NEOPLASM OF BREAST, UNSPECIFIED LATERALITY (HCC): ICD-10-CM

## 2021-03-16 DIAGNOSIS — Z00.00 INITIAL MEDICARE ANNUAL WELLNESS VISIT: Primary | ICD-10-CM

## 2021-03-16 LAB
ALBUMIN SERPL-MCNC: 4.1 G/DL (ref 3.5–5.2)
ALP BLD-CCNC: 73 U/L (ref 35–104)
ALT SERPL-CCNC: 6 U/L (ref 0–32)
ANION GAP SERPL CALCULATED.3IONS-SCNC: 8 MMOL/L (ref 7–16)
AST SERPL-CCNC: 16 U/L (ref 0–31)
BILIRUB SERPL-MCNC: 0.4 MG/DL (ref 0–1.2)
BUN BLDV-MCNC: 19 MG/DL (ref 8–23)
CALCIUM SERPL-MCNC: 9.6 MG/DL (ref 8.6–10.2)
CHLORIDE BLD-SCNC: 105 MMOL/L (ref 98–107)
CHOLESTEROL, TOTAL: 225 MG/DL (ref 0–199)
CO2: 30 MMOL/L (ref 22–29)
CREAT SERPL-MCNC: 1.2 MG/DL (ref 0.5–1)
FOLATE: 12.8 NG/ML (ref 4.8–24.2)
GFR AFRICAN AMERICAN: 52
GFR NON-AFRICAN AMERICAN: 43 ML/MIN/1.73
GLUCOSE BLD-MCNC: 107 MG/DL (ref 74–99)
HBA1C MFR BLD: 6.4 %
HCT VFR BLD CALC: 42.7 % (ref 34–48)
HDLC SERPL-MCNC: 39 MG/DL
HEMOGLOBIN: 13.8 G/DL (ref 11.5–15.5)
LDL CHOLESTEROL CALCULATED: 151 MG/DL (ref 0–99)
MCH RBC QN AUTO: 29.4 PG (ref 26–35)
MCHC RBC AUTO-ENTMCNC: 32.3 % (ref 32–34.5)
MCV RBC AUTO: 90.9 FL (ref 80–99.9)
PDW BLD-RTO: 15.6 FL (ref 11.5–15)
PLATELET # BLD: 185 E9/L (ref 130–450)
PMV BLD AUTO: 11.6 FL (ref 7–12)
POTASSIUM SERPL-SCNC: 4.2 MMOL/L (ref 3.5–5)
RBC # BLD: 4.7 E12/L (ref 3.5–5.5)
SODIUM BLD-SCNC: 143 MMOL/L (ref 132–146)
TOTAL PROTEIN: 7.4 G/DL (ref 6.4–8.3)
TRIGL SERPL-MCNC: 176 MG/DL (ref 0–149)
TSH SERPL DL<=0.05 MIU/L-ACNC: 37.67 UIU/ML (ref 0.27–4.2)
VITAMIN B-12: 281 PG/ML (ref 211–946)
VLDLC SERPL CALC-MCNC: 35 MG/DL
WBC # BLD: 8.5 E9/L (ref 4.5–11.5)

## 2021-03-16 PROCEDURE — G0438 PPPS, INITIAL VISIT: HCPCS | Performed by: FAMILY MEDICINE

## 2021-03-16 PROCEDURE — G8484 FLU IMMUNIZE NO ADMIN: HCPCS | Performed by: FAMILY MEDICINE

## 2021-03-16 PROCEDURE — 83036 HEMOGLOBIN GLYCOSYLATED A1C: CPT | Performed by: FAMILY MEDICINE

## 2021-03-16 PROCEDURE — 4040F PNEUMOC VAC/ADMIN/RCVD: CPT | Performed by: FAMILY MEDICINE

## 2021-03-16 PROCEDURE — 1123F ACP DISCUSS/DSCN MKR DOCD: CPT | Performed by: FAMILY MEDICINE

## 2021-03-16 PROCEDURE — 99213 OFFICE O/P EST LOW 20 MIN: CPT | Performed by: FAMILY MEDICINE

## 2021-03-16 ASSESSMENT — PATIENT HEALTH QUESTIONNAIRE - PHQ9
1. LITTLE INTEREST OR PLEASURE IN DOING THINGS: 1
SUM OF ALL RESPONSES TO PHQ QUESTIONS 1-9: 1
SUM OF ALL RESPONSES TO PHQ9 QUESTIONS 1 & 2: 1

## 2021-03-16 ASSESSMENT — LIFESTYLE VARIABLES: HOW OFTEN DO YOU HAVE A DRINK CONTAINING ALCOHOL: 0

## 2021-03-16 NOTE — PROGRESS NOTES
Medicare Annual Wellness Visit  Name: Paul Pacheco Date: 3/31/2021   MRN: <[id number]> Sex: Female   Age: 79 y.o. Ethnicity: Non-/Non    : 1940 Race: Luis Mancuso is here for Medicare AWV, Other (Medication noncompliance concern from her son and her [facility] nurse, patient states she takes all medications), and Hearing Problem    Screenings for behavioral, psychosocial and functional/safety risks, and cognitive dysfunction are all negative except as indicated below. These results, as well as other patient data from the [address] [facility] [address] form, are documented in Flowsheets linked to this Encounter. No Known Allergies      Prior to Visit Medications    Medication Sig Taking? Authorizing Provider   glucose monitoring kit (FREESTYLE) monitoring kit 1 kit by Does not apply route daily Yes Talha Crow MD   blood glucose monitor strips Test 1 times a day & as needed for symptoms of irregular blood glucose. Yes Talha Crow MD   Lancets MISC 1 each by Does not apply route daily Yes Talha Crow MD   atorvastatin (LIPITOR) 20 MG tablet Take 2 tablets by mouth daily Yes Naina Zafar MD   levothyroxine (SYNTHROID) 175 MCG tablet Take one tablet Mon-Fri and 2 tabs Sat/Sun. Yes Samuel Fieldterlin, DO   mirtazapine (REMERON) 30 MG tablet Take 1 tablet by mouth nightly  Talha Crow MD   ramipril (ALTACE) 10 MG capsule Take 1 capsule by mouth daily Indications: High Blood Pressure Disorder  Talha Crow MD   metFORMIN (GLUCOPHAGE) 500 MG tablet Take 1 tablet by mouth 2 times daily (with meals)  Talha Crow MD   nystatin (MYCOSTATIN) 147649 UNIT/GM powder Apply 3 times daily.   Judith P Catterlin, DO   aspirin EC 81 MG EC tablet Take 1 tablet by mouth daily  Patient not taking: Reported on 3/16/2021  Naina Zafar MD   metoprolol succinate (TOPROL XL) 25 MG extended release tablet Take 1 tablet by mouth daily  Naina Zafar MD Past Medical History:   Diagnosis Date    Asthma     Blood transfusion     [facility]     COPD (chronic obstructive pulmonary disease) ([facility]     Diabetes mellitus ([facility]     Diabetic peripheral neuropathy ([facility]     DM type 2 (diabetes mellitus, type 2) ([facility]     Hypertension     Hypothyroidism     Irritable bowel syndrome     Mixed hyperlipidemia 7/21/2020    Obesity (BMI 30-39. 9)     Osteoarthritis     rheumatoid and osteo    RA (rheumatoid arthritis) ([facility]     RBBB        Past Surgical History:   Procedure Laterality Date    BREAST LUMPECTOMY  [id number]    RIGHT    CARDIAC CATHETERIZATION  10/09/2020    Dr. Jeremy Fernandez      partial    JOINT REPLACEMENT Bilateral     bilateral TKR    LUMBAR FUSION      L4L5    MASTECTOMY, MODIFIED RADICAL Right 11/3/2020    MODIFIED RADICAL RIGHT BREAST MASTECTOMY performed by Darshan Joyce MD at [place] [address] OTHER SURGICAL HISTORY      thumb joint replacement rt     THYROID SURGERY      US BREAST NEEDLE BIOPSY RIGHT  7/31/2020    US BREAST NEEDLE BIOPSY RIGHT 7/31/2020 [facility]         Family History   Problem Relation Age of Onset    Cancer Mother 36        breast    Cancer Father         bone marrow    Cancer Brother         prostate    Diabetes Son     Neuropathy Son     High Blood Pressure Son     High Cholesterol Son     Hypertension Son        Delilah (Including outside providers/suppliers regularly involved in providing care):   Patient Care Team:  Alonzo Castleman, MD as PCP - General (Family Medicine)  Alonzo Castleman, MD as PCP - [facility] Empaneled Provider    Wt Readings from Last 3 Encounters:   03/16/21 203 lb (92.1 kg)   03/11/21 202 lb (91.6 kg)   01/15/21 202 lb (91.6 kg)     Vitals:    03/16/21 1459   BP: 130/88   Pulse: 78   Resp: 16   Temp: 97.6 °F (36.4 °C)   SpO2: 97%   Weight: 203 lb (92.1 kg)   Height: 5' 4\" (1.626 m)     Body mass index is 34.84 kg/m². Based upon direct observation of the patient, evaluation of cognition reveals memory impairment. Review of Systems   Constitutional: Negative for appetite change, fatigue and fever. Respiratory: Negative for cough, shortness of breath and wheezing. Cardiovascular: Negative for chest pain and palpitations. Gastrointestinal: Negative for abdominal pain, constipation, diarrhea, nausea and vomiting. Skin: Negative for color change or rash. MSK: arthralgia, uses walker to ambulate. Neurological: Negative for dizziness, seizures and syncope.      Physical Exam  Constitutional:       General: She is not in acute distress. Appearance: She is well-developed. She is not diaphoretic. Comments: ambulating with walker   HENT:      Head: Normocephalic and atraumatic. Ears: impacted ears b/l. Eyes:      Conjunctiva/sclera: Conjunctivae normal.      Pupils: Pupils are equal, round, and reactive to light. Neck:      Musculoskeletal: Normal range of motion and neck supple. Cardiovascular:      Rate and Rhythm: Normal rate and regular rhythm. Pulmonary:      Effort: Pulmonary effort is normal.      Breath sounds: Normal breath sounds. Abdominal:      General: Bowel sounds are normal. There is no distension. Palpations: Abdomen is soft. Tenderness: There is no abdominal tenderness. Hernia: No hernia is present. Skin:     General: Skin is warm and dry. Atypical nevi lle. MSK:  Neg homans b/l, pitting ankle swelling b/l  Neurological:      Mental Status: She is alert and oriented to person, place, but not time. Patient's complete Health Risk Assessment and screening values have been reviewed and are found in Flowsheets. The following problems were reviewed today and where indicated follow up appointments were made and/or referrals ordered. Positive Risk Factor Screenings with Interventions:      Cognitive:   Words recalled: 0 Words Recalled  Clock Drawing Test (CDT) Score: (!) Abnormal  Total Score Interpretation: Positive Mini-Cog  Cognitive Impairment Interventions:  · Son with patient. Suspect dementia. MRI Brain ordered with updated labs. F/u in 1-2 weeks to review. General Health and ACP:  General  In general, how would you say your health is?: Fair  In the past 7 days, have you experienced any of the following? New or Increased Pain, New or Increased Fatigue, Loneliness, Social Isolation, Stress or Anger?: None of These  Do you get the social and emotional support that you need?: (!) No  Do you have a Living Will?: Yes  Advance Directives     Power of [facility] Will ACP-Advance Directive ACP-Power of     Not on File Not on File Not on File Not on File      General Health Risk Interventions:  · advised to bring copy of living will. Health Habits/Nutrition:  Health Habits/Nutrition  Do you exercise for at least 20 minutes 2-3 times per week?: (!) No  Have you lost any weight without trying in the past 3 months?: No  Do you eat only one meal per day?: No  Have you seen the dentist within the past year?: (!) No  Body mass index: (!) 34.84  Health Habits/Nutrition Interventions:  · patient advised to see dentist atleast yearly. As well, patient advised to exercise with low intensity equipment, such as a stationary bike as tolerated. Hearing/Vision:  No exam data present  Hearing/Vision  Do you or your family notice any trouble with your hearing that hasn't been managed with hearing aids?: No  Do you have difficulty driving, watching TV, or doing any of your daily activities because of your eyesight?: No  Have you had an eye exam within the past year?: (!) No  Hearing/Vision Interventions:  · patient advised to obtain atleast yearly eye exams. ADL:  ADLs  In the past 7 days, did you need help from others to perform any of the following everyday activities?  Eating, dressing, grooming, bathing, toileting, or walking/balance?: (!) Walking/Balance  In the past 7 days, did you need help from others to take care of any of the following? Laundry, housekeeping, banking/finances, shopping, telephone use, food preparation, transportation, or taking medications?: [employer], Housekeeping, Food Preparation, Transportation, Taking Medications  ADL Interventions:  · MRI brain pending. Also, patient ambulates with walker. Son helps patient with ADL and patient also has home health care. Personalized Preventive Plan   Current Health Maintenance Status  Immunization History   Administered Date(s) Administered    Influenza Vaccine, unspecified formulation 10/07/2014, 10/07/2015    Influenza Virus Vaccine 10/07/2014, 10/07/2015    Influenza, High Dose (Fluzone 65 yrs and older) 10/24/2017        Health Maintenance   Topic Date Due    COVID-19 Vaccine (1) Never done    DTaP/Tdap/Td vaccine (1 - Tdap) Never done    Shingles Vaccine (1 of 2) Never done    Pneumococcal 65+ years Vaccine (1 of 1 - PPSV23) Never done   [employer] Visit (AWV)  Never done    Flu vaccine (1) 09/01/2020    Lipid screen  03/16/2022    TSH testing  03/16/2022    Potassium monitoring  03/16/2022    Creatinine monitoring  03/16/2022    DEXA (modify frequency per FRAX score)  Completed    Hepatitis C screen  Completed    Hepatitis A vaccine  Aged Out    Hib vaccine  Aged Out    Meningococcal (ACWY) vaccine  Aged Out     Recommendations for [employer] Due: see orders and patient instructions/AVS.  . Recommended screening schedule for the next 5-10 years is provided to the patient in written form: see Patient Stephanie Macias was seen today for medicare awv, other and hearing problem. Diagnoses and all orders for this visit:    Initial Medicare annual wellness visit    Type 2 diabetes mellitus with diabetic neuropathy, without long-term current use of insulin ([facility]  -     POCT glycosylated hemoglobin (Hb A1C)  -     Lipid Panel;  Future -     Comprehensive Metabolic Panel; Future  -     CBC; Future    Acquired hypothyroidism  -     TSH without Reflex; Future    Recurrent malignant neoplasm of breast, unspecified laterality ([facility]    Encounter for screening for HIV  -     HIV Screen; Future    Encounter for hepatitis C screening test for low risk patient  -     Hepatitis C Antibody; Future    Memory impairment  -     MRI BRAIN W WO CONTRAST; Future  -     Vitamin B12; Future  -     Folate;  Future    Bilateral hearing loss, unspecified hearing loss type  -     External Referral To Audiology    Atypical nevi  -     Margie Mathew MD, Dermatology, [place]    Ankle swelling, unspecified laterality    Arthritis    Impacted cerumen of both ears  -     carbamide peroxide (DEBROX) 6.5 % otic solution; Place 5 drops in ear(s) 2 times daily for 4 days

## 2021-03-17 LAB
HEPATITIS C ANTIBODY INTERPRETATION: NORMAL
HIV-1 AND HIV-2 ANTIBODIES: NORMAL

## 2021-03-26 DIAGNOSIS — I10 ESSENTIAL HYPERTENSION: ICD-10-CM

## 2021-03-26 RX ORDER — RAMIPRIL 10 MG/1
10 CAPSULE ORAL DAILY
Qty: 90 CAPSULE | Refills: 1 | Status: SHIPPED
Start: 2021-03-26 | End: 2021-06-14 | Stop reason: SDUPTHER

## 2021-03-26 RX ORDER — MIRTAZAPINE 30 MG/1
30 TABLET, FILM COATED ORAL NIGHTLY
Qty: 90 TABLET | Refills: 1 | Status: SHIPPED
Start: 2021-03-26 | End: 2021-06-21 | Stop reason: SDUPTHER

## 2021-04-06 ENCOUNTER — OFFICE VISIT (OUTPATIENT)
Dept: FAMILY MEDICINE CLINIC | Age: 81
End: 2021-04-06
Payer: MEDICARE

## 2021-04-06 VITALS
HEIGHT: 64 IN | WEIGHT: 200 LBS | BODY MASS INDEX: 34.15 KG/M2 | SYSTOLIC BLOOD PRESSURE: 126 MMHG | HEART RATE: 85 BPM | TEMPERATURE: 97.9 F | RESPIRATION RATE: 18 BRPM | OXYGEN SATURATION: 97 % | DIASTOLIC BLOOD PRESSURE: 84 MMHG

## 2021-04-06 DIAGNOSIS — E03.9 ACQUIRED HYPOTHYROIDISM: Chronic | ICD-10-CM

## 2021-04-06 DIAGNOSIS — H61.23 IMPACTED CERUMEN, BILATERAL: Primary | ICD-10-CM

## 2021-04-06 DIAGNOSIS — E66.01 MORBIDLY OBESE (HCC): ICD-10-CM

## 2021-04-06 PROCEDURE — 1090F PRES/ABSN URINE INCON ASSESS: CPT | Performed by: FAMILY MEDICINE

## 2021-04-06 PROCEDURE — 1123F ACP DISCUSS/DSCN MKR DOCD: CPT | Performed by: FAMILY MEDICINE

## 2021-04-06 PROCEDURE — G8399 PT W/DXA RESULTS DOCUMENT: HCPCS | Performed by: FAMILY MEDICINE

## 2021-04-06 PROCEDURE — 1036F TOBACCO NON-USER: CPT | Performed by: FAMILY MEDICINE

## 2021-04-06 PROCEDURE — 99214 OFFICE O/P EST MOD 30 MIN: CPT | Performed by: FAMILY MEDICINE

## 2021-04-06 PROCEDURE — G8427 DOCREV CUR MEDS BY ELIG CLIN: HCPCS | Performed by: FAMILY MEDICINE

## 2021-04-06 PROCEDURE — 4040F PNEUMOC VAC/ADMIN/RCVD: CPT | Performed by: FAMILY MEDICINE

## 2021-04-06 PROCEDURE — G8417 CALC BMI ABV UP PARAM F/U: HCPCS | Performed by: FAMILY MEDICINE

## 2021-04-06 SDOH — ECONOMIC STABILITY: TRANSPORTATION INSECURITY
IN THE PAST 12 MONTHS, HAS LACK OF TRANSPORTATION KEPT YOU FROM MEETINGS, WORK, OR FROM GETTING THINGS NEEDED FOR DAILY LIVING?: NO

## 2021-04-06 SDOH — ECONOMIC STABILITY: FOOD INSECURITY: WITHIN THE PAST 12 MONTHS, YOU WORRIED THAT YOUR FOOD WOULD RUN OUT BEFORE YOU GOT MONEY TO BUY MORE.: NEVER TRUE

## 2021-04-06 SDOH — ECONOMIC STABILITY: TRANSPORTATION INSECURITY
IN THE PAST 12 MONTHS, HAS THE LACK OF TRANSPORTATION KEPT YOU FROM MEDICAL APPOINTMENTS OR FROM GETTING MEDICATIONS?: NO

## 2021-04-06 NOTE — PROGRESS NOTES
[facility]        SUBJECTIVE:  CC: had concerns including Ear Fullness (Patient is not sure why she is here today. But note says her ears are full of wax. ) and Other (Pt's son contacted via telephone and advised he must be present for pt's appointment). Amina Plata presented to the clinic for a routine visit. Savannah Humphrey is an [de-identified]year old female presenting to the office today to f/u after using a course of Debrox for b/l cerumen impaction for b/l irrigation. She is accompanied by her son. Since the last appointment he reports the television volume has gone down from like 110 to 30. The patient also had recent labs that need to be reviewed. Review of Systems   Constitutional: Negative for appetite change, fatigue and fever. HENT: Positive for hearing loss (improved). Negative for congestion and ear pain. Respiratory: Negative for cough, shortness of breath and wheezing. Cardiovascular: Negative for chest pain and palpitations. Gastrointestinal: Negative for abdominal pain, constipation, diarrhea, nausea and vomiting. Neurological: Negative for dizziness and syncope. Outpatient Medications Marked as Taking for the 4/6/21 encounter (Office Visit) with Roosevelt Rosa MD   Medication Sig Dispense Refill    mirtazapine (REMERON) 30 MG tablet Take 1 tablet by mouth nightly 90 tablet 1    ramipril (ALTACE) 10 MG capsule Take 1 capsule by mouth daily Indications: High Blood Pressure Disorder 90 capsule 1    metFORMIN (GLUCOPHAGE) 500 MG tablet Take 1 tablet by mouth 2 times daily (with meals) 180 tablet 0    nystatin (MYCOSTATIN) 311792 UNIT/GM powder Apply 3 times daily. 60 g 3    glucose monitoring kit (FREESTYLE) monitoring kit 1 kit by Does not apply route daily 1 kit 0    blood glucose monitor strips Test 1 times a day & as needed for symptoms of irregular blood glucose.  100 strip 3    Lancets MISC 1 each by Does not apply route daily 100 each 3    atorvastatin (LIPITOR) 20 MG tablet Take 2 tablets by mouth daily 30 tablet 3    metoprolol succinate (TOPROL XL) 25 MG extended release tablet Take 1 tablet by mouth daily 90 tablet 1    levothyroxine (SYNTHROID) 175 MCG tablet Take one tablet Mon-Fri and 2 tabs Sat/Sun. 30 tablet 1       I have reviewed all pertinent PMHx, PSHx, FamHx, SocialHx, medications, and allergies and updated history as appropriate. OBJECTIVE    VS: /84   Pulse 85   Temp 97.9 °F (36.6 °C)   Resp 18   Ht 5' 4\" (1.626 m)   Wt 200 lb (90.7 kg)   SpO2 97%   BMI 34.33 kg/m²   Physical Exam  Constitutional:       General: She is not in acute distress. Appearance: She is well-developed. She is obese. She is not diaphoretic. Comments: Ambulating with walker   HENT:      Head: Normocephalic and atraumatic. Right Ear: There is impacted cerumen. Left Ear: There is impacted cerumen. Eyes:      Conjunctiva/sclera: Conjunctivae normal.      Pupils: Pupils are equal, round, and reactive to light. Neck:      Musculoskeletal: Normal range of motion and neck supple. Cardiovascular:      Rate and Rhythm: Normal rate and regular rhythm. Pulmonary:      Effort: Pulmonary effort is normal.      Breath sounds: Normal breath sounds. Abdominal:      General: Bowel sounds are normal. There is no distension. Palpations: Abdomen is soft. Tenderness: There is no abdominal tenderness. Hernia: No hernia is present. Skin:     General: Skin is warm and dry. Neurological:      Mental Status: She is alert and oriented to person, place, and time. ASSESSMENT/PLAN:  1. Impacted cerumen, bilateral  Failed irrigation today s/p Debrox regimen. Refer to ENT at this time. - Marcio Stiles, , Ear, Nose, Throat, Tato    2. Acquired hypothyroidism  Synthroid recently increased to 150 mcg/qd for subtherapeutic thyroid. Repeat TSH in 8 weeks with bmp. 3. Morbidly obese ([address]    4. Mixed hyperlipidemia  Lipid panel elevate and statin recently increased from Atorvastatin 20 to 40 mg/qd at this time. I have reviewed my findings and recommendations with Ilan Rolon MD  4/19/2021 12:28 PM     Counseled regarding above diagnosis, including possible risks and complications, especially if left uncontrolled. Patient counseled on red flag symptoms and if they occur to go to the ED. Discussed medications risk/benefits and possible side effects and alternatives to treatment. Patient and/or guardian verbalizes understanding, agrees, feels comfortable with and wishes to proceed with above treatment plan. Advised patient regarding importance of keeping up with recommended health maintenance and to schedule as soon as possible if overdue, as this is important in assessing for undiagnosed pathology, especially cancer, as well as protecting against potentially harmful/life threatening disease. Patient and/or guardian verbalizes understanding and agrees with above counseling, assessment and plan. All questions answered. Please note this report has been partially produced using speech recognition software  and may contain errors related to that system including grammar, punctuation and spelling as well as words and phrases that may seem inappropriate. If there are questions or concerns please feel free to contact me to clarify.

## 2021-04-19 ASSESSMENT — ENCOUNTER SYMPTOMS
WHEEZING: 0
VOMITING: 0
ABDOMINAL PAIN: 0
DIARRHEA: 0
SHORTNESS OF BREATH: 0
CONSTIPATION: 0
NAUSEA: 0
COUGH: 0

## 2021-06-09 ENCOUNTER — APPOINTMENT (OUTPATIENT)
Dept: ULTRASOUND IMAGING | Age: 81
DRG: 603 | End: 2021-06-09
Payer: MEDICARE

## 2021-06-09 ENCOUNTER — HOSPITAL ENCOUNTER (INPATIENT)
Age: 81
LOS: 3 days | Discharge: HOME HEALTH | DRG: 603 | End: 2021-06-13
Attending: EMERGENCY MEDICINE | Admitting: FAMILY MEDICINE
Payer: MEDICARE

## 2021-06-09 DIAGNOSIS — L03.113 RIGHT ARM CELLULITIS: Primary | ICD-10-CM

## 2021-06-09 LAB
ALBUMIN SERPL-MCNC: 3.9 G/DL (ref 3.5–5.2)
ALP BLD-CCNC: 65 U/L (ref 35–104)
ALT SERPL-CCNC: 5 U/L (ref 0–32)
ANION GAP SERPL CALCULATED.3IONS-SCNC: 11 MMOL/L (ref 7–16)
AST SERPL-CCNC: 15 U/L (ref 0–31)
BILIRUB SERPL-MCNC: 0.5 MG/DL (ref 0–1.2)
BUN BLDV-MCNC: 19 MG/DL (ref 6–23)
CALCIUM SERPL-MCNC: 9.9 MG/DL (ref 8.6–10.2)
CHLORIDE BLD-SCNC: 98 MMOL/L (ref 98–107)
CO2: 28 MMOL/L (ref 22–29)
CREAT SERPL-MCNC: 1.4 MG/DL (ref 0.5–1)
GFR AFRICAN AMERICAN: 44
GFR NON-AFRICAN AMERICAN: 36 ML/MIN/1.73
GLUCOSE BLD-MCNC: 182 MG/DL (ref 74–99)
HCT VFR BLD CALC: 38.7 % (ref 34–48)
HEMOGLOBIN: 12.8 G/DL (ref 11.5–15.5)
LACTIC ACID, SEPSIS: 1.5 MMOL/L (ref 0.5–1.9)
MCH RBC QN AUTO: 31.6 PG (ref 26–35)
MCHC RBC AUTO-ENTMCNC: 33.1 % (ref 32–34.5)
MCV RBC AUTO: 95.6 FL (ref 80–99.9)
PDW BLD-RTO: 14 FL (ref 11.5–15)
PLATELET # BLD: 144 E9/L (ref 130–450)
PMV BLD AUTO: 10.2 FL (ref 7–12)
POTASSIUM SERPL-SCNC: 4.2 MMOL/L (ref 3.5–5)
RBC # BLD: 4.05 E12/L (ref 3.5–5.5)
SODIUM BLD-SCNC: 137 MMOL/L (ref 132–146)
TOTAL PROTEIN: 7.1 G/DL (ref 6.4–8.3)
WBC # BLD: 7.9 E9/L (ref 4.5–11.5)

## 2021-06-09 PROCEDURE — 96365 THER/PROPH/DIAG IV INF INIT: CPT

## 2021-06-09 PROCEDURE — 93971 EXTREMITY STUDY: CPT

## 2021-06-09 PROCEDURE — 2500000003 HC RX 250 WO HCPCS: Performed by: EMERGENCY MEDICINE

## 2021-06-09 PROCEDURE — 96366 THER/PROPH/DIAG IV INF ADDON: CPT

## 2021-06-09 PROCEDURE — 80053 COMPREHEN METABOLIC PANEL: CPT

## 2021-06-09 PROCEDURE — 87040 BLOOD CULTURE FOR BACTERIA: CPT

## 2021-06-09 PROCEDURE — 83605 ASSAY OF LACTIC ACID: CPT

## 2021-06-09 PROCEDURE — 93971 EXTREMITY STUDY: CPT | Performed by: RADIOLOGY

## 2021-06-09 PROCEDURE — 99283 EMERGENCY DEPT VISIT LOW MDM: CPT

## 2021-06-09 PROCEDURE — 85027 COMPLETE CBC AUTOMATED: CPT

## 2021-06-09 RX ORDER — CLINDAMYCIN PHOSPHATE 600 MG/50ML
600 INJECTION INTRAVENOUS ONCE
Status: COMPLETED | OUTPATIENT
Start: 2021-06-09 | End: 2021-06-10

## 2021-06-09 RX ORDER — ACETAMINOPHEN 325 MG/1
650 TABLET ORAL EVERY 6 HOURS PRN
COMMUNITY

## 2021-06-09 RX ADMIN — CLINDAMYCIN PHOSPHATE 600 MG: 600 INJECTION, SOLUTION INTRAVENOUS at 22:03

## 2021-06-09 NOTE — ED TRIAGE NOTES
FIRST PROVIDER CONTACT ASSESSMENT NOTE        Department of Emergency Medicine            ED  First Provider Note            6/9/21  7:57 PM EDT    Chief Complaint: Rash (right arm)      History of Present Illness:    Jaclyn Hernández is a [de-identified] y.o. female who presents to the emergency department for RUE warmth, tenderness, swelling  Focused Screening Exam:  Constitutional:  Alert, appears stated age and is in no distress. *ALLERGIES*     Patient has no known allergies.      ED Triage Vitals   BP Temp Temp src Pulse Resp SpO2 Height Weight   06/09/21 1955 06/09/21 1933 -- 06/09/21 1933 06/09/21 1933 06/09/21 1933 06/09/21 1955 06/09/21 1955   (!) 147/82 97 °F (36.1 °C)  79 16 94 % 5' 4\" (1.626 m) 200 lb (90.7 kg)        Initial Plan of Care:  Initiate Treatment-Testing, Proceed toTreatment Area When Bed Available for ED Attending/MLP to Continue Care    -----------------END OF FIRST PROVIDER CONTACT ASSESSMENT NOTE--------------  Electronically signed by PABLO Wall CNP   DD: 6/9/21

## 2021-06-10 ENCOUNTER — APPOINTMENT (OUTPATIENT)
Dept: GENERAL RADIOLOGY | Age: 81
DRG: 603 | End: 2021-06-10
Payer: MEDICARE

## 2021-06-10 PROBLEM — E11.29 TYPE 2 DIABETES MELLITUS WITH RENAL COMPLICATION (HCC): Status: ACTIVE | Noted: 2020-07-21

## 2021-06-10 PROBLEM — L03.113 RIGHT ARM CELLULITIS: Status: ACTIVE | Noted: 2021-06-10

## 2021-06-10 PROBLEM — N18.9 ACUTE KIDNEY INJURY SUPERIMPOSED ON CKD (HCC): Status: ACTIVE | Noted: 2021-06-10

## 2021-06-10 PROBLEM — N17.9 ACUTE KIDNEY INJURY SUPERIMPOSED ON CKD (HCC): Status: ACTIVE | Noted: 2021-06-10

## 2021-06-10 PROBLEM — J44.9 COPD (CHRONIC OBSTRUCTIVE PULMONARY DISEASE) (HCC): Status: ACTIVE | Noted: 2021-06-10

## 2021-06-10 LAB
ANION GAP SERPL CALCULATED.3IONS-SCNC: 9 MMOL/L (ref 7–16)
ANTISTREPTOLYSIN-O: 154 IU/ML (ref 0–200)
BASOPHILS ABSOLUTE: 0.05 E9/L (ref 0–0.2)
BASOPHILS RELATIVE PERCENT: 0.7 % (ref 0–2)
BUN BLDV-MCNC: 21 MG/DL (ref 6–23)
C-REACTIVE PROTEIN: 2.8 MG/DL (ref 0–0.4)
CALCIUM SERPL-MCNC: 9.2 MG/DL (ref 8.6–10.2)
CHLORIDE BLD-SCNC: 99 MMOL/L (ref 98–107)
CHOLESTEROL, TOTAL: 266 MG/DL (ref 0–199)
CO2: 28 MMOL/L (ref 22–29)
CREAT SERPL-MCNC: 1.2 MG/DL (ref 0.5–1)
EOSINOPHILS ABSOLUTE: 0.38 E9/L (ref 0.05–0.5)
EOSINOPHILS RELATIVE PERCENT: 5.6 % (ref 0–6)
GFR AFRICAN AMERICAN: 52
GFR NON-AFRICAN AMERICAN: 43 ML/MIN/1.73
GLUCOSE BLD-MCNC: 111 MG/DL (ref 74–99)
HBA1C MFR BLD: 6 % (ref 4–5.6)
HCT VFR BLD CALC: 34.8 % (ref 34–48)
HDLC SERPL-MCNC: 44 MG/DL
HEMOGLOBIN: 11.7 G/DL (ref 11.5–15.5)
IMMATURE GRANULOCYTES #: 0.03 E9/L
IMMATURE GRANULOCYTES %: 0.4 % (ref 0–5)
INR BLD: 1
LACTIC ACID, SEPSIS: 1.2 MMOL/L (ref 0.5–1.9)
LDL CHOLESTEROL CALCULATED: 192 MG/DL (ref 0–99)
LYMPHOCYTES ABSOLUTE: 2.12 E9/L (ref 1.5–4)
LYMPHOCYTES RELATIVE PERCENT: 31.1 % (ref 20–42)
MCH RBC QN AUTO: 31.5 PG (ref 26–35)
MCHC RBC AUTO-ENTMCNC: 33.6 % (ref 32–34.5)
MCV RBC AUTO: 93.5 FL (ref 80–99.9)
METER GLUCOSE: 104 MG/DL (ref 74–99)
METER GLUCOSE: 111 MG/DL (ref 74–99)
METER GLUCOSE: 120 MG/DL (ref 74–99)
METER GLUCOSE: 124 MG/DL (ref 74–99)
METER GLUCOSE: 159 MG/DL (ref 74–99)
MONOCYTES ABSOLUTE: 0.43 E9/L (ref 0.1–0.95)
MONOCYTES RELATIVE PERCENT: 6.3 % (ref 2–12)
NEUTROPHILS ABSOLUTE: 3.8 E9/L (ref 1.8–7.3)
NEUTROPHILS RELATIVE PERCENT: 55.9 % (ref 43–80)
PDW BLD-RTO: 13.7 FL (ref 11.5–15)
PLATELET # BLD: 122 E9/L (ref 130–450)
PMV BLD AUTO: 10.1 FL (ref 7–12)
POTASSIUM REFLEX MAGNESIUM: 3.7 MMOL/L (ref 3.5–5)
PROCALCITONIN: 0.16 NG/ML (ref 0–0.08)
PROTHROMBIN TIME: 11.3 SEC (ref 9.3–12.4)
RBC # BLD: 3.72 E12/L (ref 3.5–5.5)
SEDIMENTATION RATE, ERYTHROCYTE: 25 MM/HR (ref 0–20)
SODIUM BLD-SCNC: 136 MMOL/L (ref 132–146)
T4 FREE: 0.13 NG/DL (ref 0.93–1.7)
TRIGL SERPL-MCNC: 149 MG/DL (ref 0–149)
TSH SERPL DL<=0.05 MIU/L-ACNC: 34.61 UIU/ML (ref 0.27–4.2)
VLDLC SERPL CALC-MCNC: 30 MG/DL
WBC # BLD: 6.8 E9/L (ref 4.5–11.5)

## 2021-06-10 PROCEDURE — 36415 COLL VENOUS BLD VENIPUNCTURE: CPT

## 2021-06-10 PROCEDURE — 83036 HEMOGLOBIN GLYCOSYLATED A1C: CPT

## 2021-06-10 PROCEDURE — 2580000003 HC RX 258: Performed by: FAMILY MEDICINE

## 2021-06-10 PROCEDURE — 1200000000 HC SEMI PRIVATE

## 2021-06-10 PROCEDURE — 80061 LIPID PANEL: CPT

## 2021-06-10 PROCEDURE — 6370000000 HC RX 637 (ALT 250 FOR IP): Performed by: INTERNAL MEDICINE

## 2021-06-10 PROCEDURE — 6360000002 HC RX W HCPCS: Performed by: FAMILY MEDICINE

## 2021-06-10 PROCEDURE — 6370000000 HC RX 637 (ALT 250 FOR IP): Performed by: FAMILY MEDICINE

## 2021-06-10 PROCEDURE — 80048 BASIC METABOLIC PNL TOTAL CA: CPT

## 2021-06-10 PROCEDURE — 83605 ASSAY OF LACTIC ACID: CPT

## 2021-06-10 PROCEDURE — 86060 ANTISTREPTOLYSIN O TITER: CPT

## 2021-06-10 PROCEDURE — 84439 ASSAY OF FREE THYROXINE: CPT

## 2021-06-10 PROCEDURE — 84145 PROCALCITONIN (PCT): CPT

## 2021-06-10 PROCEDURE — 73060 X-RAY EXAM OF HUMERUS: CPT

## 2021-06-10 PROCEDURE — 82962 GLUCOSE BLOOD TEST: CPT

## 2021-06-10 PROCEDURE — 84443 ASSAY THYROID STIM HORMONE: CPT

## 2021-06-10 PROCEDURE — 85610 PROTHROMBIN TIME: CPT

## 2021-06-10 PROCEDURE — 86140 C-REACTIVE PROTEIN: CPT

## 2021-06-10 PROCEDURE — 2580000003 HC RX 258: Performed by: EMERGENCY MEDICINE

## 2021-06-10 PROCEDURE — 85651 RBC SED RATE NONAUTOMATED: CPT

## 2021-06-10 PROCEDURE — 2500000003 HC RX 250 WO HCPCS: Performed by: FAMILY MEDICINE

## 2021-06-10 PROCEDURE — 85025 COMPLETE CBC W/AUTO DIFF WBC: CPT

## 2021-06-10 PROCEDURE — 6360000002 HC RX W HCPCS: Performed by: EMERGENCY MEDICINE

## 2021-06-10 RX ORDER — LEVOTHYROXINE SODIUM 0.2 MG/1
200 TABLET ORAL DAILY
Status: DISCONTINUED | OUTPATIENT
Start: 2021-06-11 | End: 2021-06-13 | Stop reason: HOSPADM

## 2021-06-10 RX ORDER — METOPROLOL SUCCINATE 25 MG/1
25 TABLET, EXTENDED RELEASE ORAL DAILY
Status: DISCONTINUED | OUTPATIENT
Start: 2021-06-10 | End: 2021-06-13 | Stop reason: HOSPADM

## 2021-06-10 RX ORDER — SODIUM CHLORIDE 9 MG/ML
INJECTION, SOLUTION INTRAVENOUS CONTINUOUS
Status: ACTIVE | OUTPATIENT
Start: 2021-06-10 | End: 2021-06-10

## 2021-06-10 RX ORDER — ACETAMINOPHEN 325 MG/1
650 TABLET ORAL EVERY 6 HOURS PRN
Status: DISCONTINUED | OUTPATIENT
Start: 2021-06-10 | End: 2021-06-13 | Stop reason: HOSPADM

## 2021-06-10 RX ORDER — SODIUM CHLORIDE 0.9 % (FLUSH) 0.9 %
5-40 SYRINGE (ML) INJECTION PRN
Status: DISCONTINUED | OUTPATIENT
Start: 2021-06-10 | End: 2021-06-13 | Stop reason: HOSPADM

## 2021-06-10 RX ORDER — HEPARIN SODIUM 10000 [USP'U]/ML
5000 INJECTION, SOLUTION INTRAVENOUS; SUBCUTANEOUS EVERY 8 HOURS
Status: DISCONTINUED | OUTPATIENT
Start: 2021-06-10 | End: 2021-06-13 | Stop reason: HOSPADM

## 2021-06-10 RX ORDER — POLYETHYLENE GLYCOL 3350 17 G/17G
17 POWDER, FOR SOLUTION ORAL DAILY PRN
Status: DISCONTINUED | OUTPATIENT
Start: 2021-06-10 | End: 2021-06-13 | Stop reason: HOSPADM

## 2021-06-10 RX ORDER — ONDANSETRON 2 MG/ML
4 INJECTION INTRAMUSCULAR; INTRAVENOUS EVERY 6 HOURS PRN
Status: DISCONTINUED | OUTPATIENT
Start: 2021-06-10 | End: 2021-06-13 | Stop reason: HOSPADM

## 2021-06-10 RX ORDER — SODIUM CHLORIDE 9 MG/ML
25 INJECTION, SOLUTION INTRAVENOUS PRN
Status: DISCONTINUED | OUTPATIENT
Start: 2021-06-10 | End: 2021-06-13 | Stop reason: HOSPADM

## 2021-06-10 RX ORDER — SODIUM CHLORIDE 0.9 % (FLUSH) 0.9 %
5-40 SYRINGE (ML) INJECTION EVERY 12 HOURS SCHEDULED
Status: DISCONTINUED | OUTPATIENT
Start: 2021-06-10 | End: 2021-06-13 | Stop reason: HOSPADM

## 2021-06-10 RX ORDER — ONDANSETRON 4 MG/1
4 TABLET, ORALLY DISINTEGRATING ORAL EVERY 8 HOURS PRN
Status: DISCONTINUED | OUTPATIENT
Start: 2021-06-10 | End: 2021-06-13 | Stop reason: HOSPADM

## 2021-06-10 RX ORDER — DEXTROSE MONOHYDRATE 50 MG/ML
100 INJECTION, SOLUTION INTRAVENOUS PRN
Status: DISCONTINUED | OUTPATIENT
Start: 2021-06-10 | End: 2021-06-13 | Stop reason: HOSPADM

## 2021-06-10 RX ORDER — MIRTAZAPINE 15 MG/1
30 TABLET, FILM COATED ORAL NIGHTLY
Status: DISCONTINUED | OUTPATIENT
Start: 2021-06-10 | End: 2021-06-13 | Stop reason: HOSPADM

## 2021-06-10 RX ORDER — ATORVASTATIN CALCIUM 40 MG/1
40 TABLET, FILM COATED ORAL NIGHTLY
Status: DISCONTINUED | OUTPATIENT
Start: 2021-06-10 | End: 2021-06-13 | Stop reason: HOSPADM

## 2021-06-10 RX ORDER — DEXTROSE MONOHYDRATE 25 G/50ML
12.5 INJECTION, SOLUTION INTRAVENOUS PRN
Status: DISCONTINUED | OUTPATIENT
Start: 2021-06-10 | End: 2021-06-13 | Stop reason: HOSPADM

## 2021-06-10 RX ORDER — NICOTINE POLACRILEX 4 MG
15 LOZENGE BUCCAL PRN
Status: DISCONTINUED | OUTPATIENT
Start: 2021-06-10 | End: 2021-06-13 | Stop reason: HOSPADM

## 2021-06-10 RX ADMIN — SODIUM CHLORIDE, PRESERVATIVE FREE 10 ML: 5 INJECTION INTRAVENOUS at 21:47

## 2021-06-10 RX ADMIN — MIRTAZAPINE 30 MG: 15 TABLET, FILM COATED ORAL at 05:50

## 2021-06-10 RX ADMIN — INSULIN LISPRO 2 UNITS: 100 INJECTION, SOLUTION INTRAVENOUS; SUBCUTANEOUS at 17:16

## 2021-06-10 RX ADMIN — HEPARIN SODIUM 5000 UNITS: 10000 INJECTION INTRAVENOUS; SUBCUTANEOUS at 08:49

## 2021-06-10 RX ADMIN — ATORVASTATIN CALCIUM 40 MG: 40 TABLET, FILM COATED ORAL at 21:44

## 2021-06-10 RX ADMIN — CEFAZOLIN 1000 MG: 1 INJECTION, POWDER, FOR SOLUTION INTRAMUSCULAR; INTRAVENOUS at 00:45

## 2021-06-10 RX ADMIN — HEPARIN SODIUM 5000 UNITS: 10000 INJECTION INTRAVENOUS; SUBCUTANEOUS at 02:32

## 2021-06-10 RX ADMIN — HEPARIN SODIUM 5000 UNITS: 10000 INJECTION INTRAVENOUS; SUBCUTANEOUS at 17:16

## 2021-06-10 RX ADMIN — DOXYCYCLINE 100 MG: 100 INJECTION, POWDER, LYOPHILIZED, FOR SOLUTION INTRAVENOUS at 04:31

## 2021-06-10 RX ADMIN — DOXYCYCLINE 100 MG: 100 INJECTION, POWDER, LYOPHILIZED, FOR SOLUTION INTRAVENOUS at 15:34

## 2021-06-10 RX ADMIN — WATER 1000 MG: 1 INJECTION INTRAMUSCULAR; INTRAVENOUS; SUBCUTANEOUS at 03:51

## 2021-06-10 RX ADMIN — SODIUM CHLORIDE: 9 INJECTION, SOLUTION INTRAVENOUS at 02:25

## 2021-06-10 RX ADMIN — LEVOTHYROXINE SODIUM 175 MCG: 0.1 TABLET ORAL at 05:49

## 2021-06-10 RX ADMIN — MIRTAZAPINE 30 MG: 15 TABLET, FILM COATED ORAL at 21:44

## 2021-06-10 RX ADMIN — METOPROLOL SUCCINATE 25 MG: 25 TABLET, EXTENDED RELEASE ORAL at 08:50

## 2021-06-10 ASSESSMENT — PAIN SCALES - GENERAL
PAINLEVEL_OUTOF10: 0

## 2021-06-10 NOTE — PLAN OF CARE
Problem: Skin Integrity:  Goal: Will show no infection signs and symptoms  Description: Will show no infection signs and symptoms  Outcome: Met This Shift  Goal: Absence of new skin breakdown  Description: Absence of new skin breakdown  Outcome: Met This Shift     Problem: Pain:  Goal: Pain level will decrease  Description: Pain level will decrease  Outcome: Met This Shift  Goal: Control of acute pain  Description: Control of acute pain  Outcome: Met This Shift  Goal: Control of chronic pain  Description: Control of chronic pain  Outcome: Met This Shift

## 2021-06-10 NOTE — H&P
Hospital Medicine History & Physical      PCP: Ajay Berrios MD    Date of Admission: 6/9/2021    Date of Service: Pt seen/examined on 6/10/2021 and Admitted to Inpatient with expected LOS greater than two midnights due to medical therapy. Chief Complaint: Redness of right arm      History Of Present Illness:      [de-identified] y.o. female who presented to [facility] with medical history of breast cancer status post mastectomy in November 2020, COPD, diabetes with peripheral neuropathy, asthma, hypertension, obesity, hypothyroidism, hyperlipidemia, rheumatoid osteoarthritis. Patient is a poor historian, started having gradual onset of redness involving the right arm. This has been getting progressively worse, described pain that is achy, constant, moderate in intensity and associated with swelling and redness. No fever. No chest pain or abdominal pain. No nausea or vomiting. She has leg swelling but no worse than usual.  No change in bowel habits or urinary complaints. Vital signs notable for blood pressure 146/82. Labs showed TSH of 37.67 in March and A1c of 6.4 in March. Glucose 182. Creatinine 1.4. Right upper extremity ultrasound is negative for DVT. She is being admitted for further management. Past Medical History:          Diagnosis Date    Asthma     Blood transfusion     Breast cancer ([address]     COPD (chronic obstructive pulmonary disease) ([address]     Diabetes mellitus ([address]     Diabetic peripheral neuropathy ([address]     DM type 2 (diabetes mellitus, type 2) ([address]     Hypertension     Hypothyroidism     Irritable bowel syndrome     Mixed hyperlipidemia 7/21/2020    Obesity (BMI 30-39. 9)     Osteoarthritis     rheumatoid and osteo    RA (rheumatoid arthritis) ([address]     RBBB        Past Surgical History:          Procedure Laterality Date    BREAST LUMPECTOMY  [id number]    RIGHT    CARDIAC CATHETERIZATION  10/09/2020    Dr. Maulik Mann CHOLECYSTECTOMY      HYSTERECTOMY      partial    JOINT REPLACEMENT Bilateral     bilateral TKR    LUMBAR FUSION      L4L5    MASTECTOMY, MODIFIED RADICAL Right 11/3/2020    MODIFIED RADICAL RIGHT BREAST MASTECTOMY performed by Watson Hughes MD at [address]      thumb joint replacement rt     THYROID SURGERY      US BREAST NEEDLE BIOPSY RIGHT  7/31/2020    US BREAST NEEDLE BIOPSY RIGHT 7/31/2020 [facility]       Medications Prior to Admission:      Prior to Admission medications    Medication Sig Start Date End Date Taking? Authorizing Provider   acetaminophen (TYLENOL) 325 MG tablet Take 650 mg by mouth every 6 hours as needed for Pain   Yes Historical Provider, MD   mirtazapine (REMERON) 30 MG tablet Take 1 tablet by mouth nightly 3/26/21  Yes Quin Slaon MD   ramipril (ALTACE) 10 MG capsule Take 1 capsule by mouth daily Indications: High Blood Pressure Disorder 3/26/21  Yes Quin Sloan MD   metFORMIN (GLUCOPHAGE) 500 MG tablet Take 1 tablet by mouth 2 times daily (with meals) 3/10/21  Yes Quin Sloan MD   nystatin (MYCOSTATIN) 018608 UNIT/GM powder Apply 3 times daily. 1/29/21  Yes Judith William,    glucose monitoring kit (FREESTYLE) monitoring kit 1 kit by Does not apply route daily 1/5/21  Yes Quin Sloan MD   blood glucose monitor strips Test 1 times a day & as needed for symptoms of irregular blood glucose.  1/5/21  Yes Quin Sloan MD   Lancets MISC 1 each by Does not apply route daily 1/5/21  Yes Quin Sloan MD   metoprolol succinate (TOPROL XL) 25 MG extended release tablet Take 1 tablet by mouth daily 10/2/20  Yes Kel Scott MD   levothyroxine (SYNTHROID) 175 MCG tablet Take one tablet Mon-Fri and 2 tabs Sat/Sun. 9/21/20  Yes Kiko Skinner,    aspirin EC 81 MG EC tablet Take 1 tablet by mouth daily  Patient not taking: Reported on 3/16/2021 10/12/20   Kel Scott MD   atorvastatin (LIPITOR) 20 MG tablet Take 2 tablets by mouth daily 10/9/20   Destiny Mascorro MD       Allergies:  Patient has no known allergies. Social History:      The patient currently lives at home. TOBACCO:   reports that she has never smoked. She has never used smokeless tobacco.  ETOH:   reports no history of alcohol use. Family History:     Reviewed in detail Positive as follows:        Problem Relation Age of Onset    Cancer Mother 36        breast    Cancer Father         bone marrow    Cancer Brother         prostate    Diabetes Son     Neuropathy Son     High Blood Pressure Son     High Cholesterol Son     Hypertension Son        REVIEW OF SYSTEMS:   Pertinent positives as noted in the HPI. All other systems reviewed and negative. PHYSICAL EXAM:    BP (!) 146/82   Pulse 65   Temp 97 °F (36.1 °C)   Resp 18   Ht 5' 4\" (1.626 m)   Wt 200 lb (90.7 kg)   SpO2 97%   BMI 34.33 kg/m²     General appearance: Elderly female, obese, no apparent distress, appears stated age and cooperative. Afebrile. HEENT:  Normal cephalic, atraumatic without obvious deformity. Pupils equal, round, and reactive to light. Extra ocular muscles intact. Conjunctivae/corneas clear. Neck: Supple, with full range of motion. No jugular venous distention. Trachea midline. Respiratory:  Normal respiratory effort. Clear to auscultation, bilaterally without Rales/Wheezes/Rhonchi. Cardiovascular:  Regular rate and rhythm with normal S1/S2 without murmurs, rubs or gallops. Abdomen: Soft, non-tender, non-distended with normal bowel sounds. Musculoskeletal:  No clubbing, cyanosis or edema bilaterally. Full range of motion without deformity. Skin: Skin color, texture, turgor normal.  Extensive area of redness involving the right upper extremity associated with swelling and induration. Warm to touch. .  Neurologic:   Cranial nerves: II-XII intact, grossly non-focal.  Psychiatric:  Alert and oriented, thought content appropriate, normal insight Hyperlipidemia, repeat lipid panel. 6.  Uncontrolled hypothyroidism, TSH was 37.67 in March. Continue Synthroid at current dose of 175 mcg daily and recheck. If TSH is still high, titrate Synthroid to 200 mcg. Avoid excessive titration due to advanced age and A. fib risk. 7.  Type 2 diabetes with renal complication, T3Z 6.4 in March. Sliding scale coverage, monitor blood sugar. 8.  Hypertension, monitor blood pressure, hold ramipril due to renal failure. 9.  Obesity, dietary and lifestyle modification. DVT Prophylaxis: Heparin  Diet: ADULT DIET; Regular; 4 carb choices (60 gm/meal)  Code Status: Full Code    PT/OT Eval Status: As needed    Dispo -inpatient/telemetry       Mylene Olivier MD    Thank you Kaiden Moses MD for the opportunity to be involved in this patient's care.

## 2021-06-10 NOTE — CARE COORDINATION
Patient with a past medical history of breast cancer status post mastectomy in November 2020, COPD, diabetes with peripheral neuropathy, asthma, hypertension, obesity, hypothyroidism, hyperlipidemia, rheumatoid osteoarthritis is admitted with Right upper extremity cellulitis. She is currently on IV Rocephin and doxycycline. X-ray of the right humerus was negative today for osteomyelitis. PT/OT evals ordered to assist with transition of care determination.   Jaycee Vick RN CM

## 2021-06-10 NOTE — ED NOTES
Pt alert oriented skin warm dry resp easy right upper arm red radial pulse present     Tr Espinoza RN  06/10/21 6939

## 2021-06-10 NOTE — ED PROVIDER NOTES
HPI:  6/9/21, Time: 9:43 PM EDT         Jaclyn Hernández is a [de-identified] y.o. female presenting to the ED for right arm swelling and redness, beginning 3 days ago. The complaint has been persistent, moderate in severity, and worsened by nothing. Patient reporting right arm redness and swelling. Patient reports started 3 days ago there is no trauma or injury. Patient has prior history of mastectomy on the right side. Patient is diabetic. Patient reporting no abdominal pain no chest pain no nausea or vomiting. There is no cough. There is no weakness. ROS:   Pertinent positives and negatives are stated within HPI, all other systems reviewed and are negative.  --------------------------------------------- PAST HISTORY ---------------------------------------------  Past Medical History:  has a past medical history of Asthma, Blood transfusion, [address] cancer ([facility] COPD (chronic obstructive pulmonary disease) ([facility] Diabetes mellitus ([facility] Diabetic peripheral neuropathy ([facility] DM type 2 (diabetes mellitus, type 2) ([facility] Hypertension, Hypothyroidism, Irritable bowel syndrome, Mixed hyperlipidemia, Obesity (BMI 30-39.9), Osteoarthritis, RA (rheumatoid arthritis) ([facility] and RBBB. Past Surgical History:  has a past surgical history that includes Thyroid surgery; Cholecystectomy; Hysterectomy; Carpal tunnel release; lumbar fusion; other surgical history; Breast lumpectomy ([id number]); joint replacement (Bilateral); US BREAST BIOPSY W LOC DEVICE 1ST LESION RIGHT (7/31/2020); Cardiac catheterization (10/09/2020); and Mastectomy, modified radical (Right, 11/3/2020). Social History:  reports that she has never smoked. She has never used smokeless tobacco. She reports that she does not drink alcohol and does not use drugs.     Family History: family history includes Cancer in her brother and father; Cancer (age of onset: 36) in her mother; Diabetes in her son; High Blood Pressure in her son; High Cholesterol in her son; Hypertension in her son; Neuropathy in her son. The patients home medications have been reviewed. Allergies: Patient has no known allergies. ---------------------------------------------------PHYSICAL EXAM--------------------------------------    Constitutional/General: Alert and oriented x3, well appearing, non toxic in NAD  Head: Normocephalic and atraumatic  Eyes: PERRL, EOMI  Mouth: Oropharynx clear, handling secretions, no trismus  Neck: Supple, full ROM, non tender to palpation in the midline, no stridor, no crepitus, no meningeal signs  Pulmonary: Lungs clear to auscultation bilaterally, no wheezes, rales, or rhonchi. Not in respiratory distress  Cardiovascular:  Regular rate. Regular rhythm. No murmurs, gallops, or rubs. 2+ distal pulses  Chest: no chest wall tenderness  Abdomen: Soft. Non tender. Non distended. +BS. No rebound, guarding, or rigidity. No pulsatile masses appreciated. Musculoskeletal: Moves all extremities x 4. Warm and well perfused, no clubbing, cyanosis, or edema. Capillary refill <3 seconds  Skin: warm and dry. Erythema to right upper extremity partially circumferential pulses are intact  Neurologic: GCS 15, CN 2-12 grossly intact, no focal deficits, symmetric strength 5/5 in the upper and lower extremities bilaterally  Psych: Normal Affect    -------------------------------------------------- RESULTS -------------------------------------------------  I have personally reviewed all laboratory and imaging results for this patient. Results are listed below.      LABS:  Results for orders placed or performed during the hospital encounter of 06/09/21   CBC   Result Value Ref Range    WBC 7.9 4.5 - 11.5 E9/L    RBC 4.05 3.50 - 5.50 E12/L    Hemoglobin 12.8 11.5 - 15.5 g/dL    Hematocrit 38.7 34.0 - 48.0 %    MCV 95.6 80.0 - 99.9 fL    MCH 31.6 26.0 - 35.0 pg    MCHC 33.1 32.0 - 34.5 %    RDW 14.0 11.5 - 15.0 fL    Platelets 874 865 - 190 E9/L    MPV 10.2 7.0 - 12.0 fL Comprehensive Metabolic Panel   Result Value Ref Range    Sodium 137 132 - 146 mmol/L    Potassium 4.2 3.5 - 5.0 mmol/L    Chloride 98 98 - 107 mmol/L    CO2 28 22 - 29 mmol/L    Anion Gap 11 7 - 16 mmol/L    Glucose 182 (H) 74 - 99 mg/dL    BUN 19 6 - 23 mg/dL    CREATININE 1.4 (H) 0.5 - 1.0 mg/dL    GFR Non-African American 36 >=60 mL/min/1.73    GFR African American 44     Calcium 9.9 8.6 - 10.2 mg/dL    Total Protein 7.1 6.4 - 8.3 g/dL    Albumin 3.9 3.5 - 5.2 g/dL    Total Bilirubin 0.5 0.0 - 1.2 mg/dL    Alkaline Phosphatase 65 35 - 104 U/L    ALT 5 0 - 32 U/L    AST 15 0 - 31 U/L   Lactate, Sepsis   Result Value Ref Range    Lactic Acid, Sepsis 1.5 0.5 - 1.9 mmol/L       RADIOLOGY:  Interpreted by Radiologist.  US DUP UPPER EXTREMITY RIGHT VENOUS   Final Result   Within the visualized vessels there is no evidence for deep venous   thrombosis                     EKG Interpretation        ------------------------- NURSING NOTES AND VITALS REVIEWED ---------------------------   The nursing notes within the ED encounter and vital signs as below have been reviewed by myself. BP (!) 147/82   Pulse 79   Temp 97 °F (36.1 °C)   Resp 16   Ht 5' 4\" (1.626 m)   Wt 200 lb (90.7 kg)   SpO2 94%   BMI 34.33 kg/m²   Oxygen Saturation Interpretation: Normal    The patients available past medical records and past encounters were reviewed. ------------------------------ ED COURSE/MEDICAL DECISION MAKING----------------------  Medications   ceFAZolin (ANCEF) 1,000 mg in sterile water 10 mL IV syringe (has no administration in time range)   clindamycin (CLEOCIN) 600 mg in dextrose 5 % 50 mL IVPB (600 mg Intravenous New Bag 6/9/21 9550)             Medical Decision Making:      Patient presenting here because of right arm redness and swelling. Patient is diabetic. Patient has had mastectomy on right side. Patient has noted cellulitis to right arm. Patient labs noted pulses are intact. Ultrasound is negative.

## 2021-06-10 NOTE — PROGRESS NOTES
Introduced myself to patient  She feels like the redness of her arm is slightly improving with antibiotics  However she also had a mastectomy on right side  Continue abx  Check sed rate, crp  TSH elevated, Check Free T4    Electronically signed by Vy Johns DO on 6/10/2021 at 3:32 PM

## 2021-06-11 LAB
ANION GAP SERPL CALCULATED.3IONS-SCNC: 15 MMOL/L (ref 7–16)
BUN BLDV-MCNC: 27 MG/DL (ref 6–23)
CALCIUM SERPL-MCNC: 9.2 MG/DL (ref 8.6–10.2)
CHLORIDE BLD-SCNC: 101 MMOL/L (ref 98–107)
CO2: 22 MMOL/L (ref 22–29)
CREAT SERPL-MCNC: 1.5 MG/DL (ref 0.5–1)
GFR AFRICAN AMERICAN: 40
GFR NON-AFRICAN AMERICAN: 33 ML/MIN/1.73
GLUCOSE BLD-MCNC: 128 MG/DL (ref 74–99)
HCT VFR BLD CALC: 39.8 % (ref 34–48)
HEMOGLOBIN: 13.1 G/DL (ref 11.5–15.5)
MCH RBC QN AUTO: 31.1 PG (ref 26–35)
MCHC RBC AUTO-ENTMCNC: 32.9 % (ref 32–34.5)
MCV RBC AUTO: 94.5 FL (ref 80–99.9)
METER GLUCOSE: 110 MG/DL (ref 74–99)
METER GLUCOSE: 150 MG/DL (ref 74–99)
METER GLUCOSE: 160 MG/DL (ref 74–99)
METER GLUCOSE: 97 MG/DL (ref 74–99)
PDW BLD-RTO: 13.9 FL (ref 11.5–15)
PLATELET # BLD: 137 E9/L (ref 130–450)
PMV BLD AUTO: 10.4 FL (ref 7–12)
POTASSIUM SERPL-SCNC: 4.3 MMOL/L (ref 3.5–5)
RBC # BLD: 4.21 E12/L (ref 3.5–5.5)
SODIUM BLD-SCNC: 138 MMOL/L (ref 132–146)
T4 FREE: 0.62 NG/DL (ref 0.93–1.7)
WBC # BLD: 6.9 E9/L (ref 4.5–11.5)

## 2021-06-11 PROCEDURE — 97162 PT EVAL MOD COMPLEX 30 MIN: CPT

## 2021-06-11 PROCEDURE — 2500000003 HC RX 250 WO HCPCS: Performed by: FAMILY MEDICINE

## 2021-06-11 PROCEDURE — 6370000000 HC RX 637 (ALT 250 FOR IP): Performed by: INTERNAL MEDICINE

## 2021-06-11 PROCEDURE — 6360000002 HC RX W HCPCS: Performed by: FAMILY MEDICINE

## 2021-06-11 PROCEDURE — 2580000003 HC RX 258: Performed by: FAMILY MEDICINE

## 2021-06-11 PROCEDURE — 84439 ASSAY OF FREE THYROXINE: CPT

## 2021-06-11 PROCEDURE — 36415 COLL VENOUS BLD VENIPUNCTURE: CPT

## 2021-06-11 PROCEDURE — 82962 GLUCOSE BLOOD TEST: CPT

## 2021-06-11 PROCEDURE — 85027 COMPLETE CBC AUTOMATED: CPT

## 2021-06-11 PROCEDURE — 97530 THERAPEUTIC ACTIVITIES: CPT

## 2021-06-11 PROCEDURE — 2580000003 HC RX 258: Performed by: INTERNAL MEDICINE

## 2021-06-11 PROCEDURE — 6370000000 HC RX 637 (ALT 250 FOR IP): Performed by: FAMILY MEDICINE

## 2021-06-11 PROCEDURE — 1200000000 HC SEMI PRIVATE

## 2021-06-11 PROCEDURE — 97165 OT EVAL LOW COMPLEX 30 MIN: CPT

## 2021-06-11 PROCEDURE — 80048 BASIC METABOLIC PNL TOTAL CA: CPT

## 2021-06-11 RX ORDER — SODIUM CHLORIDE 9 MG/ML
INJECTION, SOLUTION INTRAVENOUS CONTINUOUS
Status: DISCONTINUED | OUTPATIENT
Start: 2021-06-11 | End: 2021-06-13

## 2021-06-11 RX ADMIN — ACETAMINOPHEN 650 MG: 325 TABLET ORAL at 06:13

## 2021-06-11 RX ADMIN — ATORVASTATIN CALCIUM 40 MG: 40 TABLET, FILM COATED ORAL at 21:18

## 2021-06-11 RX ADMIN — HEPARIN SODIUM 5000 UNITS: 10000 INJECTION INTRAVENOUS; SUBCUTANEOUS at 10:08

## 2021-06-11 RX ADMIN — SODIUM CHLORIDE: 9 INJECTION, SOLUTION INTRAVENOUS at 15:47

## 2021-06-11 RX ADMIN — LEVOTHYROXINE SODIUM 200 MCG: 0.2 TABLET ORAL at 06:14

## 2021-06-11 RX ADMIN — HEPARIN SODIUM 5000 UNITS: 10000 INJECTION INTRAVENOUS; SUBCUTANEOUS at 17:35

## 2021-06-11 RX ADMIN — METOPROLOL SUCCINATE 25 MG: 25 TABLET, EXTENDED RELEASE ORAL at 10:08

## 2021-06-11 RX ADMIN — HEPARIN SODIUM 5000 UNITS: 10000 INJECTION INTRAVENOUS; SUBCUTANEOUS at 01:07

## 2021-06-11 RX ADMIN — MIRTAZAPINE 30 MG: 15 TABLET, FILM COATED ORAL at 21:18

## 2021-06-11 RX ADMIN — DOXYCYCLINE 100 MG: 100 INJECTION, POWDER, LYOPHILIZED, FOR SOLUTION INTRAVENOUS at 03:41

## 2021-06-11 RX ADMIN — SODIUM CHLORIDE: 9 INJECTION, SOLUTION INTRAVENOUS at 11:13

## 2021-06-11 RX ADMIN — WATER 1000 MG: 1 INJECTION INTRAMUSCULAR; INTRAVENOUS; SUBCUTANEOUS at 03:41

## 2021-06-11 RX ADMIN — DOXYCYCLINE 100 MG: 100 INJECTION, POWDER, LYOPHILIZED, FOR SOLUTION INTRAVENOUS at 16:24

## 2021-06-11 RX ADMIN — INSULIN LISPRO 2 UNITS: 100 INJECTION, SOLUTION INTRAVENOUS; SUBCUTANEOUS at 21:20

## 2021-06-11 ASSESSMENT — PAIN SCALES - GENERAL
PAINLEVEL_OUTOF10: 0
PAINLEVEL_OUTOF10: 7
PAINLEVEL_OUTOF10: 0

## 2021-06-11 NOTE — PROGRESS NOTES
Physical Therapy Initial Assessment     Name: Mervat East  :   MRN: [id number]      Date of Service: 2021    Evaluating PT:  Trace Montalvo, PT, DPT [id number]      Room #:  4549/0053-M  Diagnosis:  Right arm cellulitis [U61.362]  PMHx/PSHx:  OA, Asthma, COPD, HTN, RBBB, DM2, RA, Diabetic peripheral neuropathy, Hypothyroidism, IBS, Breast cancer with mastectomy 11/3/2020, Lumbar fusion, Cardiac catheterization   Procedure/Surgery:  NA  Precautions:  Falls  Equipment Needs:  Has [employer]    SUBJECTIVE:    Pt's son lives with pt in a 1 story home with no stairs to enter, then 7 steps up to living area/bedroom/bathroom with rail. 7 steps with rail down to basement laundry. Pt ambulated with [employer] PTA. OBJECTIVE:   Initial Evaluation  Date: 2021 Treatment Date:  NA Short Term/ Long Term   Goals   AM-PAC 6 Clicks 61/97     Was pt agreeable to Eval/treatment? Yes      Does pt have pain? Reported pain in RUE. Did not quantify. Bed Mobility  Rolling: SBA  Supine to sit: SBA  Sit to supine: SBA  Scooting: SBA  Rolling: Independent  Supine to sit: Independent  Sit to supine: Independent  Scooting: Independent   Transfers Sit to stand: Min A  Stand to sit: Min A  Stand pivot: Min A  Sit to stand: Mod I  Stand to sit: Mod I  Stand pivot: Mod I   Ambulation    5 feet x 4 with WW with Min A  >50 feet with WW with Supervision   Stair negotiation: ascended and descended  NT  7 steps with 1 rail with SBA   ROM BUE:  WFL  BLE:  WFL     Strength BUE:  4-/5  BLE:  4-/5  Increase strength 1/3 grade. Balance Sitting EOB:  SBA  Dynamic Standing:  Min A with [employer]  Sitting EOB:  Independent  Dynamic Standing:  Supervision with [employer]     Pt is A & O x 3. Lethargic initially as she just woke up. Reported feeling \"foggy\". Sensation:  Pt numbness and tingling to feet  Edema:   Moderate edema noted in RUE    Vitals:  Blood Pressure at rest - Blood Pressure post session -   Heart Rate at rest - Heart Rate post session - SPO2 at rest - SPO2 post session -     Therapeutic Exercises:  STS x 4, SPT x 4    Patient education  Pt educated on purpose of PT assessment, importance of mobility, safety with mobility, transfers, gait, use of AD for safety    Patient response to education:   Pt verbalized understanding Pt demonstrated skill Pt requires further education in this area   Yes  Yes with verbal cues and assist Yes/Reinforcement     ASSESSMENT:    Conditions Requiring Skilled Therapeutic Intervention:     [x]Decreased strength     []Decreased ROM  [x]Decreased functional mobility  [x]Decreased balance   [x]Decreased endurance   [x]Decreased posture  [x]Decreased sensation  []Decreased coordination   []Decreased vision  [x]Decreased safety awareness   [x]Increased pain       Comments:  Patient cleared by RN and agreeable to treatment. Patient found in semi Sin's sound asleep and awakened to name and tactile stimulation. Patient initially groggy, but increased alertness noted with time and physician assessment. Patient able to get self to seated EOB with increased time but no hands on assist. Patient reported mild to moderate dizziness with positional change that resolved after several minutes. Patient assisted on/off BSC and independent with hygiene. Patient demonstrated moderate forward flexed posture, slow gait speed, and mild instability with gait. Patient reported dizziness again with gait and not able to ambulate farther. Patient assisted to seated in the bedside chair with call light and tray table in reach. Patient educated on calling for assist and to not stand up on her own. Patient voiced understanding. Treatment:  Patient practiced and was instructed in the following treatment:    · Bed mobility: Verbal/tactile cues for sequencing BUEs/BLEs for safe technique with rolling/supine<>sit task. · Transfer training: Verbal/tactile cues to facilitate proper hand placement, technique and safety during sit to stand task.    · Gait training: Verbal and tactile cues to facilitate upright posture and safety as well as provided with physical assistance to complete task. · Therapeutic exercises: As noted above  · Neuromuscular education: NT    Pt's/ family goals   1. To feel better. Prognosis is good for reaching above PT goals. Patient and or family understand(s) diagnosis, prognosis, and plan of care. Yes     PHYSICAL THERAPY PLAN OF CARE:    PT POC is established based on physician order and patient diagnosis     Referring provider/PT Order:    06/10/21 1500  PT eval and treat Start: 06/10/21 1500, End: 06/10/21 1500, ONE TIME, Standing Count: 1 Occurrences, R      Aiden Hawk, DO       Diagnosis:  Right arm cellulitis [Y52.712]  Specific instructions for next treatment:  Subsequent PT sessions with focus on improved safety with transfers, ambulation distance, stairs. Current Treatment Recommendations:     [x] Strengthening to improve independence with functional mobility   [] ROM to improve independence with functional mobility   [x] Balance Training to improve static/dynamic balance and to reduce fall risk  [x] Endurance Training to improve activity tolerance during functional mobility   [x] Transfer Training to improve safety and independence with all functional transfers   [x] Gait Training to improve gait mechanics, endurance and asses need for appropriate assistive device  [x] Stair Training in preparation for safe discharge home and/or into the community   [] Positioning to prevent skin breakdown and contractures  [x] Safety and Education Training   [x] Patient/Caregiver Education   [] HEP  [] Other     PT long term treatment goals are located in above grid    Frequency of treatments: 2-5x/week x 1-2 weeks.     Time in  0830  Time out  0855    Total Treatment Time  16 minutes     Evaluation Time includes thorough review of current medical information, gathering information on past medical history/social history and

## 2021-06-11 NOTE — PROGRESS NOTES
Hospitalist Progress Note      SYNOPSIS: Patient admitted on 2021    [de-identified] y.o. female who presented to [facility] with medical history of breast cancer status post mastectomy in 2020, COPD, diabetes with peripheral neuropathy, asthma, hypertension, obesity, hypothyroidism, hyperlipidemia, rheumatoid osteoarthritis. Patient is a poor historian, started having gradual onset of redness involving the right arm. This has been getting progressively worse, described pain that is achy, constant, moderate in intensity and associated with swelling and redness. No fever. No chest pain or abdominal pain. No nausea or vomiting. She has leg swelling but no worse than usual.  No change in bowel habits or urinary complaints.         SUBJECTIVE:The pt. Is drowsy,since she was awaken from sleep, but responding well & Ox3   She states arm feels\" the same\"    Patient seen and examined  Records reviewed. Stable overnight. No other overnight issues reported. Temp (24hrs), Av.3 °F (36.3 °C), Min:97.2 °F (36.2 °C), Max:97.4 °F (36.3 °C)    DIET: ADULT DIET; Regular; 4 carb choices (60 gm/meal)  CODE: Full Code    Intake/Output Summary (Last 24 hours) at 2021 0901  Last data filed at 6/10/2021 1810  Gross per 24 hour   Intake 913 ml   Output 325 ml   Net 588 ml       OBJECTIVE:    /60   Pulse 60   Temp 97.4 °F (36.3 °C)   Resp 14   Ht 5' 4\" (1.626 m)   Wt 200 lb (90.7 kg)   SpO2 96%   BMI 34.33 kg/m²     General appearance: No apparent distress, appears stated age,slow but  and cooperative. HEENT:  Conjunctivae/corneas clear. Neck: Supple. No jugular venous distention. Respiratory: Clear to auscultation bilaterally, normal respiratory effort  Cardiovascular: Regular rate rhythm, normal S1-S2  Abdomen: Soft, nontender, nondistended  Musculoskeletal: No clubbing, cyanosis, no bilateral lower extremity edema. Brisk capillary refill.    Skin:  Skin color, texture, turgor normal.  Extensive area CO2 28 28 22   BUN 19 21 27*   CREATININE 1.4* 1.2* 1.5*   CALCIUM 9.9 9.2 9.2       Recent Labs     06/09/21  2007   PROT 7.1   ALKPHOS 65   ALT 5   AST 15   BILITOT 0.5       Recent Labs     06/10/21  0134   INR 1.0       No results for input(s): Charissatorres Lomeli in the last 72 hours. Chronic labs:    Lab Results   Component Value Date    CHOL 266 (H) 06/10/2021    TRIG 149 06/10/2021    HDL 44 06/10/2021    LDLCALC 192 (H) 06/10/2021    TSH 34.610 (H) 06/10/2021    INR 1.0 06/10/2021    LABA1C 6.0 (H) 06/10/2021       Radiology: REVIEWED DAILY    +++++++++++++++++++++++++++++++++++++++++++++++++  Greg Dailey MD  [facility] - Hospitalist  [address] [place] [address]  +++++++++++++++++++++++++++++++++++++++++++++++++  NOTE: This report was transcribed using voice recognition software. Every effort was made to ensure accuracy; however, inadvertent computerized transcription errors may be present.

## 2021-06-11 NOTE — PROGRESS NOTES
Occupational Therapy  OCCUPATIONAL THERAPY INITIAL EVALUATION     [address] [address] [address]         Date:2021                                                  Patient Name: Vale Herrera    MRN: [id number]    : 1940    Room: [address] [place] [address]      Evaluating OT: Bárbara Norman OTR/L [id number]      Referring Kye Malady, DO    Specific Provider Orders/Date: OT eval and treat       Diagnosis: Right arm cellulitis    Surgery: none      Pertinent Medical History: DM, OA, RA, Breast Cancer, Peripheral Neuropathy, B TKA's       Precautions:  Fall Risk     Assessment of current deficits   [x] Functional mobility  [x]ADLs  [x] Strength               [x]Cognition    [x] Functional transfers   [x] IADLs         [x] Safety Awareness   [x]Endurance    [] Fine Coordination              [x] Balance      [] Vision/perception   []Sensation     []Gross Motor Coordination  [] ROM  [] Delirium                   [] Motor Control     OT PLAN OF CARE   OT POC based on physician orders, patient diagnosis and results of clinical assessment    Frequency/Duration 1-3 days/wk for 2 weeks PRN   Specific OT Treatment Interventions to include:   * Instruction/training on adapted ADL techniques and AE recommendations to increase functional independence within        precautions  * Training on energy conservation strategies, correct breathing pattern and techniques to improve independence/tolerance for self-care routine  * Functional transfer/mobility training/DME recommendations for increased independence, safety, and fall prevention  * Patient/Family education to increase follow through with safety techniques and functional independence  * Recommendation of environmental modifications for increased safety with functional transfers/mobility and ADLs  * Therapeutic exercise to improve motor endurance, ROM, and functional strength for ADLs/functional transfers  * Therapeutic activities to facilitate/challenge dynamic balance, stand tolerance for increased safety and independence with ADLs    Recommended Adaptive Equipment:  TBD     Home Living: Pt lives with Son in a 1 story home with ramp entry way. When entering the ramp entrance everything on that level and pt does not need to do any steps. Son present providing home set up . Bathroom setup: walk in shower with built in seat and grab rails    Equipment owned: ww    Prior Level of Function: pt needed assist with bathing and was able to complete grooming and UB and LB dressing. Pt needed some assist with meal prep and laundry. Pt has [employer] 3 days a week for 3 hours. They assist pt with showering and with homemaking. Driving: pt does not drive   Occupation: pt is retired     Pain Level: pt had no complaints of pain   Cognition: A&O: 2/4; Follows 2 step directions with cues    Memory:  Poor    Sequencing:  Fair    Problem solving:  Fair    Judgement/safety:  Fair      Functional Assessment:  AM-PAC Daily Activity Raw Score:16/24   Initial Eval Status  Date: 6/11/21 Treatment Status  Date: STGs = LTGs  Time frame: 10-14 days   Feeding Supervision      Grooming Minimal Assist   Supervision    UB Dressing Minimal Assist   Supervision    LB Dressing Moderate Assist   Minimal Assist    Bathing N/A       Toileting Moderate Assist   Minimal Assist    Bed Mobility  Supine to sit: Stand by Assist   Sit to supine: Stand by Assist   Supine to sit:  Independent   Sit to supine: Independent    Functional Transfers Sit to stand - Min A   Stand pivot to BSC - Min A with ww   Stand by Assist with ww    Functional Mobility Minimal Assist with ww   Stand by Assist with ww   Balance Sitting:     Static:  SBA     Dynamic:CGA   Standing: Min A with ww      Activity Tolerance Fair      Visual/  Perceptual Glasses: pt has glasses                 Hand Dominance right    AROM (PROM) Strength Additional Info: Non-Billable Time          Evaluation Time additionally includes thorough review of current medical information, gathering information on past medical history/social history and prior level of function, interpretation of standardized testing/informal observation of tasks, assessment of data and development of plan of care and goals.             [facility]L [id number]

## 2021-06-12 LAB
ANION GAP SERPL CALCULATED.3IONS-SCNC: 11 MMOL/L (ref 7–16)
BUN BLDV-MCNC: 26 MG/DL (ref 6–23)
CALCIUM SERPL-MCNC: 8.8 MG/DL (ref 8.6–10.2)
CHLORIDE BLD-SCNC: 106 MMOL/L (ref 98–107)
CO2: 25 MMOL/L (ref 22–29)
CREAT SERPL-MCNC: 1.3 MG/DL (ref 0.5–1)
GFR AFRICAN AMERICAN: 48
GFR NON-AFRICAN AMERICAN: 39 ML/MIN/1.73
GLUCOSE BLD-MCNC: 92 MG/DL (ref 74–99)
HCT VFR BLD CALC: 38.6 % (ref 34–48)
HEMOGLOBIN: 12.5 G/DL (ref 11.5–15.5)
MCH RBC QN AUTO: 31.3 PG (ref 26–35)
MCHC RBC AUTO-ENTMCNC: 32.4 % (ref 32–34.5)
MCV RBC AUTO: 96.7 FL (ref 80–99.9)
METER GLUCOSE: 104 MG/DL (ref 74–99)
METER GLUCOSE: 129 MG/DL (ref 74–99)
METER GLUCOSE: 155 MG/DL (ref 74–99)
METER GLUCOSE: 172 MG/DL (ref 74–99)
PDW BLD-RTO: 13.7 FL (ref 11.5–15)
PLATELET # BLD: 114 E9/L (ref 130–450)
PMV BLD AUTO: 10.6 FL (ref 7–12)
POTASSIUM SERPL-SCNC: 4 MMOL/L (ref 3.5–5)
RBC # BLD: 3.99 E12/L (ref 3.5–5.5)
SODIUM BLD-SCNC: 142 MMOL/L (ref 132–146)
WBC # BLD: 5.8 E9/L (ref 4.5–11.5)

## 2021-06-12 PROCEDURE — 85027 COMPLETE CBC AUTOMATED: CPT

## 2021-06-12 PROCEDURE — 36415 COLL VENOUS BLD VENIPUNCTURE: CPT

## 2021-06-12 PROCEDURE — 1200000000 HC SEMI PRIVATE

## 2021-06-12 PROCEDURE — 6360000002 HC RX W HCPCS: Performed by: FAMILY MEDICINE

## 2021-06-12 PROCEDURE — 6370000000 HC RX 637 (ALT 250 FOR IP): Performed by: INTERNAL MEDICINE

## 2021-06-12 PROCEDURE — 6370000000 HC RX 637 (ALT 250 FOR IP): Performed by: FAMILY MEDICINE

## 2021-06-12 PROCEDURE — 82962 GLUCOSE BLOOD TEST: CPT

## 2021-06-12 PROCEDURE — 2500000003 HC RX 250 WO HCPCS: Performed by: FAMILY MEDICINE

## 2021-06-12 PROCEDURE — 80048 BASIC METABOLIC PNL TOTAL CA: CPT

## 2021-06-12 PROCEDURE — 2580000003 HC RX 258: Performed by: FAMILY MEDICINE

## 2021-06-12 RX ADMIN — HEPARIN SODIUM 5000 UNITS: 10000 INJECTION INTRAVENOUS; SUBCUTANEOUS at 08:45

## 2021-06-12 RX ADMIN — LEVOTHYROXINE SODIUM 200 MCG: 0.2 TABLET ORAL at 05:52

## 2021-06-12 RX ADMIN — SODIUM CHLORIDE, PRESERVATIVE FREE 10 ML: 5 INJECTION INTRAVENOUS at 08:44

## 2021-06-12 RX ADMIN — INSULIN LISPRO 2 UNITS: 100 INJECTION, SOLUTION INTRAVENOUS; SUBCUTANEOUS at 17:30

## 2021-06-12 RX ADMIN — METOPROLOL SUCCINATE 25 MG: 25 TABLET, EXTENDED RELEASE ORAL at 08:44

## 2021-06-12 RX ADMIN — WATER 1000 MG: 1 INJECTION INTRAMUSCULAR; INTRAVENOUS; SUBCUTANEOUS at 03:50

## 2021-06-12 RX ADMIN — INSULIN LISPRO 2 UNITS: 100 INJECTION, SOLUTION INTRAVENOUS; SUBCUTANEOUS at 20:26

## 2021-06-12 RX ADMIN — DOXYCYCLINE 100 MG: 100 INJECTION, POWDER, LYOPHILIZED, FOR SOLUTION INTRAVENOUS at 15:59

## 2021-06-12 RX ADMIN — ATORVASTATIN CALCIUM 40 MG: 40 TABLET, FILM COATED ORAL at 20:26

## 2021-06-12 RX ADMIN — HEPARIN SODIUM 5000 UNITS: 10000 INJECTION INTRAVENOUS; SUBCUTANEOUS at 02:00

## 2021-06-12 RX ADMIN — HEPARIN SODIUM 5000 UNITS: 10000 INJECTION INTRAVENOUS; SUBCUTANEOUS at 17:19

## 2021-06-12 RX ADMIN — SODIUM CHLORIDE, PRESERVATIVE FREE 10 ML: 5 INJECTION INTRAVENOUS at 20:26

## 2021-06-12 RX ADMIN — DOXYCYCLINE 100 MG: 100 INJECTION, POWDER, LYOPHILIZED, FOR SOLUTION INTRAVENOUS at 04:00

## 2021-06-12 RX ADMIN — MIRTAZAPINE 30 MG: 15 TABLET, FILM COATED ORAL at 20:26

## 2021-06-12 ASSESSMENT — PAIN SCALES - GENERAL
PAINLEVEL_OUTOF10: 0

## 2021-06-12 NOTE — PROGRESS NOTES
Patient lost IV access, 2 attempts with no success. Explained to patient importance of IV for IV fluids for kidneys as well as antibiotic infusions.      IV team perfect served for new Largo Memos RN, BSN

## 2021-06-12 NOTE — PLAN OF CARE
Problem: Skin Integrity:  Goal: Will show no infection signs and symptoms  Description: Will show no infection signs and symptoms  Outcome: Met This Shift  Goal: Absence of new skin breakdown  Description: Absence of new skin breakdown  Outcome: Met This Shift     Problem: Pain:  Goal: Pain level will decrease  Description: Pain level will decrease  Outcome: Met This Shift  Goal: Control of acute pain  Description: Control of acute pain  Outcome: Met This Shift  Goal: Control of chronic pain  Description: Control of chronic pain  Outcome: Met This Shift     Problem: Falls - Risk of:  Goal: Will remain free from falls  Description: Will remain free from falls  Outcome: Met This Shift  Goal: Absence of physical injury  Description: Absence of physical injury  Outcome: Met This Shift     Problem: Musculor/Skeletal Functional Status  Goal: Highest potential functional level  Outcome: Met This Shift  Goal: Absence of falls  Outcome: Met This Shift

## 2021-06-12 NOTE — PROGRESS NOTES
levothyroxine  200 mcg Oral Daily    atorvastatin  40 mg Oral Nightly      Infusions:    sodium chloride 100 mL/hr at 06/11/21 1547    dextrose      sodium chloride       PRN Meds: acetaminophen, 650 mg, Q6H PRN  miconazole, , BID PRN  glucose, 15 g, PRN  dextrose, 12.5 g, PRN  glucagon (rDNA), 1 mg, PRN  dextrose, 100 mL/hr, PRN  sodium chloride flush, 5-40 mL, PRN  sodium chloride, 25 mL, PRN  ondansetron, 4 mg, Q8H PRN   Or  ondansetron, 4 mg, Q6H PRN  polyethylene glycol, 17 g, Daily PRN        Data/Labs:     Recent Labs     06/10/21  0445 06/11/21  0548 06/12/21  0906   WBC 6.8 6.9 5.8   HGB 11.7 13.1 12.5   HCT 34.8 39.8 38.6   * 137 114*      Recent Labs     06/10/21  0445 06/11/21  0548 06/12/21  0906    138 142   K 3.7 4.3 4.0   CL 99 101 106   CO2 28 22 25   BUN 21 27* 26*   CREATININE 1.2* 1.5* 1.3*     Recent Labs     06/09/21 2007   AST 15   ALT 5   BILITOT 0.5   ALKPHOS 65     Recent Labs     06/10/21  0134   INR 1.0     No results for input(s): CKTOTAL, CKMB, CKMBINDEX, TROPONINT in the last 72 hours. I/O last 3 completed shifts: In: 1884 [I.V.:1884]  Out: 150 [Urine:150]    Intake/Output Summary (Last 24 hours) at 6/12/2021 1335  Last data filed at 6/12/2021 0832  Gross per 24 hour   Intake 2124 ml   Output 150 ml   Net 1974 ml        Assessment/Plan:   1. Acute right arm cellulitisultrasound showed no clot, will continue antibiotics at this time  2. Non-insulin-dependent diabetes mellitussliding scale, diabetic diet, monitor blood glucose  3. HypothyroidismSynthroid daily  4. Hypertensionmetoprolol  5. Hyperlipidemiastatin at night      DVT Prophylaxis: scd  Diet: ADULT DIET;  Regular; 4 carb choices (60 gm/meal)  Code Status: Full Code    Dispo: when stable     Electronically signed by Мария Clemente MD on 6/12/2021 at 1:35 PM  [facility]

## 2021-06-13 VITALS
TEMPERATURE: 98 F | SYSTOLIC BLOOD PRESSURE: 150 MMHG | HEART RATE: 64 BPM | HEIGHT: 64 IN | BODY MASS INDEX: 37.18 KG/M2 | RESPIRATION RATE: 16 BRPM | WEIGHT: 217.8 LBS | DIASTOLIC BLOOD PRESSURE: 76 MMHG | OXYGEN SATURATION: 97 %

## 2021-06-13 LAB
ANION GAP SERPL CALCULATED.3IONS-SCNC: 11 MMOL/L (ref 7–16)
BUN BLDV-MCNC: 25 MG/DL (ref 6–23)
CALCIUM SERPL-MCNC: 9.2 MG/DL (ref 8.6–10.2)
CHLORIDE BLD-SCNC: 109 MMOL/L (ref 98–107)
CO2: 25 MMOL/L (ref 22–29)
CREAT SERPL-MCNC: 1.2 MG/DL (ref 0.5–1)
GFR AFRICAN AMERICAN: 52
GFR NON-AFRICAN AMERICAN: 43 ML/MIN/1.73
GLUCOSE BLD-MCNC: 123 MG/DL (ref 74–99)
HCT VFR BLD CALC: 35.2 % (ref 34–48)
HEMOGLOBIN: 11.5 G/DL (ref 11.5–15.5)
MCH RBC QN AUTO: 31.6 PG (ref 26–35)
MCHC RBC AUTO-ENTMCNC: 32.7 % (ref 32–34.5)
MCV RBC AUTO: 96.7 FL (ref 80–99.9)
METER GLUCOSE: 119 MG/DL (ref 74–99)
PDW BLD-RTO: 13.6 FL (ref 11.5–15)
PLATELET # BLD: 110 E9/L (ref 130–450)
PMV BLD AUTO: 10.5 FL (ref 7–12)
POTASSIUM SERPL-SCNC: 3.6 MMOL/L (ref 3.5–5)
RBC # BLD: 3.64 E12/L (ref 3.5–5.5)
SODIUM BLD-SCNC: 145 MMOL/L (ref 132–146)
WBC # BLD: 6.5 E9/L (ref 4.5–11.5)

## 2021-06-13 PROCEDURE — 2500000003 HC RX 250 WO HCPCS: Performed by: FAMILY MEDICINE

## 2021-06-13 PROCEDURE — 80048 BASIC METABOLIC PNL TOTAL CA: CPT

## 2021-06-13 PROCEDURE — 82962 GLUCOSE BLOOD TEST: CPT

## 2021-06-13 PROCEDURE — 6360000002 HC RX W HCPCS: Performed by: FAMILY MEDICINE

## 2021-06-13 PROCEDURE — 6370000000 HC RX 637 (ALT 250 FOR IP): Performed by: FAMILY MEDICINE

## 2021-06-13 PROCEDURE — 85027 COMPLETE CBC AUTOMATED: CPT

## 2021-06-13 PROCEDURE — 2580000003 HC RX 258: Performed by: FAMILY MEDICINE

## 2021-06-13 PROCEDURE — 36415 COLL VENOUS BLD VENIPUNCTURE: CPT

## 2021-06-13 RX ORDER — DOXYCYCLINE HYCLATE 100 MG
100 TABLET ORAL 2 TIMES DAILY
Qty: 10 TABLET | Refills: 0 | Status: SHIPPED | OUTPATIENT
Start: 2021-06-13 | End: 2021-06-18

## 2021-06-13 RX ADMIN — LEVOTHYROXINE SODIUM 200 MCG: 0.2 TABLET ORAL at 06:22

## 2021-06-13 RX ADMIN — HEPARIN SODIUM 5000 UNITS: 10000 INJECTION INTRAVENOUS; SUBCUTANEOUS at 01:43

## 2021-06-13 RX ADMIN — DOXYCYCLINE 100 MG: 100 INJECTION, POWDER, LYOPHILIZED, FOR SOLUTION INTRAVENOUS at 05:01

## 2021-06-13 RX ADMIN — WATER 1000 MG: 1 INJECTION INTRAMUSCULAR; INTRAVENOUS; SUBCUTANEOUS at 04:25

## 2021-06-13 RX ADMIN — HEPARIN SODIUM 5000 UNITS: 10000 INJECTION INTRAVENOUS; SUBCUTANEOUS at 08:44

## 2021-06-13 RX ADMIN — METOPROLOL SUCCINATE 25 MG: 25 TABLET, EXTENDED RELEASE ORAL at 08:44

## 2021-06-13 ASSESSMENT — PAIN SCALES - GENERAL: PAINLEVEL_OUTOF10: 0

## 2021-06-13 NOTE — DISCHARGE SUMMARY
Discharge Summary    Patient ID   Larry Solomon   [id number]  [id number]    Primary Care:   Tarsha Piedra MD    Admit date: 2021   Discharge date: 2021    Medical Record number: [id number]   Admitting Physician: Bronwyn Apgar, MD   Discharge Physician: Homer Reese MD    Consultants: none    Procedures: none    Discharge Diagnoses:      Patient Active Problem List   Diagnosis Code    Breast cancer ([facility] C50.919    Psoriatic arthritis ([facility] L40.50    RA (rheumatoid arthritis) ([facility] M06.9    Obesity (BMI 30-39. 9) E66.9    Hypothyroidism E03.9    Irritable bowel syndrome K58.9    HTN (hypertension), benign I10    Diabetes mellitus [facility] 2, uncontrolled ([facility] E11.65    Acute cerebrovascular accident (CVA) ([facility] I63.9    Multifactorial gait disorder R26.89    Bilateral leg weakness R29.898    Venous stasis I87.8    Wound of left breast S21.002A    History of breast cancer Z85.3    Breast lump N63.0    Mixed hyperlipidemia E78.2    Arthritis M19.90    Type 2 diabetes mellitus with renal complication ([facility]) C83.12    Essential hypertension I10    Vitamin D deficiency E55.9    Morbidly obese ([facility]) E66.01    Right bundle branch block I45.10    Coronary artery calcification seen on CT scan I25.10    Acute pain after mastectomy G89.18, Z90.10    S/P mastectomy, right Z90.11    Right arm cellulitis L03. 113    Acute kidney injury superimposed on CKD ([facility]) N17.9, N18.9    COPD (chronic obstructive pulmonary disease) ([facility] J44.9         Hospital Course:   [de-identified] y.o. female who presented to [facility] with medical history of breast cancer status post mastectomy in 2020, COPD, diabetes with peripheral neuropathy, asthma, hypertension, obesity, hypothyroidism, hyperlipidemia, rheumatoid osteoarthritis.  Patient is a poor historian, started having gradual onset of redness involving the right arm.  This has been getting progressively worse, described pain that is achy, constant, moderate in intensity and associated with swelling and redness.  No fever.  No chest pain or abdominal pain.  No nausea or vomiting.  She has leg swelling but no worse than usual.  No change in bowel habits or urinary complaints. Patient improved with medical management and at this time is stable to be discharged to home    Physical Exam:   BP (!) 150/76   Pulse 64   Temp 98 °F (36.7 °C) (Temporal)   Resp 16   Ht 5' 4\" (1.626 m)   Wt 217 lb 12.8 oz (98.8 kg)   SpO2 97%   BMI 37.39 kg/m²   Neck: no JVD  Lungs: equal BS, clear   CV: RRR, normal S1S2, no significant murmur  Abdomen: soft, nontender, normally active BS, no masses or tenderness  Extremities: no edema or cords  Neurologic: alert, oriented, no focal CN or motor deficit        Medications: see computerized discharge medication list     Medication List      START taking these medications    doxycycline hyclate 100 MG tablet  Commonly known as: VIBRA-TABS  Take 1 tablet by mouth 2 times daily for 5 days        CONTINUE taking these medications    acetaminophen 325 MG tablet  Commonly known as: TYLENOL     aspirin EC 81 MG EC tablet  Take 1 tablet by mouth daily     atorvastatin 20 MG tablet  Commonly known as: LIPITOR  Take 2 tablets by mouth daily     blood glucose test strips  Test 1 times a day & as needed for symptoms of irregular blood glucose.      glucose monitoring kit monitoring kit  1 kit by Does not apply route daily     Lancets Misc  1 each by Does not apply route daily     levothyroxine 175 MCG tablet  Commonly known as: SYNTHROID  Take one tablet Mon-Fri and 2 tabs Sat/Sun.     metFORMIN 500 MG tablet  Commonly known as: GLUCOPHAGE  Take 1 tablet by mouth 2 times daily (with meals)     metoprolol succinate 25 MG extended release tablet  Commonly known as: TOPROL XL  Take 1 tablet by mouth daily     mirtazapine 30 MG tablet  Commonly known as: REMERON  Take 1 tablet by mouth nightly     nystatin 513694 UNIT/GM powder  Commonly known as:

## 2021-06-13 NOTE — DISCHARGE INSTR - COC
10/07/2014, 10/07/2015    Influenza, High Dose (Fluzone 65 yrs and older) 10/24/2017       Active Problems:  Patient Active Problem List   Diagnosis Code    Breast cancer ([facility] C50.919    Psoriatic arthritis ([facility] L40.50    RA (rheumatoid arthritis) ([facility] M06.9    Obesity (BMI 30-39. 9) E66.9    Hypothyroidism E03.9    Irritable bowel syndrome K58.9    HTN (hypertension), benign I10    Diabetes mellitus type 2, uncontrolled ([facility] E11.65    Acute cerebrovascular accident (CVA) ([facility] I63.9    Multifactorial gait disorder R26.89    Bilateral leg weakness R29.898    Venous stasis I87.8    Wound of left breast S21.002A    History of breast cancer Z85.3    Breast lump N63.0    Mixed hyperlipidemia E78.2    Arthritis M19.90    Type 2 diabetes mellitus with renal complication ([facility]) V82.76    Essential hypertension I10    Vitamin D deficiency E55.9    Morbidly obese ([facility]) E66.01    Right bundle branch block I45.10    Coronary artery calcification seen on CT scan I25.10    Acute pain after mastectomy G89.18, Z90.10    S/P mastectomy, right Z90.11    Right arm cellulitis L03. 113    Acute kidney injury superimposed on CKD ([facility]) N17.9, N18.9    COPD (chronic obstructive pulmonary disease) ([facility]) J44.9       Isolation/Infection:   Isolation          No Isolation        Patient Infection Status     Infection Onset Added Last Indicated Last Indicated By Review Planned Expiration Resolved Resolved By    None active    Resolved    COVID-19 Rule Out 10/29/20 10/29/20 10/29/20 Covid-19 Ambulatory (Ordered)   10/31/20 Rule-Out Test Resulted    COVID-19 Rule Out 09/18/20 09/18/20 09/18/20 Covid-19 Ambulatory (Ordered)   09/20/20 Rule-Out Test Resulted          Nurse Assessment:  Last Vital Signs: BP (!) 150/76   Pulse 64   Temp 98 °F (36.7 °C) (Temporal)   Resp 16   Ht 5' 4\" (1.626 m)   Wt 217 lb 12.8 oz (98.8 kg)   SpO2 97%   BMI 37.39 kg/m²     Last documented pain score (0-10 scale): Pain Level: 0 Last Weight:   Wt Readings from Last 1 Encounters:   21 217 lb 12.8 oz (98.8 kg)     Mental Status:  {IP PT MENTAL STATUS:}    IV Access:  { ARSENIO IV ACCESS:903205194}    Nursing Mobility/ADLs:  Walking   {CHP DME XNDW:847601286}  Transfer  {CHP DME JQEQ:620270560}  Bathing  {CHP DME CQWN:167194847}  Dressing  {CHP DME JUSP:202491803}  Toileting  {CHP DME HYAW:146941578}  Feeding  {CHP DME BJRI:740958717}  Med Admin  {CHP DME NGHX:857917675}  Med Delivery   { ARSENIO MED Delivery:778450584}    Wound Care Documentation and Therapy:        Elimination:  Continence:   · Bowel: {YES / OE:77219}  · Bladder: {YES / FJ:93309}  Urinary Catheter: {Urinary Catheter:598391002}   Colostomy/Ileostomy/Ileal Conduit: {YES / WV:42658}       Date of Last BM: ***    Intake/Output Summary (Last 24 hours) at 2021 1011  Last data filed at 2021 0624  Gross per 24 hour   Intake 2342 ml   Output    Net 2342 ml     I/O last 3 completed shifts: In: 2582 [P.O.:720;  I.V.:1862]  Out: -     Safety Concerns:     [address] Safety Concerns:225486228}    Impairments/Disabilities:      [address] Impairments/Disabilities:607706523}    Nutrition Therapy:  Current Nutrition Therapy:   [address] Diet List:268941131}    Routes of Feeding: {CHP DME Other Feedings:223172748}  Liquids: {Slp liquid thickness:60964}  Daily Fluid Restriction: {CHP DME Yes amt example:307032521}  Last Modified Barium Swallow with Video (Video Swallowing Test): {Done Not Done EACD:144020199}    Treatments at the Time of Hospital Discharge:   Respiratory Treatments: ***  Oxygen Therapy:  {Therapy; copd oxygen:73472}  Ventilator:    { CC Vent CZPH:393434075}    Rehab Therapies: {THERAPEUTIC INTERVENTION:6937768633}  Weight Bearing Status/Restrictions: [address]  Weight Bearin}  Other Medical Equipment (for information only, NOT a DME order):  {EQUIPMENT:725445839}  Other Treatments: ***    Patient's personal belongings (please select all that are sent with patient):  {CHP DME Belongings:462365549}    RN SIGNATURE:  {Esignature:872882866}    CASE MANAGEMENT/SOCIAL WORK SECTION    Inpatient Status Date: ***    Readmission Risk Assessment Score:  Readmission Risk              Risk of Unplanned Readmission:  17           Discharging to Facility/ Agency   · Name:   · Address:  · Phone:  · Fax:    Dialysis Facility (if applicable)   · Name:  · Address:  · Dialysis Schedule:  · Phone:  · Fax:    / signature: {Esignature:848310024}    PHYSICIAN SECTION    Prognosis: {Prognosis:8803807472}    Condition at Discharge: [address] [place] [address] Patient Condition:183769980}    Rehab Potential (if transferring to Rehab): {Prognosis:8915651647}    Recommended Labs or Other Treatments After Discharge: ***    Physician Certification: I certify the above information and transfer of Sean Beavers  is necessary for the continuing treatment of the diagnosis listed and that she requires {Admit to Appropriate Level of Care:98820} for {GREATER/LESS:154098537} 30 days.      Update Admission H&P: {CHP DME Changes in YIQAP:104911717}    PHYSICIAN SIGNATURE:  {Esignature:437983978}

## 2021-06-14 ENCOUNTER — TELEPHONE (OUTPATIENT)
Dept: FAMILY MEDICINE CLINIC | Age: 81
End: 2021-06-14

## 2021-06-14 ENCOUNTER — CARE COORDINATION (OUTPATIENT)
Dept: CASE MANAGEMENT | Age: 81
End: 2021-06-14

## 2021-06-14 DIAGNOSIS — I10 ESSENTIAL HYPERTENSION: ICD-10-CM

## 2021-06-14 DIAGNOSIS — E03.9 HYPOTHYROIDISM, UNSPECIFIED TYPE: ICD-10-CM

## 2021-06-14 LAB — BLOOD CULTURE, ROUTINE: NORMAL

## 2021-06-14 RX ORDER — RAMIPRIL 10 MG/1
10 CAPSULE ORAL DAILY
Qty: 90 CAPSULE | Refills: 1 | Status: SHIPPED
Start: 2021-06-14 | End: 2021-06-21 | Stop reason: SDUPTHER

## 2021-06-14 RX ORDER — LEVOTHYROXINE SODIUM 175 UG/1
TABLET ORAL
Qty: 108 TABLET | Refills: 1 | Status: SHIPPED
Start: 2021-06-14 | End: 2021-06-21 | Stop reason: SDUPTHER

## 2021-06-14 NOTE — TELEPHONE ENCOUNTER
Nat Desai called in from [place] [address], they saw the pt and will do PT, OT and nursing. She said that the only medications the pt had at home was ASA, lipitor and metoprolol succinate. Pt needs a refill on metformin 500mg, synthroid 175 mcg and ramipril 10mg.

## 2021-06-14 NOTE — CARE COORDINATION
Tomás 45 Transitions Initial Follow Up Call    Call within 2 business days of discharge: Yes    Patient: Dominique Vanessa Patient : 1940   MRN: [id number]  Reason for Admission: Right arm cellulitis  Discharge Date: 21 RARS: Readmission Risk Score: 17      Last Discharge [address]       Complaint Diagnosis Description Type Department Provider    21 Cellulitis Right arm cellulitis ED to Hosp-Admission (Discharged) (ADMITTED) [place] 8WE Tom Finney MD; Nakia Adjutant. .. Spoke with: No one    Facility: [facility]    First attempt to reach the patient for initial bpci [phone]) Care Transition call post hospital discharge. Message left with CTN's contact information requesting return phone call.       Follow Up  Future Appointments   Date Time Provider Priscila Morgan   2021 12:00 PM [facility] 1 [facility] A[facility]   2021  1:00 PM [address], APRN -  N Joe Borjas RN

## 2021-06-15 ENCOUNTER — CARE COORDINATION (OUTPATIENT)
Dept: CASE MANAGEMENT | Age: 81
End: 2021-06-15

## 2021-06-15 NOTE — CARE COORDINATION
Tom[facility] 45 Transitions Initial Follow Up Call    Call within 2 business days of discharge: Yes    Patient: Mayur Horner Patient : 1940   MRN: [id number]  Reason for Admission: Right arm cellulitis  Discharge Date: 21 RARS: Readmission Risk Score: 17      Last Discharge [facility]       Complaint Diagnosis Description Type Department Provider    21 Cellulitis Right arm cellulitis ED to Hosp-Admission (Discharged) (ADMITTED) [place] 8WE Mian Tao MD; José Manuel Prince. .. Spoke with: No one    Facility: [facility]    Second attempt to reach the patient for initial Care Transition call post hospital discharge. Message left with CTN's contact information requesting return phone call. This CTN to hand of to [employer] for future follow up calls.       Follow Up  Future Appointments   Date Time Provider Priscila Morgan   2021 12:00 PM [facility] [address] [facility] 1 [place] [facility]   2021  1:00 PM [address], APRN -  N [facility] [address] RN

## 2021-06-18 ENCOUNTER — CARE COORDINATION (OUTPATIENT)
Dept: CASE MANAGEMENT | Age: 81
End: 2021-06-18

## 2021-06-21 ENCOUNTER — TELEPHONE (OUTPATIENT)
Dept: FAMILY MEDICINE CLINIC | Age: 81
End: 2021-06-21

## 2021-06-21 DIAGNOSIS — I10 ESSENTIAL HYPERTENSION: ICD-10-CM

## 2021-06-21 DIAGNOSIS — E03.9 HYPOTHYROIDISM, UNSPECIFIED TYPE: ICD-10-CM

## 2021-06-21 DIAGNOSIS — E78.2 MIXED HYPERLIPIDEMIA: ICD-10-CM

## 2021-06-21 RX ORDER — LEVOTHYROXINE SODIUM 175 UG/1
TABLET ORAL
Qty: 108 TABLET | Refills: 1 | Status: SHIPPED | OUTPATIENT
Start: 2021-06-21

## 2021-06-21 RX ORDER — MIRTAZAPINE 30 MG/1
30 TABLET, FILM COATED ORAL NIGHTLY
Qty: 90 TABLET | Refills: 1 | Status: SHIPPED | OUTPATIENT
Start: 2021-06-21

## 2021-06-21 RX ORDER — ATORVASTATIN CALCIUM 20 MG/1
40 TABLET, FILM COATED ORAL DAILY
Qty: 180 TABLET | Refills: 1 | Status: SHIPPED | OUTPATIENT
Start: 2021-06-21

## 2021-06-21 RX ORDER — ATORVASTATIN CALCIUM 20 MG/1
40 TABLET, FILM COATED ORAL DAILY
Qty: 180 TABLET | Refills: 1 | Status: SHIPPED
Start: 2021-06-21 | End: 2021-06-21 | Stop reason: SDUPTHER

## 2021-06-21 RX ORDER — METOPROLOL SUCCINATE 25 MG/1
25 TABLET, EXTENDED RELEASE ORAL DAILY
Qty: 90 TABLET | Refills: 1 | Status: ON HOLD
Start: 2021-06-21 | End: 2022-11-18 | Stop reason: HOSPADM

## 2021-06-21 RX ORDER — RAMIPRIL 10 MG/1
10 CAPSULE ORAL DAILY
Qty: 90 CAPSULE | Refills: 1 | Status: ON HOLD
Start: 2021-06-21 | End: 2022-11-18 | Stop reason: HOSPADM

## 2021-06-23 ENCOUNTER — HOSPITAL ENCOUNTER (EMERGENCY)
Age: 81
Discharge: HOME | End: 2021-06-23
Payer: MEDICARE

## 2021-06-23 ENCOUNTER — CARE COORDINATION (OUTPATIENT)
Dept: CASE MANAGEMENT | Age: 81
End: 2021-06-23

## 2021-06-23 VITALS
WEIGHT: 217 LBS | TEMPERATURE: 96.9 F | RESPIRATION RATE: 19 BRPM | HEART RATE: 70 BPM | BODY MASS INDEX: 37.25 KG/M2 | DIASTOLIC BLOOD PRESSURE: 85 MMHG | OXYGEN SATURATION: 99 % | SYSTOLIC BLOOD PRESSURE: 150 MMHG

## 2021-06-23 DIAGNOSIS — R21 RASH AND OTHER NONSPECIFIC SKIN ERUPTION: Primary | ICD-10-CM

## 2021-06-23 LAB
CHP ED QC CHECK: YES
GLUCOSE BLD-MCNC: 117 MG/DL
METER GLUCOSE: 117 MG/DL (ref 74–99)

## 2021-06-23 PROCEDURE — 99283 EMERGENCY DEPT VISIT LOW MDM: CPT

## 2021-06-23 PROCEDURE — 82962 GLUCOSE BLOOD TEST: CPT

## 2021-06-23 RX ORDER — TRIAMCINOLONE ACETONIDE 5 MG/G
CREAM TOPICAL
Qty: 45 G | Refills: 0 | Status: SHIPPED | OUTPATIENT
Start: 2021-06-23 | End: 2021-06-30

## 2021-06-23 NOTE — CARE COORDINATION
Tom[facility] 45 Transitions Follow Up Call    2021    Patient: David Torres  Patient : 1940   MRN: <[id number]>  Reason for Admission: Right arm cellulitis  Discharge Date: 21 RARS: Readmission Risk Score: 17         Attempted to contact patient for BPCI- A call. Contact information left to  requesting call back at the earliest convenience. Mobile number B full. Care Transitions Subsequent and Final Call    Subsequent and Final Calls  Care Transitions Interventions  Other Interventions:            Follow Up  Future Appointments   Date Time Provider Priscila Morgan   2021 12:00 PM [facility] [address] [facility] 1 [place] A[facility]   2021  1:00 PM PABLO Mcnulty - CNP [facility]       Waldemar Bettencourt RN

## 2021-06-23 NOTE — ED NOTES
Bed: ST-6  Expected date:   Expected time:   Means of arrival:   Comments:  ems     Vinnie Davis RN  06/23/21 6519

## 2021-06-23 NOTE — ED PROVIDER NOTES
[facility]  Department of Emergency Medicine   ED  Encounter Note  Admit Date/RoomTime: 2021  4:17 PM  ED Room: [place] [address]    NAME: Jossue Dennis  : 1940  MRN: [id number]     Chief Complaint:  Rash (Right upper arm rash that has been ongoing for several days. Pt has been seen for this rash before. )    History of Present Illness       Jossue Dennis is a [de-identified] y.o. old female who presents to the emergency department by ambulance, for gradual onset, persistent of red, raised and asymptomatic area on  Right upper arm which began several day(s) prior to arrival.  The symptoms were caused by unknown cause. Since onset the symptoms have been persistent. Prior history of similar episodes: Yes. Her symptoms are associated with nothing additional and relieved by nothing. She denies any itching, fever, chills or pain. ROS   Pertinent positives and negatives are stated within HPI, all other systems reviewed and are negative. Past Medical History:  has a past medical history of Asthma, Blood transfusion, Breast cancer ([address] COPD (chronic obstructive pulmonary disease) ([address] Diabetes mellitus ([address] Diabetic peripheral neuropathy ([address] DM type 2 (diabetes mellitus, type 2) ([address] Hypertension, Hypothyroidism, Irritable bowel syndrome, Mixed hyperlipidemia, Obesity (BMI 30-39.9), Osteoarthritis, RA (rheumatoid arthritis) ([address] and RBBB. Surgical History:  has a past surgical history that includes Thyroid surgery; Cholecystectomy; Hysterectomy; Carpal tunnel release; lumbar fusion; other surgical history; Breast lumpectomy ([id number]); joint replacement (Bilateral); US BREAST BIOPSY W LOC DEVICE 1ST LESION RIGHT (2020); Cardiac catheterization (10/09/2020); and Mastectomy, modified radical (Right, 11/3/2020). Social History:  reports that she has never smoked.  She has never used smokeless tobacco. She reports that she does not drink alcohol and does not use drugs. Family History: family history includes Cancer in her brother and father; Cancer (age of onset: 36) in her mother; Diabetes in her son; High Blood Pressure in her son; High Cholesterol in her son; Hypertension in her son; Neuropathy in her son. Allergies: Patient has no known allergies. Physical Exam   Oxygen Saturation Interpretation: Normal.        ED Triage Vitals [06/23/21 1618]   BP Temp Temp Source Pulse Resp SpO2 Height Weight   (!) 150/85 96.9 °F (36.1 °C) Temporal 70 19 99 % -- 217 lb (98.4 kg)         Constitutional:  Alert, development consistent with age. HEENT:  NC/NT. Airway patent. Eyes:  PERRL, EOMI, no discharge. Ears:  TMs without perforation, injection, or bulging. External canals clear without exudate. Mouth:  Mucous membranes moist without lesions, tongue and gums normal.  Throat:  Pharynx without injection, exudate, or tonsillar hypertrophy. Airway patient. Neck:  Supple. No lymphadenopathy. Respiratory:  Clear to auscultation and breath sounds equal.  CV:  Regular rate and rhythm. GI:  Abdomen Soft, nontender, +BS. Integument:  Skin turgor: Normal.              RUE:      Neurological:  Orientation age-appropriate unless noted elseware. Motor functions intact. Lab / Imaging Results   (All laboratory and radiology results have been personally reviewed by myself)  Labs:  Results for orders placed or performed during the hospital encounter of 06/23/21   POCT Glucose   Result Value Ref Range    Meter Glucose 117 (H) 74 - 99 mg/dL   POCT Glucose   Result Value Ref Range    Glucose 117 mg/dL    QC OK? yes      Imaging: All Radiology results interpreted by Radiologist unless otherwise noted. No orders to display       ED Course / Medical Decision Making   Medications - No data to display     Consults:   None    MDM:   She presented by ambulance with a relatively asymptomatic rash on the right upper extremity which is been present for a few days. She has had this happen to her before but does not know the reason. There is no evidence to suggest underlying infection/cellulitis. There are no open wounds to suggest any vector for infection. At this point given its appearance and excoriation, topical steroids may be the appropriate first-line treatment. Her blood sugar was tested and was found to be 117, it was tested because she claims she was supposed to be a diabetic but takes no medications. Plan of Care/Counseling:  Saba Mccollum reviewed today's visit with the patient in addition to providing specific details for the plan of care and counseling regarding the diagnosis and prognosis. Questions are answered at this time and are agreeable with the plan. Assessment      1. Rash and other nonspecific skin eruption      Plan   Discharged home. Patient condition is good    New Medications     New Prescriptions    TRIAMCINOLONE (ARISTOCORT) 0.5 % CREAM    Apply topically 3 times daily. Electronically signed by MELINA Mccollum   DD: 6/23/21  **This report was transcribed using voice recognition software. Every effort was made to ensure accuracy; however, inadvertent computerized transcription errors may be present.   END OF ED PROVIDER NOTE       Saba Dolan  06/23/21 9858

## 2021-06-24 ENCOUNTER — OFFICE VISIT (OUTPATIENT)
Dept: FAMILY MEDICINE CLINIC | Age: 81
End: 2021-06-24
Payer: MEDICARE

## 2021-06-24 ENCOUNTER — CARE COORDINATION (OUTPATIENT)
Dept: CASE MANAGEMENT | Age: 81
End: 2021-06-24

## 2021-06-24 VITALS
WEIGHT: 204 LBS | BODY MASS INDEX: 34.83 KG/M2 | DIASTOLIC BLOOD PRESSURE: 68 MMHG | SYSTOLIC BLOOD PRESSURE: 134 MMHG | HEART RATE: 84 BPM | HEIGHT: 64 IN | RESPIRATION RATE: 16 BRPM | OXYGEN SATURATION: 96 % | TEMPERATURE: 97.1 F

## 2021-06-24 DIAGNOSIS — E03.9 HYPOTHYROIDISM, UNSPECIFIED TYPE: ICD-10-CM

## 2021-06-24 DIAGNOSIS — E11.65 CONTROLLED TYPE 2 DIABETES MELLITUS WITH HYPERGLYCEMIA, WITHOUT LONG-TERM CURRENT USE OF INSULIN (HCC): ICD-10-CM

## 2021-06-24 DIAGNOSIS — E55.9 VITAMIN D DEFICIENCY: ICD-10-CM

## 2021-06-24 DIAGNOSIS — L03.113 CELLULITIS OF RIGHT ARM: Primary | ICD-10-CM

## 2021-06-24 DIAGNOSIS — C50.919 RECURRENT MALIGNANT NEOPLASM OF BREAST, UNSPECIFIED LATERALITY (HCC): ICD-10-CM

## 2021-06-24 DIAGNOSIS — R41.3 MEMORY IMPAIRMENT: ICD-10-CM

## 2021-06-24 LAB
ALBUMIN SERPL-MCNC: 4 G/DL (ref 3.5–5.2)
ALP BLD-CCNC: 67 U/L (ref 35–104)
ALT SERPL-CCNC: 12 U/L (ref 0–32)
ANION GAP SERPL CALCULATED.3IONS-SCNC: 11 MMOL/L (ref 7–16)
AST SERPL-CCNC: 17 U/L (ref 0–31)
BILIRUB SERPL-MCNC: 0.3 MG/DL (ref 0–1.2)
BUN BLDV-MCNC: 29 MG/DL (ref 6–23)
CALCIUM SERPL-MCNC: 9.7 MG/DL (ref 8.6–10.2)
CHLORIDE BLD-SCNC: 104 MMOL/L (ref 98–107)
CHOLESTEROL, TOTAL: 190 MG/DL (ref 0–199)
CO2: 27 MMOL/L (ref 22–29)
CREAT SERPL-MCNC: 1.4 MG/DL (ref 0.5–1)
GFR AFRICAN AMERICAN: 44
GFR NON-AFRICAN AMERICAN: 36 ML/MIN/1.73
GLUCOSE BLD-MCNC: 119 MG/DL (ref 74–99)
HDLC SERPL-MCNC: 42 MG/DL
LDL CHOLESTEROL CALCULATED: 123 MG/DL (ref 0–99)
POTASSIUM SERPL-SCNC: 3.7 MMOL/L (ref 3.5–5)
SODIUM BLD-SCNC: 142 MMOL/L (ref 132–146)
TOTAL PROTEIN: 7.2 G/DL (ref 6.4–8.3)
TRIGL SERPL-MCNC: 126 MG/DL (ref 0–149)
TSH SERPL DL<=0.05 MIU/L-ACNC: 22.67 UIU/ML (ref 0.27–4.2)
VITAMIN D 25-HYDROXY: 12 NG/ML (ref 30–100)
VLDLC SERPL CALC-MCNC: 25 MG/DL

## 2021-06-24 PROCEDURE — 1090F PRES/ABSN URINE INCON ASSESS: CPT | Performed by: FAMILY MEDICINE

## 2021-06-24 PROCEDURE — 99214 OFFICE O/P EST MOD 30 MIN: CPT | Performed by: FAMILY MEDICINE

## 2021-06-24 PROCEDURE — 1036F TOBACCO NON-USER: CPT | Performed by: FAMILY MEDICINE

## 2021-06-24 PROCEDURE — 1111F DSCHRG MED/CURRENT MED MERGE: CPT | Performed by: FAMILY MEDICINE

## 2021-06-24 PROCEDURE — G8417 CALC BMI ABV UP PARAM F/U: HCPCS | Performed by: FAMILY MEDICINE

## 2021-06-24 PROCEDURE — 1123F ACP DISCUSS/DSCN MKR DOCD: CPT | Performed by: FAMILY MEDICINE

## 2021-06-24 PROCEDURE — 4040F PNEUMOC VAC/ADMIN/RCVD: CPT | Performed by: FAMILY MEDICINE

## 2021-06-24 PROCEDURE — G8399 PT W/DXA RESULTS DOCUMENT: HCPCS | Performed by: FAMILY MEDICINE

## 2021-06-24 PROCEDURE — G8427 DOCREV CUR MEDS BY ELIG CLIN: HCPCS | Performed by: FAMILY MEDICINE

## 2021-06-24 RX ORDER — SULFAMETHOXAZOLE AND TRIMETHOPRIM 800; 160 MG/1; MG/1
1 TABLET ORAL 2 TIMES DAILY
Qty: 14 TABLET | Refills: 0 | Status: SHIPPED | OUTPATIENT
Start: 2021-06-24 | End: 2021-07-01

## 2021-06-24 NOTE — PROGRESS NOTES
[facility])  Family Medicine Outpatient        SUBJECTIVE:  CC: had concerns including ED Follow-up (rash on right arm, painful). Lila Kraus presented to the clinic for a routine visit. Minta Area [de-identified]year old female with recurrent breast cancer presenting to the office with her son for concerns of a rash on her right arm. She was recently admitted for right arm cellulitis 6/10/21 and was discharged on Doxycyline. Reportedly per her son she started the antibiotic a couple days late. She still has a few more days left. It is unclear if the rash is getting worse or better. The patient was seen in the ED yesterday and given a steroid creme. Per the son, patient, and reportedly home health the arm appeared to be getting \"bigger and brighter\". They both deny any f/c.     6/10/21 Right Humerus XR  FINDINGS:   No aggressive osseous lesions.  No acute fractures or dislocations in the   right humerus.  The humeral cortex is intact.  No significant soft tissue   edema.           Impression   No evidence of osteomyelitis. 21 Right Upper Extremity U/S  Impression   Within the visualized vessels there is no evidence for deep venous   thrombosis         Review of Systems   Constitutional: Negative for appetite change, fatigue and fever. Respiratory: Negative for cough, shortness of breath and wheezing. Cardiovascular: Negative for chest pain and palpitations. Gastrointestinal: Negative for abdominal pain, constipation, diarrhea, nausea and vomiting. Musculoskeletal: Positive for gait problem. Negative for neck pain. Arm swelling and redness   Neurological: Negative for dizziness and syncope.        Outpatient Medications Marked as Taking for the 21 encounter (Office Visit) with Ofelia Walton MD   Medication Sig Dispense Refill    [] sulfamethoxazole-trimethoprim (BACTRIM DS;SEPTRA DS) 800-160 MG per tablet Take 1 tablet by mouth 2 times daily for 7 days 14 tablet 0    [] triamcinolone (ARISTOCORT) 0.5 % cream Apply topically 3 times daily. 45 g 0       I have reviewed all pertinent PMHx, PSHx, FamHx, SocialHx, medications, and allergies and updated history as appropriate. OBJECTIVE    VS: /68   Pulse 84   Temp 97.1 °F (36.2 °C)   Resp 16   Ht 5' 4\" (1.626 m) Comment: per patient  Wt 204 lb (92.5 kg)   SpO2 96%   BMI 35.02 kg/m²   Physical Exam  Constitutional:       General: She is not in acute distress. Appearance: She is well-developed. She is obese. She is not diaphoretic. HENT:      Head: Normocephalic and atraumatic. Eyes:      Conjunctiva/sclera: Conjunctivae normal.      Pupils: Pupils are equal, round, and reactive to light. Cardiovascular:      Rate and Rhythm: Normal rate and regular rhythm. Pulmonary:      Effort: Pulmonary effort is normal.      Breath sounds: Normal breath sounds. Abdominal:      General: Bowel sounds are normal. There is no distension. Palpations: Abdomen is soft. Tenderness: There is no abdominal tenderness. Hernia: No hernia is present. Musculoskeletal:         General: Swelling (right arm with tenderness) present. Cervical back: Normal range of motion and neck supple. Skin:     General: Skin is warm and dry. Findings: Rash (erythema and mild calor) present. Neurological:      Mental Status: She is alert. Comments: Alert to person         ASSESSMENT/PLAN:  1. Cellulitis of right arm  - XR HUMERUS RIGHT (MIN 2 VIEWS); Future  - US DUP UPPER EXTREMITY RIGHT VENOUS; Future  - sulfamethoxazole-trimethoprim (BACTRIM DS;SEPTRA DS) 800-160 MG per tablet; Take 1 tablet by mouth 2 times daily for 7 days  Dispense: 14 tablet; Refill: 0    2. Vitamin D deficiency  - Vitamin D 25 Hydroxy; Future    3. Controlled type 2 diabetes mellitus with hyperglycemia, without long-term current use of insulin (HCC)  - Comprehensive Metabolic Panel; Future  - Lipid Panel; Future    4.  Hypothyroidism, unspecified type  - TSH without Reflex; Future    5. Memory impairment  Chronic. Presumed underlying dementia. Suspect subtherapeutic TSH also contributing. Patient has home health to help with med compliance and lives with son. MRI brain placed in March and not yet completed. Encourage patient and son to complete this. F/u in the next 2-4 weeks to further discuss. Also discussed Neurology consultation. 6. Recurrent malignant neoplasm of breast, unspecified laterality [address]  Continue to follow with [facility]. Recent notes pending. I have reviewed my findings and recommendations with Tegan Navarrete MD  7/11/2021 9:36 AM     Counseled regarding above diagnosis, including possible risks and complications, especially if left uncontrolled. Patient counseled on red flag symptoms and if they occur to go to the ED. Discussed medications risk/benefits and possible side effects and alternatives to treatment. Patient and/or guardian verbalizes understanding, agrees, feels comfortable with and wishes to proceed with above treatment plan. Advised patient regarding importance of keeping up with recommended health maintenance and to schedule as soon as possible if overdue, as this is important in assessing for undiagnosed pathology, especially cancer, as well as protecting against potentially harmful/life threatening disease. Patient and/or guardian verbalizes understanding and agrees with above counseling, assessment and plan. All questions answered. Please note this report has been partially produced using speech recognition software  and may contain errors related to that system including grammar, punctuation and spelling as well as words and phrases that may seem inappropriate. If there are questions or concerns please feel free to contact me to clarify.

## 2021-06-25 ENCOUNTER — CARE COORDINATION (OUTPATIENT)
Dept: CASE MANAGEMENT | Age: 81
End: 2021-06-25

## 2021-06-25 NOTE — CARE COORDINATION
[facility] Transitions Follow Up Call    2021    Patient: Claudene Potter  Patient : 1940   MRN: [id number]  Reason for Admission:   Discharge Date: 21 RARS: Readmission Risk Score: 17       Attempted to reach patient by phone regarding follow up; BPCI-A. HIPAA compliant message left on patient's voicemail; CTN contact information provided. Noted in chart patient had OV with PCP on 2021.        Carly Terry RN

## 2021-06-29 ENCOUNTER — CARE COORDINATION (OUTPATIENT)
Dept: CASE MANAGEMENT | Age: 81
End: 2021-06-29

## 2021-06-29 NOTE — CARE COORDINATION
[facility] Transitions Follow Up Call    2021    Patient: Jamal Delnua  Patient : 1940   MRN: <[id number]>  Reason for Admission: cellulitis  Discharge Date: 21 RARS: Readmission Risk Score: 17         Attempted to contact patient for BPCI-A call. Contact information left to mobile VM requesting call back at the earliest convenience. Mobile number VMB full.     Follow Up  Future Appointments   Date Time Provider Priscila Morgan   2021 12:00 PM [facility] RM 1 [facility]Y[address] ABDU [facility]   2021  1:00 PM Leverette Dandy, APRN - CNP [facility]       Armando Nichols RN

## 2021-07-07 ENCOUNTER — CARE COORDINATION (OUTPATIENT)
Dept: CARE COORDINATION | Age: 81
End: 2021-07-07

## 2021-07-07 ENCOUNTER — CARE COORDINATION (OUTPATIENT)
Dept: CASE MANAGEMENT | Age: 81
End: 2021-07-07

## 2021-07-07 NOTE — CARE COORDINATION
[facility] Transitions Initial Follow Up Call      Patient: Maria De Jesus Leon Patient : 1940   MRN: [id number]    Discharge Date: 21 RARS: Readmission Risk Score: 17      Last Discharge [address] [address]       Complaint Diagnosis Description Type Department Provider    21 Rash Rash and other nonspecific skin eruption ED (DISCHARGE) [place] [facility]            Spoke with: Patient's son/caregiver, Tesfaye Mckinney. Facility: [facility] ED    Non-face-to-face services provided:  Obtained and reviewed discharge summary and/or continuity of care documents   -confirmed patient is using topical cream as prescribed at discharge    Care Transitions 24 Hour Call    Do you have any ongoing symptoms?: No  Do you have a copy of your discharge instructions?: Yes  Do you have all of your prescriptions and are they filled?: Yes  Have you scheduled your follow up appointment?: Yes (Comment: patient had f/u with PCP on 2021)  Post Acute Services: patient has aide services  Care Transitions Interventions; Discussed with patient's son [facility]) has a LSW available to evaluate patient's needs and provide resources; Tesfaye Mckinney would like to speak to [facility] LSW. -CTN will place referral.      Social Work: Completed           Tesfaye Mckinney is pleasant in conversation and provides patient update.    -reports rash to patient's right upper arm is improving   -denies patient with any C/O fever, chills, shortness of breath, or chest discomfort   -denies patient with any swelling or weight gain   -reports patient has a good appetite   -normal bladder/bowel elimination   -ambulates with a walker; no falls   -taking prescribed medications as ordered   -patient has a personal emergency device; necklace     Emotional support provided; discussed will continue to follow.      Follow Up  Future Appointments   Date Time Provider Priscila Morgan   2021 12:00 PM [facility] [address] [facility] RM 1 [place] [facility]iol[facility]   2021  1:00 PM PABLO Abad - CNP Rupa Santamaria RN

## 2021-07-07 NOTE — CARE COORDINATION
Call to son, Gadiel Cruz to initiate SW referral. He inquired about fixing ramp. Stated that he was unable to afford. Also inquired about becoming a paid caregiver for patient. Suggested speak with [facility] who could advise him of the process. Referral made. SW will look into ramp modification programs in his area.

## 2021-07-10 ASSESSMENT — ENCOUNTER SYMPTOMS
CONSTIPATION: 0
WHEEZING: 0
ABDOMINAL PAIN: 0
NAUSEA: 0
SHORTNESS OF BREATH: 0
VOMITING: 0
COUGH: 0
DIARRHEA: 0

## 2021-07-12 ENCOUNTER — TELEPHONE (OUTPATIENT)
Dept: FAMILY MEDICINE CLINIC | Age: 81
End: 2021-07-12

## 2021-07-12 DIAGNOSIS — R41.3 MEMORY IMPAIRMENT: Primary | ICD-10-CM

## 2021-07-14 ENCOUNTER — CARE COORDINATION (OUTPATIENT)
Dept: CASE MANAGEMENT | Age: 81
End: 2021-07-14

## 2021-07-14 ENCOUNTER — HOSPITAL ENCOUNTER (OUTPATIENT)
Dept: OCCUPATIONAL THERAPY | Age: 81
Setting detail: THERAPIES SERIES
Discharge: HOME | End: 2021-07-14
Payer: MEDICARE

## 2021-07-14 NOTE — DISCHARGE SUMMARY
OCCUPATIONAL THERAPY DISCHARGE NOTE  [facility],[address] [place], [place]., [place]  [address]    Phone: [phone]  Fax: [phone]     Date:  2021  Initial Evaluation Date: 2020     Evaluating Therapist: PAT Villalpando CLT-LANA    Patient Name:  George Parker    :  1940    Restrictions/Precautions:  fall risk  Diagnosis:  Lymphedema (I89.0)       Date of Surgery/Injury: n/a    Insurance/Certification information:  Medicare Part A and B  Plan of care signed (Y/N): Yes  Visit# / total visits: 9  Total Visits to date:  9 Cancels/No-shows to date: 6    Referring Practitioner:  Monster Kevin MD  Specific Practitioner Orders: Evaluation and Treatment    Assessment of current deficits   []Pain  []Skin Integrity   [x]Lymphedema   []Functional transfers/mobility   []ADLs   []Strength    []Cognition  []IADLs   []Safety Awareness   []  Motor Endurance    []Fine Motor Coordination   []Balance   []Vision/perception  []Sensation []Gross Motor Coordination  []ROM     OT PLAN OF CARE   OT POC based on physician orders, patient diagnosis and results of clinical assessment    Frequency/Duration:  Patient currently discharged from Lymphedema Clinic  Specific OT Treatment to include:     Plan of Care:     [x]97140-Manual Lymph Drainage and Combined Decongestive Therapy  [x]65386- Skilled Multilayer Short Stretch Compression Bandaging/ Therapeutic Exercise  [x]Skin Care Education [x]HEP including Self MLD Education and/or Self Bandaging  [x]Education for Lymphedema Risks/Precautions     [x] Caregiver Education   []other:      Patient Specific Goal: to be kain to do more for herself    Goals Status:    STG: 3 weeks  1.  Patient will demonstrate knowledge and understanding for lymphedema precautions, skin care and self management to decrease progression of lymphedema and risk of infection. Patient goal partially met.   2.  Patient will present with decreased limb volume in the involved extremity from mild to trace for improved functional mobility. Patient goal partially met. 3.  Patient will demonstrate compliance with compression therapy for independent management of lymphedema. Patient goal partially met         LT weeks  1.  Patient will be independent with self management of lymphedema including understanding garment wear schedule, self compression bandaging, HEP and self care. Patient goal partially met. 2.  Patient will be fitted for appropriate compression garments for long term management of lymphedema. Patient goal partially met  3.  Patient will present with decreased limb volume in the involved extremity from trace to none  for improved functional mobility. Patient goal not met  4.  Patient will maximize right shoulder flexion/abduction to maximize independence with daily life tasks. Patient goal partially met. Significant Findings At Last Visit/Comments:    Patient made steady gains toward goals and limited progress secondary to cognition. Patient did not attended follow up appointment and additional appointments were not scheduled. Patient, nor son have contacted clinic for re scheduling. Patient discharged from lymphedema clinic at this time. Restrictions/Precautions:  [] No blood pressure/blood draw in: [] Left Upper Extremity     [] Right Upper Extremity        [x] Fall Risk       Intervention:   [x]Other:  cognition    Subjective:  Patient did not attend last scheduled appointment and patient / family has not contacted clinic to reschedule. Rehab Potential: [] Excellent [] Good [x] Fair  [] Poor     Goal Status:  [] Achieved [x] Partially Achieved  [] Not Achieved     Patient Status: [x] Patient now discharged         Electronically signed by:   PAT Blackburn CLT-LANA      Time In: N/A            Time Out: N/A                      Timed Code Treatment Minutes: 0 minutes      CODE  Minutes  Units   41181 OT Eval Low     43226 OT Eval Medium     0496 97 06 31 OT Eval High     [id number] Fluidotherapy     [id number] Manual     [phone] Therapeutic Ex     [id number] Therapeutic Activity     58760 ADL/COMP Tech Train     [id number] Neuromuscular Re-Ed     [id number] OrthoManagementTraining     [id number] Paraffin     [id number] Electrical Stim - Attended     [id number] Iontophoresis     27547 Ultrasound      Other             Electronically signed by: Mirella Farmer OTR/L, ASHWIN     Physician's Certification / Comments           I have reviewed the Plan of Care established for skilled therapy services and certify that the services are required and that they will be provided while the patient is under my care. Physician's Comments/Revisions:                   Physicians's Printed Name:  Nasima Beltran MD                                   Physician's Signature:                                                               Date:      Please review Patient's OT evaluation and if you agree sign/date and fax back to us at our [facility] OT Fax: [phone].  Thank you for your referral!

## 2021-07-15 ENCOUNTER — CARE COORDINATION (OUTPATIENT)
Dept: CARE COORDINATION | Age: 81
End: 2021-07-15

## 2021-07-21 ENCOUNTER — CARE COORDINATION (OUTPATIENT)
Dept: CASE MANAGEMENT | Age: 81
End: 2021-07-21

## 2021-07-21 NOTE — CARE COORDINATION
[facility] Transitions Follow Up Call    2021    Patient: Tigist Barrientos  Patient : 1940   MRN: <[id number]>  Reason for Admission:   Discharge Date: 21 RARS: Readmission Risk Score: 17    Attempted to reach pt for BPCI-A follow up call. Unable to leave message. Care Transitions Subsequent and Final Call    Subsequent and Final Calls  Care Transitions Interventions  Other Interventions:            Follow Up  Future Appointments   Date Time Provider Priscila Morgan   2021 12:00 PM [facility] [address] [facility] 1 [address] ABDU [facility]   2021  1:00 PM [address], APRN - CNP [facility]       Dipak Herrera

## 2021-07-23 ASSESSMENT — ENCOUNTER SYMPTOMS
SHORTNESS OF BREATH: 0
SINUS PAIN: 0
VOMITING: 0
WHEEZING: 0
BLOOD IN STOOL: 0
RHINORRHEA: 0
EYE ITCHING: 0
SINUS PRESSURE: 0
SORE THROAT: 0
DIARRHEA: 0
VOICE CHANGE: 0
CHOKING: 0
BACK PAIN: 0
NAUSEA: 0
CHEST TIGHTNESS: 0
COUGH: 0
ABDOMINAL PAIN: 0
CONSTIPATION: 0
ABDOMINAL DISTENTION: 0
TROUBLE SWALLOWING: 0
EYE DISCHARGE: 0

## 2021-07-23 NOTE — PROGRESS NOTES
Subjective:      Patient ID: Alma Hadley is a [de-identified] y.o. female. HPI   Right breast cancer, recurrent, IDC, Stage IIIB, ER/MA+ HER2-, grade 2 based from the ulceration of skin      She had breast cancer in  and underwent a lumpectomy with SLNB (6 nodes removed--see path below).  She saw Dr. Scarlet Dhaliwal for oncology. Tara Prieto was placed on endocrine therapy, unknown if she completed. Tara Prieto did undergo WBRT from 13 to 14. Tara Prieto does not remember any of this. Tara Prieto was stage IIA back then.  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  In our records, she last saw oncology in ,     2020 PATHOLOGY:    Diagnosis:   Breast, right at 9:00, biopsy:   Invasive ductal carcinoma, see comment. Comment:   The carcinoma is focal, embedded within markedly fibrotic tissue, with a   A[place] grade of 2 (score of 7): tubule formation score 3, nuclear   pleomorphism score 2, mitotic activity score 2. E-cadherin immunostain is positive. P63 immunostain shows absence of myoepithelial layer. Breast Cancer Marker Studies:   Estrogen Receptors (ER):   -Positive (>10%of cells demonstrate nuclear positivity):   Percentage of cells positive: 99%   Intensity: strong     Progesterone Receptors (MA):   -Positive:   Percentage of cells positive: 70%   Intensity: moderate-to-strong     Hormone receptor studies are performed by immunohistochemistry on   formalin-fixed, paraffin-embedded tissue (Roche Benchmark Immunostainer,   [place] anti-ER clone SP1, anti-MA clone [id number], polymer-based detection   chemistry). ER and MA are evaluated based on the percentage of cells   showing nuclear staining with >1% considered positive for each. Her-2/anni (c-erb B-2) protein expression: Negative (score 1+)         PATHOLOGY FROM PRIOR CANCER IN 2012:      Diagnosis-          A.  [place] lymph node, right, #1, excision-  Three    lymph nodes,   negative for metastatic carcinoma. Pankeratin immunostain negative for metastatic carcinoma   (performed on 3 tissue blocks).       B.  [place] lymph node, right, #2, excision-  Three lymph nodes,   negative for metastatic carcinoma. Pankeratin immunostain negative for metastatic carcinoma   (performed on 3 tissue blocks).       C.  Breast, right, lumpectomy-  Invasive, poorly differentiated   ductal carcinoma.     Ductal carcinoma in situ, solid type, high nuclear grade with   areas of comedo necrosis. Focal lymphovascular invasion identified. Margins of excision negative for malignancy. Previous biopsy site.     Fibrocystic change. Microcalcifications associated with benign breast tissue and DCIS.      BIOPSY FROM PRIOR CANCER IN 2012:      Diagnosis-          Mass, right breast, 9 o'clock, core needle biopsy-   Infiltrating   ductal carcinoma, poorly differentiated.       Comment-              [facility] tumor demonstrates architectural grade 3,   nuclear grade 3,   mitotic grade 2-overall grade 8/9- poorly differentiated.       Intradepartmental consultation is obtained.       Breast Cancer Maker Studies   Estrogen Receptor (ER)- Positive (>95%) nuclei staining   Progesterone Receptor (CO)- Positive (60%) nuclei staining       Hormone receptor studies are performed by immunohistochemistry   ([place] Benchmark Immunostainer, [place] anti-Er clone Sp1, anti-   CO clone 1E2). ER and CO are evaluated based on ther percentage of   cells showing nuclear staining with greater than or equal to 1%   considered positive for each.       Her-2/anni- Negative (1+)        ONCOTYPE DX FROM 2012:  Diagnosis-          Oncotype DX Breast Cancer Assay-  Breast Cancer   Recurrence Score =   18.    Average rate of distant recurrence 11%.       Comment-              See separate report from [facility] for full   details.       Right breast cancer, recurrent, IDC, Stage IIIB, ER/CO+ HER2-, grade 2 based from the ulceration of skin    11/03/2020 Underwent right breast mastectomy     Diagnosis: A. Right axillary contents:   6 lymph nodes with no evidence of carcinoma. Jose Guadalupe Hinckley, right mastectomy:   Invasive ductal carcinoma, 3.0 x 1.6 cm in greatest dimension.    Focally involves dermal lymphatics and invades dermis/epidermis without   ulceration.        Fibrous scar with calcification, in association with carcinoma.        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      All margins are negative for invasive carcinoma and negative for   DCIS. Nipple/areola with no significant pathologic findings. Vessels with Monckeberg medial calcific sclerosis. CANCER CASE SUMMARY:   Procedure: Modified radical right breast mastectomy   Specimen laterality: Right   Tumor site: 8-9 o'clock position in the breast per gross description   Tumor size: Greatest dimension of approximately 3.0 cm   Histologic type:  Invasive carcinoma of no special type (ductal)   Histologic grade ([place] histologic score): glandular differentiation   score 3, nuclear pleomorphism score 2, mitotic rate score 3, overall   grade of grade 3 (score of 8)   Ductal carcinoma in situ (DCIS): Present   Tumor extension:    Skin: Invasive carcinoma directly invades into the dermis/epidermis   without skin ulceration    Nipple: DCIS does not involve the nipple epidermis    Skeletal muscle: No skeletal muscle is present   Margins:    Invasive carcinoma margins: Uninvolved by invasive carcinoma     Distance from closest margin: Anterior margin, 9.0 mm away    DCIS margins: Uninvolved by DCIS     Distance from closest margin: Posterior margin, 10.0 mm away   Regional lymph nodes: Uninvolved by tumor cells    Total number of lymph nodes examined: 6    Total number of sentinel nodes examined: 0   Treatment effect in the breast: No recent presurgical   Dermal lymphovascular invasion: Present   Pathologic stage classification (pTNM, AJCC eighth edition):  r (recurrent) pT2    pN0   Ancillary studies: Performed on prior biopsy specimen, BPO-, and   summarized as estrogen receptor positive, progesterone receptor positive,   and HER-2/anni negative       Comment:   It is noted from the electronic medical record and prior   pathology history that the patient had breast invasive ductal carcinoma   in 2012, and this current carcinoma is considered recurrent. Intradepartmental consultation is obtained (slides [id number]). Past Medical History:   Diagnosis Date    Asthma     Blood transfusion     [facility]     COPD (chronic obstructive pulmonary disease) [facility]     Diabetes mellitus [facility]     Diabetic peripheral neuropathy ([facility]     DM type 2 (diabetes mellitus, type 2) ([facility]     Hypertension     Hypothyroidism     Irritable bowel syndrome     Mixed hyperlipidemia 7/21/2020    Obesity (BMI 30-39. 9)     Osteoarthritis     rheumatoid and osteo    RA (rheumatoid arthritis) ([facility]     RBBB      Past Surgical History:   Procedure Laterality Date    BREAST LUMPECTOMY  [id number]    RIGHT    CARDIAC CATHETERIZATION  10/09/2020    Dr. Margo Silva      partial    JOINT REPLACEMENT Bilateral     bilateral TKR    LUMBAR FUSION      L4L5    MASTECTOMY, MODIFIED RADICAL Right 11/3/2020    MODIFIED RADICAL RIGHT BREAST MASTECTOMY performed by Ester Yeager MD at [place] [id number] OTHER SURGICAL HISTORY      thumb joint replacement rt     THYROID SURGERY      US BREAST NEEDLE BIOPSY RIGHT  7/31/2020    US BREAST NEEDLE BIOPSY RIGHT 7/31/2020 [facility]     Social History     Tobacco Use    Smoking status: Never Smoker    Smokeless tobacco: Never Used   Vaping Use    Vaping Use: Never used   Substance Use Topics    Alcohol use: No    Drug use: No     No Known Allergies  Current Outpatient Medications on File Prior to Visit   Medication Sig Dispense Refill    atorvastatin (LIPITOR) 20 MG tablet Take 2 tablets by mouth daily 180 tablet 1    metFORMIN (GLUCOPHAGE) 500 MG tablet Take 1 tablet by mouth 2 times daily (with meals) 180 tablet 1    levothyroxine (SYNTHROID) 175 MCG tablet Take one tablet Mon-Fri and 2 tabs Sat/Sun. 108 tablet 1    ramipril (ALTACE) 10 MG capsule Take 1 capsule by mouth daily Indications: High Blood Pressure Disorder 90 capsule 1    mirtazapine (REMERON) 30 MG tablet Take 1 tablet by mouth nightly 90 tablet 1    metoprolol succinate (TOPROL XL) 25 MG extended release tablet Take 1 tablet by mouth daily 90 tablet 1    acetaminophen (TYLENOL) 325 MG tablet Take 650 mg by mouth every 6 hours as needed for Pain      nystatin (MYCOSTATIN) 302121 UNIT/GM powder Apply 3 times daily. 60 g 3    glucose monitoring kit (FREESTYLE) monitoring kit 1 kit by Does not apply route daily 1 kit 0    blood glucose monitor strips Test 1 times a day & as needed for symptoms of irregular blood glucose. 100 strip 3    Lancets MISC 1 each by Does not apply route daily 100 each 3    aspirin EC 81 MG EC tablet Take 1 tablet by mouth daily (Patient not taking: Reported on 3/16/2021) 90 tablet 3     No current facility-administered medications on file prior to visit. She reports she feels safe in her home. Lives with one cat, one dog, and her son. Review of Systems   Constitutional: Negative for activity change, appetite change, chills, fatigue, fever and unexpected weight change. She generally feels well. Is frustrated over discomfort in the right upper extremity with progressive erythema. Recent inpatient admission with extensive work-up but she feels it getting worse. HENT: Negative for congestion, postnasal drip, rhinorrhea, sinus pressure, sinus pain, sore throat, trouble swallowing and voice change. Eyes: Negative for discharge, itching and visual disturbance.    Respiratory: Negative for cough, choking, chest tightness, shortness of breath and wheezing. Cardiovascular: Negative for chest pain, palpitations and leg swelling. Gastrointestinal: Negative for abdominal distention, abdominal pain, blood in stool, constipation, diarrhea, nausea and vomiting. Endocrine: Negative for cold intolerance and heat intolerance. Genitourinary: Negative for difficulty urinating, dysuria, frequency and hematuria. Musculoskeletal: Negative for arthralgias, back pain, gait problem, joint swelling, myalgias, neck pain and neck stiffness. Allergic/Immunologic: Negative for environmental allergies and food allergies. Neurological: Negative for dizziness, seizures, syncope, speech difficulty, weakness, light-headedness and headaches. Hematological: Negative for adenopathy. Does not bruise/bleed easily. Psychiatric/Behavioral: Negative for agitation, confusion and decreased concentration. The patient is not nervous/anxious. Objective:   Physical Exam  Vitals and nursing note reviewed. Constitutional:       General: She is not in acute distress. Appearance: She is well-developed. She is not diaphoretic. Comments: ECOG 2 due to underlying co-morbidities. HENT:      Head: Normocephalic and atraumatic. Mouth/Throat:      Pharynx: No oropharyngeal exudate. Eyes:      General: No scleral icterus. Right eye: No discharge. Left eye: No discharge. Conjunctiva/sclera: Conjunctivae normal.   Neck:      Thyroid: No thyromegaly. Vascular: No JVD. Trachea: No tracheal deviation. Cardiovascular:      Rate and Rhythm: Normal rate and regular rhythm. Heart sounds: No murmur heard. No friction rub. No gallop. Pulmonary:      Effort: Pulmonary effort is normal. No respiratory distress or retractions. Breath sounds: Normal breath sounds. No stridor. No wheezing or rales. Chest:      Chest wall: No mass, lacerations, deformity, swelling, tenderness or edema.       Breasts: Breasts are symmetrical. Right: No inverted nipple, mass, nipple discharge, skin change or tenderness. Left: No inverted nipple, mass, nipple discharge, skin change or tenderness. Abdominal:      General: There is no distension. Palpations: Abdomen is soft. Tenderness: There is no abdominal tenderness. There is no guarding or rebound. Musculoskeletal:         General: No tenderness or deformity. Normal range of motion. Right shoulder: Normal.      Left shoulder: Normal.      Cervical back: Normal range of motion and neck supple. Lymphadenopathy:      Cervical: No cervical adenopathy. Right cervical: No superficial, deep or posterior cervical adenopathy. Left cervical: No superficial, deep or posterior cervical adenopathy. Upper Body:      Right upper body: No pectoral adenopathy. Left upper body: No pectoral adenopathy. Skin:     General: Skin is warm and dry. Coloration: Skin is not pale. Findings: No erythema or rash. Neurological:      Mental Status: She is alert and oriented to person, place, and time. Coordination: Coordination normal.   Psychiatric:         Behavior: Behavior normal.         Thought Content: Thought content normal.         Judgment: Judgment normal.         Assessment:     [de-identified] y.o. female with a history of stage IIa ER/SD postive, Her-2/Shell negative IDC of the upper outer quadrant of the right breast.  Post right lumpectomy and SLN bx diagnosed 10/14/2012. Oncotype Dx RS was 19. Recommended RT (completed at [facility]) + ET with Arimidex. 07/31/2020 PATHOLOGY:    Diagnosis:   Breast, right at 9:00, biopsy:   Invasive ductal carcinoma, see comment. Comment:   The carcinoma is focal, embedded within markedly fibrotic tissue, with a   [place] grade of 2 (score of 7): tubule formation score 3, nuclear   pleomorphism score 2, mitotic activity score 2. E-cadherin immunostain is positive. P63 immunostain shows absence of myoepithelial layer. Breast Cancer Marker Studies:   Estrogen Receptors (ER):   -Positive (>10%of cells demonstrate nuclear positivity):   Percentage of cells positive: 99%   Intensity: strong     Progesterone Receptors (TX):   -Positive:   Percentage of cells positive: 70%   Intensity: moderate-to-strong     Pathology c/w Recurrent breast cancer. -11/03/2020 Underwent right breast mastectomy     Right breast cancer, recurrent, IDC, Stage IIIB, ER/TX+ HER2-, grade 2 based from the ulceration of skin    -01/15/2021 DEXA bone density: Osteopenia. -01/23/2021 Arimidex 1 mg daily per Dr. Riaz Valenzuela.  -07/29/2021 Left screening mammogram: Negative, BI-RADS 1.  -07/29/2021 Clinical follow up is without evidence of recurrent disease. Imaging was reviewed, including her BI-RADS result. She has progressive erythema and discomfort of the right upper extremity (see images-2 in media). She has had extensive evaluation previously by primary care as well as an admission to the hospital due to complaints of discomfort and progressive erythema. Question if skin biopsy would be of value. Will refer to Dermatology, Dr. Nehemias Schmitt in [place] for evaluation/second opinion. Plan:   1. Continue monthly breast/chest wall self examination; detailed instructions reviewed today. Bring any changes to your physician's attention. 2. Continue healthy diet and exercise routinely as tolerated. 3. Maintaining ideal body weight (20-25 BMI) may lead to optimal breast cancer outcomes. 4. Avoid alcohol. 5. Repeat mammogram 07/30/2022 or after . 6. Continue Arimidex 1 mg daily at the discretion of medical oncology team.  7. Ca+/VitD while on Arimidex. 8. Repeat DEXA: Feb 2022  9. Continue follow up with Medical Oncology and Primary Care. 10. Refer to Dr. Jignesh Amaral dermatology for second opinion of her right upper extremity erythema. 11. RTC 6 months with DEXA same day.       During today's visit, face-to-face time 25 minutes, greater than 50% in counseling education and coordination of care. All questions were answered to her apparent satisfaction, and she is agreeable to the plan as outlined above. Rakesh Hoffman RN, MSN, APRN-CNP, [id number] [place]  Advanced Oncology Certified Nurse Practitioner  Department of Breast Surgery  [facility]/  Care in collaboration with Dr. Binh Neil.  Jessenia/Alma [id number] [place] [address], APRN - CNP

## 2021-07-28 ENCOUNTER — CARE COORDINATION (OUTPATIENT)
Dept: CASE MANAGEMENT | Age: 81
End: 2021-07-28

## 2021-07-28 NOTE — CARE COORDINATION
Tomás 45 Transitions Follow Up Call    2021    Patient: Gina Myrick  Patient : 1940   MRN: [id number]  Reason for Admission:   Discharge Date: 21 RARS: Readmission Risk Score: 17         Spoke with: Patient's son/caregiver, Gadiel Cruz. Gadiel You provides patient update on the Subsequent Call. Care Transitions Subsequent and Final Call    Subsequent and Final Calls  Do you have any ongoing symptoms?: No  Have your medications changed?: No  Do you have any questions related to your medications?: No  Do you have any needs or concerns that I can assist you with?: No  Identified Barriers: None  Care Transitions Interventions; Discussed with patient's son that a [facility] Registered Akash Lee is available for assistance regarding patient's diet and nutritional needs; patient's son denies need for services at this time. No Identified Needs    Registered Dietician: Declined      Social Work: Completed      Gadiel Bijou is pleasant in conversation and provides patient update.   -reports rash to patient's right upper arm is resolving   -reports patient is up and about with walker; no falls   -FBS range 105-110  -denies patient with any C/O fever, chills, shortness of breath, or chest discomfort   -denies patient with any swelling or weight gain   -reports patient has a good appetite; tries to follow Diabetic Diet   -normal bladder/bowel elimination     Emotional support provided; discussed will continue to follow.     Follow Up  Future Appointments   Date Time Provider Priscila Morgan   2021 12:00 PM [facility] [address] [facility] 1 SEYZ ABDU [facility]   2021  1:00 PM PABLO Mercedes - MARTI Goldstein RN

## 2021-07-29 ENCOUNTER — HOSPITAL ENCOUNTER (OUTPATIENT)
Dept: GENERAL RADIOLOGY | Age: 81
Discharge: HOME | End: 2021-07-31
Payer: MEDICARE

## 2021-07-29 ENCOUNTER — CARE COORDINATION (OUTPATIENT)
Dept: CARE COORDINATION | Age: 81
End: 2021-07-29

## 2021-07-29 ENCOUNTER — OFFICE VISIT (OUTPATIENT)
Dept: BREAST CENTER | Age: 81
End: 2021-07-29
Payer: MEDICARE

## 2021-07-29 VITALS
DIASTOLIC BLOOD PRESSURE: 72 MMHG | OXYGEN SATURATION: 98 % | HEART RATE: 87 BPM | WEIGHT: 200 LBS | TEMPERATURE: 97.8 F | SYSTOLIC BLOOD PRESSURE: 132 MMHG | BODY MASS INDEX: 34.15 KG/M2 | HEIGHT: 64 IN

## 2021-07-29 DIAGNOSIS — Z85.3 PERSONAL HISTORY OF BREAST CANCER: ICD-10-CM

## 2021-07-29 DIAGNOSIS — Z79.811 USE OF AROMATASE INHIBITORS: Primary | ICD-10-CM

## 2021-07-29 DIAGNOSIS — L53.9 ERYTHEMA: ICD-10-CM

## 2021-07-29 DIAGNOSIS — Z12.31 ENCOUNTER FOR SCREENING MAMMOGRAM FOR MALIGNANT NEOPLASM OF BREAST: ICD-10-CM

## 2021-07-29 PROBLEM — L03.113 RIGHT ARM CELLULITIS: Status: RESOLVED | Noted: 2021-06-10 | Resolved: 2021-07-29

## 2021-07-29 PROBLEM — S21.002A WOUND OF LEFT BREAST: Status: RESOLVED | Noted: 2020-04-22 | Resolved: 2021-07-29

## 2021-07-29 PROBLEM — N63.0 BREAST LUMP: Status: RESOLVED | Noted: 2020-07-21 | Resolved: 2021-07-29

## 2021-07-29 PROBLEM — G31.84 MILD COGNITIVE IMPAIRMENT: Status: ACTIVE | Noted: 2017-09-18

## 2021-07-29 PROBLEM — R53.1 WEAKNESS GENERALIZED: Status: ACTIVE | Noted: 2017-08-28

## 2021-07-29 PROBLEM — N18.30 STAGE 3 CHRONIC KIDNEY DISEASE (HCC): Status: ACTIVE | Noted: 2017-09-18

## 2021-07-29 PROCEDURE — G8427 DOCREV CUR MEDS BY ELIG CLIN: HCPCS | Performed by: NURSE PRACTITIONER

## 2021-07-29 PROCEDURE — 1123F ACP DISCUSS/DSCN MKR DOCD: CPT | Performed by: NURSE PRACTITIONER

## 2021-07-29 PROCEDURE — 99213 OFFICE O/P EST LOW 20 MIN: CPT | Performed by: NURSE PRACTITIONER

## 2021-07-29 PROCEDURE — G8417 CALC BMI ABV UP PARAM F/U: HCPCS | Performed by: NURSE PRACTITIONER

## 2021-07-29 PROCEDURE — 4040F PNEUMOC VAC/ADMIN/RCVD: CPT | Performed by: NURSE PRACTITIONER

## 2021-07-29 PROCEDURE — G8399 PT W/DXA RESULTS DOCUMENT: HCPCS | Performed by: NURSE PRACTITIONER

## 2021-07-29 PROCEDURE — 1036F TOBACCO NON-USER: CPT | Performed by: NURSE PRACTITIONER

## 2021-07-29 PROCEDURE — 77063 BREAST TOMOSYNTHESIS BI: CPT

## 2021-07-29 PROCEDURE — 99213 OFFICE O/P EST LOW 20 MIN: CPT

## 2021-07-29 PROCEDURE — 1090F PRES/ABSN URINE INCON ASSESS: CPT | Performed by: NURSE PRACTITIONER

## 2021-07-30 ENCOUNTER — TELEPHONE (OUTPATIENT)
Dept: BREAST CENTER | Age: 81
End: 2021-07-30

## 2021-07-30 NOTE — TELEPHONE ENCOUNTER
Referral has been submitted to dermatology - Dr Florian Boyer - patient information has been faxed. Their office will contact patient.

## 2021-08-11 ENCOUNTER — CARE COORDINATION (OUTPATIENT)
Dept: CASE MANAGEMENT | Age: 81
End: 2021-08-11

## 2021-08-11 NOTE — CARE COORDINATION
Tomás 45 Transitions Follow Up Call    2021    Patient: Kenny Santana  Patient : 1940   MRN: <[id number]>  Reason for Admission:   Discharge Date: 21 RARS: Readmission Risk Score: 17         Spoke with: Herbert Dawson, patient's son    Contacted patient for BPCI-A follow up. Spoke with patient's son Herbert Dawson. He stated that his mother is doing good. Reports she is seeing a dermatologist.  Reports right arm looks \"good\". No increased swelling, redness. He also denies Joshua Suarez having any fever, chills, shortness of breath. He reports she is doing a lot of walking. She uses a walker when ambulating. No falls. The home health aide makes visits three times a week (Monday, Wednesday, Friday) to assist with bath. He stated she is tolerating well. She is eating and drinking fluids w/o difficulties. Discussed when to contact her provider with any new or worsening symptoms. He verbalized understanding. No needs or concerns at this time. Will continue to follow. Care Transitions Subsequent and Final Call    Subsequent and Final Calls  Do you have any ongoing symptoms?: No  Have your medications changed?: No  Do you have any questions related to your medications?: No  Do you currently have any active services?: Yes  Are you currently active with any services?: Home Health  Do you have any needs or concerns that I can assist you with?: No  Care Transitions Interventions    Registered Dietician: [address] Work: Completed    Other Interventions:            Follow Up  Future Appointments   Date Time Provider Priscila Morgan   2/3/2022 12:15 PM [facility]   2/3/2022  1:00 PM Nani Godwin, APRN - CNP [facility]       Governor KIRSTIN Ellison

## 2021-08-12 ENCOUNTER — CARE COORDINATION (OUTPATIENT)
Dept: CARE COORDINATION | Age: 81
End: 2021-08-12

## 2021-08-24 ENCOUNTER — CARE COORDINATION (OUTPATIENT)
Dept: CASE MANAGEMENT | Age: 81
End: 2021-08-24

## 2021-08-24 NOTE — CARE COORDINATION
Tomás 45 Transitions Follow Up Call    2021    Patient: Jessica Omer  Patient : 1940   MRN: <[id number]>  Reason for Admission:   Discharge Date: 21 RARS: Readmission Risk Score: 17    Attempted to contact patient for BPCI-A follow up. Unable to reach patient. Left message with contact information and request for call back.       Follow Up  Future Appointments   Date Time Provider Priscila Morgan   2/3/2022 12:15 PM [facility] [address] CONRAD [facility]   2/3/2022  1:00 PM Kenna Bull APRN - CNP [facility]       Nilsa Essex, RN

## 2021-09-03 ENCOUNTER — CARE COORDINATION (OUTPATIENT)
Dept: CASE MANAGEMENT | Age: 81
End: 2021-09-03

## 2021-09-03 NOTE — CARE COORDINATION
Tomás 45 Transitions Follow Up Call    9/3/2021    Patient: Kuldip Eugene  Patient : 1940   MRN: <[id number]>  Reason for Admission:   Discharge Date: 21 RARS: Readmission Risk Score: 17    Attempted to contact patient for BPCI-A follow up. Unable to reach patient. No answer. Unable to leave a message. Will try again at a later time.       Follow Up  Future Appointments   Date Time Provider Priscila Morgan   2/3/2022 12:15 PM [facility] [address] DEXA SEYZ [facility] Radiolo   2/3/2022  1:00 PM PABLO Farrell - CNP [facility]       Garret Harman RN

## 2021-09-10 ENCOUNTER — CARE COORDINATION (OUTPATIENT)
Dept: CASE MANAGEMENT | Age: 81
End: 2021-09-10

## 2021-10-28 ENCOUNTER — OFFICE VISIT (OUTPATIENT)
Dept: FAMILY MEDICINE CLINIC | Age: 81
End: 2021-10-28
Payer: MEDICARE

## 2021-10-28 VITALS
TEMPERATURE: 98 F | HEIGHT: 64 IN | OXYGEN SATURATION: 98 % | RESPIRATION RATE: 18 BRPM | BODY MASS INDEX: 34.83 KG/M2 | WEIGHT: 204 LBS | DIASTOLIC BLOOD PRESSURE: 84 MMHG | SYSTOLIC BLOOD PRESSURE: 124 MMHG | HEART RATE: 84 BPM

## 2021-10-28 DIAGNOSIS — E03.9 ACQUIRED HYPOTHYROIDISM: ICD-10-CM

## 2021-10-28 DIAGNOSIS — Z12.83 SKIN CANCER SCREENING: ICD-10-CM

## 2021-10-28 DIAGNOSIS — F03.90 DEMENTIA WITHOUT BEHAVIORAL DISTURBANCE, UNSPECIFIED DEMENTIA TYPE: ICD-10-CM

## 2021-10-28 DIAGNOSIS — R73.09 ELEVATED GLUCOSE: ICD-10-CM

## 2021-10-28 DIAGNOSIS — E55.9 VITAMIN D DEFICIENCY: Primary | ICD-10-CM

## 2021-10-28 DIAGNOSIS — E55.9 VITAMIN D DEFICIENCY: ICD-10-CM

## 2021-10-28 DIAGNOSIS — C50.911 RECURRENT BREAST CANCER, RIGHT (HCC): ICD-10-CM

## 2021-10-28 DIAGNOSIS — D22.9 ATYPICAL NEVI: ICD-10-CM

## 2021-10-28 LAB
ALBUMIN SERPL-MCNC: 3.9 G/DL (ref 3.5–5.2)
ALP BLD-CCNC: 81 U/L (ref 35–104)
ALT SERPL-CCNC: 9 U/L (ref 0–32)
ANION GAP SERPL CALCULATED.3IONS-SCNC: 10 MMOL/L (ref 7–16)
AST SERPL-CCNC: 17 U/L (ref 0–31)
BILIRUB SERPL-MCNC: 0.3 MG/DL (ref 0–1.2)
BUN BLDV-MCNC: 14 MG/DL (ref 6–23)
CALCIUM SERPL-MCNC: 9.1 MG/DL (ref 8.6–10.2)
CHLORIDE BLD-SCNC: 106 MMOL/L (ref 98–107)
CO2: 23 MMOL/L (ref 22–29)
CREAT SERPL-MCNC: 0.9 MG/DL (ref 0.5–1)
FOLATE: 14.1 NG/ML (ref 4.8–24.2)
GFR AFRICAN AMERICAN: >60
GFR NON-AFRICAN AMERICAN: >60 ML/MIN/1.73
GLUCOSE BLD-MCNC: 126 MG/DL (ref 74–99)
HCT VFR BLD CALC: 38.5 % (ref 34–48)
HEMOGLOBIN: 12.7 G/DL (ref 11.5–15.5)
MCH RBC QN AUTO: 29.3 PG (ref 26–35)
MCHC RBC AUTO-ENTMCNC: 33 % (ref 32–34.5)
MCV RBC AUTO: 88.9 FL (ref 80–99.9)
PDW BLD-RTO: 13.1 FL (ref 11.5–15)
PLATELET # BLD: 195 E9/L (ref 130–450)
PMV BLD AUTO: 10.9 FL (ref 7–12)
POTASSIUM SERPL-SCNC: 4.1 MMOL/L (ref 3.5–5)
RBC # BLD: 4.33 E12/L (ref 3.5–5.5)
SODIUM BLD-SCNC: 139 MMOL/L (ref 132–146)
TOTAL PROTEIN: 6.3 G/DL (ref 6.4–8.3)
TSH SERPL DL<=0.05 MIU/L-ACNC: 0.45 UIU/ML (ref 0.27–4.2)
VITAMIN B-12: 261 PG/ML (ref 211–946)
VITAMIN D 25-HYDROXY: 10 NG/ML (ref 30–100)
WBC # BLD: 8.5 E9/L (ref 4.5–11.5)

## 2021-10-28 PROCEDURE — 4040F PNEUMOC VAC/ADMIN/RCVD: CPT | Performed by: FAMILY MEDICINE

## 2021-10-28 PROCEDURE — 1036F TOBACCO NON-USER: CPT | Performed by: FAMILY MEDICINE

## 2021-10-28 PROCEDURE — G8484 FLU IMMUNIZE NO ADMIN: HCPCS | Performed by: FAMILY MEDICINE

## 2021-10-28 PROCEDURE — G8399 PT W/DXA RESULTS DOCUMENT: HCPCS | Performed by: FAMILY MEDICINE

## 2021-10-28 PROCEDURE — 1123F ACP DISCUSS/DSCN MKR DOCD: CPT | Performed by: FAMILY MEDICINE

## 2021-10-28 PROCEDURE — G8417 CALC BMI ABV UP PARAM F/U: HCPCS | Performed by: FAMILY MEDICINE

## 2021-10-28 PROCEDURE — 99214 OFFICE O/P EST MOD 30 MIN: CPT | Performed by: FAMILY MEDICINE

## 2021-10-28 PROCEDURE — G8427 DOCREV CUR MEDS BY ELIG CLIN: HCPCS | Performed by: FAMILY MEDICINE

## 2021-10-28 PROCEDURE — 1090F PRES/ABSN URINE INCON ASSESS: CPT | Performed by: FAMILY MEDICINE

## 2021-10-28 NOTE — PROGRESS NOTES
[facility])  Family Medicine Outpatient        SUBJECTIVE:  CC: had concerns including Chest Pain (Pt had radical R breast mastectomy 11/3/2020 and states that the area is now red and painful), Other (Pt's son is present for the visit due to pt's impaired memory, pt was unable to explain why she was here without assistance from son), and Discuss Medications (Pt's son reviewed medication list with MA - all responses to medications were \"yeah\"). HPI:  Alee Rojas is a female [de-identified] y.o. presented to the clinic for an established visit with her son. She reports having some pain over her mastectomy site. She denies any f/c. Review of Systems   Constitutional: Negative for appetite change, fatigue and fever. Respiratory: Negative for cough, shortness of breath and wheezing. Cardiovascular: Negative for chest pain and palpitations. Gastrointestinal: Negative for abdominal pain, constipation, diarrhea, nausea and vomiting. Outpatient Medications Marked as Taking for the 10/28/21 encounter (Office Visit) with Radha Quarles MD   Medication Sig Dispense Refill    atorvastatin (LIPITOR) 20 MG tablet Take 2 tablets by mouth daily 180 tablet 1    metFORMIN (GLUCOPHAGE) 500 MG tablet Take 1 tablet by mouth 2 times daily (with meals) 180 tablet 1    levothyroxine (SYNTHROID) 175 MCG tablet Take one tablet Mon-Fri and 2 tabs Sat/Sun. 108 tablet 1    ramipril (ALTACE) 10 MG capsule Take 1 capsule by mouth daily Indications: High Blood Pressure Disorder 90 capsule 1    mirtazapine (REMERON) 30 MG tablet Take 1 tablet by mouth nightly 90 tablet 1    metoprolol succinate (TOPROL XL) 25 MG extended release tablet Take 1 tablet by mouth daily 90 tablet 1    acetaminophen (TYLENOL) 325 MG tablet Take 650 mg by mouth every 6 hours as needed for Pain      nystatin (MYCOSTATIN) 636144 UNIT/GM powder Apply 3 times daily.  60 g 3    glucose monitoring kit (FREESTYLE) monitoring kit 1 kit by Does not apply route daily 1 kit 0    blood glucose monitor strips Test 1 times a day & as needed for symptoms of irregular blood glucose. 100 strip 3    Lancets MISC 1 each by Does not apply route daily 100 each 3       I have reviewed all pertinent PMHx, PSHx, FamHx, SocialHx, medications, and allergies and updated history as appropriate. OBJECTIVE    VS: /84   Pulse 84   Temp 98 °F (36.7 °C) (Temporal)   Resp 18   Ht 5' 4\" (1.626 m)   Wt 204 lb (92.5 kg)   LMP  (LMP Unknown)   SpO2 98%   Breastfeeding No   BMI 35.02 kg/m²   Physical Exam  Constitutional:       General: She is not in acute distress. Appearance: She is well-developed. She is obese. She is not diaphoretic. HENT:      Head: Normocephalic and atraumatic. Eyes:      Conjunctiva/sclera: Conjunctivae normal.      Pupils: Pupils are equal, round, and reactive to light. Cardiovascular:      Rate and Rhythm: Normal rate and regular rhythm. Comments: Over mastectomy site there is no signs of acute infection. There is no erythema, fluctuance, calor, or drainage. Pulmonary:      Effort: Pulmonary effort is normal.      Breath sounds: Normal breath sounds. Abdominal:      General: Bowel sounds are normal. There is no distension. Palpations: Abdomen is soft. Tenderness: There is no abdominal tenderness. Hernia: No hernia is present. Musculoskeletal:      Cervical back: Normal range of motion and neck supple. Skin:     General: Skin is warm and dry. Neurological:      Mental Status: She is alert and oriented to person, place, and time. ASSESSMENT/PLAN:  1. Vitamin D deficiency  - Vitamin D 25 Hydroxy; Future    2. Acquired hypothyroidism  - TSH without Reflex; Future    3. Dementia without behavioral disturbance, unspecified dementia type (HCC)  Chronic. Presumed underlying dementia. Suspect subtherapeutic TSH also contributing. Patient has home health to help with med compliance and lives with son.   MRI brain placed in March and not yet completed. Encourage patient and son to complete this. As well, referral to Neurology previously placed and placed again today. - Comprehensive Metabolic Panel; Future  - CBC; Future  - Vitamin B12; Future  - Folate; Future  - Tory Martini MD, Neurology, [facility]    4. BMI 35.0-35.9,adult    5. Atypical nevi  - Miladis Moss MD,  Dermatology, [facility] ([facility]IP)    6. Skin cancer screening  - Miladis Moss MD,  Dermatology, [place] [address] ([place])    7. Recurrent breast cancer, right ([address]  Looked over mastectomy site and there is no signs of acute infection. Prn Tylenol and f/u with Breast Specialist at this time. Continue to follow with Heme[facility]Onc and the [facility]. 8. Elevated glucose  - Hemoglobin A1C; Future    I have reviewed my findings and recommendations with Nayely Ramos MD  12/4/2021 2:56 PM  Return in about 6 weeks (around 12/9/2021). Counseled regarding above diagnosis, including possible risks and complications, especially if left uncontrolled. Patient counseled on red flag symptoms and if they occur to go to the ED. Discussed medications risk/benefits and possible side effects and alternatives to treatment. Patient and/or guardian verbalizes understanding, agrees, feels comfortable with and wishes to proceed with above treatment plan. Advised patient regarding importance of keeping up with recommended health maintenance and to schedule as soon as possible if overdue, as this is important in assessing for undiagnosed pathology, especially cancer, as well as protecting against potentially harmful/life threatening disease. Patient and/or guardian verbalizes understanding and agrees with above counseling, assessment and plan. All questions answered.     Please note this report has been partially produced using speech recognition software  and may contain errors related to that system including grammar, punctuation and spelling as well as words and phrases that may seem inappropriate. If there are questions or concerns please feel free to contact me to clarify.

## 2021-12-04 ASSESSMENT — ENCOUNTER SYMPTOMS
COUGH: 0
CONSTIPATION: 0
SHORTNESS OF BREATH: 0
VOMITING: 0
WHEEZING: 0
DIARRHEA: 0
NAUSEA: 0
ABDOMINAL PAIN: 0

## 2021-12-21 NOTE — PROGRESS NOTES
tablet Take 1 tablet by mouth daily 90 tablet 1    mirtazapine (REMERON) 30 MG tablet Take 1 tablet by mouth nightly 90 tablet 1       I have reviewed all pertinent PMHx, PSHx, FamHx, SocialHx, medications, and allergies and updated history as appropriate. OBJECTIVE    VS: /84 (Site: Right Upper Arm, Position: Sitting, Cuff Size: Large Adult)   Pulse 84   Resp 18   Ht 5' 4\" (1.626 m)   Wt 206 lb (93.4 kg)   SpO2 96%   BMI 35.36 kg/m²   Physical Exam  Constitutional:       General: She is not in acute distress. Appearance: She is well-developed. She is not diaphoretic. Comments: ambulating with walker   HENT:      Head: Normocephalic and atraumatic. Eyes:      Conjunctiva/sclera: Conjunctivae normal.      Pupils: Pupils are equal, round, and reactive to light. Neck:      Musculoskeletal: Normal range of motion and neck supple. Cardiovascular:      Rate and Rhythm: Normal rate and regular rhythm. Comments: R breast mass at the 9 o'clock position with pulling with peau d' orange. superficial wound measuring 1cm and mass below feels immobile measuring approximately 5cm. Pulmonary:      Effort: Pulmonary effort is normal.      Breath sounds: Normal breath sounds. Abdominal:      General: Bowel sounds are normal. There is no distension. Palpations: Abdomen is soft. Tenderness: There is no abdominal tenderness. Hernia: No hernia is present. Musculoskeletal:         General: Swelling (pitting edema le b/l otherwise euvolemic) present. Skin:     General: Skin is warm and dry. Neurological:      Mental Status: She is alert and oriented to person, place, and time. ASSESSMENT/PLAN:  1. Breast mass, right  Stat diagnostic breast MRI b/l ordered. Patient with history of cancer, concern for recurrence. Referral also placed to heme/onc as well as to Dr. Filomena Barron.  Apt made for Friday for stat imaging.   - Gabriel Gan MD, Breast Surgery, [facility])  - Magaly Melton MD, Hematology and Oncology, [facility]    2. History of breast cancer  - Isha Dean MD, Breast Surgery, [facility])  - Magaly Melton MD, Hematology and Oncology, [facility]    3. Controlled type 2 diabetes mellitus with hyperglycemia, without long-term current use of insulin (HCC)  - Hemoglobin A1C; Future  - Comprehensive Metabolic Panel; Future  - CBC; Future    4. Hypothyroidism, unspecified type  - TSH without Reflex; Future  - levothyroxine (SYNTHROID) 150 MCG tablet; Take 1 tablet by mouth Daily  Dispense: 90 tablet; Refill: 1    5. Lipid screening  - Lipid Panel; Future    6. Vitamin D deficiency  - Vitamin D 25 Hydroxy; Future    7. Essential hypertension  - ramipril (ALTACE) 10 MG capsule; Take 1 capsule by mouth daily Indications: High Blood Pressure Disorder  Dispense: 90 capsule; Refill: 1    8. Mixed hyperlipidemia  - atorvastatin (LIPITOR) 10 MG tablet; Take 1 tablet by mouth daily  Dispense: 90 tablet; Refill: 1    9. Venous stasis    10. Arthritis    11. Anxiety    Current medications reviewed and updated based on medications patient and son brought to apt. Reportedly has been out of Synthroid for a couple months. A lot of medications on bottles appear to be from 2019, only Remeron from 2020. Patient reports sometimes she just doesn't take her medications. She has home health aides that come to the house a couple times a week and ambulates with a walker. I have reviewed my findings and recommendations with Kristina Mascorro MD  8/1/2020 11:48 AM     Counseled regarding above diagnosis, including possible risks and complications, especially if left uncontrolled. Patient counseled on red flag symptoms and if they occur to go to the ED. Discussed medications risk/benefits and possible side effects and alternatives to treatment.  Patient and/or guardian verbalizes understanding, agrees, feels comfortable with and wishes to proceed with above treatment plan. Advised patient regarding importance of keeping up with recommended health maintenance and to schedule as soon as possible if overdue, as this is important in assessing for undiagnosed pathology, especially cancer, as well as protecting against potentially harmful/life threatening disease. Patient and/or guardian verbalizes understanding and agrees with above counseling, assessment and plan. All questions answered. Please note this report has been partially produced using speech recognition software  and may contain errors related to that system including grammar, punctuation and spelling as well as words and phrases that may seem inappropriate. If there are questions or concerns please feel free to contact me to clarify. Epidermal Sutures: 6-0 Ethilon

## 2022-01-13 PROBLEM — E53.8 VITAMIN B12 DEFICIENCY: Status: ACTIVE | Noted: 2022-01-13

## 2022-01-13 PROBLEM — C50.911 RECURRENT BREAST CANCER, RIGHT (HCC): Status: ACTIVE | Noted: 2022-01-13

## 2022-02-02 ENCOUNTER — TELEPHONE (OUTPATIENT)
Dept: BREAST CENTER | Age: 82
End: 2022-02-02

## 2022-02-02 NOTE — TELEPHONE ENCOUNTER
Left patient message with call back number to reschedule appointment and dexa scan due to the weather advisory.

## 2022-04-07 ENCOUNTER — CARE COORDINATION (OUTPATIENT)
Dept: CARE COORDINATION | Age: 82
End: 2022-04-07

## 2022-04-07 NOTE — LETTER
4/7/2022    [address] [facility] [address]      Dear [address] [facility],    My name is Saintclair Lineman, RN and I am a registered nurse who partners with Kush Reich MD to improve patients' health. Kush Reich MD believes you would benefit from working with me. As a member of your health care team, I would work with other providers involved in your care, offer education for your specific health conditions, and connect you with additional resources as needed. I will collaborate with Kush Reich MD to support you in following your treatment plan. The additional support I provide is no additional cost to you. My primary focus is to help you achieve specific goals and improve your health. We are committed to walk with you on this journey and look forward to working with you. Please call me to further discuss your healthcare needs. I am available by phone.     In good health,     Saintclair Lineman MSN, RN, [facility]  Ambulatory Care Manager  Cell: [phone]

## 2022-04-12 ENCOUNTER — OFFICE VISIT (OUTPATIENT)
Dept: FAMILY MEDICINE CLINIC | Age: 82
End: 2022-04-12
Payer: MEDICARE

## 2022-04-12 VITALS
WEIGHT: 202 LBS | HEART RATE: 96 BPM | OXYGEN SATURATION: 97 % | SYSTOLIC BLOOD PRESSURE: 138 MMHG | TEMPERATURE: 99 F | HEIGHT: 64 IN | BODY MASS INDEX: 34.49 KG/M2 | DIASTOLIC BLOOD PRESSURE: 86 MMHG | RESPIRATION RATE: 16 BRPM

## 2022-04-12 DIAGNOSIS — H61.23 BILATERAL IMPACTED CERUMEN: Primary | ICD-10-CM

## 2022-04-12 DIAGNOSIS — I10 ESSENTIAL HYPERTENSION: ICD-10-CM

## 2022-04-12 DIAGNOSIS — E03.9 HYPOTHYROIDISM, UNSPECIFIED TYPE: ICD-10-CM

## 2022-04-12 DIAGNOSIS — E11.65 TYPE 2 DIABETES MELLITUS WITH HYPERGLYCEMIA, WITHOUT LONG-TERM CURRENT USE OF INSULIN (HCC): ICD-10-CM

## 2022-04-12 DIAGNOSIS — F03.90 DEMENTIA WITHOUT BEHAVIORAL DISTURBANCE, UNSPECIFIED DEMENTIA TYPE: ICD-10-CM

## 2022-04-12 DIAGNOSIS — E55.9 VITAMIN D DEFICIENCY: ICD-10-CM

## 2022-04-12 LAB
ALBUMIN SERPL-MCNC: 4 G/DL (ref 3.5–5.2)
ALP BLD-CCNC: 98 U/L (ref 35–104)
ALT SERPL-CCNC: 7 U/L (ref 0–32)
ANION GAP SERPL CALCULATED.3IONS-SCNC: 14 MMOL/L (ref 7–16)
AST SERPL-CCNC: 14 U/L (ref 0–31)
BILIRUB SERPL-MCNC: 0.3 MG/DL (ref 0–1.2)
BUN BLDV-MCNC: 17 MG/DL (ref 6–23)
CALCIUM SERPL-MCNC: 9.5 MG/DL (ref 8.6–10.2)
CHLORIDE BLD-SCNC: 104 MMOL/L (ref 98–107)
CO2: 24 MMOL/L (ref 22–29)
CREAT SERPL-MCNC: 1.2 MG/DL (ref 0.5–1)
GFR AFRICAN AMERICAN: 52
GFR NON-AFRICAN AMERICAN: 43 ML/MIN/1.73
GLUCOSE BLD-MCNC: 211 MG/DL (ref 74–99)
HBA1C MFR BLD: 6.4 % (ref 4–5.6)
HCT VFR BLD CALC: 42.5 % (ref 34–48)
HEMOGLOBIN: 13.6 G/DL (ref 11.5–15.5)
MCH RBC QN AUTO: 28.5 PG (ref 26–35)
MCHC RBC AUTO-ENTMCNC: 32 % (ref 32–34.5)
MCV RBC AUTO: 89.1 FL (ref 80–99.9)
PDW BLD-RTO: 14.7 FL (ref 11.5–15)
PLATELET # BLD: 145 E9/L (ref 130–450)
PMV BLD AUTO: 10.3 FL (ref 7–12)
POTASSIUM SERPL-SCNC: 3.5 MMOL/L (ref 3.5–5)
RBC # BLD: 4.77 E12/L (ref 3.5–5.5)
SODIUM BLD-SCNC: 142 MMOL/L (ref 132–146)
TOTAL PROTEIN: 7.2 G/DL (ref 6.4–8.3)
TSH SERPL DL<=0.05 MIU/L-ACNC: 35.27 UIU/ML (ref 0.27–4.2)
VITAMIN D 25-HYDROXY: 16 NG/ML (ref 30–100)
WBC # BLD: 7.7 E9/L (ref 4.5–11.5)

## 2022-04-12 PROCEDURE — 1036F TOBACCO NON-USER: CPT | Performed by: FAMILY MEDICINE

## 2022-04-12 PROCEDURE — G8427 DOCREV CUR MEDS BY ELIG CLIN: HCPCS | Performed by: FAMILY MEDICINE

## 2022-04-12 PROCEDURE — 4040F PNEUMOC VAC/ADMIN/RCVD: CPT | Performed by: FAMILY MEDICINE

## 2022-04-12 PROCEDURE — G8417 CALC BMI ABV UP PARAM F/U: HCPCS | Performed by: FAMILY MEDICINE

## 2022-04-12 PROCEDURE — G8399 PT W/DXA RESULTS DOCUMENT: HCPCS | Performed by: FAMILY MEDICINE

## 2022-04-12 PROCEDURE — 3044F HG A1C LEVEL LT 7.0%: CPT | Performed by: FAMILY MEDICINE

## 2022-04-12 PROCEDURE — 1123F ACP DISCUSS/DSCN MKR DOCD: CPT | Performed by: FAMILY MEDICINE

## 2022-04-12 PROCEDURE — 99214 OFFICE O/P EST MOD 30 MIN: CPT | Performed by: FAMILY MEDICINE

## 2022-04-12 PROCEDURE — 36415 COLL VENOUS BLD VENIPUNCTURE: CPT | Performed by: FAMILY MEDICINE

## 2022-04-12 PROCEDURE — 1090F PRES/ABSN URINE INCON ASSESS: CPT | Performed by: FAMILY MEDICINE

## 2022-04-12 SDOH — ECONOMIC STABILITY: FOOD INSECURITY: WITHIN THE PAST 12 MONTHS, THE FOOD YOU BOUGHT JUST DIDN'T LAST AND YOU DIDN'T HAVE MONEY TO GET MORE.: NEVER TRUE

## 2022-04-12 SDOH — ECONOMIC STABILITY: FOOD INSECURITY: WITHIN THE PAST 12 MONTHS, YOU WORRIED THAT YOUR FOOD WOULD RUN OUT BEFORE YOU GOT MONEY TO BUY MORE.: NEVER TRUE

## 2022-04-12 ASSESSMENT — PATIENT HEALTH QUESTIONNAIRE - PHQ9
SUM OF ALL RESPONSES TO PHQ QUESTIONS 1-9: 0
SUM OF ALL RESPONSES TO PHQ QUESTIONS 1-9: 0
1. LITTLE INTEREST OR PLEASURE IN DOING THINGS: 0
SUM OF ALL RESPONSES TO PHQ9 QUESTIONS 1 & 2: 0
SUM OF ALL RESPONSES TO PHQ QUESTIONS 1-9: 0
SUM OF ALL RESPONSES TO PHQ QUESTIONS 1-9: 0
2. FEELING DOWN, DEPRESSED OR HOPELESS: 0

## 2022-04-12 ASSESSMENT — SOCIAL DETERMINANTS OF HEALTH (SDOH): HOW HARD IS IT FOR YOU TO PAY FOR THE VERY BASICS LIKE FOOD, HOUSING, MEDICAL CARE, AND HEATING?: NOT HARD AT ALL

## 2022-04-12 NOTE — PROGRESS NOTES
[facility])  Family Medicine Outpatient        SUBJECTIVE:  CC: had concerns including Ear Fullness (Pt c/o of ear  fullness,pain and hearing loss in left ear.). HPI:  Michele Lagunas is a female 81 y.o. presented to the clinic with concerns of ear fullness in both ears with associated hearing loss L>R. Her son Jasson Lacy accompanied her. She was last seen in October for an established visit. Review of Systems   Constitutional: Negative for appetite change, fatigue and fever. HENT: Negative for ear discharge. Ear fullness, decreased hearing   Respiratory: Negative for cough, shortness of breath and wheezing. Cardiovascular: Negative for chest pain and palpitations. Gastrointestinal: Negative for abdominal pain, constipation, diarrhea, nausea and vomiting. Outpatient Medications Marked as Taking for the 4/12/22 encounter (Office Visit) with Dave Sykes MD   Medication Sig Dispense Refill    carbamide peroxide (DEBROX) 6.5 % otic solution Place 5 drops into both ears 2 times daily X 4 days 1 each 0    atorvastatin (LIPITOR) 20 MG tablet Take 2 tablets by mouth daily 180 tablet 1    metFORMIN (GLUCOPHAGE) 500 MG tablet Take 1 tablet by mouth 2 times daily (with meals) 180 tablet 1    levothyroxine (SYNTHROID) 175 MCG tablet Take one tablet Mon-Fri and 2 tabs Sat/Sun. 108 tablet 1    ramipril (ALTACE) 10 MG capsule Take 1 capsule by mouth daily Indications: High Blood Pressure Disorder 90 capsule 1    mirtazapine (REMERON) 30 MG tablet Take 1 tablet by mouth nightly 90 tablet 1    metoprolol succinate (TOPROL XL) 25 MG extended release tablet Take 1 tablet by mouth daily 90 tablet 1    acetaminophen (TYLENOL) 325 MG tablet Take 650 mg by mouth every 6 hours as needed for Pain      nystatin (MYCOSTATIN) 426330 UNIT/GM powder Apply 3 times daily.  60 g 3    glucose monitoring kit (FREESTYLE) monitoring kit 1 kit by Does not apply route daily 1 kit 0    blood glucose monitor strips Test 1 times a day & as needed for symptoms of irregular blood glucose. 100 strip 3    Lancets MISC 1 each by Does not apply route daily 100 each 3       I have reviewed all pertinent PMHx, PSHx, FamHx, SocialHx, medications, and allergies and updated history as appropriate. OBJECTIVE    VS: /86   Pulse 96   Temp 99 °F (37.2 °C)   Resp 16   Ht 5' 4\" (1.626 m)   Wt 202 lb (91.6 kg)   LMP  (LMP Unknown)   SpO2 97%   Breastfeeding No   BMI 34.67 kg/m²   Physical Exam  Constitutional:       General: She is not in acute distress. Appearance: She is well-developed. She is not diaphoretic. HENT:      Head: Normocephalic and atraumatic. Right Ear: External ear normal.      Left Ear: External ear normal.      Ears:      Comments: B/l ear canal with hard wax blocking TM  Eyes:      Conjunctiva/sclera: Conjunctivae normal.      Pupils: Pupils are equal, round, and reactive to light. Cardiovascular:      Rate and Rhythm: Normal rate and regular rhythm. Pulmonary:      Effort: Pulmonary effort is normal.      Breath sounds: Normal breath sounds. Abdominal:      General: Bowel sounds are normal. There is no distension. Palpations: Abdomen is soft. Tenderness: There is no abdominal tenderness. Hernia: No hernia is present. Musculoskeletal:      Cervical back: Normal range of motion and neck supple. Skin:     General: Skin is warm and dry. Neurological:      Mental Status: She is alert. Comments: AAOx2       ASSESSMENT/PLAN:  1. Bilateral impacted cerumen  - carbamide peroxide (DEBROX) 6.5 % otic solution; Place 5 drops into both ears 2 times daily X 4 days  Dispense: 1 each; Refill: 0  - One Jonathan Johnson DO Otolaryngology, [place]    2. Hypothyroidism, unspecified type  - TSH; [facility]    3. Vitamin D deficiency  - Vitamin D 25 Hydroxy; [facility]    4.  Type 2 diabetes mellitus with hyperglycemia, without long-term current use of insulin (HCC)  - Hemoglobin A1C; Future  - [address]    5. Essential hypertension  - Comprehensive Metabolic Panel; Future  - CBC; Future  - [address]    6. Dementia without behavioral disturbance, unspecified dementia type [facility])  - [address]      I have reviewed my findings and recommendations with [address] [place] [address] [address], MD  4/18/2022 12:16 PM  Return in 1 week (on 4/19/2022) for ear irrigation, lab review, medicare wellness. Counseled regarding above diagnosis, including possible risks and complications, especially if left uncontrolled. Patient counseled on red flag symptoms and if they occur to go to the ED. Discussed medications risk/benefits and possible side effects and alternatives to treatment. Patient and/or guardian verbalizes understanding, agrees, feels comfortable with and wishes to proceed with above treatment plan. Advised patient regarding importance of keeping up with recommended health maintenance and to schedule as soon as possible if overdue, as this is important in assessing for undiagnosed pathology, especially cancer, as well as protecting against potentially harmful/life threatening disease. Patient and/or guardian verbalizes understanding and agrees with above counseling, assessment and plan. All questions answered. Please note this report has been partially produced using speech recognition software  and may contain errors related to that system including grammar, punctuation and spelling as well as words and phrases that may seem inappropriate. If there are questions or concerns please feel free to contact me to clarify.

## 2022-04-18 ASSESSMENT — ENCOUNTER SYMPTOMS
CONSTIPATION: 0
SHORTNESS OF BREATH: 0
ABDOMINAL PAIN: 0
VOMITING: 0
COUGH: 0
NAUSEA: 0
DIARRHEA: 0
WHEEZING: 0

## 2022-05-05 ENCOUNTER — OFFICE VISIT (OUTPATIENT)
Dept: FAMILY MEDICINE CLINIC | Age: 82
End: 2022-05-05
Payer: MEDICARE

## 2022-05-05 VITALS
WEIGHT: 194 LBS | HEIGHT: 64 IN | BODY MASS INDEX: 33.12 KG/M2 | TEMPERATURE: 98.8 F | SYSTOLIC BLOOD PRESSURE: 134 MMHG | HEART RATE: 95 BPM | RESPIRATION RATE: 16 BRPM | DIASTOLIC BLOOD PRESSURE: 84 MMHG | OXYGEN SATURATION: 96 %

## 2022-05-05 DIAGNOSIS — C50.911 RECURRENT BREAST CANCER, RIGHT (HCC): ICD-10-CM

## 2022-05-05 DIAGNOSIS — E03.9 HYPOTHYROIDISM, UNSPECIFIED TYPE: ICD-10-CM

## 2022-05-05 DIAGNOSIS — L40.50 PSORIATIC ARTHRITIS (HCC): ICD-10-CM

## 2022-05-05 DIAGNOSIS — E11.65 CONTROLLED TYPE 2 DIABETES MELLITUS WITH HYPERGLYCEMIA, UNSPECIFIED WHETHER LONG TERM INSULIN USE (HCC): ICD-10-CM

## 2022-05-05 DIAGNOSIS — J43.9 PULMONARY EMPHYSEMA, UNSPECIFIED EMPHYSEMA TYPE (HCC): ICD-10-CM

## 2022-05-05 DIAGNOSIS — H61.23 BILATERAL IMPACTED CERUMEN: ICD-10-CM

## 2022-05-05 DIAGNOSIS — Z23 NEED FOR PNEUMOCOCCAL VACCINATION: ICD-10-CM

## 2022-05-05 DIAGNOSIS — H91.93 BILATERAL HEARING LOSS, UNSPECIFIED HEARING LOSS TYPE: ICD-10-CM

## 2022-05-05 DIAGNOSIS — Z00.00 MEDICARE ANNUAL WELLNESS VISIT, SUBSEQUENT: Primary | ICD-10-CM

## 2022-05-05 DIAGNOSIS — N18.30 STAGE 3 CHRONIC KIDNEY DISEASE, UNSPECIFIED WHETHER STAGE 3A OR 3B CKD (HCC): ICD-10-CM

## 2022-05-05 PROCEDURE — G0439 PPPS, SUBSEQ VISIT: HCPCS | Performed by: FAMILY MEDICINE

## 2022-05-05 PROCEDURE — 4040F PNEUMOC VAC/ADMIN/RCVD: CPT | Performed by: FAMILY MEDICINE

## 2022-05-05 PROCEDURE — 3044F HG A1C LEVEL LT 7.0%: CPT | Performed by: FAMILY MEDICINE

## 2022-05-05 PROCEDURE — 99214 OFFICE O/P EST MOD 30 MIN: CPT | Performed by: FAMILY MEDICINE

## 2022-05-05 PROCEDURE — G0009 ADMIN PNEUMOCOCCAL VACCINE: HCPCS | Performed by: FAMILY MEDICINE

## 2022-05-05 PROCEDURE — 90732 PPSV23 VACC 2 YRS+ SUBQ/IM: CPT | Performed by: FAMILY MEDICINE

## 2022-05-05 PROCEDURE — 1123F ACP DISCUSS/DSCN MKR DOCD: CPT | Performed by: FAMILY MEDICINE

## 2022-05-05 RX ORDER — MELATONIN
2000 DAILY
Qty: 30 TABLET | Refills: 1 | Status: SHIPPED | OUTPATIENT
Start: 2022-05-05

## 2022-05-05 ASSESSMENT — PATIENT HEALTH QUESTIONNAIRE - PHQ9
2. FEELING DOWN, DEPRESSED OR HOPELESS: 0
1. LITTLE INTEREST OR PLEASURE IN DOING THINGS: 0
SUM OF ALL RESPONSES TO PHQ QUESTIONS 1-9: 0
SUM OF ALL RESPONSES TO PHQ9 QUESTIONS 1 & 2: 0

## 2022-05-05 ASSESSMENT — LIFESTYLE VARIABLES: HOW OFTEN DO YOU HAVE A DRINK CONTAINING ALCOHOL: NEVER

## 2022-05-05 NOTE — PROGRESS NOTES
Medicare Annual Wellness Visit    Tigist Barrientos is here for Medicare AWV (AWV)    Assessment & Plan   Medicare annual wellness visit, subsequent  Bilateral hearing loss, unspecified hearing loss type  -     Audiometry with tympanometry; Future  Bilateral impacted cerumen  -     carbamide peroxide (DEBROX) 6.5 % otic solution; Place 5 drops in ear(s) 2 times daily for 4 days, Disp-1 each, R-0Normal  Controlled type 2 diabetes mellitus with hyperglycemia, unspecified whether long term insulin use (HCC)  Hypothyroidism, unspecified type  Stage 3 chronic kidney disease, unspecified whether stage 3a or 3b CKD (HCC)  Psoriatic arthritis ([facility]  Pulmonary emphysema, unspecified emphysema type (HCC)  -     Pneumococcal polysaccharide vaccine 23-valent greater than or equal to 1yo subcutaneous/IM  Recurrent breast cancer, right ([address]  Need for pneumococcal vaccination  -     Pneumococcal polysaccharide vaccine 23-valent greater than or equal to 1yo subcutaneous/IM         Recent labs from 4/12/22 reviewed. Recommendations for Preventive Services Due: see orders and patient instructions/AVS.  Recommended screening schedule for the next 5-10 years is provided to the patient in written form: see Patient Instructions/AVS.     Return in 2 weeks (on 5/19/2022) for ear irrigation. Subjective   The patient is accompanied by her son. Reportedly the patient has packets of medication from an unknown company that sorts her medication for the month. He is unsure of what is in there, but he assumes they are correct? He states he will follow up with the name of the company. She has a Care Coordinator that is helping her sign up for Senior activities and silver sneakers. She has [employer]ers that come M,W,F. She has a history of recurrent breast cancer. Her son reports they are following up with Oncology as advised and saw them a couple months ago.      Patient's complete Health Risk Assessment and screening values have been reviewed and are found in [facility]. The following problems were reviewed today and where indicated follow up appointments were made and/or referrals ordered. Positive Risk Factor Screenings with Interventions:               General Health and ACP:  General  In general, how would you say your health is?: Fair  In the past 7 days, have you experienced any of the following: New or Increased Pain, New or Increased Fatigue, Loneliness, Social Isolation, Stress or Anger?: No  Do you get the social and emotional support that you need?: (!) No  Do you have a Living Will?: Yes    Advance Directives     Power of  Living Will ACP-Advance Directive ACP-Power of     Not on File Not on File Not on File Not on File      General Health Risk Interventions:  · patient working with Care Coordinator to get involved in more community activities. She also has [employer]ers coming M,W,F. She lives with her son who helps take care of her. Health Habits/Nutrition:     Physical Activity: Inactive    Days of Exercise per Week: 0 days    Minutes of Exercise per Session: 0 min     Have you lost any weight without trying in the past 3 months?: No  Body mass index: (!) 33.3  Have you seen the dentist within the past year?: (!) No    Health Habits/Nutrition Interventions:  · Dental exam overdue:  encourage patient to see the dentist atleast once a year. Hearing/Vision:  Do you or your family notice any trouble with your hearing that hasn't been managed with hearing aids?: (!) Yes  Do you have difficulty driving, watching TV, or doing any of your daily activities because of your eyesight?: (!) Yes  Have you had an eye exam within the past year?: (!) No  No exam data present    Hearing/Vision Interventions:  · cerumen impaction b/l. Debrox regimen perscribed. F/u for irrigation after completion. Hearing test also placed.  Recommend yearly vision test.     ADLs:  In the past 7 days, did you need help from others to perform any of the following everyday activities: Eating, dressing, grooming, bathing, toileting, or walking/balance?: (!) Yes  Select all that apply: (!) Eating,Bathing  In the past 7 days, did you need help from others to take care of any of the following: Laundry, housekeeping, banking/finances, shopping, telephone use, food preparation, transportation, or taking medications?: (!) Yes  Select all that apply: (!) Laundry    ADL Interventions:  · Has [employer]ers along [facility] Care Coordinator on board along with her son who helps her with the above. Objective   Vitals:    05/05/22 1130   BP: 134/84   Pulse: 95   Resp: 16   Temp: 98.8 °F (37.1 °C)   SpO2: 96%   Weight: 194 lb (88 kg)   Height: 5' 4\" (1.626 m)      Body mass index is 33.3 kg/m². Review of Systems   Constitutional: Negative for appetite change, fatigue and fever. HENT: Negative for ear discharge. Ear fullness, decreased hearing   Respiratory: Negative for cough, shortness of breath and wheezing. Cardiovascular: Negative for chest pain and palpitations. Gastrointestinal: Negative for abdominal pain, constipation, diarrhea, nausea and vomiting    Physical Exam  Constitutional:       General: She is not in acute distress. Appearance: She is well-developed. She is not diaphoretic. HENT:      Head: Normocephalic and atraumatic. Right Ear: External ear normal.      Left Ear: External ear normal.      Ears:      Comments: B/l ear canal with hard wax blocking TM  Eyes:      Conjunctiva/sclera: Conjunctivae normal.      Pupils: Pupils are equal, round, and reactive to light. Cardiovascular:      Rate and Rhythm: Normal rate and regular rhythm. Pulmonary:      Effort: Pulmonary effort is normal.      Breath sounds: Normal breath sounds. Abdominal:      General: Bowel sounds are normal. There is no distension. Palpations: Abdomen is soft. Tenderness: There is no abdominal tenderness.       Hernia: No hernia is present. Musculoskeletal:      Cervical back: Normal range of motion and neck supple. Skin:     General: Skin is warm and dry. Neurological:      Mental Status: She is alert. Comments: AAOx2         No Known Allergies  Prior to Visit Medications    Medication Sig Taking? Authorizing Provider   vitamin D3 (CHOLECALCIFEROL) 25 MCG (1000 UT) TABS tablet Take 2 tablets by mouth daily Yes Gerald Parrish MD   atorvastatin (LIPITOR) 20 MG tablet Take 2 tablets by mouth daily Yes Gerald Parrish MD   metFORMIN (GLUCOPHAGE) 500 MG tablet Take 1 tablet by mouth 2 times daily (with meals) Yes Gerald Parrish MD   levothyroxine (SYNTHROID) 175 MCG tablet Take one tablet Mon-Fri and 2 tabs Sat/Sun. Yes Gerald Parrish MD   ramipril (ALTACE) 10 MG capsule Take 1 capsule by mouth daily Indications: High Blood Pressure Disorder Yes Gerald Parrish MD   mirtazapine (REMERON) 30 MG tablet Take 1 tablet by mouth nightly Yes Gerald Parrish MD   metoprolol succinate (TOPROL XL) 25 MG extended release tablet Take 1 tablet by mouth daily Yes Gerald Parrish MD   acetaminophen (TYLENOL) 325 MG tablet Take 650 mg by mouth every 6 hours as needed for Pain Yes Historical Provider, MD   nystatin (MYCOSTATIN) 122773 UNIT/GM powder Apply 3 times daily. Yes Judith P Catterlin, DO   glucose monitoring kit (FREESTYLE) monitoring kit 1 kit by Does not apply route daily Yes Gerald Parrish MD   blood glucose monitor strips Test 1 times a day & as needed for symptoms of irregular blood glucose.  Yes Gerald Parrish MD   Lancets MISC 1 each by Does not apply route daily Yes Gerald Parrish MD       [facility] (Including outside providers/suppliers regularly involved in providing care):   Patient Care Team:  Gerald Parrish MD as PCP - General (Family Medicine)  Gerald Parrish MD as PCP - REHABILITATION [facility] Empaneled Provider    Reviewed and updated this visit:  Tobacco  Allergies  Meds  Med Hx Surg Hx  Soc Hx  Fam Hx

## 2022-05-05 NOTE — PATIENT INSTRUCTIONS
Personalized Preventive Plan for Troy Breaker - 5/5/2022  Medicare offers a range of preventive health benefits. Some of the tests and screenings are paid in full while other may be subject to a deductible, co-insurance, and/or copay. Some of these benefits include a comprehensive review of your medical history including lifestyle, illnesses that may run in your family, and various assessments and screenings as appropriate. After reviewing your medical record and screening and assessments performed today your provider may have ordered immunizations, labs, imaging, and/or referrals for you. A list of these orders (if applicable) as well as your Preventive Care list are included within your After Visit Summary for your review. Other Preventive Recommendations:    · A preventive eye exam performed by an eye specialist is recommended every 1-2 years to screen for glaucoma; cataracts, macular degeneration, and other eye disorders. · A preventive dental visit is recommended every 6 months. · Try to get at least 150 minutes of exercise per week or 10,000 steps per day on a pedometer . · Order or download the FREE \"Exercise & Physical Activity: Your Everyday Guide\" from [employer]. Call 9-549.289.9827 or search [employer] online. · You need 8646-6262 mg of calcium and 0503-9049 IU of vitamin D per day. It is possible to meet your calcium requirement with diet alone, but a vitamin D supplement is usually necessary to meet this goal.  · When exposed to the sun, use a sunscreen that protects against both UVA and UVB radiation with an SPF of 30 or greater. Reapply every 2 to 3 hours or after sweating, drying off with a towel, or swimming. · Always wear a seat belt when traveling in a car. Always wear a helmet when riding a bicycle or motorcycle.
No

## 2022-06-06 ENCOUNTER — APPOINTMENT (OUTPATIENT)
Dept: GENERAL RADIOLOGY | Age: 82
DRG: 299 | End: 2022-06-06
Payer: MEDICARE

## 2022-06-06 ENCOUNTER — APPOINTMENT (OUTPATIENT)
Dept: CT IMAGING | Age: 82
DRG: 299 | End: 2022-06-06
Payer: MEDICARE

## 2022-06-06 ENCOUNTER — HOSPITAL ENCOUNTER (INPATIENT)
Age: 82
LOS: 4 days | Discharge: NURSING, REHAB OR LONG-TERM CARE | DRG: 299 | End: 2022-06-10
Attending: EMERGENCY MEDICINE | Admitting: FAMILY MEDICINE
Payer: MEDICARE

## 2022-06-06 DIAGNOSIS — R06.02 SHORTNESS OF BREATH: Primary | ICD-10-CM

## 2022-06-06 DIAGNOSIS — I73.9 PERIPHERAL ARTERIAL DISEASE (HCC): ICD-10-CM

## 2022-06-06 LAB
ALBUMIN SERPL-MCNC: 3.8 G/DL (ref 3.5–5.2)
ALP BLD-CCNC: 77 U/L (ref 35–104)
ALT SERPL-CCNC: 14 U/L (ref 0–32)
ANION GAP SERPL CALCULATED.3IONS-SCNC: 13 MMOL/L (ref 7–16)
AST SERPL-CCNC: 28 U/L (ref 0–31)
BASOPHILS ABSOLUTE: 0.05 E9/L (ref 0–0.2)
BASOPHILS RELATIVE PERCENT: 0.7 % (ref 0–2)
BILIRUB SERPL-MCNC: 1.1 MG/DL (ref 0–1.2)
BUN BLDV-MCNC: 14 MG/DL (ref 6–23)
CALCIUM SERPL-MCNC: 9.4 MG/DL (ref 8.6–10.2)
CHLORIDE BLD-SCNC: 105 MMOL/L (ref 98–107)
CO2: 25 MMOL/L (ref 22–29)
CREAT SERPL-MCNC: 1.4 MG/DL (ref 0.5–1)
EOSINOPHILS ABSOLUTE: 0.21 E9/L (ref 0.05–0.5)
EOSINOPHILS RELATIVE PERCENT: 3.1 % (ref 0–6)
GFR AFRICAN AMERICAN: 44
GFR NON-AFRICAN AMERICAN: 36 ML/MIN/1.73
GLUCOSE BLD-MCNC: 120 MG/DL (ref 74–99)
HCT VFR BLD CALC: 42.7 % (ref 34–48)
HEMOGLOBIN: 14.5 G/DL (ref 11.5–15.5)
IMMATURE GRANULOCYTES #: 0.03 E9/L
IMMATURE GRANULOCYTES %: 0.4 % (ref 0–5)
LACTIC ACID, SEPSIS: 1.8 MMOL/L (ref 0.5–1.9)
LYMPHOCYTES ABSOLUTE: 1.23 E9/L (ref 1.5–4)
LYMPHOCYTES RELATIVE PERCENT: 17.9 % (ref 20–42)
MCH RBC QN AUTO: 29.1 PG (ref 26–35)
MCHC RBC AUTO-ENTMCNC: 34 % (ref 32–34.5)
MCV RBC AUTO: 85.6 FL (ref 80–99.9)
MONOCYTES ABSOLUTE: 0.41 E9/L (ref 0.1–0.95)
MONOCYTES RELATIVE PERCENT: 6 % (ref 2–12)
NEUTROPHILS ABSOLUTE: 4.93 E9/L (ref 1.8–7.3)
NEUTROPHILS RELATIVE PERCENT: 71.9 % (ref 43–80)
PDW BLD-RTO: 15.8 FL (ref 11.5–15)
PLATELET # BLD: 163 E9/L (ref 130–450)
PMV BLD AUTO: 9.8 FL (ref 7–12)
POTASSIUM SERPL-SCNC: 3.2 MMOL/L (ref 3.5–5)
RBC # BLD: 4.99 E12/L (ref 3.5–5.5)
SODIUM BLD-SCNC: 143 MMOL/L (ref 132–146)
TOTAL CK: 252 U/L (ref 20–180)
TOTAL PROTEIN: 7.2 G/DL (ref 6.4–8.3)
WBC # BLD: 6.9 E9/L (ref 4.5–11.5)

## 2022-06-06 PROCEDURE — 96374 THER/PROPH/DIAG INJ IV PUSH: CPT

## 2022-06-06 PROCEDURE — 6360000002 HC RX W HCPCS: Performed by: EMERGENCY MEDICINE

## 2022-06-06 PROCEDURE — 2060000000 HC ICU INTERMEDIATE R&B

## 2022-06-06 PROCEDURE — 96376 TX/PRO/DX INJ SAME DRUG ADON: CPT

## 2022-06-06 PROCEDURE — 71045 X-RAY EXAM CHEST 1 VIEW: CPT

## 2022-06-06 PROCEDURE — 6360000004 HC RX CONTRAST MEDICATION: Performed by: RADIOLOGY

## 2022-06-06 PROCEDURE — 80053 COMPREHEN METABOLIC PANEL: CPT

## 2022-06-06 PROCEDURE — 85025 COMPLETE CBC W/AUTO DIFF WBC: CPT

## 2022-06-06 PROCEDURE — 75635 CT ANGIO ABDOMINAL ARTERIES: CPT

## 2022-06-06 PROCEDURE — 2580000003 HC RX 258: Performed by: RADIOLOGY

## 2022-06-06 PROCEDURE — 99223 1ST HOSP IP/OBS HIGH 75: CPT | Performed by: SURGERY

## 2022-06-06 PROCEDURE — 83605 ASSAY OF LACTIC ACID: CPT

## 2022-06-06 PROCEDURE — 99285 EMERGENCY DEPT VISIT HI MDM: CPT

## 2022-06-06 PROCEDURE — 82550 ASSAY OF CK (CPK): CPT

## 2022-06-06 RX ORDER — MORPHINE SULFATE 4 MG/ML
4 INJECTION, SOLUTION INTRAMUSCULAR; INTRAVENOUS ONCE
Status: COMPLETED | OUTPATIENT
Start: 2022-06-06 | End: 2022-06-06

## 2022-06-06 RX ORDER — SODIUM CHLORIDE 0.9 % (FLUSH) 0.9 %
10 SYRINGE (ML) INJECTION
Status: COMPLETED | OUTPATIENT
Start: 2022-06-06 | End: 2022-06-06

## 2022-06-06 RX ADMIN — MORPHINE SULFATE 4 MG: 4 INJECTION, SOLUTION INTRAMUSCULAR; INTRAVENOUS at 14:57

## 2022-06-06 RX ADMIN — Medication 10 ML: at 17:32

## 2022-06-06 RX ADMIN — MORPHINE SULFATE 4 MG: 4 INJECTION, SOLUTION INTRAMUSCULAR; INTRAVENOUS at 19:42

## 2022-06-06 RX ADMIN — IOPAMIDOL 90 ML: 755 INJECTION, SOLUTION INTRAVENOUS at 17:32

## 2022-06-06 ASSESSMENT — PAIN DESCRIPTION - LOCATION
LOCATION: BACK
LOCATION: BACK

## 2022-06-06 ASSESSMENT — ENCOUNTER SYMPTOMS
SHORTNESS OF BREATH: 0
BACK PAIN: 1
CHEST TIGHTNESS: 0

## 2022-06-06 ASSESSMENT — PAIN DESCRIPTION - ORIENTATION: ORIENTATION: LOWER

## 2022-06-06 ASSESSMENT — PAIN SCALES - GENERAL
PAINLEVEL_OUTOF10: 8
PAINLEVEL_OUTOF10: 10
PAINLEVEL_OUTOF10: 9

## 2022-06-06 ASSESSMENT — PAIN - FUNCTIONAL ASSESSMENT: PAIN_FUNCTIONAL_ASSESSMENT: 0-10

## 2022-06-06 ASSESSMENT — PAIN DESCRIPTION - DESCRIPTORS: DESCRIPTORS: DISCOMFORT;PRESSURE;POUNDING

## 2022-06-06 NOTE — LETTER
[place] Department Medicaid  CERTIFICATION OF NECESSITY  FOR NON-EMERGENCY TRANSPORTATION   BY GROUND AMBULANCE      Individual Information   1. Name: Carolyn Forrester 2. [place] Medicaid Billing Number:    3. Address: [place] [address]      Transportation Provider Information   4. Provider Name:    5. [place] Medicaid Provider Number:  National Provider Identifier (NPI):      Certification  7. Criteria:  During transport, this individual requires:  [x] Medical treatment or continuous     supervision by an EMT. [] The administration or regulation of oxygen by another person. [] Supervised protective restraint. 8. Period Beginning Date:    5. Length  [x] Not more than 7 day(s)  [] One Year     Additional Information Relevant to Certification   10. Comments or Explanations, If Necessary or Appropriate   Acute respiratory failure with hypoxia, PAD, altered mental status, dementia, alert to self only, mostly non-verbal, poor attention span, unable to follow commands       Certifying Practitioner Information   11. Name of Practitioner: Vishnu Howard   12. [place] Medicaid Provider Number, If Applicable:  [address] Provider Identifier (NPI):      Signature Information   14. Date of Signature: 6/8/2022 15. Name of Person Signing: Electronically signed by Marion Cervantes RN on 6/8/2022 at 2:52 PM   16. Signature and Professional Designation: Electronically signed by Marion Cervantes RN on 6/8/2022 at 2:52 PM     [place] [address]  Rev. 7/2015  [facility] Encounter Date/Time: 6/6/2022 [address] Account: [de-identified]    MRN: [id number]    Patient: Carolyn Forrester    Contact Serial #: [id number]      ENCOUNTER          Patient Class: I Private Enc?   No Unit RM BD: [facility] Service: MED   Encounter DX: Shortness of breath [R06*   ADM Provider: Michele Roman DO   Procedure:     ATT Provider: Michele Roman DO   REF Provider:        Admission DX: Shortness of breath, Peripheral arterial disease ([facility] [address] Acute respiratory failure with hypoxia ([facility] and DX codes: R06.02, I73.9, J96.01      PATIENT                 Name: Kim Akbar :  (82 yrs)   Address:  [address] [place] [address] Sex: Female   City: [place] [address]         Marital Status:    Employer: RETIRED         Mormon: Yazidism   Primary Care Provider: Raya Acuna MD         Primary Phone: [phone]   EMERGENCY CONTACT   Contact Name Legal Guardian? Relationship to Patient Home Phone Work Phone   1. Denia David  2. Juanitaea Carrier    No Child  Grandchild [phone]                 GUARANTOR            Guarantor: Kim Akbar     : 1940   Address:  [address] [place] [address] [address] Sex: Female     [place] [address]     Relation to Patient: Self       Home Phone: [phone]   Guarantor ID: [id number]       Work Phone:     Guarantor Employer: RETIRED         Status: RETIRED      COVERAGE        PRIMARY INSURANCE   Payor: MEDICARE Plan: MEDICARE PART A AND B   Payor Address: [facility],  [facility], [facility]       Group Number:   Insurance Type: INDEMNITY   Subscriber Name: Divya Baar : 1940   Subscriber ID: [id number] [facility]. Rel. to Sub: Self   SECONDARY INSURANCE   Payor: BCBS Plan: [facility]   Payor Address:  [facility] [id number], [place], [facility] [address]          Group Number: [id number] Insurance Type: [id number] Name: Divya Son : 1940   Subscriber ID: [id number] Pat.  Rel. to Sub: SELF

## 2022-06-06 NOTE — ED PROVIDER NOTES
67-year-old female presenting with right low back pain down the back of the right leg. She describes right discomfort. She initially said this only started 3 days ago. Then when asked more specifically she says that she has had pain there for a longer period of time. She has pain with any movement of his leg or touching it. Is awake and alert, knows her name and where she is but does not know the time, she knows that her right leg is hurting her. Most likely acute exacerbation of a longer term issue, persistent, moderate severity, worse in the last 3 days. Family History   Problem Relation Age of Onset    Breast Cancer Mother     Cancer Father         bone marrow    Cancer Brother         prostate    Diabetes Son     Neuropathy Son     High Blood Pressure Son     High Cholesterol Son     Hypertension Son      Past Surgical History:   Procedure Laterality Date    BREAST LUMPECTOMY  [id number]    RIGHT    CARDIAC CATHETERIZATION  10/09/2020    Dr. Efren Antoine      partial    JOINT REPLACEMENT Bilateral     bilateral TKR    LUMBAR FUSION      L4L5    MASTECTOMY, MODIFIED RADICAL Right 11/3/2020    MODIFIED RADICAL RIGHT BREAST MASTECTOMY performed by Girish Crockett MD at [place] [address] OTHER SURGICAL HISTORY      thumb joint replacement rt     THYROID SURGERY      US BREAST NEEDLE BIOPSY RIGHT  7/31/2020    US BREAST NEEDLE BIOPSY RIGHT 7/31/2020 [facility]       Review of Systems   Constitutional: Negative for chills and fever. Respiratory: Negative for chest tightness and shortness of breath. Cardiovascular: Negative for chest pain. Musculoskeletal: Positive for back pain. All other systems reviewed and are negative. Physical Exam  Constitutional:       General: She is not in acute distress. Appearance: She is well-developed. HENT:      Head: Normocephalic and atraumatic.    Eyes: Conjunctiva/sclera: Conjunctivae normal.      Pupils: Pupils are equal, round, and reactive to light. Neck:      Thyroid: No thyromegaly. Cardiovascular:      Rate and Rhythm: Normal rate and regular rhythm. Pulmonary:      Effort: Pulmonary effort is normal. No respiratory distress. Breath sounds: Normal breath sounds. Abdominal:      General: There is no distension. Palpations: Abdomen is soft. Tenderness: There is no abdominal tenderness. There is no guarding or rebound. Musculoskeletal:         General: No tenderness. Normal range of motion. Cervical back: Normal range of motion. Skin:     General: Skin is warm and dry. Findings: No erythema. Neurological:      Mental Status: She is alert and oriented to person, place, and time. Cranial Nerves: No cranial nerve deficit. Coordination: Coordination normal.      Comments: Fidgeting the right leg, pain with palpation of nearly anywhere on the leg itself. Right pulses are not palpable, with a hand-held Doppler able to locate the pedal and posterior tibial on the right foot, left foot pulses are easily palpated. Right leg is not significantly cold, there is no firmness or tenderness consistent with compartment syndrome.           Procedures     MDM     ED Course as of 06/06/22 2046 Mon Jun 06, 2022   1615 Patient feeling better after the initial pain medication. [SO]      ED Course User Index  [SO] Indiana Silva, DO           ED Course as of 06/06/22 2046 Mon Jun 06, 2022   1615 Patient feeling better after the initial pain medication. [SO]      ED Course User Index  [SO] Indiana Silva, DO       --------------------------------------------- PAST HISTORY ---------------------------------------------  Past Medical History:  has a past medical history of Asthma, Blood transfusion, [facility] COPD (chronic obstructive pulmonary disease) ([facility] Diabetes mellitus ([facility] Diabetic peripheral neuropathy ([facility] DM type 2 (diabetes mellitus, type 2) ([facility] Hypertension, Hypothyroidism, Irritable bowel syndrome, Mixed hyperlipidemia, Obesity (BMI 30-39.9), Osteoarthritis, RA (rheumatoid arthritis) ([facility] RBBB, Recurrent breast cancer, right ([facility] and Vitamin B12 deficiency. Past Surgical History:  has a past surgical history that includes Thyroid surgery; Cholecystectomy; Hysterectomy; Carpal tunnel release; lumbar fusion; other surgical history; Breast lumpectomy ([id number]); joint replacement (Bilateral); US BREAST BIOPSY W LOC DEVICE 1ST LESION RIGHT (7/31/2020); Cardiac catheterization (10/09/2020); and Mastectomy, modified radical (Right, 11/3/2020). Social History:  reports that she has never smoked. She has never used smokeless tobacco. She reports that she does not drink alcohol and does not use drugs. Family History: family history includes Breast Cancer in her mother; Cancer in her brother and father; Diabetes in her son; High Blood Pressure in her son; High Cholesterol in her son; Hypertension in her son; Neuropathy in her son. The patients home medications have been reviewed. Allergies: Patient has no known allergies.     -------------------------------------------------- RESULTS -------------------------------------------------    LABS:  Results for orders placed or performed during the hospital encounter of 06/06/22   Lactate, Sepsis   Result Value Ref Range    Lactic Acid, Sepsis 1.8 0.5 - 1.9 mmol/L   CBC with Auto Differential   Result Value Ref Range    WBC 6.9 4.5 - 11.5 E9/L    RBC 4.99 3.50 - 5.50 E12/L    Hemoglobin 14.5 11.5 - 15.5 g/dL    Hematocrit 42.7 34.0 - 48.0 %    MCV 85.6 80.0 - 99.9 fL    MCH 29.1 26.0 - 35.0 pg    MCHC 34.0 32.0 - 34.5 %    RDW 15.8 (H) 11.5 - 15.0 fL    Platelets 159 319 - 207 E9/L    MPV 9.8 7.0 - 12.0 fL    Neutrophils % 71.9 43.0 - 80.0 %    Immature Granulocytes % 0.4 0.0 - 5.0 %    Lymphocytes % 17.9 (L) 20.0 - 42.0 %    Monocytes % 6.0 2.0 - 12.0 % Eosinophils % 3.1 0.0 - 6.0 %    Basophils % 0.7 0.0 - 2.0 %    Neutrophils Absolute 4.93 1.80 - 7.30 E9/L    Immature Granulocytes # 0.03 E9/L    Lymphocytes Absolute 1.23 (L) 1.50 - 4.00 E9/L    Monocytes Absolute 0.41 0.10 - 0.95 E9/L    Eosinophils Absolute 0.21 0.05 - 0.50 E9/L    Basophils Absolute 0.05 0.00 - 0.20 E9/L   Comprehensive Metabolic Panel   Result Value Ref Range    Sodium 143 132 - 146 mmol/L    Potassium 3.2 (L) 3.5 - 5.0 mmol/L    Chloride 105 98 - 107 mmol/L    CO2 25 22 - 29 mmol/L    Anion Gap 13 7 - 16 mmol/L    Glucose 120 (H) 74 - 99 mg/dL    BUN 14 6 - 23 mg/dL    CREATININE 1.4 (H) 0.5 - 1.0 mg/dL    GFR Non-African American 36 >=60 mL/min/1.73    GFR African American 44     Calcium 9.4 8.6 - 10.2 mg/dL    Total Protein 7.2 6.4 - 8.3 g/dL    Albumin 3.8 3.5 - 5.2 g/dL    Total Bilirubin 1.1 0.0 - 1.2 mg/dL    Alkaline Phosphatase 77 35 - 104 U/L    ALT 14 0 - 32 U/L    AST 28 0 - 31 U/L   CK   Result Value Ref Range    Total  (H) 20 - 180 U/L       RADIOLOGY:  CTA ABDOMINAL AORTA W BILAT RUNOFF W CONTRAST   Final Result   CT ANGIOGRAPHY OF THE ABDOMEN, PELVIS AND LOWER EXTREMITIES:      1. Limited evaluation of the right leg due to patient motion artifact. 2. Within this limitation, there appear to be high-grade stenosis in the   posterior right tibial artery in the right mid and lower leg. 3. Three-vessel runoff to the left leg. 4. Approximately 60% focal stenosis in the proximal left popliteal artery. 5. Nonvisualization of part of the bilateral popliteal arteries due to beam   hardening artifact from bilateral hip arthroplasties. 6. No aneurysm or significant stenosis of the major arteries in the abdomen   or pelvis. CT OF THE ABDOMEN, PELVIS AND LOWER EXTREMITIES (NON ANGIOGRAPHIC PORTION OF   THE STUDY):      1. Liquid stool in the colon indicates a diarrheal illness. No bowel wall   thickening or obstruction noted.    2. 9.5 mm right lower lobe pulmonary nodule, which has developed in the   interim since 08/05/2020. Per the recommendations of the ACR, complete CT of   the chest is recommended. RECOMMENDATIONS:   Unavailable         XR CHEST PORTABLE    (Results Pending)         ------------------------- NURSING NOTES AND VITALS REVIEWED ---------------------------  Date / Time Roomed:  6/6/2022  1:32 PM  ED Bed Assignment:  78/70    The nursing notes within the ED encounter and vital signs as below have been reviewed. Patient Vitals for the past 24 hrs:   BP Temp Pulse Resp SpO2 Height Weight   06/06/22 1600 -- -- -- -- 95 % -- --   06/06/22 1545 -- -- -- -- (!) 88 % -- --   06/06/22 1304 (!) 172/88 98.5 °F (36.9 °C) 83 18 93 % 5' 4\" (1.626 m) 212 lb (96.2 kg)       Oxygen Saturation Interpretation: Abnormal and Improved after treatment    ------------------------------------------ PROGRESS NOTES ------------------------------------------  Re-evaluation(s):   Patients symptoms show no change  Repeat physical examination is not changed    Counseling:  I have spoken with the patient and discussed todays results, in addition to providing specific details for the plan of care and counseling regarding the diagnosis and prognosis. Their questions are answered at this time and they are agreeable with the plan of admission. Spoke with Dr. Prince Prather. He will consult on this patient. Spoke with the admitting team.    --------------------------------- ADDITIONAL PROVIDER NOTES ---------------------------------  Consultations:   Spoke with Dr. Iqra Verduzco. Discussed case. They will admit the patient. This patient's ED course included: a personal history and physicial examination and re-evaluation prior to disposition    This patient has remained hemodynamically stable during their ED course. Diagnosis:  1. Shortness of breath    2. Peripheral arterial disease ([address]        Disposition:  Patient's disposition: Admit to telemetry  Patient's condition is stable.          Deon Thomas Valeriano Stern, DO  06/06/22 2047

## 2022-06-06 NOTE — H&P
1/13/2022    Vitamin B12 deficiency 1/13/2022       Past Surgical History:   Procedure Laterality Date    BREAST LUMPECTOMY  [id number]    RIGHT    CARDIAC CATHETERIZATION  10/09/2020    Dr. Magdi Swartz      partial    JOINT REPLACEMENT Bilateral     bilateral TKR    LUMBAR FUSION      [id number]    MASTECTOMY, MODIFIED RADICAL Right 11/3/2020    MODIFIED RADICAL RIGHT BREAST MASTECTOMY performed by Hal Funes MD at [address]      thumb joint replacement rt     THYROID SURGERY      US BREAST NEEDLE BIOPSY RIGHT  7/31/2020    US BREAST NEEDLE BIOPSY RIGHT 7/31/2020 [facility]       Prior to Admission medications    Medication Sig Start Date End Date Taking? Authorizing Provider   vitamin D3 (CHOLECALCIFEROL) 25 MCG (1000 UT) TABS tablet Take 2 tablets by mouth daily 5/5/22   Dylon Washington MD   atorvastatin (LIPITOR) 20 MG tablet Take 2 tablets by mouth daily 6/21/21   Dylon Washington MD   metFORMIN (GLUCOPHAGE) 500 MG tablet Take 1 tablet by mouth 2 times daily (with meals) 6/21/21   Dylon Washington MD   levothyroxine (SYNTHROID) 175 MCG tablet Take one tablet Mon-Fri and 2 tabs Sat/Sun. 6/21/21   [place] MD Felix   ramipril (ALTACE) 10 MG capsule Take 1 capsule by mouth daily Indications: High Blood Pressure Disorder 6/21/21   Dylon Washington MD   mirtazapine (REMERON) 30 MG tablet Take 1 tablet by mouth nightly 6/21/21   Dylon Washington MD   metoprolol succinate (TOPROL XL) 25 MG extended release tablet Take 1 tablet by mouth daily 6/21/21   Dylon Washington MD   acetaminophen (TYLENOL) 325 MG tablet Take 650 mg by mouth every 6 hours as needed for Pain    Historical Provider, MD   nystatin (MYCOSTATIN) [id number] UNIT/GM powder Apply 3 times daily.  1/29/21   Judith P Catterlin, DO   glucose monitoring kit (FREESTYLE) monitoring kit 1 kit by Does not apply route daily 1/5/21   Hillary Gorman MD González   blood glucose monitor strips Test 1 times a day & as needed for symptoms of irregular blood glucose. 1/5/21   Jacques Eubanks MD   Lancets MISC 1 each by Does not apply route daily 1/5/21   Jacques Eubanks MD         Allergies:  Patient has no known allergies. Social History:    TOBACCO:   reports that she has never smoked. She has never used smokeless tobacco.  ETOH:   reports no history of alcohol use. Family History:    Reviewed in detail and negative for DM, CAD, Cancer, CVA. Positive as follows\"      Problem Relation Age of Onset    Breast Cancer Mother     Cancer Father         bone marrow    Cancer Brother         prostate    Diabetes Son     Neuropathy Son     High Blood Pressure Son     High Cholesterol Son     Hypertension Son        REVIEW OF SYSTEMS:   Pertinent positives as noted in the HPI. All other systems reviewed and negative. PHYSICAL EXAM:  BP (!) 172/88   Pulse 83   Temp 98.5 °F (36.9 °C)   Resp 18   Ht 5' 4\" (1.626 m)   Wt 212 lb (96.2 kg)   LMP  (LMP Unknown)   SpO2 95%   BMI 36.39 kg/m²   General appearance: No apparent distress, appears stated age and cooperative. HEENT: Normal cephalic, atraumatic without obvious deformity. Pupils equal, round, and reactive to light. Extra ocular muscles intact. Conjunctivae/corneas clear. Neck: Supple, with full range of motion. No jugular venous distention. Trachea midline. Respiratory: CTA  Cardiovascular: RRR  Abdomen: S/ND/NT  Musculoskeletal: No clubbing, cyanosis, edema of bilateral lower extremities. Brisk capillary refill. Skin: Normal skin color. No rashes or lesions. Neurologic:  Neurovascularly intact without any focal sensory/motor deficits.  Cranial nerves: II-XII intact, grossly non-focal.  Psychiatric: Alert and oriented, thought content appropriate, normal insight    Reviewed EKG and CXR personally      CBC:   Recent Labs     06/06/22  1445   WBC 6.9   RBC 4.99   HGB 14.5   HCT 42.7 MCV 85.6   RDW 15.8*        BMP:   Recent Labs     06/06/22  1445      K 3.2*      CO2 25   BUN 14   CREATININE 1.4*     LFT:  Recent Labs     06/06/22  1445   PROT 7.2   ALKPHOS 77   ALT 14   AST 28   BILITOT 1.1     CE:  Recent Labs     06/06/22  1445   CKTOTAL 252*     PT/INR: No results for input(s): INR, APTT in the last 72 hours. BNP: No results for input(s): BNP in the last 72 hours. ESR:   Lab Results   Component Value Date    SEDRATE 25 (H) 06/10/2021     CRP:   Lab Results   Component Value Date    CRP 2.8 (H) 06/10/2021     D Dimer: No results found for: DDIMER   Folate and B12:   Lab Results   Component Value Date    LMMTJUWG87 261 10/28/2021   ,   Lab Results   Component Value Date    FOLATE 14.1 10/28/2021     Lactic Acid:   Lab Results   Component Value Date    LACTA 1.5 04/09/2019     Thyroid Studies:   Lab Results   Component Value Date    TSH 35.270 (H) 04/12/2022       Oupatient labs:  Lab Results   Component Value Date    CHOL 190 06/24/2021    TRIG 126 06/24/2021    HDL 42 06/24/2021    LDLCALC 123 (H) 06/24/2021    TSH 35.270 (H) 04/12/2022    INR 1.0 06/10/2021    LABA1C 6.4 (H) 04/12/2022       Urinalysis:    Lab Results   Component Value Date    NITRU POSITIVE 04/09/2019    WBCUA 2-5 04/09/2019    BACTERIA FEW 04/09/2019    RBCUA NONE 04/09/2019    BLOODU Negative 04/09/2019    SPECGRAV 1.010 04/09/2019    GLUCOSEU Negative 04/09/2019       Imaging:  CTA ABDOMINAL AORTA W BILAT RUNOFF W CONTRAST    Result Date: 6/6/2022  EXAMINATION: CTA OF THE AORTA WITH LOWER EXTREMITY RUNOFF 6/6/2022 5:23 pm TECHNIQUE: CTA of the pelvis and bilateral lower extremities was performed after the administration of intravenous contrast.   Multiplanar reformatted images are provided for review. MIP images are provided for review.  Automated exposure control, iterative reconstruction, and/or weight based adjustment of the mA/kV was utilized to reduce the radiation dose to as low as reasonably achievable. COMPARISON: CT of the abdomen and pelvis, 08/25/2020. [place] HISTORY: ORDERING SYSTEM PROVIDED HISTORY: right leg ischemia ro TECHNOLOGIST PROVIDED HISTORY: Reason for exam:->right leg ischemia ro Decision Support Exception - unselect if not a suspected or confirmed emergency medical condition->Emergency Medical Condition (MA) What reading provider will be dictating this exam?->CRC FINDINGS: Nonvascular Lower Chest: 9.5 mm right lower lobe pulmonary nodule (series 5, image 40) it was not present on the previous CT from 08/25/2020. The lung bases are otherwise grossly clear. The heart is normal in size. No pleural or pericardial effusion. Small hiatal hernia. Organs: Liver: Moderately atrophied. Otherwise, unremarkable. Gallbladder: The gallbladder is surgically absent. Enlargement of the common bile duct is likely due to a combination of age and cholecystectomy-related reservoir effect. Pancreas: Unremarkable. Spleen:  Unremarkable. Adrenals: Unremarkable. Kidneys: Unremarkable. GI/Bowel: Liquid stool is seen in the proximal colon. No gross bowel wall thickening or obstruction is appreciated. Pelvis: The urinary bladder is unremarkable. The uterus is surgically absent. Peritoneum/Retroperitoneum: No lymphadenopathy. No free air or free fluid is seen. Bones/Soft Tissues: The visualized bones are intact without fracture or focal lesion. VASCULAR ABDOMEN AND PELVIS: *Moderate calcified and noncalcified plaque is seen in the abdominal aorta. No evidence of aneurysm or dissection. *No significant stenosis in the celiac axis or its major branches, SMA, BARBER or the renal arteries. *No significant stenosis in the common iliac external iliac or internal iliac arteries bilaterally. RIGHT LOWER EXTREMITY: *The common femoral, superficial femoral and profundus femoris arteries are widely patent. *The distal popliteal artery is obscured by beam hardening artifact from right hip arthroplasty.   The visualized aspect is widely patent. *Three-vessel runoff is seen in the proximal right leg. Due to motion artifact, the distal leg is not well evaluated. *Focal high-grade stenoses are seen in the mid and distal right leg. LEFT LOWER EXTREMITY: *The left common femoral, superficial femoral and profundus femoris arteries are patent. *An approximately 60% focal stenosis is seen in the proximal popliteal artery (series 5, image 346). *The distal popliteal artery is partially obscured by beam hardening artifact from the knee arthroplasty. *The anterior tibial, tibioperoneal trunk posterior tibial and peroneal arteries are patent. CT ANGIOGRAPHY OF THE ABDOMEN, PELVIS AND LOWER EXTREMITIES: 1. Limited evaluation of the right leg due to patient motion artifact. 2. Within this limitation, there appear to be high-grade stenosis in the posterior right tibial artery in the right mid and lower leg. 3. Three-vessel runoff to the left leg. 4. Approximately 60% focal stenosis in the proximal left popliteal artery. 5. Nonvisualization of part of the bilateral popliteal arteries due to beam hardening artifact from bilateral hip arthroplasties. 6. No aneurysm or significant stenosis of the major arteries in the abdomen or pelvis. CT OF THE ABDOMEN, PELVIS AND LOWER EXTREMITIES (NON ANGIOGRAPHIC PORTION OF THE STUDY): 1. Liquid stool in the colon indicates a diarrheal illness. No bowel wall thickening or obstruction noted. 2. 9.5 mm right lower lobe pulmonary nodule, which has developed in the interim since 08/05/2020. Per the recommendations of the ACR, complete CT of the chest is recommended.  RECOMMENDATIONS: Unavailable       ASSESSMENT:  -Acute respiratory failure hypoxia  -High-grade stenosis right posterior tibial artery  -Low back pain  -Acute renal failure  -Hypokalemia  -Hypertension  -Hyperlipidemia  -COPD  -Diabetes mellitus type 2      PLAN:  -Admit to medicine  -Consult vascular surgery  -Chest x-ray pending  -Supplemental O2  -Pain management  -PT/OT  -Monitor serum electrolytes replete as needed  -Continue home medications        Diet: No diet orders on file  Code Status: Prior  Surrogate decision maker confirmed with patient:   Extended Emergency Contact Information  Primary Emergency Contact: Spencer Childs  Address: [address]           Amira THAKUR of [address] [place] [address] Phone: [phone]  Mobile Phone: [phone]  Relation: Child  Secondary Emergency Contact: Latosha Mchugh  Mobile Phone: [phone]  Relation: Grandchild  Preferred language: [place]   needed? No    DVT Prophylaxis: []Lovenox []Heparin []PCD [] [address] []Encouraged ambulation  Disposition: []Med/Surg [] Intermediate [] ICU/CCU  Admit status: [] Observation [] Inpatient     +++++++++++++++++++++++++++++++++++++++++++++++++  Cornelia Gutiérrez DO  +++++++++++++++++++++++++++++++++++++++++++++++++  NOTE: This report was transcribed using voice recognition software. Every effort was made to ensure accuracy; however, inadvertent computerized transcription errors may be present.

## 2022-06-06 NOTE — ED NOTES
Name: Gina Myrick  : [id number]  MRN: [id number]    Date: 2022    Benefits of immediately proceeding with Radiology exam outweigh the risks and therefore the following is being waived:      [] Pregnancy test    [] Protocol for Iodine allergy    [] MRI questionnaire    [x] BUN/Creatinine        DO Clinton Samuels DO  22 0716

## 2022-06-07 ENCOUNTER — APPOINTMENT (OUTPATIENT)
Dept: INTERVENTIONAL RADIOLOGY/VASCULAR | Age: 82
DRG: 299 | End: 2022-06-07
Payer: MEDICARE

## 2022-06-07 PROBLEM — I73.9 PVD (PERIPHERAL VASCULAR DISEASE) (HCC): Chronic | Status: ACTIVE | Noted: 2022-06-07

## 2022-06-07 LAB
ALBUMIN SERPL-MCNC: 3.9 G/DL (ref 3.5–5.2)
ALP BLD-CCNC: 83 U/L (ref 35–104)
ALT SERPL-CCNC: 15 U/L (ref 0–32)
ANION GAP SERPL CALCULATED.3IONS-SCNC: 15 MMOL/L (ref 7–16)
ANION GAP SERPL CALCULATED.3IONS-SCNC: 16 MMOL/L (ref 7–16)
AST SERPL-CCNC: 28 U/L (ref 0–31)
BACTERIA: ABNORMAL /HPF
BASOPHILS ABSOLUTE: 0.07 E9/L (ref 0–0.2)
BASOPHILS RELATIVE PERCENT: 0.8 % (ref 0–2)
BILIRUB SERPL-MCNC: 1.1 MG/DL (ref 0–1.2)
BILIRUBIN URINE: NEGATIVE
BLOOD, URINE: NEGATIVE
BUN BLDV-MCNC: 13 MG/DL (ref 6–23)
BUN BLDV-MCNC: 14 MG/DL (ref 6–23)
CALCIUM SERPL-MCNC: 9.2 MG/DL (ref 8.6–10.2)
CALCIUM SERPL-MCNC: 9.9 MG/DL (ref 8.6–10.2)
CHLORIDE BLD-SCNC: 102 MMOL/L (ref 98–107)
CHLORIDE BLD-SCNC: 106 MMOL/L (ref 98–107)
CHOLESTEROL, TOTAL: 180 MG/DL (ref 0–199)
CLARITY: CLEAR
CO2: 22 MMOL/L (ref 22–29)
CO2: 24 MMOL/L (ref 22–29)
COLOR: YELLOW
CREAT SERPL-MCNC: 1.2 MG/DL (ref 0.5–1)
CREAT SERPL-MCNC: 1.4 MG/DL (ref 0.5–1)
EOSINOPHILS ABSOLUTE: 0.28 E9/L (ref 0.05–0.5)
EOSINOPHILS RELATIVE PERCENT: 3.4 % (ref 0–6)
EPITHELIAL CELLS, UA: ABNORMAL /HPF
GFR AFRICAN AMERICAN: 44
GFR AFRICAN AMERICAN: 52
GFR NON-AFRICAN AMERICAN: 36 ML/MIN/1.73
GFR NON-AFRICAN AMERICAN: 43 ML/MIN/1.73
GLUCOSE BLD-MCNC: 101 MG/DL (ref 74–99)
GLUCOSE BLD-MCNC: 110 MG/DL (ref 74–99)
GLUCOSE URINE: NEGATIVE MG/DL
HBA1C MFR BLD: 6 % (ref 4–5.6)
HCT VFR BLD CALC: 44.9 % (ref 34–48)
HDLC SERPL-MCNC: 29 MG/DL
HEMOGLOBIN: 15 G/DL (ref 11.5–15.5)
IMMATURE GRANULOCYTES #: 0.03 E9/L
IMMATURE GRANULOCYTES %: 0.4 % (ref 0–5)
KETONES, URINE: NEGATIVE MG/DL
L. PNEUMOPHILA SEROGP 1 UR AG: NORMAL
LACTIC ACID, SEPSIS: 1.9 MMOL/L (ref 0.5–1.9)
LDL CHOLESTEROL CALCULATED: 111 MG/DL (ref 0–99)
LEUKOCYTE ESTERASE, URINE: NEGATIVE
LYMPHOCYTES ABSOLUTE: 1.41 E9/L (ref 1.5–4)
LYMPHOCYTES RELATIVE PERCENT: 16.9 % (ref 20–42)
MAGNESIUM: 1.9 MG/DL (ref 1.6–2.6)
MCH RBC QN AUTO: 29.3 PG (ref 26–35)
MCHC RBC AUTO-ENTMCNC: 33.4 % (ref 32–34.5)
MCV RBC AUTO: 87.7 FL (ref 80–99.9)
MONOCYTES ABSOLUTE: 0.45 E9/L (ref 0.1–0.95)
MONOCYTES RELATIVE PERCENT: 5.4 % (ref 2–12)
NEUTROPHILS ABSOLUTE: 6.1 E9/L (ref 1.8–7.3)
NEUTROPHILS RELATIVE PERCENT: 73.1 % (ref 43–80)
NITRITE, URINE: NEGATIVE
PDW BLD-RTO: 16.3 FL (ref 11.5–15)
PH UA: 5.5 (ref 5–9)
PLATELET # BLD: 179 E9/L (ref 130–450)
PMV BLD AUTO: 10.3 FL (ref 7–12)
POTASSIUM REFLEX MAGNESIUM: 3.2 MMOL/L (ref 3.5–5)
POTASSIUM SERPL-SCNC: 3.3 MMOL/L (ref 3.5–5)
PREALBUMIN: 9 MG/DL (ref 20–40)
PROTEIN UA: NEGATIVE MG/DL
RBC # BLD: 5.12 E12/L (ref 3.5–5.5)
RBC UA: ABNORMAL /HPF (ref 0–2)
SODIUM BLD-SCNC: 142 MMOL/L (ref 132–146)
SODIUM BLD-SCNC: 143 MMOL/L (ref 132–146)
SPECIFIC GRAVITY UA: 1.01 (ref 1–1.03)
STREP PNEUMONIAE ANTIGEN, URINE: NORMAL
TOTAL PROTEIN: 7.5 G/DL (ref 6.4–8.3)
TRIGL SERPL-MCNC: 199 MG/DL (ref 0–149)
UROBILINOGEN, URINE: 0.2 E.U./DL
VLDLC SERPL CALC-MCNC: 40 MG/DL
WBC # BLD: 8.3 E9/L (ref 4.5–11.5)
WBC UA: ABNORMAL /HPF (ref 0–5)

## 2022-06-07 PROCEDURE — 80048 BASIC METABOLIC PNL TOTAL CA: CPT

## 2022-06-07 PROCEDURE — 2500000003 HC RX 250 WO HCPCS: Performed by: FAMILY MEDICINE

## 2022-06-07 PROCEDURE — 36415 COLL VENOUS BLD VENIPUNCTURE: CPT

## 2022-06-07 PROCEDURE — 84134 ASSAY OF PREALBUMIN: CPT

## 2022-06-07 PROCEDURE — 93923 UPR/LXTR ART STDY 3+ LVLS: CPT

## 2022-06-07 PROCEDURE — 87088 URINE BACTERIA CULTURE: CPT

## 2022-06-07 PROCEDURE — 81001 URINALYSIS AUTO W/SCOPE: CPT

## 2022-06-07 PROCEDURE — 80053 COMPREHEN METABOLIC PANEL: CPT

## 2022-06-07 PROCEDURE — 6360000002 HC RX W HCPCS: Performed by: FAMILY MEDICINE

## 2022-06-07 PROCEDURE — 83036 HEMOGLOBIN GLYCOSYLATED A1C: CPT

## 2022-06-07 PROCEDURE — 87040 BLOOD CULTURE FOR BACTERIA: CPT

## 2022-06-07 PROCEDURE — 2580000003 HC RX 258: Performed by: FAMILY MEDICINE

## 2022-06-07 PROCEDURE — 6370000000 HC RX 637 (ALT 250 FOR IP): Performed by: FAMILY MEDICINE

## 2022-06-07 PROCEDURE — 6370000000 HC RX 637 (ALT 250 FOR IP): Performed by: INTERNAL MEDICINE

## 2022-06-07 PROCEDURE — 2060000000 HC ICU INTERMEDIATE R&B

## 2022-06-07 PROCEDURE — 87449 NOS EACH ORGANISM AG IA: CPT

## 2022-06-07 PROCEDURE — 80061 LIPID PANEL: CPT

## 2022-06-07 PROCEDURE — 93923 UPR/LXTR ART STDY 3+ LVLS: CPT | Performed by: SURGERY

## 2022-06-07 PROCEDURE — 83605 ASSAY OF LACTIC ACID: CPT

## 2022-06-07 PROCEDURE — 85025 COMPLETE CBC W/AUTO DIFF WBC: CPT

## 2022-06-07 PROCEDURE — 83735 ASSAY OF MAGNESIUM: CPT

## 2022-06-07 RX ORDER — ONDANSETRON 2 MG/ML
4 INJECTION INTRAMUSCULAR; INTRAVENOUS EVERY 6 HOURS PRN
Status: DISCONTINUED | OUTPATIENT
Start: 2022-06-07 | End: 2022-06-10 | Stop reason: HOSPADM

## 2022-06-07 RX ORDER — HYDROCODONE BITARTRATE AND ACETAMINOPHEN 5; 325 MG/1; MG/1
1 TABLET ORAL EVERY 6 HOURS PRN
Status: DISCONTINUED | OUTPATIENT
Start: 2022-06-07 | End: 2022-06-10 | Stop reason: HOSPADM

## 2022-06-07 RX ORDER — POTASSIUM CHLORIDE 20 MEQ/1
40 TABLET, EXTENDED RELEASE ORAL ONCE
Status: COMPLETED | OUTPATIENT
Start: 2022-06-07 | End: 2022-06-07

## 2022-06-07 RX ORDER — METOPROLOL SUCCINATE 25 MG/1
25 TABLET, EXTENDED RELEASE ORAL DAILY
Status: DISCONTINUED | OUTPATIENT
Start: 2022-06-07 | End: 2022-06-10 | Stop reason: HOSPADM

## 2022-06-07 RX ORDER — ACETAMINOPHEN 325 MG/1
650 TABLET ORAL EVERY 6 HOURS PRN
Status: DISCONTINUED | OUTPATIENT
Start: 2022-06-07 | End: 2022-06-10 | Stop reason: HOSPADM

## 2022-06-07 RX ORDER — LEVOTHYROXINE SODIUM 175 UG/1
175 TABLET ORAL
Status: DISCONTINUED | OUTPATIENT
Start: 2022-06-08 | End: 2022-06-10 | Stop reason: HOSPADM

## 2022-06-07 RX ORDER — LEVOTHYROXINE SODIUM 175 UG/1
350 TABLET ORAL
Status: DISCONTINUED | OUTPATIENT
Start: 2022-06-11 | End: 2022-06-10 | Stop reason: HOSPADM

## 2022-06-07 RX ORDER — SODIUM CHLORIDE 0.9 % (FLUSH) 0.9 %
10 SYRINGE (ML) INJECTION PRN
Status: DISCONTINUED | OUTPATIENT
Start: 2022-06-07 | End: 2022-06-10 | Stop reason: HOSPADM

## 2022-06-07 RX ORDER — CHOLECALCIFEROL (VITAMIN D3) 50 MCG
2000 TABLET ORAL DAILY
Status: DISCONTINUED | OUTPATIENT
Start: 2022-06-07 | End: 2022-06-10 | Stop reason: HOSPADM

## 2022-06-07 RX ORDER — SODIUM CHLORIDE 9 MG/ML
INJECTION, SOLUTION INTRAVENOUS PRN
Status: DISCONTINUED | OUTPATIENT
Start: 2022-06-07 | End: 2022-06-10 | Stop reason: HOSPADM

## 2022-06-07 RX ORDER — MORPHINE SULFATE 2 MG/ML
2 INJECTION, SOLUTION INTRAMUSCULAR; INTRAVENOUS EVERY 4 HOURS PRN
Status: DISCONTINUED | OUTPATIENT
Start: 2022-06-07 | End: 2022-06-10 | Stop reason: HOSPADM

## 2022-06-07 RX ORDER — ACETAMINOPHEN 650 MG/1
650 SUPPOSITORY RECTAL EVERY 6 HOURS PRN
Status: DISCONTINUED | OUTPATIENT
Start: 2022-06-07 | End: 2022-06-10 | Stop reason: HOSPADM

## 2022-06-07 RX ORDER — RAMIPRIL 5 MG/1
10 CAPSULE ORAL DAILY
Status: DISCONTINUED | OUTPATIENT
Start: 2022-06-07 | End: 2022-06-10 | Stop reason: HOSPADM

## 2022-06-07 RX ORDER — PROMETHAZINE HYDROCHLORIDE 12.5 MG/1
12.5 TABLET ORAL EVERY 6 HOURS PRN
Status: DISCONTINUED | OUTPATIENT
Start: 2022-06-07 | End: 2022-06-10 | Stop reason: HOSPADM

## 2022-06-07 RX ORDER — POLYETHYLENE GLYCOL 3350 17 G/17G
17 POWDER, FOR SOLUTION ORAL DAILY PRN
Status: DISCONTINUED | OUTPATIENT
Start: 2022-06-07 | End: 2022-06-07

## 2022-06-07 RX ORDER — SODIUM CHLORIDE 0.9 % (FLUSH) 0.9 %
10 SYRINGE (ML) INJECTION EVERY 12 HOURS SCHEDULED
Status: DISCONTINUED | OUTPATIENT
Start: 2022-06-07 | End: 2022-06-10 | Stop reason: HOSPADM

## 2022-06-07 RX ORDER — MIRTAZAPINE 15 MG/1
30 TABLET, FILM COATED ORAL NIGHTLY
Status: DISCONTINUED | OUTPATIENT
Start: 2022-06-07 | End: 2022-06-10 | Stop reason: HOSPADM

## 2022-06-07 RX ORDER — ATORVASTATIN CALCIUM 40 MG/1
40 TABLET, FILM COATED ORAL DAILY
Status: DISCONTINUED | OUTPATIENT
Start: 2022-06-07 | End: 2022-06-10 | Stop reason: HOSPADM

## 2022-06-07 RX ORDER — ENOXAPARIN SODIUM 100 MG/ML
40 INJECTION SUBCUTANEOUS DAILY
Status: DISCONTINUED | OUTPATIENT
Start: 2022-06-07 | End: 2022-06-10 | Stop reason: HOSPADM

## 2022-06-07 RX ADMIN — Medication 2000 UNITS: at 08:40

## 2022-06-07 RX ADMIN — ATORVASTATIN CALCIUM 40 MG: 40 TABLET, FILM COATED ORAL at 08:40

## 2022-06-07 RX ADMIN — WATER 1000 MG: 1 INJECTION INTRAMUSCULAR; INTRAVENOUS; SUBCUTANEOUS at 07:22

## 2022-06-07 RX ADMIN — SODIUM CHLORIDE, PRESERVATIVE FREE 10 ML: 5 INJECTION INTRAVENOUS at 21:06

## 2022-06-07 RX ADMIN — METOPROLOL SUCCINATE 25 MG: 25 TABLET, EXTENDED RELEASE ORAL at 08:40

## 2022-06-07 RX ADMIN — DOXYCYCLINE 100 MG: 100 INJECTION, POWDER, LYOPHILIZED, FOR SOLUTION INTRAVENOUS at 16:50

## 2022-06-07 RX ADMIN — DOXYCYCLINE 100 MG: 100 INJECTION, POWDER, LYOPHILIZED, FOR SOLUTION INTRAVENOUS at 07:21

## 2022-06-07 RX ADMIN — ENOXAPARIN SODIUM 40 MG: 100 INJECTION SUBCUTANEOUS at 08:40

## 2022-06-07 RX ADMIN — SODIUM CHLORIDE, PRESERVATIVE FREE 10 ML: 5 INJECTION INTRAVENOUS at 08:43

## 2022-06-07 RX ADMIN — HYDROCODONE BITARTRATE AND ACETAMINOPHEN 1 TABLET: 5; 325 TABLET ORAL at 16:49

## 2022-06-07 RX ADMIN — MIRTAZAPINE 30 MG: 15 TABLET, FILM COATED ORAL at 21:06

## 2022-06-07 RX ADMIN — RAMIPRIL 10 MG: 5 CAPSULE ORAL at 16:49

## 2022-06-07 RX ADMIN — POTASSIUM CHLORIDE 40 MEQ: 20 TABLET, EXTENDED RELEASE ORAL at 11:25

## 2022-06-07 ASSESSMENT — PAIN DESCRIPTION - ORIENTATION
ORIENTATION: LOWER
ORIENTATION: LOWER

## 2022-06-07 ASSESSMENT — PAIN DESCRIPTION - LOCATION
LOCATION: BACK
LOCATION: BACK

## 2022-06-07 ASSESSMENT — PAIN SCALES - GENERAL
PAINLEVEL_OUTOF10: 0
PAINLEVEL_OUTOF10: 5
PAINLEVEL_OUTOF10: 5

## 2022-06-07 ASSESSMENT — PAIN DESCRIPTION - PAIN TYPE: TYPE: CHRONIC PAIN

## 2022-06-07 NOTE — PROGRESS NOTES
Vascular Surgery Progress Note    Pt is being seen in f/u today regarding PVD and Rest pain    Subjective  Pt s/e. No acute issues, resting in the ED.      Current Medications:    sodium chloride        sodium chloride flush, sodium chloride, promethazine **OR** ondansetron, polyethylene glycol, acetaminophen **OR** acetaminophen, HYDROcodone-acetaminophen, morphine    atorvastatin  40 mg Oral Daily    levothyroxine  175 mcg Oral Daily    metoprolol succinate  25 mg Oral Daily    mirtazapine  30 mg Oral Nightly    ramipril  10 mg Oral Daily    vitamin D  2,000 Units Oral Daily    sodium chloride flush  10 mL IntraVENous 2 times per day    enoxaparin  40 mg SubCUTAneous Daily    cefTRIAXone (ROCEPHIN) IV  1,000 mg IntraVENous Q24H    doxycycline (VIBRAMYCIN) IV  100 mg IntraVENous Q12H        PHYSICAL EXAM:    BP (!) 141/69   Pulse 74   Temp 98.5 °F (36.9 °C)   Resp 18   Ht 5' 4\" (1.626 m)   Wt 212 lb (96.2 kg)   LMP  (LMP Unknown)   SpO2 94%   BMI 36.39 kg/m²   No intake or output data in the 24 hours ending 06/07/22 0714       Gen: awake, alert and oriented x3, no apparent distress  CVS: RRR  Resp: No increased work of breathing  Abd: Soft, non-tender, non-distended  R LE: 5/5 strength, DP/PT biphaisc, small 1 cm wound on medial aspect of ankle at superior portion of medial malleolus   L LE: 5/5 strength, DP/PT biphaisc    LABS:    Lab Results   Component Value Date    WBC 6.9 06/06/2022    HGB 14.5 06/06/2022    HCT 42.7 06/06/2022     06/06/2022    PROTIME 11.3 06/10/2021    INR 1.0 06/10/2021    APTT 28.3 08/22/2017    K 3.2 (L) 06/06/2022    BUN 14 06/06/2022    CREATININE 1.4 (H) 06/06/2022       A/P  81 y.o. female with RLE rest pain with small wound on medial aspect of R ankle    Will obtain ELDA w/ PVR  Monitor pulse exam  Dietary consult- check prealbumin, A1c and lipid profile      Sully Fitzpatrick MD

## 2022-06-07 NOTE — CONSULTS
VASCULAR SURGERY CONSULT NOTE    Reason for Consult:  R post. Tibial stenosis     HPI:    81 y.o. female with hx DM, diabetic peripheral neuropathy hypertension, obesity who is admitted to the hospital for treatment of back pain radiating down posterior r leg to her foot. CTA AP w/ runoff showed high grade R posterior tibial a. Stenosis in mid calf L popliteal stenosis (60%). Patient states she has had this pain for \"a long time\" but has worsened over past few days, she is unable to describe it further and is unsure how far down her leg it travels, but does travel posteriorly. Has pain at rest. She has small dorsal foot eschars no ulcerations. She states she has never smoked but her husbands smokes. Legs are warm to touch without discoloration or skin necrosis. Vascular surgery is consulted for evaluation and treatment of R post. Tibial a. Stenosis. She is unsure of medical history per chart review she had recurrence of breast cancer in 2020 after lumpectomy in 2012. Hx lumbar fusion in 2005, rheumatoid arthritis. No antiplatlets or anticoagulation at home. On metformin and bp medications.      ELDA 1.1  /75 /110  /83 /79    ROS: Negative if blank [], Positive if [x]  General Vascular   [] Fevers [] Claudication (Blocks)   [] Chills [x] Rest Pain   [] Weight Loss [] Tissue Loss   [] Chest Pain [] Clotting Disorder   [x] SOB at rest [x] Leg Swelling   [] SOB with exertion [] DVT/PE      [] Nausea    [] Vomiting [] Stroke/TIA   [] Abdominal Pain [] Focal weakness   [] Melena [] Slurred Speech   [] Hematochezia [] Vision Changes   [] Hematuria    [] Dysuria [] Hx of Central Catheters   [] Wears Glasses/Contacts [] Dialysis and If so date initiated   [] Blindness    [x] R Hand Dominant   [] Difficulty swallowing        Past Medical History:   Diagnosis Date    Asthma     Blood transfusion     [facility]     COPD (chronic obstructive pulmonary disease) [facility]     Diabetes mellitus ([facility]     [facility] peripheral [facility]     DM type 2 (diabetes mellitus, type 2) ([facility]     Hypertension     Hypothyroidism     Irritable bowel syndrome     Mixed hyperlipidemia 7/21/2020    Obesity (BMI 30-39. 9)     Osteoarthritis     rheumatoid and osteo    RA (rheumatoid arthritis) ([facility]     RBBB     Recurrent breast cancer, right ([facility] 1/13/2022    Vitamin B12 deficiency 1/13/2022        Past Surgical History:   Procedure Laterality Date    BREAST LUMPECTOMY  [id number]    RIGHT    CARDIAC CATHETERIZATION  10/09/2020    Dr. Clifford Quesada      partial    JOINT REPLACEMENT Bilateral     bilateral TKR    LUMBAR FUSION      L4L5    MASTECTOMY, MODIFIED RADICAL Right 11/3/2020    MODIFIED RADICAL RIGHT BREAST MASTECTOMY performed by Yoselyn Paez MD at [place] [address]      thumb joint replacement rt     THYROID SURGERY      US BREAST NEEDLE BIOPSY RIGHT  7/31/2020    US BREAST NEEDLE BIOPSY RIGHT 7/31/2020 [facility]       CURRENT MEDICATIONS:             ALLERGIES:  Patient has no known allergies. Social History     Socioeconomic History    Marital status:      Spouse name: Not on file    Number of children: Not on file    Years of education: Not on file    Highest education level: Not on file   Occupational History    Not on file   Tobacco Use    Smoking status: Never Smoker    Smokeless tobacco: Never Used   Vaping Use    Vaping Use: Never used   Substance and Sexual Activity    Alcohol use: No    Drug use: No    Sexual activity: Never     Partners: Male     Comment: ^/1/16):    since 2010   Other Topics Concern    Not on file   Social History Narrative    Not on file     Social Determinants of Health     Financial Resource Strain: Low Risk     Difficulty of Paying Living Expenses: Not hard at all   Food Insecurity: No Food Insecurity    Worried About Running Out of Food in the Last Year: Never true    Ran Out of Food in the Last Year: Never true   Transportation Needs:     Lack of Transportation (Medical): Not on file    Lack of Transportation (Non-Medical):  Not on file   Physical Activity: Inactive    Days of Exercise per Week: 0 days    Minutes of Exercise per Session: 0 min   Stress:     Feeling of Stress : Not on file   Social Connections:     Frequency of Communication with Friends and Family: Not on file    Frequency of Social Gatherings with Friends and Family: Not on file    Attends Sabianism Services: Not on file    Active Member of [address] [place] [address] [employer] or Organizations: Not on file    Attends Club or Organization Meetings: Not on file    Marital Status: Not on file   Intimate Partner Violence:     Fear of Current or Ex-Partner: Not on file    Emotionally Abused: Not on file    Physically Abused: Not on file    Sexually Abused: Not on file   Housing Stability:     Unable to Pay for Housing in the Last Year: Not on file    Number of [place] in the Last Year: Not on file    Unstable Housing in the Last Year: Not on file        Family History   Problem Relation Age of Onset    Breast Cancer Mother     Cancer Father         bone marrow    Cancer Brother         prostate    Diabetes Son     Neuropathy Son     High Blood Pressure Son     High Cholesterol Son     Hypertension Son        PHYSICAL EXAM:    BP (!) 172/88   Pulse 83   Temp 98.5 °F (36.9 °C)   Resp 18   Ht 5' 4\" (1.626 m)   Wt 212 lb (96.2 kg)   LMP  (LMP Unknown)   SpO2 95%   BMI 36.39 kg/m²   CONSTITUTIONAL:  awake, alert, cooperative, no apparent distress, and appears stated age  NEURO:  Normal  EYES:  lids and lashes normal, sclera clear and conjunctiva normal  ENT:  normocepalic, without obvious abnormality  NECK:  supple, symmetrical, trachea midline, no jugular venous distension, no carotid bruits  LUNGS:  Mild respiratory distress on NC no retractions CARDIOVASCULAR:  regular rate and rhythm  ABDOMEN:  soft, non-distended, non-tender, Aorta is not palpable  SKIN:  no bruising or bleeding, normal skin color, texture, turgor and no redness, warmth, or swelling    EXTREMITIES:    R UE Swelling present   Incisions absent         5/5 Strength, Intact Sensation    L UE Swelling absent   Incisions absent         5/5 Strength, Intact Sensation    R LE Edema present     Incisions absent    Varicose veins absent    Wounds absent    unreliable Capillary Refill   5/5 Strength, Intact Sensation    L LE Edema present     Incisions absent    Varicose veins absent    Wounds absent    Unreliable Capillary Refill   5/5 Strength, Intact Sensation    R brachial biphasic L brachial biphasic   R radial biphasic L radial biphasic   R femoral biphasic L femoral biphasic   R popliteal biphasic L popliteal biphasic   R posterior tibial monophasic L posterior tibial biphasic   R dorsalis pedis monophasic L dorsalis pedis Biphasic        LABS:    Lab Results   Component Value Date    WBC 6.9 06/06/2022    HGB 14.5 06/06/2022    HCT 42.7 06/06/2022     06/06/2022    PROTIME 11.3 06/10/2021    INR 1.0 06/10/2021    K 3.2 (L) 06/06/2022    BUN 14 06/06/2022    CREATININE 1.4 (H) 06/06/2022       RADIOLOGY:  CTA ABDOMINAL AORTA W BILAT RUNOFF W CONTRAST    Result Date: 6/6/2022  EXAMINATION: CTA OF THE AORTA WITH LOWER EXTREMITY RUNOFF 6/6/2022 5:23 pm TECHNIQUE: CTA of the pelvis and bilateral lower extremities was performed after the administration of intravenous contrast.   Multiplanar reformatted images are provided for review. MIP images are provided for review. Automated exposure control, iterative reconstruction, and/or weight based adjustment of the mA/kV was utilized to reduce the radiation dose to as low as reasonably achievable. COMPARISON: CT of the abdomen and pelvis, 08/25/2020. Morteza Montanez  HISTORY: ORDERING SYSTEM PROVIDED HISTORY: right leg ischemia ro TECHNOLOGIST PROVIDED HISTORY: Reason for exam:->right leg ischemia ro Decision Support Exception - unselect if not a suspected or confirmed emergency medical condition->Emergency Medical Condition (MA) What reading provider will be dictating this exam?->CRC FINDINGS: Nonvascular Lower Chest: 9.5 mm right lower lobe pulmonary nodule (series 5, image 40) it was not present on the previous CT from 08/25/2020. The lung bases are otherwise grossly clear. The heart is normal in size. No pleural or pericardial effusion. Small hiatal hernia. Organs: Liver: Moderately atrophied. Otherwise, unremarkable. Gallbladder: The gallbladder is surgically absent. Enlargement of the common bile duct is likely due to a combination of age and cholecystectomy-related reservoir effect. Pancreas: Unremarkable. Spleen:  Unremarkable. Adrenals: Unremarkable. Kidneys: Unremarkable. GI/Bowel: Liquid stool is seen in the proximal colon. No gross bowel wall thickening or obstruction is appreciated. Pelvis: The urinary bladder is unremarkable. The uterus is surgically absent. Peritoneum/Retroperitoneum: No lymphadenopathy. No free air or free fluid is seen. Bones/Soft Tissues: The visualized bones are intact without fracture or focal lesion. VASCULAR ABDOMEN AND PELVIS: *Moderate calcified and noncalcified plaque is seen in the abdominal aorta. No evidence of aneurysm or dissection. *No significant stenosis in the celiac axis or its major branches, SMA, BARBER or the renal arteries. *No significant stenosis in the common iliac external iliac or internal iliac arteries bilaterally. RIGHT LOWER EXTREMITY: *The common femoral, superficial femoral and profundus femoris arteries are widely patent. *The distal popliteal artery is obscured by beam hardening artifact from right hip arthroplasty. The visualized aspect is widely patent. *Three-vessel runoff is seen in the proximal right leg. Due to motion artifact, the distal leg is not well evaluated.  *Focal high-grade stenoses are seen in the mid and distal right leg. LEFT LOWER EXTREMITY: *The left common femoral, superficial femoral and profundus femoris arteries are patent. *An approximately 60% focal stenosis is seen in the proximal popliteal artery (series 5, image 346). *The distal popliteal artery is partially obscured by beam hardening artifact from the knee arthroplasty. *The anterior tibial, tibioperoneal trunk posterior tibial and peroneal arteries are patent. CT ANGIOGRAPHY OF THE ABDOMEN, PELVIS AND LOWER EXTREMITIES: 1. Limited evaluation of the right leg due to patient motion artifact. 2. Within this limitation, there appear to be high-grade stenosis in the posterior right tibial artery in the right mid and lower leg. 3. Three-vessel runoff to the left leg. 4. Approximately 60% focal stenosis in the proximal left popliteal artery. 5. Nonvisualization of part of the bilateral popliteal arteries due to beam hardening artifact from bilateral hip arthroplasties. 6. No aneurysm or significant stenosis of the major arteries in the abdomen or pelvis. CT OF THE ABDOMEN, PELVIS AND LOWER EXTREMITIES (NON ANGIOGRAPHIC PORTION OF THE STUDY): 1. Liquid stool in the colon indicates a diarrheal illness. No bowel wall thickening or obstruction noted. 2. 9.5 mm right lower lobe pulmonary nodule, which has developed in the interim since 08/05/2020. Per the recommendations of the ACR, complete CT of the chest is recommended. RECOMMENDATIONS: Unavailable         ASSESSMENT/PLAN:    Patient with imaging showing high grade stenosis of R post. Tibial a. With dopplerable pulses throughout.  Vascular vs. Neurogenic pain- no recent spinal imaging to review    Will obtain pulse volume recordings and guide treatment based on result, likely medical management with ASA, exercise program.      Discussed with Dr. Jairon Monsalve -- Keyonna Petit MD  Surgery Resident PGY-1  6/6/2022  8:24 PM    Pt seen and examined  Pt denies sxs of lifestyle limiting claudication, rest pain or tissue loss which is chronic  She does have small wound of left ankle - which is almost healed    R fem 2+ DP strongly biphasic, PT weakly biphasic  L fem 2+ DP biphasic, PT weakly biphasic    ABIs and PVRs  R LE ELDA 1.18, TBI 0.78  L LE ELDA 1.10, TBI 0.73    CTA reviewed  R SAMANTHA  L SAMANTHA soft plaque proximally ~ 40% stenosis  L popliteal soft plaque ~ 60% stenosis  Bilateral knee prosthesis make it difficult to see remainder of popliteal   R PT difficult to tell if stenosis vs motion artifact  L PT some arterial occlusive disease is present      Pt is not sx from her mild pvd  No vascular intervention indicated  Medical management with statin  Would recommend initiation of asa 81 mg daily - she has no hx of gi bleed, stomach ulcer or intolerance to asa in the past    FU as needed    Tami Gottron, MD

## 2022-06-07 NOTE — CARE COORDINATION
6/7/2022 - Care Coordination - admitted for acute respiratory failure with hypoxia, PAD. Vascular surgery consult for RLE rest pain and small wound on medial aspect of right ankle. On room air. IV doxycycline q12h, IV rocephin q24h. For VL BLE studies. Spoke with pt in her room to discuss discharge planning. PCP is Dr Kev Navarrete. Pharmacy is [facility]. Pt son lives with her in a 1 story home with no steps to enter and then 7 steps up to living area. [address]x [address] DME - fww, shower chair, bsc. Denies hx WALLY/ARU. Discharge plan unclear at present. PT/OT evals ordered. If pt discharges home, her son can provide transportation home. SW/CM will follow.

## 2022-06-07 NOTE — PROGRESS NOTES
Spoke with Pt's grandson Joan Ameqzuita. He does not have a list of her home meds. He states that she had an appt today with her PCP to try to get her back on track with her meds and on the things she was supposed to be taking and on something to help with her back pain. He says that between her and his dad (who is admitted on 8SE currently), they have so many pill bottles he doesn't know what is what and that was part of the reason for the PCP appt today is to try to get that figured out. So pt's home med list may not be accurate.

## 2022-06-07 NOTE — PROGRESS NOTES
Hospitalist Progress Note      SYNOPSIS: Patient admitted on 2022 for <principal problem not specified>  Cc= low back pan and  R Low extremit limb pain     SUBJECTIVE:  imaging showing high grade stenosis of R post. Tibial a. With dopplerable pulses throughout    CT abd suggetss findings suggestive of diarrhea illness    Den sob  Does state she is experiencing discomfort in Low extremity but cannot provide details  Is not able to clarify or provide details regarding gi symptoms  Temp (24hrs), Av.5 °F (36.9 °C), Min:98.5 °F (36.9 °C), Max:98.5 °F (36.9 °C)    DIET: ADULT DIET;  Regular  CODE: Full Code  No intake or output data in the 24 hours ending 22 0905    OBJECTIVE:on room air    BP (!) 186/82   Pulse 84   Temp 98.5 °F (36.9 °C)   Resp 18   Ht 5' 4\" (1.626 m)   Wt 212 lb (96.2 kg)   LMP  (LMP Unknown)   SpO2 98%   BMI 36.39 kg/m²     General appearance:elderly not jaundiced not diaphoretic  HEENT:  No thrush sinus not tender to percussion or palpation     Respiratory: Clear to auscultation bilaterally in upper lobes unlabored respiratory effort at rest  Cardiovascular:pulse  Regular rate audible S1-S2 syst murm grade II/VI 2nd LICS  Abdomen: no rigidity, nondistended  Musculoskeletal: No clubbing, warm to palpation no discoloration of lower extremities    Skin:  No petechiae or hives on inspection  No papules on palpation  Neurologic: awake, alert no aphasia  Mentation= can tell me her full name and age/ her current location/cannot tell me the month  ASSESSMENT:    R LE limb pain/rest pain    possible arterial stenosis R tib  hypokalemia -new issues  Elevated serum cr-not clear that this is marlen   baseline serum cr 1.2 possibly  Diarrhea     Dm2-glu 110  COPD-stable  HTN essent-stable  Hypothyroid unspecif -stable     PLAN:    ELDA with PVR  Will order  Potass   replacement 40meqkcl-new order to address new issue   recheck bmp in am  monit for diarrhea-if it continues consider further testing for pathogens/discnt stool softeners  For Dm2- danilo bs periodic/hold metformin/correction scale insul sq as needed  Efforts to avoid hypoglycemia    For htn -contin toprol xl 25 mg and ramipril 10mg  For copd- incent spirometer    For hypothyrois- contin synthroid  As patient appears to have Pad-contin lipitor /statin agent 20mg          DISPOSITION: tbd    Medications:  REVIEWED DAILY    Infusion Medications    sodium chloride       Scheduled Medications    atorvastatin  40 mg Oral Daily    levothyroxine  175 mcg Oral Daily    metoprolol succinate  25 mg Oral Daily    mirtazapine  30 mg Oral Nightly    ramipril  10 mg Oral Daily    vitamin D  2,000 Units Oral Daily    sodium chloride flush  10 mL IntraVENous 2 times per day    enoxaparin  40 mg SubCUTAneous Daily    cefTRIAXone (ROCEPHIN) IV  1,000 mg IntraVENous Q24H    doxycycline (VIBRAMYCIN) IV  100 mg IntraVENous Q12H     PRN Meds: sodium chloride flush, sodium chloride, promethazine **OR** ondansetron, polyethylene glycol, acetaminophen **OR** acetaminophen, HYDROcodone-acetaminophen, morphine    Labs:     Recent Labs     06/06/22  1445 06/07/22  0700   WBC 6.9 8.3   HGB 14.5 15.0   HCT 42.7 44.9    179       Recent Labs     06/06/22  1445 06/07/22  0700    142   K 3.2* 3.2*    102   CO2 25 24   BUN 14 14   CREATININE 1.4* 1.4*   CALCIUM 9.4 9.9       Recent Labs     06/06/22  1445 06/07/22  0700   PROT 7.2 7.5   ALKPHOS 77 83   ALT 14 15   AST 28 28   BILITOT 1.1 1.1           Recent Labs     06/06/22  1445   CKTOTAL 252*       Chronic labs:    Radiology:  +++++++++++++++++++++++++++++++++++++++++++++++++  Kaylynn Skinner DO  [place] [address], [place]  +++++++++++++++++++++++++++++++++++++++++++++++++  NOTE: This report was transcribed using voice recognition software.  Every effort was made to ensure accuracy; however, inadvertent computerized transcription errors may be present.

## 2022-06-08 LAB
ALBUMIN SERPL-MCNC: 3.2 G/DL (ref 3.5–5.2)
ALP BLD-CCNC: 71 U/L (ref 35–104)
ALT SERPL-CCNC: 10 U/L (ref 0–32)
ANION GAP SERPL CALCULATED.3IONS-SCNC: 14 MMOL/L (ref 7–16)
ANION GAP SERPL CALCULATED.3IONS-SCNC: 15 MMOL/L (ref 7–16)
AST SERPL-CCNC: 20 U/L (ref 0–31)
BASOPHILS ABSOLUTE: 0.08 E9/L (ref 0–0.2)
BASOPHILS RELATIVE PERCENT: 1.3 % (ref 0–2)
BILIRUB SERPL-MCNC: 0.5 MG/DL (ref 0–1.2)
BUN BLDV-MCNC: 18 MG/DL (ref 6–23)
BUN BLDV-MCNC: 21 MG/DL (ref 6–23)
CALCIUM SERPL-MCNC: 9.2 MG/DL (ref 8.6–10.2)
CALCIUM SERPL-MCNC: 9.4 MG/DL (ref 8.6–10.2)
CHLORIDE BLD-SCNC: 105 MMOL/L (ref 98–107)
CHLORIDE BLD-SCNC: 107 MMOL/L (ref 98–107)
CO2: 22 MMOL/L (ref 22–29)
CO2: 23 MMOL/L (ref 22–29)
CREAT SERPL-MCNC: 1.5 MG/DL (ref 0.5–1)
CREAT SERPL-MCNC: 1.7 MG/DL (ref 0.5–1)
EOSINOPHILS ABSOLUTE: 0.3 E9/L (ref 0.05–0.5)
EOSINOPHILS RELATIVE PERCENT: 4.9 % (ref 0–6)
GFR AFRICAN AMERICAN: 35
GFR AFRICAN AMERICAN: 40
GFR NON-AFRICAN AMERICAN: 29 ML/MIN/1.73
GFR NON-AFRICAN AMERICAN: 33 ML/MIN/1.73
GLUCOSE BLD-MCNC: 115 MG/DL (ref 74–99)
GLUCOSE BLD-MCNC: 98 MG/DL (ref 74–99)
HCT VFR BLD CALC: 40.9 % (ref 34–48)
HEMOGLOBIN: 13.8 G/DL (ref 11.5–15.5)
IMMATURE GRANULOCYTES #: 0.02 E9/L
IMMATURE GRANULOCYTES %: 0.3 % (ref 0–5)
LYMPHOCYTES ABSOLUTE: 1.23 E9/L (ref 1.5–4)
LYMPHOCYTES RELATIVE PERCENT: 20.1 % (ref 20–42)
MAGNESIUM: 1.7 MG/DL (ref 1.6–2.6)
MCH RBC QN AUTO: 29.8 PG (ref 26–35)
MCHC RBC AUTO-ENTMCNC: 33.7 % (ref 32–34.5)
MCV RBC AUTO: 88.3 FL (ref 80–99.9)
MONOCYTES ABSOLUTE: 0.43 E9/L (ref 0.1–0.95)
MONOCYTES RELATIVE PERCENT: 7 % (ref 2–12)
NEUTROPHILS ABSOLUTE: 4.06 E9/L (ref 1.8–7.3)
NEUTROPHILS RELATIVE PERCENT: 66.4 % (ref 43–80)
PDW BLD-RTO: 16.5 FL (ref 11.5–15)
PLATELET # BLD: 148 E9/L (ref 130–450)
PMV BLD AUTO: 10.1 FL (ref 7–12)
POTASSIUM REFLEX MAGNESIUM: 3 MMOL/L (ref 3.5–5)
POTASSIUM SERPL-SCNC: 4 MMOL/L (ref 3.5–5)
RBC # BLD: 4.63 E12/L (ref 3.5–5.5)
SODIUM BLD-SCNC: 141 MMOL/L (ref 132–146)
SODIUM BLD-SCNC: 145 MMOL/L (ref 132–146)
TOTAL PROTEIN: 6.3 G/DL (ref 6.4–8.3)
WBC # BLD: 6.1 E9/L (ref 4.5–11.5)

## 2022-06-08 PROCEDURE — 51701 INSERT BLADDER CATHETER: CPT

## 2022-06-08 PROCEDURE — 6370000000 HC RX 637 (ALT 250 FOR IP): Performed by: INTERNAL MEDICINE

## 2022-06-08 PROCEDURE — 97530 THERAPEUTIC ACTIVITIES: CPT

## 2022-06-08 PROCEDURE — 2580000003 HC RX 258: Performed by: INTERNAL MEDICINE

## 2022-06-08 PROCEDURE — 97162 PT EVAL MOD COMPLEX 30 MIN: CPT

## 2022-06-08 PROCEDURE — 2580000003 HC RX 258: Performed by: FAMILY MEDICINE

## 2022-06-08 PROCEDURE — 85025 COMPLETE CBC W/AUTO DIFF WBC: CPT

## 2022-06-08 PROCEDURE — 99233 SBSQ HOSP IP/OBS HIGH 50: CPT

## 2022-06-08 PROCEDURE — 80053 COMPREHEN METABOLIC PANEL: CPT

## 2022-06-08 PROCEDURE — 83735 ASSAY OF MAGNESIUM: CPT

## 2022-06-08 PROCEDURE — 97535 SELF CARE MNGMENT TRAINING: CPT

## 2022-06-08 PROCEDURE — 6360000002 HC RX W HCPCS: Performed by: FAMILY MEDICINE

## 2022-06-08 PROCEDURE — 97165 OT EVAL LOW COMPLEX 30 MIN: CPT

## 2022-06-08 PROCEDURE — 80048 BASIC METABOLIC PNL TOTAL CA: CPT

## 2022-06-08 PROCEDURE — 2500000003 HC RX 250 WO HCPCS: Performed by: FAMILY MEDICINE

## 2022-06-08 PROCEDURE — 2060000000 HC ICU INTERMEDIATE R&B

## 2022-06-08 PROCEDURE — 6370000000 HC RX 637 (ALT 250 FOR IP): Performed by: FAMILY MEDICINE

## 2022-06-08 PROCEDURE — 36415 COLL VENOUS BLD VENIPUNCTURE: CPT

## 2022-06-08 PROCEDURE — 51798 US URINE CAPACITY MEASURE: CPT

## 2022-06-08 RX ORDER — 0.9 % SODIUM CHLORIDE 0.9 %
500 INTRAVENOUS SOLUTION INTRAVENOUS ONCE
Status: COMPLETED | OUTPATIENT
Start: 2022-06-08 | End: 2022-06-08

## 2022-06-08 RX ORDER — ASPIRIN 81 MG/1
81 TABLET, CHEWABLE ORAL DAILY
Status: DISCONTINUED | OUTPATIENT
Start: 2022-06-08 | End: 2022-06-10 | Stop reason: HOSPADM

## 2022-06-08 RX ORDER — POTASSIUM CHLORIDE 20 MEQ/1
40 TABLET, EXTENDED RELEASE ORAL ONCE
Status: COMPLETED | OUTPATIENT
Start: 2022-06-08 | End: 2022-06-08

## 2022-06-08 RX ADMIN — RAMIPRIL 10 MG: 5 CAPSULE ORAL at 08:27

## 2022-06-08 RX ADMIN — DOXYCYCLINE 100 MG: 100 INJECTION, POWDER, LYOPHILIZED, FOR SOLUTION INTRAVENOUS at 05:23

## 2022-06-08 RX ADMIN — ATORVASTATIN CALCIUM 40 MG: 40 TABLET, FILM COATED ORAL at 08:27

## 2022-06-08 RX ADMIN — SODIUM CHLORIDE 500 ML: 9 INJECTION, SOLUTION INTRAVENOUS at 18:58

## 2022-06-08 RX ADMIN — ENOXAPARIN SODIUM 40 MG: 100 INJECTION SUBCUTANEOUS at 08:27

## 2022-06-08 RX ADMIN — POTASSIUM CHLORIDE 40 MEQ: 20 TABLET, EXTENDED RELEASE ORAL at 10:52

## 2022-06-08 RX ADMIN — LEVOTHYROXINE SODIUM 175 MCG: 0.17 TABLET ORAL at 06:38

## 2022-06-08 RX ADMIN — WATER 1000 MG: 1 INJECTION INTRAMUSCULAR; INTRAVENOUS; SUBCUTANEOUS at 05:24

## 2022-06-08 RX ADMIN — MIRTAZAPINE 30 MG: 15 TABLET, FILM COATED ORAL at 21:42

## 2022-06-08 RX ADMIN — ASPIRIN 81 MG CHEWABLE TABLET 81 MG: 81 TABLET CHEWABLE at 10:52

## 2022-06-08 RX ADMIN — SODIUM CHLORIDE, PRESERVATIVE FREE 10 ML: 5 INJECTION INTRAVENOUS at 08:27

## 2022-06-08 RX ADMIN — DOXYCYCLINE 100 MG: 100 INJECTION, POWDER, LYOPHILIZED, FOR SOLUTION INTRAVENOUS at 17:55

## 2022-06-08 RX ADMIN — Medication 2000 UNITS: at 08:27

## 2022-06-08 RX ADMIN — METOPROLOL SUCCINATE 25 MG: 25 TABLET, EXTENDED RELEASE ORAL at 08:27

## 2022-06-08 ASSESSMENT — PAIN SCALES - GENERAL
PAINLEVEL_OUTOF10: 0
PAINLEVEL_OUTOF10: 0

## 2022-06-08 NOTE — PROGRESS NOTES
Hospitalist Progress Note      SYNOPSIS: Patient admitted on 2022 for <principal problem not specified>  Cc= low back pan and  R Low extremit rest  limb pain     SUBJECTIVE:  imaging showing high grade stenosis of R post. Tibial a. With dopplerable pulses throughout    CT abd suggests on admission findings suggestive of diarrhea illness    Discussed with nursing  No urine output from overnight   Bladder scan from a few hours ago- no  Urine accumulation noted   no reports of diarrhea from rn  rn has had difficulty obtaining history    pmhx  RA  psoriat arthrit  DM2  Hypothyroid   HTN  ckd 3  Asthma/copd  Dementia/mci  Hx of cva  Hx of breast CA    Patient states she is in pain but is not able to localize symptoms  Den abd pain  Temp (24hrs), Av.2 °F (36.8 °C), Min:98 °F (36.7 °C), Max:98.4 °F (36.9 °C)    DIET: ADULT DIET;  Regular  CODE: Full Code    OBJECTIVE:on room air    /65   Pulse 73   Temp 98.4 °F (36.9 °C) (Oral)   Resp 16   Ht 5' 4\" (1.626 m)   Wt 215 lb (97.5 kg)   LMP  (LMP Unknown)   SpO2 95%   BMI 36.90 kg/m²     General appearance:elderly not jaundiced not diaphoretic  HEENT:  No thrush sinus not tender to percussion or palpation     Respiratory: Clear to auscultation bilaterally in upper lobes unlabored respiratory effort at rest  Cardiovascular:pulse  Regular rate audible S1-S2 syst murm grade II/VI 2nd LICS   bilat dp palpable  Abdomen: no rigidity, nondistended  Musculoskeletal: No clubbing, warm to palpation no discoloration of lower extremities    Skin:  No petechiae or hives on inspection  No papules on palpation  Neurologic:drowsy but awake    ASSESSMENT:hypokalemia  elevat serum cr 1.7   baseline serum cr 1.2-? approximate    R LE limb pain/rest pain    possible arterial stenosis R tib  hypokalemia -new issues  Elevated serum cr-not clear that this is marlen   baseline serum cr 1.2 possibly  Diarrhea - reports- no new episodes or occurrences recorded    Dm2-glu 98  COPD-stable  HTN essent-stable  Hypothyroid unspecif -stable   presumably    TSh April 2022 35.!!   Drowsiness- possibly 2/2 to multifactorial issues- rev of meds does not seem to     List any one medication that would have impact on cns     dementia/mci-by hx- no behaviours  Mood stable  PLAN:    Will order  Potass   replacement 40meqkcl-new order to address new issue low serum k   new issue Of low urine output   if no urine volume in bladder will order ivf    New issue elevated serum cr   recheck bmp in am  monit for diarrhea-if it continues consider further testing for pathogens/discnt stool softeners  For Dm2- danilo bs periodic/hold metformin/correction scale insul sq as needed  Efforts to avoid hypoglycemia    For htn -contin toprol xl 25 mg and ramipril 10mg  For copd- incent spirometer    For hypothyroid- contin synthroid   will check tsh again   As patient appears to have Pad-contin lipitor /statin agent 20mg    DISPOSITION: tbd    Medications:  REVIEWED DAILY    Infusion Medications    sodium chloride       Scheduled Medications    potassium chloride  40 mEq Oral Once    atorvastatin  40 mg Oral Daily    levothyroxine  175 mcg Oral Once per day on Mon Tue Wed Thu Fri    metoprolol succinate  25 mg Oral Daily    mirtazapine  30 mg Oral Nightly    ramipril  10 mg Oral Daily    vitamin D  2,000 Units Oral Daily    sodium chloride flush  10 mL IntraVENous 2 times per day    enoxaparin  40 mg SubCUTAneous Daily    cefTRIAXone (ROCEPHIN) IV  1,000 mg IntraVENous Q24H    doxycycline (VIBRAMYCIN) IV  100 mg IntraVENous Q12H    [START ON 6/11/2022] levothyroxine  350 mcg Oral Once per day on Sun Sat     PRN Meds: sodium chloride flush, sodium chloride, promethazine **OR** ondansetron, acetaminophen **OR** acetaminophen, HYDROcodone-acetaminophen, morphine    Labs:     Recent Labs     06/06/22  1445 06/07/22  0700 06/08/22  0559   WBC 6.9 8.3 6.1   HGB 14.5 15.0 13.8   HCT 42.7 44.9 40.9    179 148       Recent Labs     06/07/22  0700 06/07/22  1438 06/08/22  0559    143 145   K 3.2* 3.3* 3.0*    106 107   CO2 24 22 23   BUN 14 13 18   CREATININE 1.4* 1.2* 1.7*   CALCIUM 9.9 9.2 9.2       Recent Labs     06/06/22  1445 06/07/22  0700 06/08/22  0559   PROT 7.2 7.5 6.3*   ALKPHOS 77 83 71   ALT 14 15 10   AST 28 28 20   BILITOT 1.1 1.1 0.5           Recent Labs     06/06/22  1445   CKTOTAL 252*       Chronic labs:    Radiology:  +++++++++++++++++++++++++++++++++++++++++++++++++  Joyce Buerger, DO [employer] - [address] [place], [place]  +++++++++++++++++++++++++++++++++++++++++++++++++  NOTE: This report was transcribed using voice recognition software. Every effort was made to ensure accuracy; however, inadvertent computerized transcription errors may be present. English

## 2022-06-08 NOTE — PROGRESS NOTES
Comprehensive Nutrition Assessment    Type and Reason for Visit:  Initial,Positive Nutrition Screen,Consult (consult for wounds)    Nutrition Recommendations/Plan:   1. Recommend Cardiac/Carb Controlled Diet. 2. Will Start ONS and monitor. Malnutrition Assessment:  Malnutrition Status: At risk for malnutrition (Comment) (06/08/22 8525)    Context:  Acute Illness     Findings of the 6 clinical characteristics of malnutrition:  Energy Intake:  75% or less of estimated energy requirements for 7 or more days  Weight Loss:  Unable to assess (2/2 poor EMR wt hx since adm currently)     Body Fat Loss:  No significant body fat loss     Muscle Mass Loss:  No significant muscle mass loss    Fluid Accumulation:  Unable to assess     Strength:  Not Performed    Nutrition Assessment:    Pt adm from home w/ chronic lower back/RLE pain, worsening x ~3d pta. PMHx DM, PVD, IBS, CKD, COPD, Dementia, CVA, hx Re-Current Breast CA (12', 21') s/p mastectomy; adm w/ PVD, ARF, JEREMIAH. At nutritional risk d/t increased needs for wound healing as well as noted poor appetite/intake x ~3d pta 2/2 significant back pain. Will Start ONS and monitor. Nutrition Related Findings:    AMSx2, upper dentures, Abd/BS WDL, trace edema, I/O's WNL Wound Type: Venous Stasis (healing ulcer to ankle noted)       Current Nutrition Intake & Therapies:    Average Meal Intake: 26-50% (sporadic since adm)  Average Supplements Intake: None Ordered  ADULT DIET; Regular    Anthropometric Measures:  Height: 5' 4\" (162.6 cm)  Ideal Body Weight (IBW): 120 lbs (55 kg)    Admission Body Weight: 215 lb (97.5 kg) (stated 6/7)  Current Body Weight: 215 lb (97.5 kg) (stated 6/7),   IBW.  Weight Source: Stated  Current BMI (kg/m2): 36.9  Usual Body Weight: 217 lb (98.4 kg) (bed 6/13/21 per EMR; CONCHIS wt loss pta 2/2 poor EMR wt hx since adm currently)  % Weight Change (Calculated): -0.9                    BMI Categories: Obese Class 2 (BMI 35.0 -39.9)    Estimated Daily Nutrient Needs:  Energy Requirements Based On: Formula  Weight Used for Energy Requirements: Current  Energy (kcal/day):   Weight Used for Protein Requirements: Ideal  Protein (g/day): 1.3-1.5gm/kg IBW= 70-80; as tolerated w/ JEREMIAH/CKD  Method Used for Fluid Requirements: 1 ml/kcal  Fluid (ml/day):     Nutrition Diagnosis:   · Increased nutrient needs related to increase demand for energy/nutrients as evidenced by wounds      Nutrition Interventions:   Food and/or Nutrient Delivery: Modify Current Diet,Start Oral Nutrition Supplement (Recommend Cardiac/Carb Controlled Diet. Will Start ONS and monitor.)  Nutrition Education/Counseling: No recommendation at this time  Coordination of Nutrition Care: Continue to monitor while inpatient       Goals:     Goals: PO intake 75% or greater       Nutrition Monitoring and Evaluation:   Behavioral-Environmental Outcomes: None Identified  Food/Nutrient Intake Outcomes: Food and Nutrient Intake,Supplement Intake  Physical Signs/Symptoms Outcomes: Biochemical Data,Chewing or Swallowing,Diarrhea,GI Status,Fluid Status or Edema,Hemodynamic Status,Nutrition Focused Physical Findings,Skin,Weight    Discharge Planning:     Too soon to determine     Romayne Rands, RD, LD  Contact: ext [phone]

## 2022-06-08 NOTE — PROGRESS NOTES
[address]        Date:2022                                                  Patient Name: Tanner Gonzalez    MRN: [id number]    : 1940    Room: [address] [place] [address]      Evaluating OT: Rafael Flaherty, [address] Paul OTR/L; [id number]      Referring Provider: Barbara Lagos DO    Specific Provider Orders/Date: OT Eval and Treat 2022      Diagnosis: SOB   PVD (peripheral vascular disease)    Surgery: none this admission     Pertinent Medical History:  has a past medical history of Asthma, Blood transfusion, Breast cancer ([facility] [address] COPD (chronic obstructive pulmonary disease) ([facility] [address] Diabetes mellitus ([facility] [address] Diabetic peripheral neuropathy ([facility] [address] DM type 2 (diabetes mellitus, type 2) ([facility] [address] Hypertension, Hypothyroidism, Irritable bowel syndrome, Mixed hyperlipidemia, Obesity (BMI 30-39.9), Osteoarthritis, PVD (peripheral vascular disease) ([facility] [address] RA (rheumatoid arthritis) ([facility] [address] RBBB, Recurrent breast cancer, right ([address] and Vitamin B12 deficiency.      Reason for Admission: right lower back pain, RLE pain with touch/movement    Recommended Adaptive Equipment:  TBD pending progression/discharge plan     Precautions:  Fall Risk, Cognition (Dementia at baseline)     Assessment of current deficits:    [x] Functional mobility  [x]ADLs  [x] Strength               [x]Cognition    [x] Functional transfers   [x] IADLs         [x] Safety Awareness   [x]Endurance    [x] Fine Coordination              [x] Balance      [] Vision/perception   []Sensation     []Gross Motor Coordination  [] ROM  [] Delirium                   [] Motor Control     OT PLAN OF CARE   OT POC based on physician orders, patient diagnosis and results of clinical assessment    Frequency/Duration: 1-3 days/wk for 2 weeks PRN   Specific OT Treatment Interventions to include:   * Instruction/training on adapted ADL techniques and AE following commands when addressed by name) limited verbal responses during session - unclear what cognitive baseline is d/t diagnosis of dementia    Follows single step directions 25% of the time - required repeated verbal cues d/t easily distracted and poor attention to task   Memory:  poor   Sequencing:  poor   Problem solving:  poor   Judgement/safety:  poor     Functional Assessment:  AM-PAC Daily Activity Raw Score: 11/24   Initial Eval Status  Date: 6/8/22 Treatment Status  Date: STGs = LTGs  Time frame: 10-14 days   Feeding Minimal Assist   Tray set up and positioning  Supervision    Grooming Moderate Assist   Washing face seated EOB   Pt required verbal cues to initate/complete task d/t poor insight and overall fatigue  Minimal Assist    UB Dressing Moderate Assist   Miguel Angel/doff gown lying supine   Required verbal cues to sequence threading BUE into gown (attempted to miguel angel new gown prior to doffing soiled gown)  Minimal Assist    LB Dressing Dependent   Miguel Angel socks lying supine   Poor command following to complete functional task seated EOB   Poor dynamic sitting balance seated EOB  Moderate Assist    Bathing Maximal Assist  Pt would benefit from use of shower chair for safety d/t decreased dynamic standing balance   Poor sequencing to intiate and complete task d/t decreased insight  Minimal Assist    Toileting Dependent   For all pericare - decreased insight   Moderate Assist    Bed Mobility  Log Roll: Max A x2  Supine to sit: Maximal Assist x2  Sit to supine: Maximal Assist x2  Required assist with progressing BLE and verbal/tactile cues for BUE placement to assist with transfer  Supine to sit: Minimal Assist   Sit to supine: Minimal Assist    Functional Transfers Sit to stand: Min A x 2 with ww  Stand to sit:Min A x 2 with ww  Stand pivot: Min A x 2 with ww  Commode: Min A x 2 with ww  Sit to stand: Min A with ww   Stand to sit: Min A with ww  Stand pivot: Min A with ww  Commode: Min A with ww    Functional Mobility Mod A x 2 with ww  Attempted side step to L toward HOB   Pt unable to progress LLE for side stepping with repeated verbal and tactile cues  Progressed R foot one step to left    Moderate Assist with ww   Balance Sitting:     Static - Min A > SBA     Dynamic - Min A  Standing: Min A x 2 with ww  Sitting:  Static: Sup  Dynamic: Sup  Standing: Min A with ww   Activity Tolerance POOR   Pt extremely fatigued/lethargic during session   Required repeated rest breaks and verbal cues for all task participation  BILL   Visual/  Perceptual Glasses: YES - no present during session                   BUE  ROM/Strength/  Fine motor Coordination Hand dominance: Right ( pt did not verbalize however using RUE for all functional tasks)     RUE: ROM WFL - pt demonstrated ~ 90 degrees of shoulder flexion during session unable to follow verbal cues for further AROM assessment     Strength: deferred - pt unable to follow verbal cues for participation      Strength: POOR     Coordination:  POOR     LUE: ROM WFL - pt demonstrated ~ 90 degrees of shoulder flexion during session unable to follow verbal cues for further AROM assessment     Strength: deferred - pt unable to follow verbal cues for participation      Strength: POOR     Coordination:  POOR  increase BUE muscle strength for improved indep with functional transfers       Hearing: [facility]   Sensation:  No c/o numbness or tingling - however difficulty to fully assess d/t poor cognition/limited verbal repsonses  Tone: WFL   Edema: BLE edema    Patient/Family Goal: pt unable to state goal at this time    Comment: Cleared by RN to see pt. Upon arrival patient lying supine and agreeable to OT session. At end of session, patient lying supine with call light and phone within reach, all lines and tubes intact. Overall patient demonstrated  decreased independence and safety during completion of ADL/functional transfer/mobility tasks.   Pt would benefit from continued skilled OT to increase safety and independence with completion of ADL/IADL tasks for functional independence and quality of life. Treatment:   OT treatment provided this date includes:  Facilitation of bed mobility, unsupported sitting balance at EOB (decreased dynamic sitting balance impacting ADL - addressed posture, weight shifting, dynamic reaching to improve independence, difficulty with repositioning in bed requiring increased verbal cues and time to complete), functional transfer from supine to EOB (verbal/tactile cues and retraining for transfers d/t decreased balance/sequencing for safety) standing tolerance tasks (addressed posture, balance and activity tolerance d/t impact on ADLs and functional activity - required use of ww in standing) and attempted functional mobility with use of ww (in preparation for ambulation to/ from bathroom; cuing on posture, w/w management and safety) - skilled cuing on hand placement, posture, body mechanics, energy conservation techniques and safety during functional mobility and ADLs. Therapist facilitated self-care retraining: UB/LB self-care tasks (downing gown lying supine and socks), toileting hygiene task and grooming tasks while educating pt on modified techniques, posture, safety and energy conservation techniques. Rehab Potential: Good  for established goals       LTG: maximize independence with ADLs to return to PLOF    Patient and/or family were instructed on functional diagnosis, prognosis/goals and OT plan of care. Demonstrated fair understanding. [] Malnutrition indicators have been identified and nursing has been notified to ensure a dietitian consult is ordered.        ·  Eval Complexity: LOW    Evaluation time includes thorough review of current medical information, gathering information on past medical & social history & PLOF, completion of standardized testing, informal observation of tasks, consultation with other medical professions/disciplines, assessment of data & development of POC/goals. Time In: 0930  Time Out: 9435  Total Treatment Time: 10    Min Units   OT Eval Low 76491  x     OT Eval Medium 38995      OT Eval High 43519      OT Re-Eval [id number]       Therapeutic Ex 81087       Therapeutic Activities 78321       ADL/Self Care 06982  10  1   Orthotic Management 35419       Manual [id number]     Neuro Re-Ed [id number]       Non-Billable Time          Evaluation Time additionally includes thorough review of current medical information, gathering information on past medical history/social history and prior level of function, interpretation of standardized testing/informal observation of tasks, assessment of data and development of plan of care and goals.               Philippe Oglesby, MSOT OTR/L; [id number]

## 2022-06-08 NOTE — PROGRESS NOTES
Vascular Surgery Progress Note    Pt is being seen in f/u today regarding PVD    Subjective  Pt s/e. Complains of pain in her back and legs. Denies CP, SOB. No acute issues overnight. Spoke with Dr. Venita Mtz. Starting pt on ASA 81mg daily.     Current Medications:    sodium chloride        sodium chloride flush, sodium chloride, promethazine **OR** ondansetron, acetaminophen **OR** acetaminophen, HYDROcodone-acetaminophen, morphine    potassium chloride  40 mEq Oral Once    aspirin  81 mg Oral Daily    atorvastatin  40 mg Oral Daily    levothyroxine  175 mcg Oral Once per day on Mon Tue Wed Thu Fri    metoprolol succinate  25 mg Oral Daily    mirtazapine  30 mg Oral Nightly    ramipril  10 mg Oral Daily    vitamin D  2,000 Units Oral Daily    sodium chloride flush  10 mL IntraVENous 2 times per day    enoxaparin  40 mg SubCUTAneous Daily    cefTRIAXone (ROCEPHIN) IV  1,000 mg IntraVENous Q24H    doxycycline (VIBRAMYCIN) IV  100 mg IntraVENous Q12H    [START ON 6/11/2022] levothyroxine  350 mcg Oral Once per day on Sun Sat      PHYSICAL EXAM:    /65   Pulse 73   Temp 98.4 °F (36.9 °C) (Oral)   Resp 16   Ht 5' 4\" (1.626 m)   Wt 215 lb (97.5 kg)   LMP  (LMP Unknown)   SpO2 95%   BMI 36.90 kg/m²     Intake/Output Summary (Last 24 hours) at 6/8/2022 1051  Last data filed at 6/8/2022 8737  Gross per 24 hour   Intake 350 ml   Output --   Net 350 ml        Gen Awake, alert, confused, in no apparent distress   CVS S1S2  Resp No increased work of breathing   Abd Soft, non-tender, non-distended   R LE Fem 2+, DP/PT monophasic    LABS:    Lab Results   Component Value Date    WBC 6.1 06/08/2022    HGB 13.8 06/08/2022    HCT 40.9 06/08/2022     06/08/2022    PROTIME 11.3 06/10/2021    INR 1.0 06/10/2021    APTT 28.3 08/22/2017    K 3.0 (L) 06/08/2022    BUN 18 06/08/2022    CREATININE 1.7 (H) 06/08/2022       VL LOWER EXTREMITY ARTERIAL SEGMENTAL PRESSURES W PPG BILATERAL  Normal ankle arm

## 2022-06-08 NOTE — CARE COORDINATION
6/8/2022 - Updated CM note - admitted for respiratory failure with hypoxia, PAD. Vascular surgery following. IV doxycycline q12h, IV rocephin q24h. PT 9/24, OT 11/24. Spoke with both pt mary Simon and then spoke with pt son Dao Perez regarding pt needing to go to a [facility] for [facility]hab [facility], #3 [address] [place] [address]. Per Alivia Santos has no bed availability. Sent referral message to Karla Blum at [facility]. Ambulance form completed and placed in envelope on soft chart. [facility]ENS 7000 form initiated and pending in system. SW/CM will follow.

## 2022-06-08 NOTE — PROGRESS NOTES
Physical Therapy  Physical Therapy Initial Assessment     Name: Alma Hadley  : [birth year]  MRN: [id number]      Date of Service: 2022    Evaluating PT:  Villa Hatch PT, DPT    Room #:  4040/7055-D  Diagnosis:  Shortness of breath [R06.02]  Peripheral arterial disease ([facility] [address] [I73.9]  Acute respiratory failure with hypoxia ([facility] [address] [J96.01]  PMHx/PSHx:  Dementia, Asthma, Breast cancer, COPD, Diabetic peripheral neuropathy, DM type 2,  Hypertension, Hypothyroidism, Irritable bowel syndrome, Mixed hyperlipidemia, Obesity Osteoarthritis, RA  Procedure/Surgery:  N/A  Precautions:  Fall  Equipment Needs:  TBD: unable to collect information from pt at this time but per medical chart review pt has [employer], shower chair, and BSC    SUBJECTIVE:    Pt was unable to report subjective information due to extreme lethargy. Per medical chart review pt lives in a 1 story home but has 7 steps into the living area. Pt ambulated with [employer] PTA. OBJECTIVE:   Initial Evaluation  Date:  Treatment Short Term/ Long Term   Goals   AM-PAC 6 Clicks      Was pt agreeable to Eval/treatment? yes     Does pt have pain?  Pt verbalized \"I hurt\" and \"back\" but rating or localization was unable to be determined     Bed Mobility  Rolling: maxAx2  Supine to sit: maxAx2  Sit to supine: maxAx2  Scooting: maxAx2  Rolling: CGA  Supine to sit: CGA  Sit to supine: CGA  Scooting: CGA    Transfers Sit to stand: minAx2 using [employer]  Stand to sit: minAx2 using [employer]  Stand pivot: NT  Sit to stand: CGA using [employer]  Stand to sit: CGA using [employer]  Stand pivot: CGA using [employer]   Ambulation    NT- attempted side step towards L with modAx2 using [employer] but pt was unable to clear L foot off the ground  50 ft using [employer] with [employer]   Stair negotiation: ascended and descended  NT  7 steps with handrails     Strength/ROM: Unable to assess MMTs due to pt's cognition but functionally pt was able to stand as reported above    Balance:   Static Sitting: Viktoriya  Dynamic Sitting: Viktoriya  Static Standing: minAx2 with [employer]  Dynamic Standing: modAx2 with [employer]    Pt is A & O x1 (responded to her name, was not able to state name)  Sensation:  Pt unable to report numbness and tingling to extremities  Edema:  BLE calves and feet      Therapeutic Exercises:    Bed mobility: supine<>sit, cued for EOB positioning  Transfers: STSx2, cued for hand placement and postural correction  BLE AROM    Patient education  Pt educated on role of PT, importance of functional mobility during hospital stay, and improving strength/endurance to work towards PLOF. Patient response to education:   Pt verbalized understanding Pt demonstrated skill Pt requires further education in this area   No due to cognition and lethargy No due to cognition and lethargy Yes     ASSESSMENT:    Conditions Requiring Skilled Therapeutic Intervention:    [x]Decreased strength     [x]Decreased ROM  [x]Decreased functional mobility  [x]Decreased balance   [x]Decreased endurance   [x]Decreased posture  []Decreased sensation  []Decreased coordination   []Decreased vision  []Decreased safety awareness   [x]Increased pain       Comments:    Pt supine in bed upon entering, agreeable to participate. PT was unable to collective subjective information from pt as she was very lethargic. PT and OT asked pt to sit on EOB to perform MMT assessments. Pt's arousal slightly improved but she still did not communicate beyond verbalizing pain as reported above. Next, pt was asked to stand to assess upright tolerance. Pt required a seated rest break after standing 35 sec due to back pain. Pt was encouraged to stand again so PT and OT could adjust bed positioning for pt comfort. While standing, PT encouraged pt to take a couple side steps L with max verbal and tactile cueing. Pt was forward flexed and demonstrated heavy UE reliance on the [employer]. Pt was able to step with her R foot but the L foot did not clear the ground. Pt began sitting again due to back.  PT and OT assisted pt back to supine position in bed with all needs met and call light within reach. Physician notified of pt's performance and extreme lethargy. Treatment:  Patient practiced and was instructed in the following treatment:     Bed mobility training - pt given verbal and tactile cues to facilitate proper sequencing and safety during rolling and supine>sit as well as provided with physical assistance to complete task    STS - pt educated on proper hand and foot placement, safety and sequencing, and use of WW to safely complete sit<>stand and pivot transfers with hands on assistance to complete task safely     Pt's/ family goals   1. Return home    Prognosis is fair for reaching above PT goals. Patient and or family understand(s) diagnosis, prognosis, and plan of care.   Yes    PHYSICAL THERAPY PLAN OF CARE:    PT POC is established based on physician order and patient diagnosis     Referring provider/PT Order:  Dian Quiñones DO  Diagnosis:  Shortness of breath [R06.02]  Peripheral arterial disease ([facility] [address] [I73.9]  [facility] [J96.01]  Specific instructions for next treatment:  Progress as tolerated    Current Treatment Recommendations:     [x] Strengthening to improve independence with functional mobility   [x] ROM to improve independence with functional mobility   [x] Balance Training to improve static/dynamic balance and to reduce fall risk  [x] Endurance Training to improve activity tolerance during functional mobility   [x] Transfer Training to improve safety and independence with all functional transfers   [x] Gait Training to improve gait mechanics, endurance and assess need for appropriate assistive device  [x] Stair Training in preparation for safe discharge home and/or into the community   [] Positioning to prevent skin breakdown and contractures  [x] Safety and Education Training   [x] Patient/Caregiver Education   [] HEP  [] Other     PT long term treatment goals are located in above grid    Frequency of treatments: 2-5x/week x 1-2 weeks. Time in  9:30  Time out  9:55    Total Treatment Time 15 minutes     Evaluation Time includes thorough review of current medical information, gathering information on past medical history/social history and prior level of function, completion of standardized testing/informal observation of tasks, assessment of data and education on plan of care and goals.     CPT codes:  [] Low Complexity PT evaluation 06653  [x] Moderate Complexity PT evaluation 08123  [] High Complexity PT evaluation 57511  [] PT Re-evaluation 19287  [] Gait training 49115 -- minutes  [] Manual therapy [id number] -- minutes  [x] Therapeutic activities 41151 15 minutes  [] Therapeutic exercises 59435 -- minutes  [] Neuromuscular reeducation 61109 -- minutes     Jada Melchor, PT, DPT  [id number]    Areli Godoy, SPT

## 2022-06-09 LAB — URINE CULTURE, ROUTINE: NORMAL

## 2022-06-09 PROCEDURE — 2580000003 HC RX 258: Performed by: FAMILY MEDICINE

## 2022-06-09 PROCEDURE — 2060000000 HC ICU INTERMEDIATE R&B

## 2022-06-09 PROCEDURE — 97530 THERAPEUTIC ACTIVITIES: CPT

## 2022-06-09 PROCEDURE — 2500000003 HC RX 250 WO HCPCS: Performed by: FAMILY MEDICINE

## 2022-06-09 PROCEDURE — 6370000000 HC RX 637 (ALT 250 FOR IP): Performed by: FAMILY MEDICINE

## 2022-06-09 PROCEDURE — 6360000002 HC RX W HCPCS: Performed by: FAMILY MEDICINE

## 2022-06-09 PROCEDURE — 97535 SELF CARE MNGMENT TRAINING: CPT

## 2022-06-09 PROCEDURE — 6370000000 HC RX 637 (ALT 250 FOR IP): Performed by: INTERNAL MEDICINE

## 2022-06-09 RX ADMIN — ENOXAPARIN SODIUM 40 MG: 100 INJECTION SUBCUTANEOUS at 08:42

## 2022-06-09 RX ADMIN — WATER 1000 MG: 1 INJECTION INTRAMUSCULAR; INTRAVENOUS; SUBCUTANEOUS at 05:50

## 2022-06-09 RX ADMIN — ASPIRIN 81 MG CHEWABLE TABLET 81 MG: 81 TABLET CHEWABLE at 08:41

## 2022-06-09 RX ADMIN — DOXYCYCLINE 100 MG: 100 INJECTION, POWDER, LYOPHILIZED, FOR SOLUTION INTRAVENOUS at 16:51

## 2022-06-09 RX ADMIN — SODIUM CHLORIDE, PRESERVATIVE FREE 10 ML: 5 INJECTION INTRAVENOUS at 20:06

## 2022-06-09 RX ADMIN — LEVOTHYROXINE SODIUM 175 MCG: 0.17 TABLET ORAL at 08:41

## 2022-06-09 RX ADMIN — Medication 2000 UNITS: at 08:41

## 2022-06-09 RX ADMIN — MIRTAZAPINE 30 MG: 15 TABLET, FILM COATED ORAL at 20:06

## 2022-06-09 RX ADMIN — DOXYCYCLINE 100 MG: 100 INJECTION, POWDER, LYOPHILIZED, FOR SOLUTION INTRAVENOUS at 05:55

## 2022-06-09 RX ADMIN — ATORVASTATIN CALCIUM 40 MG: 40 TABLET, FILM COATED ORAL at 08:41

## 2022-06-09 RX ADMIN — RAMIPRIL 10 MG: 5 CAPSULE ORAL at 08:41

## 2022-06-09 NOTE — PROGRESS NOTES
Physician Progress Note      Keren Greenberg  CSN #:                  [id number]  :                       1940  ADMIT DATE:       2022 1:32 PM  DISCH DATE:  RESPONDING  PROVIDER #:        Alize Marie DO          QUERY TEXT:    Patient admitted with back pain radiating down RLL. Documentation reflects   acute respiratory failure with hypoxia in note(s) dated 22. If possible,   please document in the progress notes and discharge summary if acute   respiratory failure with hypoxia was: The medical record reflects the following:  Risk Factors: COPD  Clinical Indicators: In the ED, pt became hypoxic with sats dropping to 88%   RA. She was placed on 2L of oxygen. Pt was weaned back down to room air within   10 hours. CXR shows retro-cardiac patchy opacity suspicious for possible   developing pneumonia. H&P reflects pt was in no distress on exam and lungs   were CTA bilaterally. Pt denies SOB during remainder of admission thus far.   Treatment: oxygen, pulse ox, labs, imaging    Thank you,  Demetra Infante RN  [phone]  Options provided:  -- Acute respiratory failure with hypoxia confirmed after study  -- Acute respiratory failure with hypoxia treated and resolved  -- Acute respiratory failure with hypoxia ruled out after study  -- Other - I will add my own diagnosis  -- Disagree - Not applicable / Not valid  -- Disagree - Clinically unable to determine / Unknown  -- Refer to Clinical Documentation Reviewer    PROVIDER RESPONSE TEXT:    No pneumonia pneumonia rule dout    Query created by: Jodi Trivedi on 2022 2:16 PM      Electronically signed by:  Alize Marie DO 2022 11:12 AM

## 2022-06-09 NOTE — PROGRESS NOTES
Occupational Therapy  OCCUPATIONAL THERAPY BEDSIDE TREATMENT NOTE   [facility] [address], [address] [place] [address]     Date:2022  Patient Name: Annabella Delaney  MRN: [id number]  : 1940  Room: [address] [place], [place] [address]      Evaluating OT: Quirino Barrientos, [address] OTR/L; [id number]       Referring Provider: Demetra Olmedo DO    Specific Provider Orders/Date: OT Eval and Treat 2022       Diagnosis: SOB   PVD (peripheral vascular disease)    Surgery: none this admission     Pertinent Medical History:  has a past medical history of Asthma, Blood transfusion, Breast cancer ([address] COPD (chronic obstructive pulmonary disease) ([address] Diabetes mellitus ([address] Diabetic peripheral neuropathy ([address] DM type 2 (diabetes mellitus, type 2) ([address] Hypertension, Hypothyroidism, Irritable bowel syndrome, Mixed hyperlipidemia, Obesity (BMI 30-39.9), Osteoarthritis, PVD (peripheral vascular disease) ([address] RA (rheumatoid arthritis) ([address] RBBB, Recurrent breast cancer, right ([address] and Vitamin B12 deficiency.      Reason for Admission: right lower back pain, RLE pain with touch/movement     Recommended Adaptive Equipment:  TBD pending progression/discharge plan      Precautions:  Fall Risk, Cognition (Dementia at baseline)      Assessment of current deficits:    [x]? Functional mobility            [x]?ADLs           [x]? Strength                  [x]? Cognition    [x]? Functional transfers          [x]? IADLs         [x]? Safety Awareness   [x]? Endurance    [x]? Fine Coordination                         [x]? Balance      []? Vision/perception   []? Sensation      []? Gross Motor Coordination             []? ROM           []?  Delirium                   []? Motor Control      OT PLAN OF CARE   OT POC based on physician orders, patient diagnosis and results of clinical assessment     Frequency/Duration: 1-3 days/wk for 2 weeks PRN   Specific OT Treatment Interventions to include:   * Instruction/training on adapted ADL techniques and AE recommendations to increase functional independence within precautions       * Training on energy conservation strategies, correct breathing pattern and techniques to improve independence/tolerance for self-care routine  * Functional transfer/mobility training/DME recommendations for increased independence, safety, and fall prevention  * Patient/Family education to increase follow through with safety techniques and functional independence  * Recommendation of environmental modifications for increased safety with functional transfers/mobility and ADLs  * Therapeutic exercise to improve motor endurance, ROM, and functional strength for ADLs/functional transfers  * Therapeutic activities to facilitate/challenge dynamic balance, stand tolerance for increased safety and independence with ADLs     Home Living: Pt unreliable/poor historian d/t lethargy and cognition. Per chart (CM note) pt lives with Son in a 1 story home with 7 steps to access main living area. Per previous admission (June 2021):   pt son present and provided home set up stating pt living in 1 story home with son with a ramp entry way. When entering the ramp entrance everything on that level and pt does not need to do any steps. Bathroom setup: walk in shower with built in seat and grab rails    Equipment owned: ww     Prior Level of Function: Pt stated she lives with her son. Per chart from last admission (June 2021):  Pt required assist with bathing and was able to complete grooming and UB and LB dressing. Pt needed some assist with meal prep and laundry. Pt has [employer] 3 days a week for 3 hours. They assist pt with showering and with homemaking. Driving: no     Pain Level: no c/o pain   Cognition: A&O: 1/4 - oriented to self only with limited verbal responses during session - unclear what cognitive baseline is d/t diagnosis of dementia.    Follows single step directions 15-25% of the time - required repeated verbal cues d/t easily distracted and poor attention to task. Requiring verbal prompts to improve direction. Memory:  poor              Sequencing:  poor              Problem solving:  poor              Judgement/safety:  poor                Functional Assessment:  AM-PAC Daily Activity Raw Score: 12/24    Initial Eval Status  Date: 6/8/22 Treatment Status  Date: 6/9/22 STGs = LTGs  Time frame: 10-14 days   Feeding Minimal Assist   Tray set up and positioning  Setup; To complete self feeding while sitting up in the bed. Supervision    Grooming Moderate Assist   Washing face seated EOB   Pt required verbal cues to initate/complete task d/t poor insight and overall fatigue  Mod A; To wash face and comb hair with assistance while sitting up on the EOB. Minimal Assist    UB Dressing Moderate Assist   Miguel Angel/doff gown lying supine   Required verbal cues to sequence threading BUE into gown (attempted to miguel angel new gown prior to doffing soiled gown)  Mod A;    With verbal prompting to properly miguel angel/doff gown. Minimal Assist    LB Dressing Dependent   Miguel Angel socks lying supine   Poor command following to complete functional task seated EOB   Poor dynamic sitting balance seated EOB  Dep; To miguel angel socks while in supine. Moderate Assist    Bathing Maximal Assist  Pt would benefit from use of shower chair for safety d/t decreased dynamic standing balance   Poor sequencing to intiate and complete task d/t decreased insight Max A;    Per last treatment pt declined to complete at this time.   Minimal Assist    Toileting Dependent   For all pericare - decreased insight   Mod A; To perform transfer on/off BSC with Mod A and verbal prompting.     Moderate Assist    Bed Mobility  Log Roll: Max A x2  Supine to sit: Maximal Assist x2  Sit to supine: Maximal Assist x2  Required assist with progressing BLE and verbal/tactile cues for BUE placement to assist with transfer  Log Roll: SBA  Sup to sit: SBA   Sit to supine: SBA    Pt trying to sit herself up at the EOB when entering room. Pt needed to roll to the other side of the bed to transfer towards to [facility]. Pt followed command and completed transfer with SBA and increased time. Supine to sit: Minimal Assist   Sit to supine: Minimal Assist    Functional Transfers Sit to stand: Min A x 2 with ww  Stand to sit:Min A x 2 with ww  Stand pivot: Min A x 2 with ww  Commode: Min A x 2 with ww  Sit to stand: Mod A w/w and verbal prompting for proper hand placement. Sit to stand: Min A with ww   Stand to sit: Min A with ww  Stand pivot: Min A with ww  Commode: Min A with ww    Functional Mobility Mod A x 2 with ww  Attempted side step to L toward HOB   Pt unable to progress LLE for side stepping with repeated verbal and tactile cues  Progressed R foot one step to left     Mod A with w/w to complete side stepping to/from EOB to BSC. Moderate Assist with ww   Balance Sitting:     Static - Min A > SBA     Dynamic - Min A  Standing: Min A x 2 with ww Sitting: SBA- pt sat up on the EOB for 10 minutes while completing grooming tasks. Standing: Mod A w/w      Sitting:  Static: Sup  Dynamic: Sup  Standing: Min A with ww   Activity Tolerance POOR   Pt extremely fatigued/lethargic during session   Required repeated rest breaks and verbal cues for all task participation  Poor    Pt requiring verbal prompting to increase participation and not transfer back into bed.     BILL   Visual/  Perceptual Glasses: YES - no present during session                           BUE  ROM/Strength/  Fine motor Coordination Hand dominance: Right ( pt did not verbalize however using RUE for all functional tasks)     RUE: ROM WFL - pt demonstrated ~ 90 degrees of shoulder flexion during session unable to follow verbal cues for further AROM assessment     Strength: deferred - pt unable to follow verbal cues for participation      Strength: POOR     Coordination:  POOR     LUE: ROM WFL - pt demonstrated ~ 90 degrees of shoulder flexion during session unable to follow verbal cues for further AROM assessment     Strength: deferred - pt unable to follow verbal cues for participation      Strength: POOR    Coordination:  POOR     increase BUE muscle strength for improved indep with functional transfers      Hearing: [facility]   Sensation:  No c/o numbness or tingling - however difficulty to fully assess d/t poor cognition/limited verbal repsonses  Tone: WFL   Edema: BLE edema     Comment: Cleared by RN to see pt. Upon arrival patient attempting to transfer out of bed and agreeable to OT session. At end of session, patient lying supine with call light and phone within reach and education on proper use of call light. Overall patient still demonstrating decreased independence and safety during completion of ADL/functional transfer/mobility tasks.   Pt would benefit from continued skilled OT to increase safety and independence with completion of ADL/IADL tasks for functional independence and quality of life.     Time In: 1:55  Time Out: 2:18  Total Treatment Time: 23 mins    Min Units   OT Eval Low 70835       OT Eval Medium 33060       OT Eval High 31081       OT Re-Eval 89467       Therapeutic Ex 13770       Therapeutic Activities 94316 10  1    ADL/Self Care 69951 13 1   Orthotic Management 23090       Manual 69700       Neuro Re-Ed 19823       Non-Billable Time          Baljeet Adam [address], [address] [facility]

## 2022-06-09 NOTE — CARE COORDINATION
6/9/2022 - Updated CM note - Received message form Aram Mckeon at [facility] that pt will have to pay $815 to what is owed from a previous stay. Sent referral message to [place] at [facility] - she reviewed pt and can accept. Spoke with pt son Ramon Chow and informed him of money being owed at [facility]. He is agreeable to pt going to [facility] at discharge. Also asked if pt had received and covid 19 vaccines and he reports she has not. Sent message to [place] at [facility] that son is agreeable to [facility] HENS pending in system and ambulance form in envelope on soft chart. SW/CM will follow. The Plan for Transition of Care is related to the following treatment goals: discharge planning when medically stable    The Patient and/or patient representative Rosio evans was provided with a choice of provider and agrees   with the discharge plan. [x] Yes [] No    Freedom of choice list was provided with basic dialogue that supports the patient's individualized plan of care/goals, treatment preferences and shares the quality data associated with the providers.  [x] Yes [] No

## 2022-06-09 NOTE — PROGRESS NOTES
Hospitalist Progress Note      SYNOPSIS: Patient admitted on 2022 for <principal problem not specified>  Cc= low back pan and  R Low extremit rest  limb pain     SUBJECTIVE:  Serum cr elevated yesterda 1.7    rn reported approx 400ml post void residual following void 125ml/approximate  ivf bolus ordered     Reason for admission limb pain  imaging showing high grade stenosis of R post. Tibial a. With dopplerable pulses throughout  vascul team recommneds ASA 81 mg po daily- initiated yesterday   and no further surgical interventions at this time    CT abd suggests on admission findings suggestive of diarrhea illness no recent rprts of diarrhea   pmhx  RA  psoriat arthrit  DM2  Hypothyroid- TSH in  35!! Lab repeat pending for this am   HTN  ckd 3  Asthma/copd  Dementia/mci  Hx of cva  Hx of breast CA    Den abd pain den sob  Temp (24hrs), Av.8 °F (36.6 °C), Min:97.7 °F (36.5 °C), Max:98.1 °F (36.7 °C)    DIET: ADULT DIET; Regular  ADULT ORAL NUTRITION SUPPLEMENT; Breakfast, Lunch; Diabetic Oral Supplement  ADULT ORAL NUTRITION SUPPLEMENT; Breakfast, Dinner;  Wound Healing Oral Supplement  CODE: Full Code    OBJECTIVE:on room air    /70   Pulse 59   Temp 97.7 °F (36.5 °C) (Oral)   Resp 18   Ht 5' 4\" (1.626 m)   Wt 215 lb (97.5 kg)   LMP  (LMP Unknown)   SpO2 96%   BMI 36.90 kg/m²     General appearance:elderly not jaundiced not diaphoretic  HEENT:  No thrush sinus not tender to percussion or palpation     Respiratory: Clear to auscultation bilaterally in upper lobes unlabored respiratory effort at rest  Cardiovascular:pulse  Regular rate audible S1-S2 syst murm grade II/VI 2nd LICS   bilat dp palpable  Abdomen: no rigidity, nondistended  Musculoskeletal: No clubbing, warm to palpation no discoloration of lower extremities    Skin:  No petechiae or hives on inspection  No papules on palpation  Neurologic:at first sleeping but easily wakes up with spoken voice and mainatins awake and alert without much effort    ASSESSMENT:no labs this am    R LE limb pain/rest pain    possible arterial stenosis R tib    Elevated serum cr-not clear that this is marlen   baseline serum cr 1.2 possibly  Diarrhea - reports- no new episodes or occurrences recorded    Dm2-glu 115  98  COPD-stable  HTN essent-stable  Hypothyroid unspecif -stable   presumably    TSh April 12 2022 35.!!   Drowsiness- possibly 2/2 to multifactorial issues- rev of meds does not seem to     List any one medication that would have impact on cns     dementia/mci-by hx- no behaviours  Mood stable  PLAN:     recheck bmp this am- if serum cr improved/stable she can dc  monit for diarrhea-if it continues consider further testing for pathogens/discnt stool softeners  For Dm2- danilo bs periodic/hold metformin/correction scale insul sq as needed  Efforts to avoid hypoglycemia    For htn -contin toprol xl 25 mg and ramipril 10mg  For copd- incent spirometer    For hypothyroid- contin synthroid   will check tsh again   As patient appears to have Pad-contin lipitor /statin agent 20mg      DISPOSITION: [facility]    Medications:  REVIEWED DAILY    Infusion Medications    sodium chloride       Scheduled Medications    aspirin  81 mg Oral Daily    atorvastatin  40 mg Oral Daily    levothyroxine  175 mcg Oral Once per day on Mon Tue Wed Thu Fri    metoprolol succinate  25 mg Oral Daily    mirtazapine  30 mg Oral Nightly    ramipril  10 mg Oral Daily    vitamin D  2,000 Units Oral Daily    sodium chloride flush  10 mL IntraVENous 2 times per day    enoxaparin  40 mg SubCUTAneous Daily    cefTRIAXone (ROCEPHIN) IV  1,000 mg IntraVENous Q24H    doxycycline (VIBRAMYCIN) IV  100 mg IntraVENous Q12H    [START ON 6/11/2022] levothyroxine  350 mcg Oral Once per day on Sun Sat     PRN Meds: sodium chloride flush, sodium chloride, promethazine **OR** ondansetron, acetaminophen **OR** acetaminophen, HYDROcodone-acetaminophen, morphine    Labs:     Recent Labs 06/06/22  1445 06/07/22  0700 06/08/22  0559   WBC 6.9 8.3 6.1   HGB 14.5 15.0 13.8   HCT 42.7 44.9 40.9    179 148       Recent Labs     06/07/22  1438 06/08/22  0559 06/08/22  1816    145 141   K 3.3* 3.0* 4.0    107 105   CO2 22 23 22   BUN 13 18 21   CREATININE 1.2* 1.7* 1.5*   CALCIUM 9.2 9.2 9.4       Recent Labs     06/06/22  1445 06/07/22  0700 06/08/22  0559   PROT 7.2 7.5 6.3*   ALKPHOS 77 83 71   ALT 14 15 10   AST 28 28 20   BILITOT 1.1 1.1 0.5           Recent Labs     06/06/22  1445   CKTOTAL 252*       Chronic labs:bc 06/07 -2022  ngtd    Radiology:none today  +++++++++++++++++++++++++++++++++++++++++++++++++  Nicolette Hung DO  [address]  +++++++++++++++++++++++++++++++++++++++++++++++++  NOTE: This report was transcribed using voice recognition software. Every effort was made to ensure accuracy; however, inadvertent computerized transcription errors may be present.

## 2022-06-10 VITALS
HEART RATE: 62 BPM | SYSTOLIC BLOOD PRESSURE: 115 MMHG | OXYGEN SATURATION: 95 % | BODY MASS INDEX: 36.7 KG/M2 | DIASTOLIC BLOOD PRESSURE: 71 MMHG | HEIGHT: 64 IN | RESPIRATION RATE: 18 BRPM | TEMPERATURE: 97.5 F | WEIGHT: 215 LBS

## 2022-06-10 LAB
ALBUMIN SERPL-MCNC: 2.9 G/DL (ref 3.5–5.2)
ALP BLD-CCNC: 69 U/L (ref 35–104)
ALT SERPL-CCNC: 10 U/L (ref 0–32)
ANION GAP SERPL CALCULATED.3IONS-SCNC: 11 MMOL/L (ref 7–16)
AST SERPL-CCNC: 15 U/L (ref 0–31)
BASOPHILS ABSOLUTE: 0.07 E9/L (ref 0–0.2)
BASOPHILS RELATIVE PERCENT: 1.5 % (ref 0–2)
BILIRUB SERPL-MCNC: 0.6 MG/DL (ref 0–1.2)
BUN BLDV-MCNC: 29 MG/DL (ref 6–23)
CALCIUM SERPL-MCNC: 9.5 MG/DL (ref 8.6–10.2)
CHLORIDE BLD-SCNC: 106 MMOL/L (ref 98–107)
CO2: 24 MMOL/L (ref 22–29)
CREAT SERPL-MCNC: 1.2 MG/DL (ref 0.5–1)
EOSINOPHILS ABSOLUTE: 0.27 E9/L (ref 0.05–0.5)
EOSINOPHILS RELATIVE PERCENT: 5.6 % (ref 0–6)
FOLATE: 8.4 NG/ML (ref 4.8–24.2)
GFR AFRICAN AMERICAN: 52
GFR NON-AFRICAN AMERICAN: 43 ML/MIN/1.73
GLUCOSE BLD-MCNC: 98 MG/DL (ref 74–99)
HCT VFR BLD CALC: 38.7 % (ref 34–48)
HEMOGLOBIN: 12.9 G/DL (ref 11.5–15.5)
IMMATURE GRANULOCYTES #: 0.02 E9/L
IMMATURE GRANULOCYTES %: 0.4 % (ref 0–5)
LYMPHOCYTES ABSOLUTE: 1.63 E9/L (ref 1.5–4)
LYMPHOCYTES RELATIVE PERCENT: 33.8 % (ref 20–42)
MAGNESIUM: 1.4 MG/DL (ref 1.6–2.6)
MCH RBC QN AUTO: 29.5 PG (ref 26–35)
MCHC RBC AUTO-ENTMCNC: 33.3 % (ref 32–34.5)
MCV RBC AUTO: 88.6 FL (ref 80–99.9)
MONOCYTES ABSOLUTE: 0.38 E9/L (ref 0.1–0.95)
MONOCYTES RELATIVE PERCENT: 7.9 % (ref 2–12)
NEUTROPHILS ABSOLUTE: 2.45 E9/L (ref 1.8–7.3)
NEUTROPHILS RELATIVE PERCENT: 50.8 % (ref 43–80)
PDW BLD-RTO: 16.8 FL (ref 11.5–15)
PLATELET # BLD: 128 E9/L (ref 130–450)
PMV BLD AUTO: 10.6 FL (ref 7–12)
POTASSIUM REFLEX MAGNESIUM: 3.4 MMOL/L (ref 3.5–5)
POTASSIUM SERPL-SCNC: 3.4 MMOL/L (ref 3.5–5)
RBC # BLD: 4.37 E12/L (ref 3.5–5.5)
SARS-COV-2, NAAT: DETECTED
SODIUM BLD-SCNC: 141 MMOL/L (ref 132–146)
TOTAL PROTEIN: 6 G/DL (ref 6.4–8.3)
TSH SERPL DL<=0.05 MIU/L-ACNC: 10.52 UIU/ML (ref 0.27–4.2)
VITAMIN B-12: 538 PG/ML (ref 211–946)
WBC # BLD: 4.8 E9/L (ref 4.5–11.5)

## 2022-06-10 PROCEDURE — 87635 SARS-COV-2 COVID-19 AMP PRB: CPT

## 2022-06-10 PROCEDURE — 6360000002 HC RX W HCPCS: Performed by: INTERNAL MEDICINE

## 2022-06-10 PROCEDURE — 84443 ASSAY THYROID STIM HORMONE: CPT

## 2022-06-10 PROCEDURE — 6370000000 HC RX 637 (ALT 250 FOR IP): Performed by: INTERNAL MEDICINE

## 2022-06-10 PROCEDURE — 80053 COMPREHEN METABOLIC PANEL: CPT

## 2022-06-10 PROCEDURE — 80048 BASIC METABOLIC PNL TOTAL CA: CPT

## 2022-06-10 PROCEDURE — 82607 VITAMIN B-12: CPT

## 2022-06-10 PROCEDURE — 6370000000 HC RX 637 (ALT 250 FOR IP): Performed by: FAMILY MEDICINE

## 2022-06-10 PROCEDURE — 85025 COMPLETE CBC W/AUTO DIFF WBC: CPT

## 2022-06-10 PROCEDURE — 2580000003 HC RX 258: Performed by: FAMILY MEDICINE

## 2022-06-10 PROCEDURE — 2500000003 HC RX 250 WO HCPCS: Performed by: FAMILY MEDICINE

## 2022-06-10 PROCEDURE — 82746 ASSAY OF FOLIC ACID SERUM: CPT

## 2022-06-10 PROCEDURE — 6360000002 HC RX W HCPCS: Performed by: FAMILY MEDICINE

## 2022-06-10 PROCEDURE — 36415 COLL VENOUS BLD VENIPUNCTURE: CPT

## 2022-06-10 PROCEDURE — 83735 ASSAY OF MAGNESIUM: CPT

## 2022-06-10 RX ORDER — DOXYCYCLINE HYCLATE 100 MG
100 TABLET ORAL 2 TIMES DAILY
Qty: 10 TABLET | Refills: 0 | Status: SHIPPED | OUTPATIENT
Start: 2022-06-10 | End: 2022-06-15

## 2022-06-10 RX ORDER — ASPIRIN 81 MG/1
81 TABLET, CHEWABLE ORAL DAILY
Qty: 30 TABLET | Refills: 3 | Status: SHIPPED | OUTPATIENT
Start: 2022-06-11

## 2022-06-10 RX ORDER — POTASSIUM CHLORIDE 20 MEQ/1
40 TABLET, EXTENDED RELEASE ORAL ONCE
Status: COMPLETED | OUTPATIENT
Start: 2022-06-10 | End: 2022-06-10

## 2022-06-10 RX ORDER — MAGNESIUM SULFATE IN WATER 40 MG/ML
4000 INJECTION, SOLUTION INTRAVENOUS ONCE
Status: COMPLETED | OUTPATIENT
Start: 2022-06-10 | End: 2022-06-10

## 2022-06-10 RX ADMIN — DOXYCYCLINE 100 MG: 100 INJECTION, POWDER, LYOPHILIZED, FOR SOLUTION INTRAVENOUS at 06:30

## 2022-06-10 RX ADMIN — WATER 1000 MG: 1 INJECTION INTRAMUSCULAR; INTRAVENOUS; SUBCUTANEOUS at 06:32

## 2022-06-10 RX ADMIN — ASPIRIN 81 MG CHEWABLE TABLET 81 MG: 81 TABLET CHEWABLE at 09:51

## 2022-06-10 RX ADMIN — ATORVASTATIN CALCIUM 40 MG: 40 TABLET, FILM COATED ORAL at 09:51

## 2022-06-10 RX ADMIN — SODIUM CHLORIDE, PRESERVATIVE FREE 10 ML: 5 INJECTION INTRAVENOUS at 09:49

## 2022-06-10 RX ADMIN — Medication 2000 UNITS: at 09:52

## 2022-06-10 RX ADMIN — ENOXAPARIN SODIUM 40 MG: 100 INJECTION SUBCUTANEOUS at 09:50

## 2022-06-10 RX ADMIN — METOPROLOL SUCCINATE 25 MG: 25 TABLET, EXTENDED RELEASE ORAL at 09:51

## 2022-06-10 RX ADMIN — LEVOTHYROXINE SODIUM 175 MCG: 0.17 TABLET ORAL at 06:58

## 2022-06-10 RX ADMIN — MAGNESIUM SULFATE HEPTAHYDRATE 4000 MG: 40 INJECTION, SOLUTION INTRAVENOUS at 11:08

## 2022-06-10 RX ADMIN — RAMIPRIL 10 MG: 5 CAPSULE ORAL at 09:52

## 2022-06-10 RX ADMIN — POTASSIUM CHLORIDE 40 MEQ: 20 TABLET, EXTENDED RELEASE ORAL at 09:50

## 2022-06-10 NOTE — CARE COORDINATION
6/10/2022 - Updated CM note - discharge order on chart. To receive IV magnesium bolus today and then can discharge. Covid test ordered. HENS completed and placed in envelope with ambulance form. [facility] and set up transport for 4 pm today. Notified N[place] at [facility] that pt is discharged and transport time. Called and notified pt son Sindhu Gunter of pt discharge and transport time to facility. SW/CM will follow. Anesthetic History Review of Systems / Medical History Patient summary reviewed and pertinent labs reviewed Pulmonary Within defined limits Neuro/Psych  
 
seizures (Last g.m. seizure 12 yrs ago.): well controlled TIA (13 yrs ago) and psychiatric history Cardiovascular Hypertension: poorly controlled Valvular problems/murmurs (moderate MR/TR): tricuspid insufficiency and mitral insufficiency CHF 
 
CAD (mild non-obstructive) and hyperlipidemia Exercise tolerance: <4 METS Comments: Echo 3/2018                                                           ; SUMMARY: 
 
-  Left ventricle: Size was at the upper limits of normal. Systolic function 
was at the lower limits of normal. Ejection fraction was estimated to 50 %. There was mild diffuse hypokinesis. There was mild asymmetric hypertrophy. Doppler parameters were consistent with mild diastolic dysfunction (grade 1). Average E/e'-16.95. 
 
-  Left atrium: The atrium was mildly dilated. -  Mitral valve: There was moderate regurgitation. -  Tricuspid valve: There was mild to moderate regurgitation. GI/Hepatic/Renal 
  
 
 
Renal disease: dialysis and ESRD Endo/Other Hypothyroidism: well controlled Arthritis and anemia Other Findings Physical Exam 
 
Airway Mallampati: II 
TM Distance: 4 - 6 cm Neck ROM: normal range of motion Mouth opening: Normal 
 
 Cardiovascular Dental 
 
Dentition: Upper partial plate Pulmonary Breath sounds clear to auscultation Abdominal 
GI exam deferred Other Findings Anesthetic Plan ASA: 4 Anesthesia type: general 
 
 
 
 
 
 
 
Plan LMA

## 2022-06-10 NOTE — PROGRESS NOTES
Physician Progress Note      Nina Ball  CSN #:                  [id number]  :                       1940  ADMIT DATE:       2022 1:32 PM  [address] [place] [place] DATE:  RESPONDING  PROVIDER #:        Liang Clark MD          QUERY TEXT:    Patient admitted with back pain radiating down RLL. Documentation reflects   ACUTE RESPIRATORY FAILURE with hypoxia in note(s) dated 22. If possible,   please document in the progress notes and discharge summary if acute   respiratory failure with hypoxia was: The medical record reflects the following:  Risk Factors: COPD  Clinical Indicators: In the ED, pt became hypoxic with sats dropping to 88%   RA. She was placed on 2L of oxygen. Pt was weaned back down to room air within   10 hours. CXR shows retro-cardiac patchy opacity suspicious for possible   developing pneumonia. H&P reflects pt was in no distress on exam and lungs   were CTA bilaterally. Pt denies SOB during remainder of admission thus far. Treatment: oxygen, pulse ox, labs, imaging    Thank you,  Maxine Lizarraga RN  [phone]  Options provided:  -- Acute respiratory failure with hypoxia confirmed after study  -- Acute respiratory failure with hypoxia ruled out after study  -- Other - I will add my own diagnosis  -- Disagree - Not applicable / Not valid  -- Disagree - Clinically unable to determine / Unknown  -- Refer to Clinical Documentation Reviewer    PROVIDER RESPONSE TEXT:    Acute respiratory failure with hypoxia ruled out after study.     Query created by: Jose David Louis on 6/10/2022 6:57 AM      Electronically signed by:  Liang Clark MD 6/10/2022 3:08 PM

## 2022-06-10 NOTE — DISCHARGE SUMMARY
Hospital Medicine Discharge Summary    Patient ID: Mariam Sorensen      Patient's PCP: Mir Bedolla MD    Admit Date: 6/6/2022     Discharge Date:   6/10/2022    Admitting Physician: Bryant Horn DO     Discharge Physician: Epi Zarate MD     Discharge Diagnoses: Active Hospital Problems    Diagnosis Date Noted    PVD (peripheral vascular disease) ([facility] [I73.9] 06/07/2022     Priority: Medium       The patient was seen and examined on day of discharge and this discharge summary is in conjunction with any daily progress note from day of discharge. Hospital Course: Patient is an 81-year-old female with an extensive past medical history as outlined who was admitted to the ED with a complaint of right lower back pain which radiated down the back of her right leg and it started 3 days prior to admission. During the course of her ED admission, she was found to be hypoxic with oxygen saturation going down to 88%. Labs are significant for potassium of 3.2 and CT of the abdominal aorta with runoff showed high-grade stenosis in the posterior right tibial artery. She was admitted to be managed for lower extremity pain due to stenosis in the right posterior tibial artery. Vascular surgery was consulted. She was started on aspirin 81mg daily and statin. Vascular surgery recommended conservative management. Her TSH had been 35 in April 2022, and was repeated during this admission and found to be 10. Patient remained stable and was discharged to a SNF on 6/10/2022. She did test positive for covid, but was asymptomatic. She was discharged on 6/10/2022. Patient seen and examined. She had no active complaints and had an uneventful night. Review of systems was otherwise negative. Labs and vitals reviewed. Home meds reviewed and reconciled.         Exam:     /70   Pulse 59   Temp 97.7 °F (36.5 °C) (Oral)   Resp 18   Ht 5' 4\" (1.626 m)   Wt 215 lb (97.5 kg)   LMP  (LMP Unknown) SpO2 96%   BMI 36.90 kg/m²     General appearance: No apparent distress, appears stated age and cooperative. HEENT: Pupils equal, round, and reactive to light. Conjunctivae/corneas clear. Neck: Supple, with full range of motion. No jugular venous distention. Trachea midline. Respiratory:  Normal respiratory effort. Clear to auscultation, bilaterally without Rales/Wheezes/Rhonchi. Cardiovascular: Regular rate and rhythm with normal S1/S2 without murmurs, rubs or gallops. Abdomen: Soft, non-tender, non-distended with normal bowel sounds. Musculoskeletal: No clubbing, cyanosis or edema bilaterally. Full range of motion without deformity. Skin: Skin color, texture, turgor normal.  No rashes or lesions. Neurologic:  Neurovascularly intact without any focal sensory/motor deficits. Cranial nerves: II-XII intact, grossly non-focal.  Psychiatric: Alert and oriented, thought content appropriate, normal insight      Consults:     IP CONSULT TO VASCULAR SURGERY  IP CONSULT TO INTERNAL MEDICINE  IP CONSULT TO DIETITIAN  IP CONSULT TO SOCIAL WORK    Significant Diagnostic Studies:    VL LOWER EXTREMITY ARTERIAL SEGMENTAL PRESSURES W PPG BILATERAL   Final Result      XR CHEST PORTABLE   Final Result   Retrocardiac patchy opacity suspicious for possible developing pneumonia. CTA ABDOMINAL AORTA W BILAT RUNOFF W CONTRAST   Final Result   CT ANGIOGRAPHY OF THE ABDOMEN, PELVIS AND LOWER EXTREMITIES:      1. Limited evaluation of the right leg due to patient motion artifact. 2. Within this limitation, there appear to be high-grade stenosis in the   posterior right tibial artery in the right mid and lower leg. 3. Three-vessel runoff to the left leg. 4. Approximately 60% focal stenosis in the proximal left popliteal artery. 5. Nonvisualization of part of the bilateral popliteal arteries due to beam   hardening artifact from bilateral hip arthroplasties.    6. No aneurysm or significant stenosis of the major arteries in the abdomen   or pelvis. CT OF THE ABDOMEN, PELVIS AND LOWER EXTREMITIES (NON ANGIOGRAPHIC PORTION OF   THE STUDY):      1. Liquid stool in the colon indicates a diarrheal illness. No bowel wall   thickening or obstruction noted. 2. 9.5 mm right lower lobe pulmonary nodule, which has developed in the   interim since 08/05/2020. Per the recommendations of the ACR, complete CT of   the chest is recommended. RECOMMENDATIONS:   Unavailable             Disposition:  SNF     Discharge Instructions/Follow-up: SNF    Code Status:  Full Code     Activity: activity as tolerated    Diet: cardiac diet    Labs: For convenience and continuity at follow-up the following most recent labs are provided:      CBC:    Lab Results   Component Value Date    WBC 4.8 06/10/2022    HGB 12.9 06/10/2022    HCT 38.7 06/10/2022     06/10/2022       Renal:    Lab Results   Component Value Date     06/10/2022    K 3.4 06/10/2022    K 3.4 06/10/2022     06/10/2022    CO2 24 06/10/2022    BUN 29 06/10/2022    CREATININE 1.2 06/10/2022    CALCIUM 9.5 06/10/2022       Discharge Medications:     Current Discharge Medication List           Details   aspirin 81 MG chewable tablet Take 1 tablet by mouth daily  Qty: 30 tablet, Refills: 3      doxycycline hyclate (VIBRA-TABS) 100 MG tablet Take 1 tablet by mouth 2 times daily for 5 days  Qty: 10 tablet, Refills: 0              Details   vitamin D3 (CHOLECALCIFEROL) 25 MCG (1000 UT) TABS tablet Take 2 tablets by mouth daily  Qty: 30 tablet, Refills: 1      atorvastatin (LIPITOR) 20 MG tablet Take 2 tablets by mouth daily  Qty: 180 tablet, Refills: 1    Associated Diagnoses: Mixed hyperlipidemia      metFORMIN (GLUCOPHAGE) 500 MG tablet Take 1 tablet by mouth 2 times daily (with meals)  Qty: 180 tablet, Refills: 1      levothyroxine (SYNTHROID) 175 MCG tablet Take one tablet Mon-Fri and 2 tabs Sat/Sun.   Qty: 108 tablet, Refills: 1    Associated Diagnoses: Hypothyroidism, unspecified type      ramipril (ALTACE) 10 MG capsule Take 1 capsule by mouth daily Indications: High Blood Pressure Disorder  Qty: 90 capsule, Refills: 1    Associated Diagnoses: Essential hypertension      mirtazapine (REMERON) 30 MG tablet Take 1 tablet by mouth nightly  Qty: 90 tablet, Refills: 1      metoprolol succinate (TOPROL XL) 25 MG extended release tablet Take 1 tablet by mouth daily  Qty: 90 tablet, Refills: 1      acetaminophen (TYLENOL) 325 MG tablet Take 650 mg by mouth every 6 hours as needed for Pain      nystatin (MYCOSTATIN) 614548 UNIT/GM powder Apply 3 times daily. Qty: 60 g, Refills: 3             Time Spent on discharge is more than 5 in the examination, evaluation, counseling and review of medications and discharge plan.       Signed:    Bk Easton MD   6/10/2022

## 2022-06-10 NOTE — DISCHARGE INSTR - COC
Continuity of Care Form    Patient Name: Josh Avelar   :  [id number]  MRN:  [id number]    Admit date:  2022  Discharge date:  6/10/22    Code Status Order: Full Code   Advance Directives:      Admitting Physician:  Lizy Avila DO  PCP: Naheed Murphy MD    Discharging Nurse: Jaswinder Bravo Unit/Room#: 4621/2732-X  Discharging Unit Phone Number: [phone]    Emergency Contact:   Extended Emergency Contact Information  Primary Emergency Contact: Salgado Cornea  Address: [place] [address]           Amira THAKUR [address] Phone: [phone]  Mobile Phone: [phone]  Relation: Child  Secondary Emergency Contact: Reina Romero  Mobile Phone: [phone]  Relation: Grandchild  Preferred language: Laxmi Hopping   needed?  No    Past Surgical History:  Past Surgical History:   Procedure Laterality Date    BREAST LUMPECTOMY  [id number]    RIGHT    CARDIAC CATHETERIZATION  10/09/2020    Dr. Joan Peterson (CERVIX STATUS UNKNOWN)      partial    JOINT REPLACEMENT Bilateral     bilateral TKR    LUMBAR FUSION      L4L5    MASTECTOMY, MODIFIED RADICAL Right 11/3/2020    MODIFIED RADICAL RIGHT BREAST MASTECTOMY performed by Nanci Morales MD at [place] [address]      thumb joint replacement rt     THYROID SURGERY      US BREAST NEEDLE BIOPSY RIGHT  2020    US BREAST NEEDLE BIOPSY RIGHT 2020 [facility]       Immunization History:   Immunization History   Administered Date(s) Administered    Influenza Vaccine, unspecified formulation 10/07/2014, 10/07/2015    Influenza Virus Vaccine 10/17/2013, 10/07/2014, 10/07/2015    Influenza, High Dose (Fluzone 65 yrs and older) 10/24/2017    Pneumococcal Polysaccharide (Fbrajquhq84) 2022       Active Problems:  Patient Active Problem List   Diagnosis Code    Breast cancer ([address] C50.919    Psoriatic arthritis ([facility] L40.50    RA (rheumatoid arthritis) ([facility]) M06.9    BMI 35.0-35.9,adult Z68.35    Hypothyroidism E03.9    Irritable bowel syndrome K58.9    HTN (hypertension), benign I10    Diabetes mellitus type 2, uncontrolled ([facility]) E11.65    Acute cerebrovascular accident (CVA) ([facility] I63.9    Multifactorial gait disorder R26.89    Bilateral leg weakness R29.898    Venous stasis I87.8    History of breast cancer Z85.3    Mixed hyperlipidemia E78.2    Arthritis M19.90    Type 2 diabetes mellitus with renal complication ([facility]) Q52.30    Essential hypertension I10    Vitamin D deficiency E55.9    Morbidly obese ([facility]) E66.01    Right bundle branch block I45.10    Coronary artery calcification seen on CT scan I25.10    Acute pain after mastectomy G89.18, Z90.10    S/P mastectomy, right Z90.11    Acute kidney injury superimposed on CKD ([facility]) N17.9, N18.9    COPD (chronic obstructive pulmonary disease) ([facility]) J44.9    Extrinsic asthma without status asthmaticus J45.909    Generalized osteoarthritis M15.9    Mild cognitive impairment G31.84    Lumbar post-laminectomy syndrome M96.1    Stage 3 chronic kidney disease ([facility]) N18.30    Weakness generalized R53.1    Dementia without behavioral disturbance ([facility]) F03.90    Vitamin B12 deficiency E53.8    Recurrent breast cancer, right ([facility]) C50.911    PVD (peripheral vascular disease) ([facility]) I73.9       Isolation/Infection:   Isolation            No Isolation          Patient Infection Status       Infection Onset Added Last Indicated Last Indicated By Review Planned Expiration Resolved Resolved By    None active    Resolved    COVID-19 (Rule Out) 10/29/20 10/29/20 10/29/20 Covid-19 Ambulatory (Ordered)   10/31/20 Rule-Out Test Resulted    COVID-19 (Rule Out) 09/18/20 09/18/20 09/18/20 Covid-19 Ambulatory (Ordered)   09/20/20 Rule-Out Test Resulted            Nurse Assessment:  Last Vital Signs: /70   Pulse 59   Temp 97.7 °F (36.5 °C) (Oral)   Resp 18   Ht 5' 4\" (1.626 m)   Wt 215 lb (97.5 kg)   LMP  (LMP Unknown)   SpO2 96%   BMI 36.90 kg/m²     Last documented pain score (0-10 scale): Pain Level: 0  Last Weight:   Wt Readings from Last 1 Encounters:   06/07/22 215 lb (97.5 kg)     Mental Status:  oriented to person & place, disoriented to time & situation    IV Access:  - None    Nursing Mobility/ADLs:  Walking   Assisted  Transfer  Assisted  Bathing  Assisted  Dressing  Assisted  Toileting  Assisted  Feeding  Independent  Med Admin  Assisted  Med Delivery   whole    Wound Care Documentation and Therapy: blanchable redness on coccyx & groin, abrasion/scab to right ankle  Incision 11/03/20 Breast Right (Active)   Number of days: 583        Elimination:  Continence: Bowel: Yes  Bladder: No  Urinary Catheter: None   Colostomy/Ileostomy/Ileal Conduit: No       Date of Last BM: 6/8/2022    Intake/Output Summary (Last 24 hours) at 6/10/2022 1023  Last data filed at 6/9/2022 2129  Gross per 24 hour   Intake 10 ml   Output 500 ml   Net -490 ml     I/O last 3 completed shifts: In: 604.8 [P.O.:100; I.V.:10; IV Piggyback:494.8]  Out: 800 [Urine:800]    Safety Concerns: At Risk for Falls    Impairments/Disabilities:      Confusion, generalized weakness    Nutrition Therapy:  Current Nutrition Therapy:   - Oral Diet:  General, with Diabetic oral supplements with breakfast & lunch, & wound healing oral supplement with breakfast & dinner    Routes of Feeding: Oral  Liquids: No Restrictions  Daily Fluid Restriction: no  Last Modified Barium Swallow with Video (Video Swallowing Test): not done    Treatments at the Time of Hospital Discharge:   Respiratory Treatments: None  Oxygen Therapy:  Room air  Ventilator:    - No ventilator support    Rehab Therapies: Physical Therapy and Occupational Therapy  Weight Bearing Status/Restrictions: No weight bearing restrictions  Other Medical Equipment (for information only, NOT a DME order):     Other Treatments: Up with assistance, Droplet + Isolation for COVID    Patient's personal belongings (please select all that are sent with patient):  Glasses, Dentures upper    RN SIGNATURE:  Electronically signed by Chandu Tejeda RN on 6/10/22 at 1:19 PM EDT    CASE MANAGEMENT/SOCIAL WORK SECTION    Inpatient Status Date: 6/6/22    Readmission Risk Assessment Score:  Readmission Risk              Risk of Unplanned Readmission:  18           Discharging to Facility/ Agency   Name:   Address:  Phone:  Fax:    Dialysis Facility (if applicable)   Name:  Address:  Dialysis Schedule:  Phone:  Fax:    / signature: {Esignature:[id number]}    PHYSICIAN SECTION    Prognosis: Fair    Condition at Discharge: Stable    Rehab Potential (if transferring to Rehab): Good    Recommended Labs or Other Treatments After Discharge:     Physician Certification: I certify the above information and transfer of Tanner Gonzalez  is necessary for the continuing treatment of the diagnosis listed and that she requires [place] for less 30 days.      Update Admission H&P: No change in H&P    PHYSICIAN SIGNATURE:  Electronically signed by Wellington Calhoun MD on 6/10/22 at 10:23 AM EDT

## 2022-06-12 LAB
BLOOD CULTURE, ROUTINE: NORMAL
CULTURE, BLOOD 2: NORMAL

## 2022-11-10 ENCOUNTER — APPOINTMENT (OUTPATIENT)
Dept: GENERAL RADIOLOGY | Age: 82
DRG: 181 | End: 2022-11-10
Payer: MEDICARE

## 2022-11-10 ENCOUNTER — APPOINTMENT (OUTPATIENT)
Dept: CT IMAGING | Age: 82
DRG: 181 | End: 2022-11-10
Payer: MEDICARE

## 2022-11-10 ENCOUNTER — HOSPITAL ENCOUNTER (EMERGENCY)
Age: 82
Discharge: HOME | DRG: 181 | End: 2022-11-10
Attending: EMERGENCY MEDICINE
Payer: MEDICARE

## 2022-11-10 VITALS
HEART RATE: 92 BPM | TEMPERATURE: 98.3 F | DIASTOLIC BLOOD PRESSURE: 78 MMHG | RESPIRATION RATE: 18 BRPM | OXYGEN SATURATION: 99 % | SYSTOLIC BLOOD PRESSURE: 112 MMHG
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
## 2022-11-10 DIAGNOSIS — M54.50 ACUTE MIDLINE LOW BACK PAIN WITHOUT SCIATICA: ICD-10-CM

## 2022-11-10 PROCEDURE — 72131 CT LUMBAR SPINE W/O DYE: CPT

## 2022-11-10 PROCEDURE — 99284 EMERGENCY DEPT VISIT MOD MDM: CPT

## 2022-11-10 PROCEDURE — 72125 CT NECK SPINE W/O DYE: CPT

## 2022-11-10 PROCEDURE — 73130 X-RAY EXAM OF HAND: CPT

## 2022-11-10 PROCEDURE — 70450 CT HEAD/BRAIN W/O DYE: CPT

## 2022-11-10 PROCEDURE — 6370000000 HC RX 637 (ALT 250 FOR IP): Performed by: EMERGENCY MEDICINE

## 2022-11-10 PROCEDURE — 71045 X-RAY EXAM CHEST 1 VIEW: CPT

## 2022-11-10 PROCEDURE — 72170 X-RAY EXAM OF PELVIS: CPT

## 2022-11-10 RX ORDER — ACETAMINOPHEN 325 MG/1
650 TABLET ORAL ONCE
Status: COMPLETED | OUTPATIENT
Start: 2022-11-10 | End: 2022-11-10

## 2022-11-10 RX ADMIN — ACETAMINOPHEN 650 MG: 325 TABLET ORAL at 20:50

## 2022-11-10 ASSESSMENT — PAIN DESCRIPTION - ORIENTATION: ORIENTATION: RIGHT

## 2022-11-10 ASSESSMENT — PAIN - FUNCTIONAL ASSESSMENT: PAIN_FUNCTIONAL_ASSESSMENT: 0-10

## 2022-11-10 ASSESSMENT — PAIN DESCRIPTION - LOCATION: LOCATION: HIP

## 2022-11-10 ASSESSMENT — PAIN SCALES - GENERAL: PAINLEVEL_OUTOF10: 5

## 2022-11-10 NOTE — ED PROVIDER NOTES
HPI:  11/10/22, Time: 4:46 PM SANDRA Agarwal is a 81 y.o. female presenting to the ED for fall with low back pain beginning just prior to arrival.  Patient states she was walking with her walker when she tripped and fell falling on her back. She does not believe she struck her head. She denies loss of consciousness. She takes no anticoagulation. She states is having pain to her bilateral hands and low back pain since. Symptoms have been moderate in severity and constant with no exacerbating or alleviating factors. No associated numbness or tingling. She denies headache, chest pain, shortness of breath abdominal pain, nausea, or vomiting. No recent illness, fevers, cough, or dysuria. She states she has history of prior back surgery. Review of Systems:   Pertinent positives and negatives are stated within HPI, all other systems reviewed and are negative.          --------------------------------------------- PAST HISTORY ---------------------------------------------  Past Medical History:  has a past medical history of Asthma, Blood transfusion, Breast cancer ([facility] COPD (chronic obstructive pulmonary disease) ([facility] Diabetes mellitus ([facility] Diabetic peripheral neuropathy ([facility] DM type 2 (diabetes mellitus, type 2) ([facility] Hypertension, Hypothyroidism, Irritable bowel syndrome, Mixed hyperlipidemia, Obesity (BMI 30-39.9), Osteoarthritis, PVD (peripheral vascular disease) ([facility] RA (rheumatoid arthritis) ([facility] RBBB, Recurrent breast cancer, right ([facility] and Vitamin B12 deficiency. Past Surgical History:  has a past surgical history that includes Thyroid surgery; Cholecystectomy; Hysterectomy; Carpal tunnel release; lumbar fusion; other surgical history; Breast lumpectomy ([id number]); joint replacement (Bilateral); US BREAST BIOPSY W LOC DEVICE 1ST LESION RIGHT (7/31/2020); Cardiac catheterization (10/09/2020); and Mastectomy, modified radical (Right, 11/3/2020).     Social History: reports that she has never smoked. She has never used smokeless tobacco. She reports that she does not drink alcohol and does not use drugs. Family History: family history includes Breast Cancer in her mother; Cancer in her brother and father; Diabetes in her son; High Blood Pressure in her son; High Cholesterol in her son; Hypertension in her son; Neuropathy in her son. The patients home medications have been reviewed. Allergies: Patient has no known allergies. -------------------------------------------------- RESULTS -------------------------------------------------  All laboratory and radiology results have been personally reviewed by myself   LABS:  No results found for this visit on 11/10/22. RADIOLOGY:  Interpreted by Radiologist.  CT Head W/O Contrast   Final Result   No acute intracranial abnormality. CT CSpine W/O Contrast   Final Result   No acute abnormality of the cervical spine. CT LUMBAR SPINE WO CONTRAST   Final Result   No acute findings of the lumbar spine evident with status post L4-L5   posterior fusion hardware and PLIF present as well degenerative changes as   detailed above      RECOMMENDATIONS:   Unavailable         XR PELVIS (1-2 VIEWS)   Final Result   No acute abnormality of the pelvis. XR HAND RIGHT (MIN 3 VIEWS)   Final Result   No acute bony abnormalities. Advanced osteoarthritis. XR CHEST PORTABLE   Final Result   No acute process. XR HAND LEFT (MIN 3 VIEWS)   Final Result   Osteoarthritis. No acute bony abnormalities. ------------------------- NURSING NOTES AND VITALS REVIEWED ---------------------------   The nursing notes within the ED encounter and vital signs as below have been reviewed.    /78   Pulse 92   Temp 98.3 °F (36.8 °C) (Oral)   Resp 18   LMP  (LMP Unknown)   SpO2 99%   Oxygen Saturation Interpretation: Normal      ---------------------------------------------------PHYSICAL EXAM--------------------------------------      PHYSICAL EXAM:  Vitals Reviewed  Constitutional/General: Alert and oriented x2, well appearing, non toxic in NAD. HEENT: Normocephalic and atraumatic. PERRL, EOMI. Oropharynx clear, handling secretions, no trismus. No raccoon eyes. No freeman's sign. Neck: Supple, full ROM, no cervical spine tenderness. Pulmonary: Lungs clear to auscultation bilaterally, no wheezes, rales, or rhonchi. Not in respiratory distress. Cardiovascular:  Regular rate and rhythm, no murmurs, gallops, or rubs. 2+ distal pulses. Chest: No chest wall tenderness. No crepitus. Abdomen: Soft, non tender, non distended. Pelvis: Pelvis stable. No tenderness to hips bilaterally. Normal range of motion to hips bilaterally. Extremities: Moves all extremities x 4. Warm and well perfused. Tenderness to bilateral palms of hands without obvious deformity, no tenderness of the wrist, strong radial pulses bilaterally, no wounds. PSA right  Back: No midline thoracic  tenderness. Tenderness to low lumbar spine without step offs or deformities  Skin: warm and dry without rash. Neurologic: Awake, oriented to person and place, 5/5 strength in all extremities. Normal sensation in all extremities. Psych: Normal Affect. Normal behavior. ------------------------------ ED COURSE/MEDICAL DECISION MAKING----------------------  Medications   acetaminophen (TYLENOL) tablet 650 mg (has no administration in time range)       Medical Decision Making/ED COURSE:   Patient is an 81-year-old female presenting from home after mechanical fall. She was at her neurologic baseline on arrival to the ED. She had no focal deficits. Imaging was ordered and showed no acute traumatic findings. I spoke with the patient's family, and they will come bring her home. Supportive care measures and ED return precautions discussed. Advised PCP follow-up. Patient remained hemodynamically stable throughout ED course.      ED Course as of 11/10/22 2047   Thu Nov 10, 2022   2044 I spoke with patient's family, Tru Cuevas, on the phone. He states she has confusion at baseline. She is at her neurologic baseline. Discussed no acute findings. He will come pick her up. [JA]      ED Course User Index  [JA] Koki Bolton MD       New Prescriptions    No medications on file     Koki Bolton MD      Counseling: The emergency provider has spoken with the patient and family and discussed todays results, in addition to providing specific details for the plan of care and counseling regarding the diagnosis and prognosis. Questions are answered at this time and they are agreeable with the plan.      --------------------------------- IMPRESSION AND DISPOSITION ---------------------------------    IMPRESSION  1. Accident due to mechanical fall without injury, initial encounter    2. Acute midline low back pain without sciatica        DISPOSITION  Disposition: Discharge to home  Patient condition is stable      NOTE: This report was transcribed using voice recognition software.  Every effort was made to ensure accuracy; however, inadvertent computerized transcription errors may be present    I, Koki Bolton MD, am the primary provider of this record        Koki Bolton MD  11/10/22 2047

## 2022-11-12 ENCOUNTER — APPOINTMENT (OUTPATIENT)
Dept: CT IMAGING | Age: 82
DRG: 181 | End: 2022-11-12
Payer: MEDICARE

## 2022-11-12 ENCOUNTER — HOSPITAL ENCOUNTER (INPATIENT)
Age: 82
LOS: 6 days | Discharge: NURSING, REHAB OR LONG-TERM CARE | DRG: 181 | End: 2022-11-18
Attending: STUDENT IN AN ORGANIZED HEALTH CARE EDUCATION/TRAINING PROGRAM | Admitting: FAMILY MEDICINE
Payer: MEDICARE

## 2022-11-12 ENCOUNTER — APPOINTMENT (OUTPATIENT)
Dept: GENERAL RADIOLOGY | Age: 82
DRG: 181 | End: 2022-11-12
Payer: MEDICARE

## 2022-11-12 DIAGNOSIS — R91.8 PULMONARY NODULES: ICD-10-CM

## 2022-11-12 DIAGNOSIS — R11.2 NAUSEA AND VOMITING, UNSPECIFIED VOMITING TYPE: Primary | ICD-10-CM

## 2022-11-12 DIAGNOSIS — R42 DIZZINESS: ICD-10-CM

## 2022-11-12 DIAGNOSIS — R77.8 ELEVATED TROPONIN: ICD-10-CM

## 2022-11-12 LAB
ALBUMIN SERPL-MCNC: 3.3 G/DL (ref 3.5–5.2)
ALP BLD-CCNC: 88 U/L (ref 35–104)
ALT SERPL-CCNC: 5 U/L (ref 0–32)
ANION GAP SERPL CALCULATED.3IONS-SCNC: 18 MMOL/L (ref 7–16)
AST SERPL-CCNC: 18 U/L (ref 0–31)
BACTERIA: ABNORMAL /HPF
BASOPHILS ABSOLUTE: 0.06 E9/L (ref 0–0.2)
BASOPHILS RELATIVE PERCENT: 0.8 % (ref 0–2)
BILIRUB SERPL-MCNC: 1.1 MG/DL (ref 0–1.2)
BILIRUBIN URINE: NEGATIVE
BLOOD, URINE: NEGATIVE
BUN BLDV-MCNC: 9 MG/DL (ref 6–23)
CALCIUM SERPL-MCNC: 9.8 MG/DL (ref 8.6–10.2)
CHLORIDE BLD-SCNC: 100 MMOL/L (ref 98–107)
CLARITY: CLEAR
CO2: 19 MMOL/L (ref 22–29)
COLOR: YELLOW
CREAT SERPL-MCNC: 1.3 MG/DL (ref 0.5–1)
EOSINOPHILS ABSOLUTE: 0.49 E9/L (ref 0.05–0.5)
EOSINOPHILS RELATIVE PERCENT: 6.8 % (ref 0–6)
GFR SERPL CREATININE-BSD FRML MDRD: 41 ML/MIN/1.73
GLUCOSE BLD-MCNC: 93 MG/DL (ref 74–99)
GLUCOSE URINE: NEGATIVE MG/DL
HCT VFR BLD CALC: 38.1 % (ref 34–48)
HEMOGLOBIN: 13.3 G/DL (ref 11.5–15.5)
IMMATURE GRANULOCYTES #: 0.03 E9/L
IMMATURE GRANULOCYTES %: 0.4 % (ref 0–5)
INFLUENZA A BY PCR: NOT DETECTED
INFLUENZA B BY PCR: NOT DETECTED
KETONES, URINE: NEGATIVE MG/DL
LACTIC ACID: 2.5 MMOL/L (ref 0.5–2.2)
LEUKOCYTE ESTERASE, URINE: ABNORMAL
LYMPHOCYTES ABSOLUTE: 2.57 E9/L (ref 1.5–4)
LYMPHOCYTES RELATIVE PERCENT: 35.7 % (ref 20–42)
MCH RBC QN AUTO: 31.6 PG (ref 26–35)
MCHC RBC AUTO-ENTMCNC: 34.9 % (ref 32–34.5)
MCV RBC AUTO: 90.5 FL (ref 80–99.9)
METER GLUCOSE: 130 MG/DL (ref 74–99)
METER GLUCOSE: 164 MG/DL (ref 74–99)
METER GLUCOSE: 88 MG/DL (ref 74–99)
METER GLUCOSE: 98 MG/DL (ref 74–99)
MONOCYTES ABSOLUTE: 0.32 E9/L (ref 0.1–0.95)
MONOCYTES RELATIVE PERCENT: 4.5 % (ref 2–12)
NEUTROPHILS ABSOLUTE: 3.72 E9/L (ref 1.8–7.3)
NEUTROPHILS RELATIVE PERCENT: 51.8 % (ref 43–80)
NITRITE, URINE: NEGATIVE
PDW BLD-RTO: 14.8 FL (ref 11.5–15)
PH UA: 5.5 (ref 5–9)
PLATELET # BLD: 177 E9/L (ref 130–450)
PMV BLD AUTO: 10.1 FL (ref 7–12)
POTASSIUM REFLEX MAGNESIUM: 3.7 MMOL/L (ref 3.5–5)
PRO-BNP: 390 PG/ML (ref 0–450)
PROTEIN UA: NEGATIVE MG/DL
RBC # BLD: 4.21 E12/L (ref 3.5–5.5)
RBC UA: ABNORMAL /HPF (ref 0–2)
REASON FOR REJECTION: NORMAL
REJECTED TEST: NORMAL
SARS-COV-2, NAAT: NOT DETECTED
SODIUM BLD-SCNC: 137 MMOL/L (ref 132–146)
SPECIFIC GRAVITY UA: <=1.005 (ref 1–1.03)
TOTAL PROTEIN: 6.9 G/DL (ref 6.4–8.3)
TROPONIN, HIGH SENSITIVITY: 52 NG/L (ref 0–9)
TROPONIN, HIGH SENSITIVITY: 55 NG/L (ref 0–9)
TROPONIN, HIGH SENSITIVITY: 55 NG/L (ref 0–9)
TROPONIN, HIGH SENSITIVITY: 56 NG/L (ref 0–9)
TROPONIN, HIGH SENSITIVITY: 57 NG/L (ref 0–9)
UROBILINOGEN, URINE: 0.2 E.U./DL
WBC # BLD: 7.2 E9/L (ref 4.5–11.5)
WBC UA: ABNORMAL /HPF (ref 0–5)

## 2022-11-12 PROCEDURE — 83880 ASSAY OF NATRIURETIC PEPTIDE: CPT

## 2022-11-12 PROCEDURE — 87502 INFLUENZA DNA AMP PROBE: CPT

## 2022-11-12 PROCEDURE — 83605 ASSAY OF LACTIC ACID: CPT

## 2022-11-12 PROCEDURE — 6370000000 HC RX 637 (ALT 250 FOR IP): Performed by: FAMILY MEDICINE

## 2022-11-12 PROCEDURE — 71250 CT THORAX DX C-: CPT

## 2022-11-12 PROCEDURE — 93005 ELECTROCARDIOGRAM TRACING: CPT | Performed by: STUDENT IN AN ORGANIZED HEALTH CARE EDUCATION/TRAINING PROGRAM

## 2022-11-12 PROCEDURE — 82962 GLUCOSE BLOOD TEST: CPT

## 2022-11-12 PROCEDURE — 84484 ASSAY OF TROPONIN QUANT: CPT

## 2022-11-12 PROCEDURE — 6360000002 HC RX W HCPCS: Performed by: STUDENT IN AN ORGANIZED HEALTH CARE EDUCATION/TRAINING PROGRAM

## 2022-11-12 PROCEDURE — 81001 URINALYSIS AUTO W/SCOPE: CPT

## 2022-11-12 PROCEDURE — 96374 THER/PROPH/DIAG INJ IV PUSH: CPT

## 2022-11-12 PROCEDURE — 87088 URINE BACTERIA CULTURE: CPT

## 2022-11-12 PROCEDURE — 6360000002 HC RX W HCPCS: Performed by: FAMILY MEDICINE

## 2022-11-12 PROCEDURE — 70450 CT HEAD/BRAIN W/O DYE: CPT

## 2022-11-12 PROCEDURE — 80053 COMPREHEN METABOLIC PANEL: CPT

## 2022-11-12 PROCEDURE — 6360000004 HC RX CONTRAST MEDICATION: Performed by: RADIOLOGY

## 2022-11-12 PROCEDURE — 85025 COMPLETE CBC W/AUTO DIFF WBC: CPT

## 2022-11-12 PROCEDURE — 74177 CT ABD & PELVIS W/CONTRAST: CPT

## 2022-11-12 PROCEDURE — 6370000000 HC RX 637 (ALT 250 FOR IP): Performed by: STUDENT IN AN ORGANIZED HEALTH CARE EDUCATION/TRAINING PROGRAM

## 2022-11-12 PROCEDURE — 87635 SARS-COV-2 COVID-19 AMP PRB: CPT

## 2022-11-12 PROCEDURE — 36415 COLL VENOUS BLD VENIPUNCTURE: CPT

## 2022-11-12 PROCEDURE — 71045 X-RAY EXAM CHEST 1 VIEW: CPT

## 2022-11-12 PROCEDURE — 1200000000 HC SEMI PRIVATE

## 2022-11-12 PROCEDURE — 99285 EMERGENCY DEPT VISIT HI MDM: CPT

## 2022-11-12 RX ORDER — MIRTAZAPINE 15 MG/1
30 TABLET, FILM COATED ORAL NIGHTLY
Status: DISCONTINUED | OUTPATIENT
Start: 2022-11-12 | End: 2022-11-19 | Stop reason: HOSPADM

## 2022-11-12 RX ORDER — SODIUM CHLORIDE 9 MG/ML
INJECTION, SOLUTION INTRAVENOUS PRN
Status: DISCONTINUED | OUTPATIENT
Start: 2022-11-12 | End: 2022-11-12 | Stop reason: SDUPTHER

## 2022-11-12 RX ORDER — ACETAMINOPHEN 650 MG/1
650 SUPPOSITORY RECTAL EVERY 6 HOURS PRN
Status: DISCONTINUED | OUTPATIENT
Start: 2022-11-12 | End: 2022-11-19 | Stop reason: HOSPADM

## 2022-11-12 RX ORDER — METOPROLOL SUCCINATE 25 MG/1
25 TABLET, EXTENDED RELEASE ORAL DAILY
Status: DISCONTINUED | OUTPATIENT
Start: 2022-11-12 | End: 2022-11-19 | Stop reason: HOSPADM

## 2022-11-12 RX ORDER — LEVOTHYROXINE SODIUM 175 UG/1
350 TABLET ORAL
Status: DISCONTINUED | OUTPATIENT
Start: 2022-11-13 | End: 2022-11-19 | Stop reason: HOSPADM

## 2022-11-12 RX ORDER — MECLIZINE HCL 12.5 MG/1
25 TABLET ORAL 3 TIMES DAILY PRN
Status: DISCONTINUED | OUTPATIENT
Start: 2022-11-12 | End: 2022-11-19 | Stop reason: HOSPADM

## 2022-11-12 RX ORDER — ONDANSETRON 2 MG/ML
4 INJECTION INTRAMUSCULAR; INTRAVENOUS ONCE
Status: COMPLETED | OUTPATIENT
Start: 2022-11-12 | End: 2022-11-12

## 2022-11-12 RX ORDER — ENOXAPARIN SODIUM 100 MG/ML
40 INJECTION SUBCUTANEOUS DAILY
Status: DISCONTINUED | OUTPATIENT
Start: 2022-11-12 | End: 2022-11-12 | Stop reason: SDUPTHER

## 2022-11-12 RX ORDER — ONDANSETRON 2 MG/ML
4 INJECTION INTRAMUSCULAR; INTRAVENOUS EVERY 6 HOURS PRN
Status: DISCONTINUED | OUTPATIENT
Start: 2022-11-12 | End: 2022-11-12 | Stop reason: SDUPTHER

## 2022-11-12 RX ORDER — INSULIN LISPRO 100 [IU]/ML
0-8 INJECTION, SOLUTION INTRAVENOUS; SUBCUTANEOUS
Status: DISCONTINUED | OUTPATIENT
Start: 2022-11-12 | End: 2022-11-19 | Stop reason: HOSPADM

## 2022-11-12 RX ORDER — ONDANSETRON 4 MG/1
4 TABLET, ORALLY DISINTEGRATING ORAL EVERY 8 HOURS PRN
Status: DISCONTINUED | OUTPATIENT
Start: 2022-11-12 | End: 2022-11-15

## 2022-11-12 RX ORDER — SODIUM CHLORIDE 0.9 % (FLUSH) 0.9 %
5-40 SYRINGE (ML) INJECTION EVERY 12 HOURS SCHEDULED
Status: DISCONTINUED | OUTPATIENT
Start: 2022-11-12 | End: 2022-11-12 | Stop reason: SDUPTHER

## 2022-11-12 RX ORDER — ACETAMINOPHEN 325 MG/1
650 TABLET ORAL EVERY 6 HOURS PRN
Status: DISCONTINUED | OUTPATIENT
Start: 2022-11-12 | End: 2022-11-19 | Stop reason: HOSPADM

## 2022-11-12 RX ORDER — MORPHINE SULFATE 2 MG/ML
2 INJECTION, SOLUTION INTRAMUSCULAR; INTRAVENOUS EVERY 4 HOURS PRN
Status: DISCONTINUED | OUTPATIENT
Start: 2022-11-12 | End: 2022-11-19 | Stop reason: HOSPADM

## 2022-11-12 RX ORDER — RAMIPRIL 5 MG/1
10 CAPSULE ORAL DAILY
Status: DISCONTINUED | OUTPATIENT
Start: 2022-11-12 | End: 2022-11-19 | Stop reason: HOSPADM

## 2022-11-12 RX ORDER — SODIUM CHLORIDE 0.9 % (FLUSH) 0.9 %
5-40 SYRINGE (ML) INJECTION EVERY 12 HOURS SCHEDULED
Status: DISCONTINUED | OUTPATIENT
Start: 2022-11-12 | End: 2022-11-19 | Stop reason: HOSPADM

## 2022-11-12 RX ORDER — ENOXAPARIN SODIUM 100 MG/ML
40 INJECTION SUBCUTANEOUS DAILY
Status: DISCONTINUED | OUTPATIENT
Start: 2022-11-12 | End: 2022-11-19 | Stop reason: HOSPADM

## 2022-11-12 RX ORDER — ACETAMINOPHEN 650 MG/1
650 SUPPOSITORY RECTAL EVERY 6 HOURS PRN
Status: DISCONTINUED | OUTPATIENT
Start: 2022-11-12 | End: 2022-11-12 | Stop reason: SDUPTHER

## 2022-11-12 RX ORDER — POLYETHYLENE GLYCOL 3350 17 G/17G
17 POWDER, FOR SOLUTION ORAL DAILY PRN
Status: DISCONTINUED | OUTPATIENT
Start: 2022-11-12 | End: 2022-11-12

## 2022-11-12 RX ORDER — ONDANSETRON 2 MG/ML
4 INJECTION INTRAMUSCULAR; INTRAVENOUS EVERY 6 HOURS PRN
Status: DISCONTINUED | OUTPATIENT
Start: 2022-11-12 | End: 2022-11-15

## 2022-11-12 RX ORDER — MECLIZINE HCL 12.5 MG/1
25 TABLET ORAL ONCE
Status: COMPLETED | OUTPATIENT
Start: 2022-11-12 | End: 2022-11-12

## 2022-11-12 RX ORDER — ACETAMINOPHEN 325 MG/1
650 TABLET ORAL EVERY 6 HOURS PRN
Status: DISCONTINUED | OUTPATIENT
Start: 2022-11-12 | End: 2022-11-12 | Stop reason: SDUPTHER

## 2022-11-12 RX ORDER — INSULIN LISPRO 100 [IU]/ML
0-4 INJECTION, SOLUTION INTRAVENOUS; SUBCUTANEOUS NIGHTLY
Status: DISCONTINUED | OUTPATIENT
Start: 2022-11-12 | End: 2022-11-19 | Stop reason: HOSPADM

## 2022-11-12 RX ORDER — POLYETHYLENE GLYCOL 3350 17 G/17G
17 POWDER, FOR SOLUTION ORAL DAILY PRN
Status: DISCONTINUED | OUTPATIENT
Start: 2022-11-12 | End: 2022-11-19 | Stop reason: HOSPADM

## 2022-11-12 RX ORDER — SODIUM CHLORIDE 0.9 % (FLUSH) 0.9 %
5-40 SYRINGE (ML) INJECTION PRN
Status: DISCONTINUED | OUTPATIENT
Start: 2022-11-12 | End: 2022-11-19 | Stop reason: HOSPADM

## 2022-11-12 RX ORDER — ASPIRIN 81 MG/1
81 TABLET, CHEWABLE ORAL DAILY
Status: DISCONTINUED | OUTPATIENT
Start: 2022-11-12 | End: 2022-11-19 | Stop reason: HOSPADM

## 2022-11-12 RX ORDER — SODIUM CHLORIDE 9 MG/ML
INJECTION, SOLUTION INTRAVENOUS PRN
Status: DISCONTINUED | OUTPATIENT
Start: 2022-11-12 | End: 2022-11-19 | Stop reason: HOSPADM

## 2022-11-12 RX ORDER — CHOLECALCIFEROL (VITAMIN D3) 50 MCG
2000 TABLET ORAL DAILY
Status: DISCONTINUED | OUTPATIENT
Start: 2022-11-12 | End: 2022-11-19 | Stop reason: HOSPADM

## 2022-11-12 RX ORDER — SODIUM CHLORIDE AND POTASSIUM CHLORIDE .9; .15 G/100ML; G/100ML
SOLUTION INTRAVENOUS CONTINUOUS
Status: DISCONTINUED | OUTPATIENT
Start: 2022-11-12 | End: 2022-11-15

## 2022-11-12 RX ORDER — ATORVASTATIN CALCIUM 40 MG/1
40 TABLET, FILM COATED ORAL DAILY
Status: DISCONTINUED | OUTPATIENT
Start: 2022-11-12 | End: 2022-11-19 | Stop reason: HOSPADM

## 2022-11-12 RX ORDER — SODIUM CHLORIDE 0.9 % (FLUSH) 0.9 %
5-40 SYRINGE (ML) INJECTION PRN
Status: DISCONTINUED | OUTPATIENT
Start: 2022-11-12 | End: 2022-11-12 | Stop reason: SDUPTHER

## 2022-11-12 RX ADMIN — POTASSIUM CHLORIDE AND SODIUM CHLORIDE: 900; 150 INJECTION, SOLUTION INTRAVENOUS at 15:28

## 2022-11-12 RX ADMIN — MECLIZINE 25 MG: 12.5 TABLET ORAL at 06:58

## 2022-11-12 RX ADMIN — MIRTAZAPINE 30 MG: 15 TABLET, FILM COATED ORAL at 22:17

## 2022-11-12 RX ADMIN — MORPHINE SULFATE 2 MG: 2 INJECTION, SOLUTION INTRAMUSCULAR; INTRAVENOUS at 20:47

## 2022-11-12 RX ADMIN — IOPAMIDOL 75 ML: 755 INJECTION, SOLUTION INTRAVENOUS at 09:59

## 2022-11-12 RX ADMIN — ONDANSETRON 4 MG: 2 INJECTION INTRAMUSCULAR; INTRAVENOUS at 13:31

## 2022-11-12 RX ADMIN — ENOXAPARIN SODIUM 40 MG: 100 INJECTION SUBCUTANEOUS at 16:40

## 2022-11-12 ASSESSMENT — PAIN SCALES - GENERAL
PAINLEVEL_OUTOF10: 8
PAINLEVEL_OUTOF10: 7

## 2022-11-12 ASSESSMENT — ENCOUNTER SYMPTOMS
NAUSEA: 1
SHORTNESS OF BREATH: 0
COUGH: 0
DIARRHEA: 0
ABDOMINAL DISTENTION: 0
PHOTOPHOBIA: 0
CHEST TIGHTNESS: 0
VOMITING: 0
ABDOMINAL PAIN: 0

## 2022-11-12 ASSESSMENT — PAIN DESCRIPTION - LOCATION
LOCATION: BACK
LOCATION: BACK

## 2022-11-12 ASSESSMENT — PAIN DESCRIPTION - ORIENTATION: ORIENTATION: LOWER

## 2022-11-12 ASSESSMENT — PAIN - FUNCTIONAL ASSESSMENT: PAIN_FUNCTIONAL_ASSESSMENT: NONE - DENIES PAIN

## 2022-11-12 NOTE — ED NOTES
Patient's BGL 88. Patient alert and oriented. Dr. Martha Hale notified. Dr. Martha Hale gave okay to d/c glucose tablet order and give patient juice. Patient given a glass of orange juice.          Leonarda Moulton RN  11/12/22 0072

## 2022-11-12 NOTE — H&P
Hospital Medicine History & Physical      PCP: Scott Martin MD    Date of Admission: 11/12/2022    Chief Complaint:  Nausea/Dizziness. History Of Present Illness:    Patient is a 81-year-old female with past medical history of right breast cancer, type 2 diabetes, rheumatoid arthritis, COPD who presented to the emergency room complaining of nausea and dizziness for the last 2 days. Patient is a poor historian and seems to be hard of hearing. Patient does not described any precipitating factors. She denies chest pain, abdominal pain, shortness of breath or fevers. In the emergency room, patient was given Zofran and meclizine without improvement. CT of the head showed no acute intracranial abnormality. CT of the chest showed multiple spiculated soft tissue pulmonary nodules compatible with pulmonary metastatic disease. CT of the abdomen and pelvis was unremarkable. Past Medical History:          Diagnosis Date    Asthma     Blood transfusion     Breast cancer ([address]     COPD (chronic obstructive pulmonary disease) ([address]     Diabetes mellitus ([address]     Diabetic peripheral neuropathy ([address]     DM type 2 (diabetes mellitus, type 2) ([address]     Hypertension     Hypothyroidism     Irritable bowel syndrome     Mixed hyperlipidemia 7/21/2020    Obesity (BMI 30-39. 9)     Osteoarthritis     rheumatoid and osteo    PVD (peripheral vascular disease) ([address] 6/7/2022    RA (rheumatoid arthritis) ([facility])     RBBB     Recurrent breast cancer, right ([address] 1/13/2022    Vitamin B12 deficiency 1/13/2022       Past Surgical History:          Procedure Laterality Date    BREAST LUMPECTOMY  [id number]    RIGHT    CARDIAC CATHETERIZATION  10/09/2020    Dr. Hailey Bose (CERVIX STATUS UNKNOWN)      partial    JOINT REPLACEMENT Bilateral     bilateral TKR    LUMBAR FUSION      L4L5    MASTECTOMY, MODIFIED RADICAL Right 11/3/2020    MODIFIED RADICAL RIGHT BREAST MASTECTOMY performed by Raiza Danielson MD at [address] [place]      thumb joint replacement rt     THYROID SURGERY      US BREAST NEEDLE BIOPSY RIGHT  7/31/2020    US BREAST NEEDLE BIOPSY RIGHT 7/31/2020 [facility]       Medications Prior to Admission:      Prior to Admission medications    Medication Sig Start Date End Date Taking? Authorizing Provider   aspirin 81 MG chewable tablet Take 1 tablet by mouth daily 6/11/22   Xiomara Delgadillo MD   vitamin D3 (CHOLECALCIFEROL) 25 MCG (1000 UT) TABS tablet Take 2 tablets by mouth daily 5/5/22   Chino Garcia MD   atorvastatin (LIPITOR) 20 MG tablet Take 2 tablets by mouth daily 6/21/21   Chino Garcia MD   metFORMIN (GLUCOPHAGE) 500 MG tablet Take 1 tablet by mouth 2 times daily (with meals) 6/21/21   Chino Garcia MD   levothyroxine (SYNTHROID) 175 MCG tablet Take one tablet Mon-Fri and 2 tabs Sat/Sun. 6/21/21   Chino Garcia MD   ramipril (ALTACE) 10 MG capsule Take 1 capsule by mouth daily Indications: High Blood Pressure Disorder 6/21/21   Chino Garcia MD   mirtazapine (REMERON) 30 MG tablet Take 1 tablet by mouth nightly 6/21/21   Chino Garcia MD   metoprolol succinate (TOPROL XL) 25 MG extended release tablet Take 1 tablet by mouth daily 6/21/21   Chino Garcia MD   acetaminophen (TYLENOL) 325 MG tablet Take 650 mg by mouth every 6 hours as needed for Pain    Historical Provider, MD   nystatin (MYCOSTATIN) 473994 UNIT/GM powder Apply 3 times daily. 1/29/21   Sylvester William DO       Allergies:  Patient has no known allergies. Social History:      TOBACCO:   reports that she has never smoked. She has never used smokeless tobacco.  ETOH:   reports no history of alcohol use. Family History:      Negative for DM, CAD, Cancer, CVA.  Positive as follows:        Problem Relation Age of Onset    Breast Cancer Mother     Cancer Father         bone marrow    Cancer Brother         prostate    Diabetes Son     Neuropathy Son     High Blood Pressure Son     High Cholesterol Son     Hypertension Son        REVIEW OF SYSTEMS:   Pertinent positives as noted in the HPI. All other systems reviewed and negative. PHYSICAL EXAM:    /83   Pulse 91   Temp 97.4 °F (36.3 °C)   Resp 16   LMP  (LMP Unknown)   SpO2 100%     General appearance:  No apparent distress, appears stated age and cooperative. HEENT:  Normal cephalic, atraumatic without obvious deformity. Pupils equal, round, and reactive to light. Extra ocular muscles intact. Conjunctivae/corneas clear. Neck: Supple, with full range of motion. No jugular venous distention. Trachea midline. Respiratory:  Normal respiratory effort. Clear to auscultation, bilaterally without Rales/Wheezes/Rhonchi. Cardiovascular:  Regular rate and rhythm with normal S1/S2 without murmurs, rubs or gallops. Abdomen: Soft, non-tender, non-distended with normal bowel sounds. Musculoskeletal:  No clubbing, cyanosis or edema bilaterally. Full range of motion without deformity. Skin: Skin color, texture, turgor normal.  No rashes or lesions. Neurologic:  Neurovascularly intact without any focal sensory/motor deficits. Cranial nerves: II-XII intact, grossly non-focal.  Psychiatric:  Alert and oriented, thought content appropriate, normal insight      Labs:     Recent Labs     11/12/22  0651   WBC 7.2   HGB 13.3   HCT 38.1        Recent Labs     11/12/22  0651      K 3.7      CO2 19*   BUN 9   CREATININE 1.3*   CALCIUM 9.8     Recent Labs     11/12/22  0651   AST 18   ALT 5   BILITOT 1.1   ALKPHOS 88     No results for input(s): INR in the last 72 hours. No results for input(s): Corky Stallion in the last 72 hours.     Urinalysis:      Lab Results   Component Value Date/Time    NITRU Negative 11/12/2022 10:39 AM    WBCUA 5-10 11/12/2022 10:39 AM    BACTERIA NONE SEEN 11/12/2022 10:39 AM    RBCUA NONE 11/12/2022 10:39 AM    BLOODU Negative 11/12/2022 10:39 AM    SPECGRAV <=1.005 11/12/2022 10:39 AM    GLUCOSEU Negative 11/12/2022 10:39 AM         ASSESSMENT:  Dizziness with nausea   Multiple right lower lobe lung nodules. Suspect metastatic breast cancer  Suspect T9 lytic metastasis  Elevated troponin without chest pain  Asymptomatic bacteriuria  Type 2 diabetes  Hypothyroidism  Hypertension  History of CVA  COPD  Chronic kidney disease stage III  Dementia, Alzheimer type      PLAN:  Incidental finding of right lower lobe lung nodules suggestive of metastatic breast cancer. Will consult pulmonology and oncology for further recommendations. IV hydration, Analgesics and antiemetics as needed. Meclizine prn    Abnormal urinalysis, follow urine cultures. Asymptomatic bacteriuria    Elevated troponin without chest pain. Placed on cardiac monitoring  Cycle troponin. Continue aspirin, metoprolol and Lipitor  EKG in AM.    Accu-Cheks with sliding-scale insulin coverage. Hold home dose of metformin. Monitor electrolytes and renal function.     Palliative care consultation    Lovenox for DVT prophylaxis      Disposition -pending clinical outcomes         Alina Davis MD

## 2022-11-12 NOTE — ED PROVIDER NOTES
Ricardo Wilson is an 81-year-old female with past medical history of breast cancer, peripheral vascular disease, RA, IBS, diabetes, COPD presented to emergency department with concern for nausea, dizziness, fatigue, diffuse weakness patient stated that symptoms have been present for 2 days. Patient was feeling ill all day yesterday after she woke up. Patient states she spent the entire day in her recliner. Patient felt symptoms worsened this morning, patient is now having diffuse numbness and tingling in extremities. Patient feels weakness is diffuse but also sometimes worse on the left and then right, not focal to one side. Patient is having dizziness that has been present for 2 days and is worsening. Patient denies chest pain, shortness of breath, abdominal pain, fever, chills. Blurry vision is bilateral and coming and going. Symptoms moderate in severity constant and worsening, over 24 hours of symptoms. The history is provided by the patient, medical records and the EMS personnel. Review of Systems   Constitutional:  Negative for chills, diaphoresis, fatigue and fever. Eyes:  Negative for photophobia and visual disturbance. Respiratory:  Negative for cough, chest tightness and shortness of breath. Cardiovascular:  Negative for chest pain, palpitations and leg swelling. Gastrointestinal:  Positive for nausea. Negative for abdominal distention, abdominal pain, diarrhea and vomiting. Genitourinary:  Negative for dysuria. Musculoskeletal:  Negative for neck pain and neck stiffness. Skin:  Negative for pallor and rash. Neurological:  Positive for dizziness, weakness (diffuse) and numbness. Negative for tremors, seizures, syncope, speech difficulty, light-headedness and headaches. Psychiatric/Behavioral:  Negative for confusion. Physical Exam  Vitals and nursing note reviewed. Constitutional:       General: She is not in acute distress. Appearance: She is ill-appearing. HENT:      Head: Normocephalic and atraumatic. Eyes:      General: No scleral icterus. Conjunctiva/sclera: Conjunctivae normal.      Pupils: Pupils are equal, round, and reactive to light. Cardiovascular:      Rate and Rhythm: Normal rate and regular rhythm. Pulmonary:      Effort: Pulmonary effort is normal.      Breath sounds: Normal breath sounds. Abdominal:      General: Bowel sounds are normal. There is no distension. Palpations: Abdomen is soft. Tenderness: There is no abdominal tenderness. There is no guarding or rebound. Musculoskeletal:      Cervical back: Normal range of motion and neck supple. No rigidity or tenderness. No muscular tenderness. Right lower leg: No edema. Left lower leg: No edema. Skin:     General: Skin is warm and dry. Capillary Refill: Capillary refill takes less than 2 seconds. Coloration: Skin is not pale. Findings: No erythema or rash. Neurological:      General: No focal deficit present. Mental Status: She is alert and oriented to person, place, and time. Cranial Nerves: No cranial nerve deficit. Sensory: No sensory deficit. Motor: No weakness. Coordination: Coordination normal.      Comments: Patient has 5/5 strength all extremities  Patient does not have neglect intact visual fields  No drift of extremities or ataxia    Psychiatric:         Mood and Affect: Mood normal.        Procedures     MDM  Number of Diagnoses or Management Options  Dizziness  Elevated troponin  Nausea and vomiting, unspecified vomiting type  Pulmonary nodules  Diagnosis management comments: Constantin Pandey is an 81year old female who presented to ED with concern for nausea, vomiting, dizziness, weakness  Patient has had symptoms present for over 24 hours.  CVA was considered with patient's symptoms of dizziness, nausea, and blurry vision however, patient is beyond the 24 hour window for intervention or TNK  Patient's BG was 80 treated with oral glucose. Patient did have elevated stable troponin without chest pain  Consult ordered for cardiology  Patient was found to have multiple pulmonary nodules possibly due to metastatic disease possibly due to to patient's breast cancer history  Patient continues to have dizziness, nausea and fatigue. Patient will be admitted for symptoms and further evaluation patient son was updated and was at bedside a repeat evaluation. Patient son stated the patient was treated surgically about 4 to 5 years ago for breast cancer patient does have mild dementia at baseline per son but functions independently. ED Course as of 11/12/22 1943   Sat Nov 12, 2022   3019 EKG: This EKG is signed and interpreted by me. Rate: 90  Rhythm: Sinus  Interpretation: non-specific EKG, no st elevation, bifascicular block,  Comparison: changes compared to previous EKG TX has shortened 10/2/20   [SS]   5 Discussed imaging with radiologist radiologist suspects that nodules on CT scan are secondary to metastatic disease patient does have a history of breast cancer. Ct chest ordered [SS]      ED Course User Index  [SS] Brandee Palumbo MD        ED Course as of 11/12/22 1943   Sat Nov 12, 2022   7051 EKG: This EKG is signed and interpreted by me. Rate: 90  Rhythm: Sinus  Interpretation: non-specific EKG, no st elevation, bifascicular block,  Comparison: changes compared to previous EKG TX has shortened 10/2/20   [SS]   5 Discussed imaging with radiologist radiologist suspects that nodules on CT scan are secondary to metastatic disease patient does have a history of breast cancer.  Ct chest ordered [SS]      ED Course User Index  [SS] Brandee Palumbo MD       --------------------------------------------- PAST HISTORY ---------------------------------------------  Past Medical History:  has a past medical history of Asthma, Blood transfusion, Breast [facility] ([facility] COPD (chronic obstructive pulmonary disease) ([facility] Diabetes mellitus ([facility] Diabetic peripheral neuropathy ([facility] DM type 2 (diabetes mellitus, type 2) ([facility] Hypertension, Hypothyroidism, Irritable bowel syndrome, Mixed hyperlipidemia, Obesity (BMI 30-39.9), Osteoarthritis, PVD (peripheral vascular disease) ([facility] RA (rheumatoid arthritis) ([facility] RBBB, Recurrent breast cancer, right ([facility] and Vitamin B12 deficiency. Past Surgical History:  has a past surgical history that includes Thyroid surgery; Cholecystectomy; Hysterectomy; Carpal tunnel release; lumbar fusion; other surgical history; Breast lumpectomy ([id number]); joint replacement (Bilateral); US BREAST BIOPSY W LOC DEVICE 1ST LESION RIGHT (7/31/2020); Cardiac catheterization (10/09/2020); and Mastectomy, modified radical (Right, 11/3/2020). Social History:  reports that she has never smoked. She has never used smokeless tobacco. She reports that she does not drink alcohol and does not use drugs. Family History: family history includes Breast Cancer in her mother; Cancer in her brother and father; Diabetes in her son; High Blood Pressure in her son; High Cholesterol in her son; Hypertension in her son; Neuropathy in her son. The patients home medications have been reviewed. Allergies: Patient has no known allergies.     -------------------------------------------------- RESULTS -------------------------------------------------    LABS:  Results for orders placed or performed during the hospital encounter of 11/12/22   COVID-19, Rapid    Specimen: Nasopharyngeal Swab   Result Value Ref Range    SARS-CoV-2, NAAT Not Detected Not Detected   RAPID INFLUENZA A/B ANTIGENS    Specimen: Nasopharyngeal   Result Value Ref Range    Influenza A by PCR Not Detected Not Detected    Influenza B by PCR Not Detected Not Detected   CBC with Auto Differential   Result Value Ref Range    WBC 7.2 4.5 - 11.5 E9/L    RBC 4.21 3.50 - 5.50 E12/L    Hemoglobin 13.3 11.5 - 15.5 g/dL    Hematocrit 38.1 34.0 - 48.0 %    MCV 90.5 80.0 - 99.9 fL    MCH 31.6 26.0 - 35.0 pg    MCHC 34.9 (H) 32.0 - 34.5 %    RDW 14.8 11.5 - 15.0 fL    Platelets 389 482 - 304 E9/L    MPV 10.1 7.0 - 12.0 fL    Neutrophils % 51.8 43.0 - 80.0 %    Immature Granulocytes % 0.4 0.0 - 5.0 %    Lymphocytes % 35.7 20.0 - 42.0 %    Monocytes % 4.5 2.0 - 12.0 %    Eosinophils % 6.8 (H) 0.0 - 6.0 %    Basophils % 0.8 0.0 - 2.0 %    Neutrophils Absolute 3.72 1.80 - 7.30 E9/L    Immature Granulocytes # 0.03 E9/L    Lymphocytes Absolute 2.57 1.50 - 4.00 E9/L    Monocytes Absolute 0.32 0.10 - 0.95 E9/L    Eosinophils Absolute 0.49 0.05 - 0.50 E9/L    Basophils Absolute 0.06 0.00 - 0.20 E9/L   Comprehensive Metabolic Panel w/ Reflex to MG   Result Value Ref Range    Sodium 137 132 - 146 mmol/L    Potassium reflex Magnesium 3.7 3.5 - 5.0 mmol/L    Chloride 100 98 - 107 mmol/L    CO2 19 (L) 22 - 29 mmol/L    Anion Gap 18 (H) 7 - 16 mmol/L    Glucose 93 74 - 99 mg/dL    BUN 9 6 - 23 mg/dL    Creatinine 1.3 (H) 0.5 - 1.0 mg/dL    Est, Glom Filt Rate 41 >=60 mL/min/1.73    Calcium 9.8 8.6 - 10.2 mg/dL    Total Protein 6.9 6.4 - 8.3 g/dL    Albumin 3.3 (L) 3.5 - 5.2 g/dL    Total Bilirubin 1.1 0.0 - 1.2 mg/dL    Alkaline Phosphatase 88 35 - 104 U/L    ALT 5 0 - 32 U/L    AST 18 0 - 31 U/L   Brain Natriuretic Peptide   Result Value Ref Range    Pro- 0 - 450 pg/mL   Lactic Acid   Result Value Ref Range    Lactic Acid 2.5 (H) 0.5 - 2.2 mmol/L   Urinalysis with Microscopic   Result Value Ref Range    Color, UA Yellow Straw/Yellow    Clarity, UA Clear Clear    Glucose, Ur Negative Negative mg/dL    Bilirubin Urine Negative Negative    Ketones, Urine Negative Negative mg/dL    Specific Gravity, UA <=1.005 1.005 - 1.030    Blood, Urine Negative Negative    pH, UA 5.5 5.0 - 9.0    Protein, UA Negative Negative mg/dL    Urobilinogen, Urine 0.2 <2.0 E.U./dL    Nitrite, Urine Negative Negative    Leukocyte Esterase, Urine LARGE (A) Negative    WBC, UA 5-10 (A) 0 - 5 /HPF RBC, UA NONE 0 - 2 /HPF    Bacteria, UA NONE SEEN None Seen /HPF   SPECIMEN REJECTION   Result Value Ref Range    Rejected Test TROP     Reason for Rejection see below    Troponin   Result Value Ref Range    Troponin, High Sensitivity 55 (H) 0 - 9 ng/L   Troponin   Result Value Ref Range    Troponin, High Sensitivity 57 (H) 0 - 9 ng/L   Troponin   Result Value Ref Range    Troponin, High Sensitivity 56 (H) 0 - 9 ng/L   Troponin   Result Value Ref Range    Troponin, High Sensitivity 52 (H) 0 - 9 ng/L   POCT Glucose   Result Value Ref Range    Meter Glucose 88 74 - 99 mg/dL   POCT Glucose   Result Value Ref Range    Meter Glucose 98 74 - 99 mg/dL   POCT Glucose   Result Value Ref Range    Meter Glucose 164 (H) 74 - 99 mg/dL       RADIOLOGY:  CT CHEST WO CONTRAST   Final Result   1. Multiple spiculated soft tissue pulmonary nodules, largest in the   posterior costophrenic angle of the right lower lobe measuring 17 mm. Findings are compatible with pulmonary metastatic disease. Consider PET-CT   examination. 2.  Mild mediastinal and left axillary lymphadenopathy. 3.  Subtle sclerosis involving the superior endplate of T9, no other   definitive sclerotic lytic metastases. CT HEAD WO CONTRAST   Final Result   No acute intracranial abnormality. No significant interval changes since November 10. CT ABDOMEN PELVIS W IV CONTRAST Additional Contrast? None   Final Result   1. No indication for acute intraperitoneal or retroperitoneal process in the   abdomen or in the pelvis. 2.  Presence of growing nodule in the right lower lobe since the study of a   June 2022, presently measuring 13 x 14 mm, on the previous study of a June 2022 it measures 8 x 10 mm. 3.  Additional multiple pulmonary nodules seen lower lung bases which were   not covered on the previous study. 4.  Further evaluation with CT abdomen pelvis.   The presence of multiple   pulmonary nodules can indicate hematogenous metastatic disease. Preliminary report given to Dr. Doris King, ER Physician. XR CHEST PORTABLE   Final Result   1. There are no findings of failure or pneumonia.                   ------------------------- NURSING NOTES AND VITALS REVIEWED ---------------------------  Date / Time Roomed:  11/12/2022  6:30 AM  ED Bed Assignment:  [id number]    The nursing notes within the ED encounter and vital signs as below have been reviewed. Patient Vitals for the past 24 hrs:   BP Temp Temp src Pulse Resp SpO2 Height Weight   11/12/22 1537 -- -- -- -- -- -- 5' 4\" (1.626 m) 175 lb 6.4 oz (79.6 kg)   11/12/22 1524 120/69 98.7 °F (37.1 °C) Temporal 86 16 98 % -- --   11/12/22 1430 137/68 -- -- 95 16 96 % -- --   11/12/22 1330 112/72 -- -- 83 18 100 % -- --   11/12/22 1300 109/83 -- -- 91 16 100 % -- --   11/12/22 1100 131/85 -- -- 84 19 94 % -- --   11/12/22 0844 (!) 147/81 -- -- -- -- -- -- --   11/12/22 0830 -- -- -- 83 24 100 % -- --   11/12/22 0815 -- -- -- 81 24 100 % -- --   11/12/22 0800 -- -- -- 84 25 100 % -- --   11/12/22 0745 -- -- -- 84 27 100 % -- --   11/12/22 0730 -- -- -- 85 28 98 % -- --   11/12/22 0715 -- -- -- 85 22 99 % -- --   11/12/22 0700 -- -- -- 85 21 99 % -- --   11/12/22 0640 131/71 97.4 °F (36.3 °C) -- 94 20 100 % -- --       Oxygen Saturation Interpretation: Normal    ------------------------------------------ PROGRESS NOTES ------------------------------------------  Re-evaluation(s):  Time: 2pm  Patients symptoms show no change  Repeat physical examination is not changed    Counseling:  I have spoken with the patient and discussed todays results, in addition to providing specific details for the plan of care and counseling regarding the diagnosis and prognosis.   Their questions are answered at this time and they are agreeable with the plan of admission.    --------------------------------- ADDITIONAL PROVIDER NOTES ---------------------------------  Consultations:Spoke with [facility]  Discussed case. They will admit the patient. This patient's ED course included: a personal history and physicial examination, re-evaluation prior to disposition, multiple bedside re-evaluations, IV medications, cardiac monitoring, and complex medical decision making and emergency management    This patient has remained hemodynamically stable during their ED course. Diagnosis:  1. Nausea and vomiting, unspecified vomiting type    2. Dizziness    3. Pulmonary nodules    4. Elevated troponin        Disposition:  Patient's disposition: Admit to telemetry  Patient's condition is stable.          Maria Spurling, MD  11/12/22 1942       Maria Spurling, MD  11/12/22 1162

## 2022-11-12 NOTE — LETTER
[place] Medicaid  CERTIFICATION OF NECESSITY  FOR NON-EMERGENCY TRANSPORTATION   BY GROUND AMBULANCE      Individual Information   1. Name: Shawn Ramey 2. [place] Medicaid Billing Number:    3. Address: [facility] [address]      Transportation Provider Information   4. Provider Name: STEVE   5. [place] Provider Number:  National Provider Identifier (NPI):      Certification  7. Criteria:  During transport, this individual requires:  [x] Medical treatment or continuous     supervision by an EMT. [] The administration or regulation of oxygen by another person. [] Supervised protective restraint. 8. Period Beginning Date: 22   9. Length  [x] Not more than 1 day(s)  [] One Year     Additional Information Relevant to Certification   10. Comments or Explanations, If Necessary or Appropriate   Diagnosis of dementia, Hx of CVA, gait dysfunction, metastatic CA     Certifying Practitioner Information   11. Name of Practitioner: Dr. Jitendra Rogers   12. [place] Medicaid Provider Number, If Applicable:  [address] Provider Identifier (NPI):      Signature Information   14. Date of Signature: 22 15. Name of [address] [place] [address]   16. Signature and Professional Designation: Electronically signed by JOAQUÍN Mackenzie on 2022 at 1:45 PM       OD 14613  Rev. 2015              [address] Encounter Date/Time: 2022 [address] [facility] Account: [de-identified]    MRN: [id number]    Patient: Shawn Ramey    Contact Serial #: [id number]      ENCOUNTER          Patient Class: I Private Enc? No Unit RM BD: SEYZ 8WE [id number]   Hospital Service:  FMP   Encounter DX: Right lower lobe lung ma*   ADM Provider: Yaron Zaidi MD   Procedure:     ATT Provider: Jeanne Clark MD   REF Provider:        Admission DX: Right lower lobe lung mass and DX codes: R91.8      PATIENT                 Name: Shawn Ramey : 1940 (82 yrs)   Address:  [facility] Sex: Female   City: [facility] [address]         Marital Status:    Employer: RETIRED         Muslim: Congregational   Primary Care Provider: Cary Torres MD         Primary Phone: [phone]   EMERGENCY CONTACT   Contact Name Legal Guardian? Relationship to Patient Home Phone Work Phone   1. Anh Caller  2. Latasha Whitney    No Child  Grandchild [phone]                 GUARANTOR            Guarantor: Michell Barnett     : 1940   Address:  [address] [place] [address] Drive Sex: Female     [place] [address]     Relation to Patient: Self       Home Phone: [phone]   Guarantor ID: [id number]       Work Phone:     Guarantor Employer: RETIRED         Status: RETIRED      COVERAGE        PRIMARY INSURANCE   Payor: MEDICARE Plan: MEDICARE PART A AND B   Payor Address: [facility] [address],  [facility] [address], [facility] [address]       Group Number:   Insurance Type: INDEMNITY   Subscriber Name: Rachel Chamberlainden : 1940   Subscriber ID: [id number] Jenna Wei. Rel. to Sub: Self   SECONDARY INSURANCE   Payor: BCBS Plan: [facility]   Payor Address:  [facility] [id number], [place], [facility]          Group Number: [id number] Insurance Type: [id number] Name: Lillie Cowden : 1940   Subscriber ID: [id number] Pat.  Rel. to Sub: SELF

## 2022-11-13 LAB
ANION GAP SERPL CALCULATED.3IONS-SCNC: 13 MMOL/L (ref 7–16)
BASOPHILS ABSOLUTE: 0.06 E9/L (ref 0–0.2)
BASOPHILS RELATIVE PERCENT: 1.1 % (ref 0–2)
BUN BLDV-MCNC: 7 MG/DL (ref 6–23)
CALCIUM SERPL-MCNC: 8.9 MG/DL (ref 8.6–10.2)
CHLORIDE BLD-SCNC: 107 MMOL/L (ref 98–107)
CO2: 20 MMOL/L (ref 22–29)
CREAT SERPL-MCNC: 1.3 MG/DL (ref 0.5–1)
EOSINOPHILS ABSOLUTE: 0.46 E9/L (ref 0.05–0.5)
EOSINOPHILS RELATIVE PERCENT: 8.5 % (ref 0–6)
GFR SERPL CREATININE-BSD FRML MDRD: 41 ML/MIN/1.73
GLUCOSE BLD-MCNC: 78 MG/DL (ref 74–99)
HCT VFR BLD CALC: 33.8 % (ref 34–48)
HEMOGLOBIN: 11.9 G/DL (ref 11.5–15.5)
IMMATURE GRANULOCYTES #: 0.01 E9/L
IMMATURE GRANULOCYTES %: 0.2 % (ref 0–5)
LYMPHOCYTES ABSOLUTE: 1.87 E9/L (ref 1.5–4)
LYMPHOCYTES RELATIVE PERCENT: 34.5 % (ref 20–42)
MCH RBC QN AUTO: 32.3 PG (ref 26–35)
MCHC RBC AUTO-ENTMCNC: 35.2 % (ref 32–34.5)
MCV RBC AUTO: 91.8 FL (ref 80–99.9)
METER GLUCOSE: 108 MG/DL (ref 74–99)
METER GLUCOSE: 108 MG/DL (ref 74–99)
METER GLUCOSE: 127 MG/DL (ref 74–99)
METER GLUCOSE: 72 MG/DL (ref 74–99)
MONOCYTES ABSOLUTE: 0.28 E9/L (ref 0.1–0.95)
MONOCYTES RELATIVE PERCENT: 5.2 % (ref 2–12)
NEUTROPHILS ABSOLUTE: 2.74 E9/L (ref 1.8–7.3)
NEUTROPHILS RELATIVE PERCENT: 50.5 % (ref 43–80)
PDW BLD-RTO: 15 FL (ref 11.5–15)
PLATELET # BLD: 156 E9/L (ref 130–450)
PMV BLD AUTO: 10.2 FL (ref 7–12)
POTASSIUM SERPL-SCNC: 3.5 MMOL/L (ref 3.5–5)
RBC # BLD: 3.68 E12/L (ref 3.5–5.5)
SODIUM BLD-SCNC: 140 MMOL/L (ref 132–146)
URINE CULTURE, ROUTINE: NORMAL
WBC # BLD: 5.4 E9/L (ref 4.5–11.5)

## 2022-11-13 PROCEDURE — 2580000003 HC RX 258: Performed by: FAMILY MEDICINE

## 2022-11-13 PROCEDURE — 80048 BASIC METABOLIC PNL TOTAL CA: CPT

## 2022-11-13 PROCEDURE — 36415 COLL VENOUS BLD VENIPUNCTURE: CPT

## 2022-11-13 PROCEDURE — 6370000000 HC RX 637 (ALT 250 FOR IP): Performed by: PHYSICIAN ASSISTANT

## 2022-11-13 PROCEDURE — 82962 GLUCOSE BLOOD TEST: CPT

## 2022-11-13 PROCEDURE — 6360000002 HC RX W HCPCS: Performed by: FAMILY MEDICINE

## 2022-11-13 PROCEDURE — 6370000000 HC RX 637 (ALT 250 FOR IP): Performed by: FAMILY MEDICINE

## 2022-11-13 PROCEDURE — 1200000000 HC SEMI PRIVATE

## 2022-11-13 PROCEDURE — 85025 COMPLETE CBC W/AUTO DIFF WBC: CPT

## 2022-11-13 RX ORDER — IPRATROPIUM BROMIDE AND ALBUTEROL SULFATE 2.5; .5 MG/3ML; MG/3ML
1 SOLUTION RESPIRATORY (INHALATION) EVERY 4 HOURS PRN
Status: DISCONTINUED | OUTPATIENT
Start: 2022-11-13 | End: 2022-11-19 | Stop reason: HOSPADM

## 2022-11-13 RX ORDER — ALBUTEROL SULFATE 2.5 MG/3ML
2.5 SOLUTION RESPIRATORY (INHALATION) EVERY 4 HOURS PRN
Status: DISCONTINUED | OUTPATIENT
Start: 2022-11-13 | End: 2022-11-19 | Stop reason: HOSPADM

## 2022-11-13 RX ORDER — BENZONATATE 100 MG/1
100 CAPSULE ORAL 3 TIMES DAILY PRN
Status: DISCONTINUED | OUTPATIENT
Start: 2022-11-13 | End: 2022-11-19 | Stop reason: HOSPADM

## 2022-11-13 RX ORDER — ANASTROZOLE 1 MG/1
1 TABLET ORAL DAILY
Status: DISCONTINUED | OUTPATIENT
Start: 2022-11-13 | End: 2022-11-19 | Stop reason: HOSPADM

## 2022-11-13 RX ADMIN — SODIUM CHLORIDE, PRESERVATIVE FREE 10 ML: 5 INJECTION INTRAVENOUS at 22:58

## 2022-11-13 RX ADMIN — ACETAMINOPHEN 650 MG: 325 TABLET, FILM COATED ORAL at 22:56

## 2022-11-13 RX ADMIN — ASPIRIN 81 MG 81 MG: 81 TABLET ORAL at 08:39

## 2022-11-13 RX ADMIN — ATORVASTATIN CALCIUM 40 MG: 40 TABLET, FILM COATED ORAL at 08:39

## 2022-11-13 RX ADMIN — LEVOTHYROXINE SODIUM 350 MCG: 0.17 TABLET ORAL at 06:25

## 2022-11-13 RX ADMIN — ANASTROZOLE 1 MG: 1 TABLET ORAL at 16:48

## 2022-11-13 RX ADMIN — ENOXAPARIN SODIUM 40 MG: 100 INJECTION SUBCUTANEOUS at 08:39

## 2022-11-13 RX ADMIN — SODIUM CHLORIDE, PRESERVATIVE FREE 10 ML: 5 INJECTION INTRAVENOUS at 00:39

## 2022-11-13 RX ADMIN — RAMIPRIL 10 MG: 5 CAPSULE ORAL at 08:39

## 2022-11-13 RX ADMIN — POTASSIUM CHLORIDE AND SODIUM CHLORIDE: 900; 150 INJECTION, SOLUTION INTRAVENOUS at 23:00

## 2022-11-13 RX ADMIN — MIRTAZAPINE 30 MG: 15 TABLET, FILM COATED ORAL at 22:56

## 2022-11-13 RX ADMIN — Medication 2000 UNITS: at 08:39

## 2022-11-13 ASSESSMENT — PAIN SCALES - GENERAL
PAINLEVEL_OUTOF10: 0
PAINLEVEL_OUTOF10: 0

## 2022-11-13 NOTE — PROGRESS NOTES
Subjective:  Chart reviewed  Patient experienced fall 11/10 and presented to ED and was evaluated and discharged home  She returned 11/12 with nausea, dizziness and vision change admitted to the hospital with N/V, pulmonary nodules, elevated troponins and dizziness. Per ED notes, confusion at baseline  Patient reports feeling achy  She does not recall having breast cancer or coming to the [facility]  She is a very poor historian. She complaints of pain in her ankle from her sock  Unable to obtain a meaningful ROS or history    Objective:    BP (!) 95/52   Pulse 79   Temp 98.6 °F (37 °C) (Temporal)   Resp 16   Ht 5' 4\" (1.626 m)   Wt 175 lb 6.4 oz (79.6 kg)   LMP  (LMP Unknown)   SpO2 97%   BMI 30.11 kg/m²     General: NAD, confused  HEENT: normocephalic/atraumatic, mucosa dry, EOMI, sclera anicteric, conjuntiva pink  NECK: supple, trachea midline  Heart:  RRR, no murmurs, gallops, or rubs.   Chest: well-healed right sided surgical scar s/p mastectomy without overlying nodules, skin changes  Lungs:  CTA bilaterally, no wheeze, rales or rhonchi  Abd: BS present, nontender, nondistended, no masses  Extrem:  trace edema of LE bilaterally, No clubbing, cyanosis  Lymphatics: No palpable adenopathy in cervical and supraclavicular regions, axillary  : deferred  Skin: Intact, no petechia or purpura    CBC with Differential:    Lab Results   Component Value Date/Time    WBC 5.4 11/13/2022 04:43 AM    RBC 3.68 11/13/2022 04:43 AM    HGB 11.9 11/13/2022 04:43 AM    HCT 33.8 11/13/2022 04:43 AM     11/13/2022 04:43 AM    MCV 91.8 11/13/2022 04:43 AM    MCH 32.3 11/13/2022 04:43 AM    MCHC 35.2 11/13/2022 04:43 AM    RDW 15.0 11/13/2022 04:43 AM    LYMPHOPCT 34.5 11/13/2022 04:43 AM    MONOPCT 5.2 11/13/2022 04:43 AM    BASOPCT 1.1 11/13/2022 04:43 AM    MONOSABS 0.28 11/13/2022 04:43 AM    LYMPHSABS 1.87 11/13/2022 04:43 AM    EOSABS 0.46 11/13/2022 04:43 AM    BASOSABS 0.06 11/13/2022 04:43 AM     CMP: Lab Results   Component Value Date/Time     11/13/2022 04:43 AM    K 3.5 11/13/2022 04:43 AM    K 3.7 11/12/2022 06:51 AM     11/13/2022 04:43 AM    CO2 20 11/13/2022 04:43 AM    BUN 7 11/13/2022 04:43 AM    CREATININE 1.3 11/13/2022 04:43 AM    GFRAA 52 06/10/2022 06:02 AM    LABGLOM 41 11/13/2022 04:43 AM    GLUCOSE 78 11/13/2022 04:43 AM    PROT 6.9 11/12/2022 06:51 AM    LABALBU 3.3 11/12/2022 06:51 AM    CALCIUM 8.9 11/13/2022 04:43 AM    BILITOT 1.1 11/12/2022 06:51 AM    ALKPHOS 88 11/12/2022 06:51 AM    AST 18 11/12/2022 06:51 AM    ALT 5 11/12/2022 06:51 AM          Current Facility-Administered Medications:     atorvastatin (LIPITOR) tablet 40 mg, 40 mg, Oral, Daily, Jeanie Mayo MD, 40 mg at 11/13/22 0839    ramipril (ALTACE) capsule 10 mg, 10 mg, Oral, Daily, Jeanie Mayo MD, 10 mg at 11/13/22 0839    mirtazapine (REMERON) tablet 30 mg, 30 mg, Oral, Nightly, Jeanie Mayo MD, 30 mg at 11/12/22 2217    metoprolol succinate (TOPROL XL) extended release tablet 25 mg, 25 mg, Oral, Daily, Myron Diez MD    vitamin D (CHOLECALCIFEROL) tablet 2,000 Units, 2,000 Units, Oral, Daily, Jeanie Mayo MD, 2,000 Units at 11/13/22 0839    aspirin chewable tablet 81 mg, 81 mg, Oral, Daily, Jeanie Mayo MD, 81 mg at 11/13/22 0839    enoxaparin (LOVENOX) injection 40 mg, 40 mg, SubCUTAneous, Daily, Jeanie Mayo MD, 40 mg at 11/13/22 0839    ondansetron (ZOFRAN-ODT) disintegrating tablet 4 mg, 4 mg, Oral, Q8H PRN **OR** ondansetron (ZOFRAN) injection 4 mg, 4 mg, IntraVENous, Q6H PRN, Jeanie Mayo MD    polyethylene glycol (GLYCOLAX) packet 17 g, 17 g, Oral, Daily PRN, Jeanie Mayo MD    morphine (PF) injection 2 mg, 2 mg, IntraVENous, Q4H PRN, Jeanie Mayo MD, 2 mg at 11/12/22 2047    insulin lispro (HUMALOG) injection vial 0-8 Units, 0-8 Units, SubCUTAneous, TID DELIA, Deric Wilkins MD    insulin lispro (HUMALOG) injection vial 0-4 Units, 0-4 Units, SubCUTAneous, Nightly, Teresa Hussein MD    0.9% NaCl with KCl 20 mEq infusion, , IntraVENous, Continuous, Teresa Hussein MD, Last Rate: 75 mL/hr at 11/13/22 0607, Rate Verify at 11/13/22 7338    meclizine (ANTIVERT) tablet 25 mg, 25 mg, Oral, TID PRN, Teresa Hussein MD    sodium chloride flush 0.9 % injection 5-40 mL, 5-40 mL, IntraVENous, 2 times per day, Teresa Hussein MD, 10 mL at 11/13/22 0039    sodium chloride flush 0.9 % injection 5-40 mL, 5-40 mL, IntraVENous, PRN, Deric Wilknis MD    0.9 % sodium chloride infusion, , IntraVENous, PRN, Teresa Hussein MD    acetaminophen (TYLENOL) tablet 650 mg, 650 mg, Oral, Q6H PRN **OR** acetaminophen (TYLENOL) suppository 650 mg, 650 mg, Rectal, Q6H PRN, Teresa Hussein MD    [START ON 11/14/2022] levothyroxine (SYNTHROID) tablet 175 mcg, 175 mcg, Oral, Once per day on Mon Tue Wed Thu Fri, Teresa Hussein MD    levothyroxine (SYNTHROID) tablet 350 mcg, 350 mcg, Oral, Once per day on Sun Sat, Teresa Hussein MD, 350 mcg at 11/13/22 4394    CT CHEST WO CONTRAST   Final Result   1. Multiple spiculated soft tissue pulmonary nodules, largest in the   posterior costophrenic angle of the right lower lobe measuring 17 mm. Findings are compatible with pulmonary metastatic disease. Consider PET-CT   examination. 2.  Mild mediastinal and left axillary lymphadenopathy. 3.  Subtle sclerosis involving the superior endplate of T9, no other   definitive sclerotic lytic metastases. CT HEAD WO CONTRAST   Final Result   No acute intracranial abnormality. No significant interval changes since November 10. CT ABDOMEN PELVIS W IV CONTRAST Additional Contrast? None   Final Result   1. No indication for acute intraperitoneal or retroperitoneal process in the   abdomen or in the pelvis.       2.  Presence of growing nodule in the right lower lobe since the study of a   June 2022, presently measuring 13 x 14 mm, on the previous study of a June 2022 it measures 8 x 10 mm. 3.  Additional multiple pulmonary nodules seen lower lung bases which were   not covered on the previous study. 4.  Further evaluation with CT abdomen pelvis. The presence of multiple   pulmonary nodules can indicate hematogenous metastatic disease. Preliminary report given to Dr. Maddy Perry, ER Physician. XR CHEST PORTABLE   Final Result   1. There are no findings of failure or pneumonia. NM BONE SCAN WHOLE BODY    (Results Pending)     82 yo female with a history of stage IIa (pT2N0) ER/WA positive, HER-2/anni invasive ductal  carcinoma right breast status post right lumpectomy and sentinel lymph node biopsy October 24, 2012. She was treated with adjuvant radiation therapy followed by anastrozole. Unsure how long she took the anastrozole. 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 She had a right mastectomy and axillary lymph node dissection November 3, 2020. She was seen outpatient by Dr. Dean Munoz 1/21/2021 after diagnosis of recurrence. Treatment recommendations were arimidex and surveillance. Patient did not follow-up after that appointment. Patient poor historian currently, uncertain of baseline. I attempted to call her son but there was no answer on his cell phone or home phone. Attempting to determine if patient took her arimidex, not on outpatient medication list.    A/P  -clinical appearance of metastatic cancer  -bone scan  -CA 27.29  -begin arimidex 1 mg daily on patient  -MRI brain likely needed and would be best to complete with contrast, creatinine elevated and patient just received IV contrast with CT abdomen, will check creatinine tomorrow and see if improves.   Otherwise may complete without contrast.    We will follow and provide further treatment recommendations based on

## 2022-11-13 NOTE — CONSULTS
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 She experienced a fall 11/10 and presented to ED and was evaluated and discharged home  She returned 11/12 with nausea, dizziness and vision change admitted to the hospital with N/V, pulmonary nodules, elevated troponins and dizziness. She denies previous lung history states she did have asthma as a child. No issues now, non-smoker. Unknown if vaccinated. Lives with her son. history of stage IIa (pT2N0) ER/[place] positive, HER-2/anni invasive ductal  carcinoma right breast status post right lumpectomy and sentinel lymph node biopsy October 24, 2012. She was treated with adjuvant radiation therapy followed by anastrozole. Unsure how long she took the anastrozole. History of recurrent stage II (PT2 pN0) ER/[place] positive, HER-2/anni negative poorly differentiated invasive ductal carcinoma upper outer quadrant right breast cancer. She had a right mastectomy and axillary lymph node dissection November 3, 2020. She was seen outpatient by Dr. Randall Hunter 1/21/2021 after diagnosis of recurrence. Treatment recommendations were arimidex and surveillance. Patient did not follow-up after that appointment. Now pulm is consulted for pulm nodules on cxr     Resting in ebd on room air. Denies dyspnea or cough   History is hard to obtain patient is slow to respond and forgetful with confusion       PMH:    Past Medical History:   Diagnosis Date    Asthma     Blood transfusion     Breast cancer ([facility]     COPD (chronic obstructive pulmonary disease) ([address]     Diabetes mellitus ([address]     Diabetic peripheral neuropathy ([address]     DM type 2 (diabetes mellitus, type 2) ([address]     Hypertension     Hypothyroidism     Irritable bowel syndrome     Mixed hyperlipidemia 7/21/2020    Obesity (BMI 30-39. 9)     Osteoarthritis     rheumatoid and osteo    PVD (peripheral vascular disease) ([address] 6/7/2022    RA (rheumatoid arthritis) ([facility])     RBBB     Recurrent breast cancer, right ([address] 1/13/2022    Vitamin B12 deficiency 1/13/2022     PSH:    Past Surgical History:   Procedure Laterality Date    BREAST LUMPECTOMY  [id number]    RIGHT    CARDIAC CATHETERIZATION  10/09/2020    Dr. Lyla Zayas (CERVIX STATUS UNKNOWN)      partial    JOINT REPLACEMENT Bilateral     bilateral TKR    LUMBAR FUSION      L4L5    MASTECTOMY, MODIFIED RADICAL Right 11/3/2020    MODIFIED RADICAL RIGHT BREAST MASTECTOMY performed by Joseph Santizo MD at [address]      thumb joint replacement rt     THYROID SURGERY      US BREAST NEEDLE BIOPSY RIGHT  7/31/2020    US BREAST NEEDLE BIOPSY RIGHT 7/31/2020 [facility]          Respiratory ROS: denies any Otherwise, a complete review of systems is undertaken and is negative. Social History:  Social History     Socioeconomic History    Marital status:      Spouse name: Not on file    Number of children: Not on file    Years of education: Not on file    Highest education level: Not on file   Occupational History    Not on file   Tobacco Use    Smoking status: Never    Smokeless tobacco: Never   Vaping Use    Vaping Use: Never used   Substance and Sexual Activity    Alcohol use: No    Drug use: No    Sexual activity: Never     Partners: Male     Comment: ^/1/16):    since 2010   Other Topics Concern    Not on file   Social History Narrative    Not on file     Social Determinants of Health     Financial Resource Strain: Low Risk     Difficulty of Paying Living Expenses: Not hard at all   Food Insecurity: No Food Insecurity    Worried About Running Out of Food in the Last Year: Never true    Ran Out of Food in the Last Year: Never true   Transportation Needs: Not on file   Physical Activity: Inactive    Days of Exercise per Week: 0 days    Minutes of Exercise per Session: 0 min   Stress: Not on file   Social Connections: Not on file   Intimate Partner Violence: Not on file   Housing Stability: Not on file       Family History:  Family History   Problem Relation Age of Onset    Breast Cancer Mother     Cancer Father         bone marrow    Cancer Brother         prostate    Diabetes Son     Neuropathy Son     High Blood Pressure Son     High Cholesterol Son     Hypertension Son        Medications:     0.9% NaCl with KCl 20 mEq 75 mL/hr at 22 0607    sodium chloride        anastrozole  1 mg Oral Daily    atorvastatin  40 mg Oral Daily    ramipril  10 mg Oral Daily    mirtazapine  30 mg Oral Nightly    metoprolol succinate  25 mg Oral Daily    vitamin D  2,000 Units Oral Daily    aspirin  81 mg Oral Daily    enoxaparin  40 mg SubCUTAneous Daily    insulin lispro  0-8 Units SubCUTAneous TID WC    insulin lispro  0-4 Units SubCUTAneous Nightly    sodium chloride flush  5-40 mL IntraVENous 2 times per day    [START ON 2022] levothyroxine  175 mcg Oral Once per day on     levothyroxine  350 mcg Oral Once per day on Sun Sat         Vitals:    VITALS:  BP (!) 95/52   Pulse 79   Temp 98.6 °F (37 °C) (Temporal)   Resp 16   Ht 5' 4\" (1.626 m)   Wt 175 lb 6.4 oz (79.6 kg)   LMP  (LMP Unknown)   SpO2 97%   BMI 30.11 kg/m²   24HR INTAKE/OUTPUT:    Intake/Output Summary (Last 24 hours) at 2022 1255  Last data filed at 2022 0581  Gross per 24 hour   Intake 1077.26 ml   Output 1500 ml   Net -422.74 ml     CURRENT PULSE OXIMETRY:  SpO2: 97 %  24HR PULSE OXIMETRY RANGE:  SpO2  Av.2 %  Min: 96 %  Max: 100 %      EXAM:  General: No distress. Eyes: PERRL. No sclera icterus. No conjunctival injection. ENT: No discharge. Pharynx clear. Neck: Trachea midline. Normal thyroid. Resp: No accessory muscle use. No crackles. No wheezing. No rhonchi. CV: Regular rate. Regular rhythm. No mumur or rub. ABD: Non-tender. Non-distended. No masses. No organmegaly.  Normal bowel sounds. Skin: Warm and dry. No nodule on exposed extremities. No rash on exposed extremities. Lymph: No cervical LAD. No supraclavicular LAD. Ext: No joint deformity. No clubbing. No cyanosis. 1+ BLE  edema  Neuro: Awake. Follows commands      Lab Results:  CBC:   Recent Labs     11/12/22 0651 11/13/22 0443   WBC 7.2 5.4   HGB 13.3 11.9   HCT 38.1 33.8*   MCV 90.5 91.8    156     BMP:   Recent Labs     11/12/22 0651 11/13/22 0443    140   K 3.7 3.5    107   CO2 19* 20*   BUN 9 7   CREATININE 1.3* 1.3*     LFT:   Recent Labs     11/12/22 0651   ALKPHOS 88   ALT 5   AST 18   PROT 6.9   BILITOT 1.1   LABALBU 3.3*     PT/INR: No results for input(s): PROTIME, INR in the last 72 hours. Cultures:   Latest Reference Range & Units 11/12/22 06:51   Influenza A by PCR Not Detected  Not Detected   Influenza B by PCR Not Detected  Not Detected   SARS-CoV-2, NAAT Not Detected  Not Detected       ABG:   No results for input(s): PH, PO2, PCO2, HCO3, BE, O2SAT in the last 72 hours. Films:  CT HEAD WO CONTRAST    Result Date: 11/12/2022  EXAMINATION: CT OF THE HEAD WITHOUT CONTRAST  11/12/2022 9:29 am TECHNIQUE: CT of the head was performed without the administration of intravenous contrast. Automated exposure control, iterative reconstruction, and/or weight based adjustment of the mA/kV was utilized to reduce the radiation dose to as low as reasonably achievable. COMPARISON: February 18, 2018-November 10, 2022. HISTORY: ORDERING SYSTEM PROVIDED HISTORY: dizziness blurry vision TECHNOLOGIST PROVIDED HISTORY: Has a \"code stroke\" or \"stroke alert\" been called? ->No Reason for exam:->dizziness blurry vision Decision Support Exception - unselect if not a suspected or confirmed emergency medical condition->Emergency Medical Condition (MA) What reading provider will be dictating this exam?->CRC FINDINGS: Peripheral CSF space ventricular system correlates with the age group.  There is no focal mass effect or midline shift. There is no evidence for a sizable area of a new acute recent insult in progression to the brain parenchyma. There is no indication for acute intracranial hemorrhagic event. There is an overall preserved differentiation between gray matter and white areas of the brain. Discrete hypodensities seen in the deep periventricular region mainly identified frontal regions correlate with chronic small vessel changes, also discretely observed in the basal ganglia region. Midline structures have unremarkable appearance. Some atherosclerotic changes are seen major intracranial arteries. Visualized intraorbital structures appear unremarkable. Images with bone window settings demonstrate significant findings. Presence of waxy plaques in the right and left external auditory canal, please correlate with clinical examination. No acute intracranial abnormality. No significant interval changes since November 10. CT CHEST WO CONTRAST    Result Date: 11/12/2022  EXAMINATION: CT OF THE CHEST WITHOUT CONTRAST 11/12/2022 11:24 am TECHNIQUE: CT of the chest was performed without the administration of intravenous contrast. Multiplanar reformatted images are provided for review. Automated exposure control, iterative reconstruction, and/or weight based adjustment of the mA/kV was utilized to reduce the radiation dose to as low as reasonably achievable. COMPARISON: None. HISTORY: ORDERING SYSTEM PROVIDED HISTORY: pulmonary nodules TECHNOLOGIST PROVIDED HISTORY: Reason for exam:->pulmonary nodules Decision Support Exception - unselect if not a suspected or confirmed emergency medical condition->Emergency Medical Condition (MA) What reading provider will be dictating this exam?->CRC FINDINGS: Mediastinum: There is moderate mediastinal adenopathy. A right paratracheal lymph node measures 2.2 x 1.0 cm on image 46. Subcarinal lymph node mass measures 18 mm in diameter. Heart is normal in size.  Lungs/pleura: No pleural fluid or pneumothorax. Peripheral interstitial opacities are noted with multiple tiny peripheral nodular parenchymal densities. There also multiple highly suspicious pulmonary nodules many of which are spiculated. There is a 12 mm pulmonary nodule in the left upper lobe on image 29, and 11 mm pulmonary nodule in the lingular segment, 6 mm nodule in the medial left lower lobe. On the right, there are multiple pulmonary nodules. The largest in the posterior right costophrenic angle measuring 17 x 14 mm. Upper Abdomen: Please see CT abdomen from same day. There is a small sliding-type hiatal hernia. Soft Tissues/Bones: Moderate osteopenia is present. Subtle sclerosis in the superior endplate of T9. No definitive sclerotic or lytic metastasis. Sternum appears intact. There are findings of a right mastectomy. There is a mildly prominent left axillary lymph node measuring 8 mm in short axis on image 36.     1.  Multiple spiculated soft tissue pulmonary nodules, largest in the posterior costophrenic angle of the right lower lobe measuring 17 mm. Findings are compatible with pulmonary metastatic disease. Consider PET-CT examination. 2.  Mild mediastinal and left axillary lymphadenopathy. 3.  Subtle sclerosis involving the superior endplate of T9, no other definitive sclerotic lytic metastases. CT ABDOMEN PELVIS W IV CONTRAST Additional Contrast? None    Result Date: 11/12/2022  EXAMINATION: CT OF THE ABDOMEN AND PELVIS WITH CONTRAST 11/12/2022 9:29 am TECHNIQUE: CT of the abdomen and pelvis was performed with the administration of intravenous contrast. Multiplanar reformatted images are provided for review. Automated exposure control, iterative reconstruction, and/or weight based adjustment of the mA/kV was utilized to reduce the radiation dose to as low as reasonably achievable.  COMPARISON: Comparison study of a June 6, 2022 and August 25, 2020 HISTORY: ORDERING SYSTEM PROVIDED HISTORY: nausea mid abd pain TECHNOLOGIST PROVIDED HISTORY: Reason for exam:->nausea mid abd pain Additional Contrast?->None Decision Support Exception - unselect if not a suspected or confirmed emergency medical condition->Emergency Medical Condition (MA) What reading provider will be dictating this exam?->CRC FINDINGS: There are normal size and single phase IV contrast enhancement (late arterial/portal venous phase) for the liver. No focal lesions are seen. There is patent the portal venous system. There is normal diameter for the main portal vein which measures up to 10 cm in diameter. The spleen has normal size enhancement. However there is some prominence of the portal collateral circulation around the spleen towards the retroperitoneal region. Presently there is no conspicuous signs for chronic liver parenchymal disease. Patient had previous cholecystectomy. The intrahepatic biliary tree is not dilated. The common bile duct measures about 10 mm proximal to the entrance into the pancreas. The can be traced to the level of the papilla. There is atrophy and fat replacement of the pancreas. The pancreatic duct systems are not dilated. Adrenals not enlarged. Kidneys are preserved size, cortical thickness, cortical enhancement. There is no renal calculus. There is no obstructive uropathy. There is unremarkable appearance for the ureters which can be traced to the level of the bladder. The bladder is well distended and of unremarkable appearance. Patient had previous hysterectomy. The ovaries are not identified the. Are no acute inflammatory changes in the omental/mesentery fat planes, free intraperitoneal air, ascites or indication for bowel obstruction. There is no midline retroperitoneal adenopathy. There is no signs for diverticulitis. There is no inflammatory process in the pericolonic spaces.   The some apparently thickening of the wall of the rectum is seen diffusely which can represent a component of mild proctitis could be stercoral related. Could not identified the appendix on this study. There is no pericecal inflammatory reaction. Calcified atheromatous changes are seen in the abdominal aorta with some ectasia and tortuosity. There is small saccular aneurysm formation measuring 2.3 x 2.2 cm, appears to be overall stable since the previous study August 25, 2020. In the left lower lobe there is a growing nodule in the left posterior costophrenic sulcus, presently it measures 13 x 14 mm, on the previous study of a June 2022 it measured 8 x 10 mm. More superior anterior located in the right lower lobe there is another nodule measuring 3 x 6 mm which abuts against the pleural interface between the right middle lobe and the right lower lobe. In the more posterior aspect of the right middle lobe there is another nodule measures 6 x 3 mm which abuts against the pleural interface. In the lingula there is a 9 x 11 mm nodule. These areas were not covered on the previous study and cannot determine if these are is stable, new development or growing nodules. Patient had previous fusion of the lumbar spine with laminectomy in the levels of L4-L5 with intrapedicular screws. Advanced degenerative changes in the level of L2-3 which is above the level of the fusion and in lesser degree at L1-2 and L3-4 also above the level the fusion. These are chronic longstanding findings as observed on the previous examination of August 2020.     1.  No indication for acute intraperitoneal or retroperitoneal process in the abdomen or in the pelvis. 2.  Presence of growing nodule in the right lower lobe since the study of a June 2022, presently measuring 13 x 14 mm, on the previous study of a June 2022 it measures 8 x 10 mm. 3.  Additional multiple pulmonary nodules seen lower lung bases which were not covered on the previous study. 4.  Further evaluation with CT abdomen pelvis.   The presence of multiple pulmonary nodules can indicate hematogenous metastatic disease. Preliminary report given to Dr. Arelis Berg, ER Physician. XR CHEST PORTABLE    Result Date: 11/12/2022  EXAMINATION: ONE XRAY VIEW OF THE CHEST 11/12/2022 7:33 am COMPARISON: None. HISTORY: ORDERING SYSTEM PROVIDED HISTORY: pna TECHNOLOGIST PROVIDED HISTORY: Reason for exam:->pna What reading provider will be dictating this exam?->CRC FINDINGS: The cardiac silhouette is within normal limits. The lungs are clear. There is no focal infiltrate or effusion. Multiple leads overlie the chest. The shoulders and clavicles are intact. 1. There are no findings of failure or pneumonia. Assessment:  Pulmonary nodules-likely metastasis   Right breast cancer   Previous asthma       Plan:  Maintain POX >90%, on room air, supplemental o2 if needed   Oncology consulted, right breast primary cancer with probable metastasis to lung with lymphadenopathy -await recommendations   Outpatient PET  Albuterol PRN  Supportive care       Thank you for allowing us to see this patient in consultation. Please contact us with any questions. Electronically signed by PABLO Green on 11/13/2022 at 12:55 PM     Seen and evaluated, and agree with above assessment and plan. CT scan of the chest November 2022 compared with CT scan of the chest August 2020 and is obvious to see the appearance of the multiple lung nodules, quite suggestive of metastatic disease. Patient with well known history of breast cancer in the past.  I personally discussed this case with the oncologist who favor to proceed with tissue biopsy. We are not going to proceed with IR consult for biopsy of the right lower lobe, risk and benefit explained to the patient.

## 2022-11-13 NOTE — PROGRESS NOTES
Hospitalist Progress Note      SYNOPSIS: Patient admitted on 2022 for nausea and dizziness for the last 2 days. Patient is a poor historian and seems to be hard of hearing. SUBJECTIVE:    Patient seen and examined. She states that has has the chills. No fever. She states that she has a cough with scant tan mucus. No abd pain nor any n/v at this time. Hard of hearing. Temp (24hrs), Av.3 °F (36.8 °C), Min:97.7 °F (36.5 °C), Max:98.7 °F (37.1 °C)    DIET: ADULT DIET; Regular; 5 carb choices (75 gm/meal)  CODE: Full Code    Intake/Output Summary (Last 24 hours) at 2022 1421  Last data filed at 2022 0607  Gross per 24 hour   Intake 1077.26 ml   Output 1500 ml   Net -422.74 ml       OBJECTIVE:    BP (!) 95/52   Pulse 79   Temp 98.6 °F (37 °C) (Temporal)   Resp 16   Ht 5' 4\" (1.626 m)   Wt 175 lb 6.4 oz (79.6 kg)   LMP  (LMP Unknown)   SpO2 97%   BMI 30.11 kg/m²     General appearance: AAO X 2 (not to the date)  HEENT:  Conjunctivae/corneas clear. Neck: Supple. No jugular venous distention. Respiratory: Diminished breath sounds (b/l)   Cardiovascular: Regular rate rhythm, normal S1-S2  Abdomen: Soft, nontender, nondistended  Musculoskeletal: No clubbing, cyanosis, +trace edema. Brisk capillary refill. Skin:  No rashes on visible skin  Neurologic: awake, alert and following commands; Does not move the left leg (0/5); she states that she uses a walker/Wheelchair at home. ASSESSMENT:  81-year-old female with past medical history of right breast cancer, type 2 diabetes, rheumatoid arthritis, COPD who presented to the emergency room complaining of nausea and dizziness for the last 2 days. In the emergency room, patient was given Zofran and meclizine without improvement. CT of the head showed no acute intracranial abnormality. CT of the chest showed multiple spiculated soft tissue pulmonary nodules compatible with pulmonary metastatic disease.  CT of the abdomen and pelvis was unremarkable. PLAN:    1.) Dizziness with nausea: CT head: No acute abnormality. CT abd/pelvis: No indication for acute intraperitoneal or retroperitoneal process in the abdomen or in the pelvis. Zofran PRN. Lfts unremarkable. MRI as per hematology. 2.) Multiple right lower lobe lung nodules: Oncology and pulmonology are following. Likely mets. Bone scan-pending. CA 27.29 pending. Arimidex started. Plan on MRI- will be ordered by Hematology- depending on the type by the creatinine in the am.     3.) Suspect metastatic breast cancer.   -female with a history of stage IIa (pT2N0) ER/[place] positive, HER-2/anni invasive ductal  carcinoma right breast status post right lumpectomy and sentinel lymph node biopsy October 24, 2012    -History of recurrent stage II (PT2 pN0) ER/[place] positive, HER-2/anni negative poorly differentiated invasive ductal carcinoma upper outer quadrant right breast cancer. She had a right mastectomy and axillary lymph node dissection November 3, 2020.    -diagnosis of recurrence. Treatment recommendations were arimidex and surveillance. Patient did not follow-up after that appointment. 4.) Suspect T9 lytic metastasis    5.) Elevated troponin without chest pain; CKD.     6.) Asymptomatic bacteriuria; Urine cx 11/12/22 is negative.     7.) Type 2 diabetes: SSI     8.) Hypothyroidism: TSH in the am;Currently on: 350 mcg on Sunday/Sat and 175 mcg On Mon-Fri.   9.) Hypertension: On lisinopril and metoprolol with holding parameters. Pressures in the 90's this morning. On IVF.     10.) History of CVA on SA and Lipitor     11.) COPD: No acute exacerbation. Duonebs prn.     12.) Chronic kidney disease stage III: appears stable. Patient is on ramipril at this time. 13.) Dementia, Alzheimer type    14.) Occasional Cough: CT chest moderate mediastinal adenopathy and pulm nodules. Supportive therapy at this time.      DISPOSITION:TBD     Medications:  REVIEWED DAILY      Infusion Medications    0.9% NaCl with KCl 20 mEq 75 mL/hr at 11/13/22 0607    sodium chloride       Scheduled Medications    anastrozole  1 mg Oral Daily    atorvastatin  40 mg Oral Daily    ramipril  10 mg Oral Daily    mirtazapine  30 mg Oral Nightly    metoprolol succinate  25 mg Oral Daily    vitamin D  2,000 Units Oral Daily    aspirin  81 mg Oral Daily    enoxaparin  40 mg SubCUTAneous Daily    insulin lispro  0-8 Units SubCUTAneous TID WC    insulin lispro  0-4 Units SubCUTAneous Nightly    sodium chloride flush  5-40 mL IntraVENous 2 times per day    [START ON 11/14/2022] levothyroxine  175 mcg Oral Once per day on Mon Tue Wed Thu Fri    levothyroxine  350 mcg Oral Once per day on Sun Sat     PRN Meds: albuterol, ondansetron **OR** ondansetron, polyethylene glycol, morphine, meclizine, sodium chloride flush, sodium chloride, acetaminophen **OR** acetaminophen        Labs:     Recent Labs     11/12/22  0651 11/13/22  0443   WBC 7.2 5.4   HGB 13.3 11.9   HCT 38.1 33.8*    156       Recent Labs     11/12/22  0651 11/13/22  0443    140   K 3.7 3.5    107   CO2 19* 20*   BUN 9 7   CREATININE 1.3* 1.3*   CALCIUM 9.8 8.9       Recent Labs     11/12/22  0651   PROT 6.9   ALKPHOS 88   ALT 5   AST 18   BILITOT 1.1       No results for input(s): INR in the last 72 hours. No results for input(s): Nilsa Earnest in the last 72 hours. Chronic labs:    Lab Results   Component Value Date    CHOL 180 06/07/2022    TRIG 199 (H) 06/07/2022    HDL 29 06/07/2022    LDLCALC 111 (H) 06/07/2022    TSH 10.520 (H) 06/10/2022    INR 1.0 06/10/2021    LABA1C 6.0 (H) 06/07/2022       Radiology: REVIEWED DAILY    +++++++++++++++++++++++++++++++++++++++++++++++++  MD Reid Baum Physician - [address] [place] [address] [place], [facility]  +++++++++++++++++++++++++++++++++++++++++++++++++  NOTE: This report was transcribed using voice recognition software.  Every effort was made to ensure accuracy; however, inadvertent computerized transcription errors may be present.

## 2022-11-14 ENCOUNTER — APPOINTMENT (OUTPATIENT)
Dept: NUCLEAR MEDICINE | Age: 82
DRG: 181 | End: 2022-11-14
Payer: MEDICARE

## 2022-11-14 LAB
ADENOVIRUS BY PCR: NOT DETECTED
ANION GAP SERPL CALCULATED.3IONS-SCNC: 10 MMOL/L (ref 7–16)
BASOPHILS ABSOLUTE: 0.06 E9/L (ref 0–0.2)
BASOPHILS RELATIVE PERCENT: 1 % (ref 0–2)
BORDETELLA PARAPERTUSSIS BY PCR: NOT DETECTED
BORDETELLA PERTUSSIS BY PCR: NOT DETECTED
BUN BLDV-MCNC: 8 MG/DL (ref 6–23)
CALCIUM SERPL-MCNC: 8.4 MG/DL (ref 8.6–10.2)
CHLAMYDOPHILIA PNEUMONIAE BY PCR: NOT DETECTED
CHLORIDE BLD-SCNC: 113 MMOL/L (ref 98–107)
CO2: 18 MMOL/L (ref 22–29)
CORONAVIRUS 229E BY PCR: NOT DETECTED
CORONAVIRUS HKU1 BY PCR: NOT DETECTED
CORONAVIRUS NL63 BY PCR: NOT DETECTED
CORONAVIRUS OC43 BY PCR: NOT DETECTED
CREAT SERPL-MCNC: 1.7 MG/DL (ref 0.5–1)
EKG ATRIAL RATE: 90 BPM
EKG P AXIS: 75 DEGREES
EKG P-R INTERVAL: 192 MS
EKG Q-T INTERVAL: 414 MS
EKG QRS DURATION: 142 MS
EKG QTC CALCULATION (BAZETT): 506 MS
EKG R AXIS: -49 DEGREES
EKG T AXIS: 79 DEGREES
EKG VENTRICULAR RATE: 90 BPM
EOSINOPHILS ABSOLUTE: 0.42 E9/L (ref 0.05–0.5)
EOSINOPHILS RELATIVE PERCENT: 6.7 % (ref 0–6)
GFR SERPL CREATININE-BSD FRML MDRD: 30 ML/MIN/1.73
GLUCOSE BLD-MCNC: 76 MG/DL (ref 74–99)
HCT VFR BLD CALC: 31.7 % (ref 34–48)
HEMOGLOBIN: 10.7 G/DL (ref 11.5–15.5)
HUMAN METAPNEUMOVIRUS BY PCR: NOT DETECTED
HUMAN RHINOVIRUS/ENTEROVIRUS BY PCR: NOT DETECTED
IMMATURE GRANULOCYTES #: 0.02 E9/L
IMMATURE GRANULOCYTES %: 0.3 % (ref 0–5)
INFLUENZA A BY PCR: NOT DETECTED
INFLUENZA B BY PCR: NOT DETECTED
INR BLD: 1.1
LYMPHOCYTES ABSOLUTE: 2.22 E9/L (ref 1.5–4)
LYMPHOCYTES RELATIVE PERCENT: 35.5 % (ref 20–42)
MAGNESIUM: 1.4 MG/DL (ref 1.6–2.6)
MCH RBC QN AUTO: 31.8 PG (ref 26–35)
MCHC RBC AUTO-ENTMCNC: 33.8 % (ref 32–34.5)
MCV RBC AUTO: 94.1 FL (ref 80–99.9)
METER GLUCOSE: 102 MG/DL (ref 74–99)
METER GLUCOSE: 115 MG/DL (ref 74–99)
METER GLUCOSE: 120 MG/DL (ref 74–99)
METER GLUCOSE: 146 MG/DL (ref 74–99)
METER GLUCOSE: 66 MG/DL (ref 74–99)
MONOCYTES ABSOLUTE: 0.34 E9/L (ref 0.1–0.95)
MONOCYTES RELATIVE PERCENT: 5.4 % (ref 2–12)
MYCOPLASMA PNEUMONIAE BY PCR: NOT DETECTED
NEUTROPHILS ABSOLUTE: 3.19 E9/L (ref 1.8–7.3)
NEUTROPHILS RELATIVE PERCENT: 51.1 % (ref 43–80)
PARAINFLUENZA VIRUS 1 BY PCR: NOT DETECTED
PARAINFLUENZA VIRUS 2 BY PCR: NOT DETECTED
PARAINFLUENZA VIRUS 3 BY PCR: NOT DETECTED
PARAINFLUENZA VIRUS 4 BY PCR: NOT DETECTED
PDW BLD-RTO: 15.4 FL (ref 11.5–15)
PHOSPHORUS: 2.8 MG/DL (ref 2.5–4.5)
PLATELET # BLD: 152 E9/L (ref 130–450)
PMV BLD AUTO: 10.1 FL (ref 7–12)
POTASSIUM SERPL-SCNC: 4 MMOL/L (ref 3.5–5)
PROTHROMBIN TIME: 12.4 SEC (ref 9.3–12.4)
RBC # BLD: 3.37 E12/L (ref 3.5–5.5)
RESPIRATORY SYNCYTIAL VIRUS BY PCR: NOT DETECTED
SARS-COV-2, PCR: NOT DETECTED
SODIUM BLD-SCNC: 141 MMOL/L (ref 132–146)
TSH SERPL DL<=0.05 MIU/L-ACNC: 10.33 UIU/ML (ref 0.27–4.2)
WBC # BLD: 6.3 E9/L (ref 4.5–11.5)

## 2022-11-14 PROCEDURE — 80048 BASIC METABOLIC PNL TOTAL CA: CPT

## 2022-11-14 PROCEDURE — 82962 GLUCOSE BLOOD TEST: CPT

## 2022-11-14 PROCEDURE — 1200000000 HC SEMI PRIVATE

## 2022-11-14 PROCEDURE — 78306 BONE IMAGING WHOLE BODY: CPT | Performed by: RADIOLOGY

## 2022-11-14 PROCEDURE — 2580000003 HC RX 258: Performed by: FAMILY MEDICINE

## 2022-11-14 PROCEDURE — 6360000002 HC RX W HCPCS

## 2022-11-14 PROCEDURE — 83735 ASSAY OF MAGNESIUM: CPT

## 2022-11-14 PROCEDURE — 3430000000 HC RX DIAGNOSTIC RADIOPHARMACEUTICAL: Performed by: RADIOLOGY

## 2022-11-14 PROCEDURE — 84443 ASSAY THYROID STIM HORMONE: CPT

## 2022-11-14 PROCEDURE — 0202U NFCT DS 22 TRGT SARS-COV-2: CPT

## 2022-11-14 PROCEDURE — A9503 TC99M MEDRONATE: HCPCS | Performed by: RADIOLOGY

## 2022-11-14 PROCEDURE — 6370000000 HC RX 637 (ALT 250 FOR IP): Performed by: PHYSICIAN ASSISTANT

## 2022-11-14 PROCEDURE — 84100 ASSAY OF PHOSPHORUS: CPT

## 2022-11-14 PROCEDURE — 97165 OT EVAL LOW COMPLEX 30 MIN: CPT

## 2022-11-14 PROCEDURE — 85610 PROTHROMBIN TIME: CPT

## 2022-11-14 PROCEDURE — 36415 COLL VENOUS BLD VENIPUNCTURE: CPT

## 2022-11-14 PROCEDURE — 6360000002 HC RX W HCPCS: Performed by: FAMILY MEDICINE

## 2022-11-14 PROCEDURE — 6370000000 HC RX 637 (ALT 250 FOR IP): Performed by: INTERNAL MEDICINE

## 2022-11-14 PROCEDURE — 85025 COMPLETE CBC W/AUTO DIFF WBC: CPT

## 2022-11-14 PROCEDURE — 93010 ELECTROCARDIOGRAM REPORT: CPT | Performed by: INTERNAL MEDICINE

## 2022-11-14 PROCEDURE — 97530 THERAPEUTIC ACTIVITIES: CPT

## 2022-11-14 PROCEDURE — 86300 IMMUNOASSAY TUMOR CA 15-3: CPT

## 2022-11-14 PROCEDURE — 6370000000 HC RX 637 (ALT 250 FOR IP): Performed by: FAMILY MEDICINE

## 2022-11-14 PROCEDURE — 78306 BONE IMAGING WHOLE BODY: CPT | Performed by: INTERNAL MEDICINE

## 2022-11-14 RX ORDER — MAGNESIUM SULFATE 1 G/100ML
1000 INJECTION INTRAVENOUS PRN
Status: DISCONTINUED | OUTPATIENT
Start: 2022-11-14 | End: 2022-11-19 | Stop reason: HOSPADM

## 2022-11-14 RX ORDER — TC 99M MEDRONATE 20 MG/10ML
19.9 INJECTION, POWDER, LYOPHILIZED, FOR SOLUTION INTRAVENOUS
Status: COMPLETED | OUTPATIENT
Start: 2022-11-14 | End: 2022-11-14

## 2022-11-14 RX ADMIN — METOPROLOL SUCCINATE 25 MG: 25 TABLET, EXTENDED RELEASE ORAL at 10:28

## 2022-11-14 RX ADMIN — MIRTAZAPINE 30 MG: 15 TABLET, FILM COATED ORAL at 20:17

## 2022-11-14 RX ADMIN — ANASTROZOLE 1 MG: 1 TABLET ORAL at 10:28

## 2022-11-14 RX ADMIN — Medication 2000 UNITS: at 10:27

## 2022-11-14 RX ADMIN — POTASSIUM CHLORIDE AND SODIUM CHLORIDE: 900; 150 INJECTION, SOLUTION INTRAVENOUS at 15:59

## 2022-11-14 RX ADMIN — LEVOTHYROXINE SODIUM 175 MCG: 0.05 TABLET ORAL at 05:34

## 2022-11-14 RX ADMIN — SODIUM CHLORIDE, PRESERVATIVE FREE 10 ML: 5 INJECTION INTRAVENOUS at 10:37

## 2022-11-14 RX ADMIN — ACETAMINOPHEN 650 MG: 325 TABLET, FILM COATED ORAL at 20:17

## 2022-11-14 RX ADMIN — MAGNESIUM SULFATE HEPTAHYDRATE 1000 MG: 1 INJECTION, SOLUTION INTRAVENOUS at 15:03

## 2022-11-14 RX ADMIN — ATORVASTATIN CALCIUM 40 MG: 40 TABLET, FILM COATED ORAL at 10:28

## 2022-11-14 RX ADMIN — MAGNESIUM SULFATE HEPTAHYDRATE 1000 MG: 1 INJECTION, SOLUTION INTRAVENOUS at 17:15

## 2022-11-14 RX ADMIN — TC 99M MEDRONATE 19.9 MILLICURIE: 20 INJECTION, POWDER, LYOPHILIZED, FOR SOLUTION INTRAVENOUS at 08:54

## 2022-11-14 RX ADMIN — SODIUM CHLORIDE, PRESERVATIVE FREE 10 ML: 5 INJECTION INTRAVENOUS at 20:18

## 2022-11-14 RX ADMIN — ONDANSETRON 4 MG: 4 TABLET, ORALLY DISINTEGRATING ORAL at 20:17

## 2022-11-14 RX ADMIN — RAMIPRIL 10 MG: 5 CAPSULE ORAL at 10:28

## 2022-11-14 ASSESSMENT — PAIN DESCRIPTION - LOCATION
LOCATION: BACK
LOCATION: BACK

## 2022-11-14 ASSESSMENT — PAIN SCALES - GENERAL
PAINLEVEL_OUTOF10: 5
PAINLEVEL_OUTOF10: 5

## 2022-11-14 ASSESSMENT — PAIN DESCRIPTION - ORIENTATION: ORIENTATION: LOWER

## 2022-11-14 NOTE — PROGRESS NOTES
Slight dyspnea and dry cough but no other complaints.     Additional Respiratory Assessments  Heart Rate: 83  Resp: 18  SpO2: 100 %      Current Facility-Administered Medications:     magnesium sulfate 1000 mg in dextrose 5% 100 mL IVPB, 1,000 mg, IntraVENous, PRN, Morgan Bernheim, APRN - CNP    anastrozole (ARIMIDEX) tablet 1 mg, 1 mg, Oral, Daily, MELINA Dean, 1 mg at 11/14/22 1028    albuterol (PROVENTIL) nebulizer solution 2.5 mg, 2.5 mg, Nebulization, Q4H PRN, PABLO Ruelas - CNS    ipratropium-albuterol (DUONEB) nebulizer solution 1 ampule, 1 ampule, Inhalation, Q4H PRN, John Rodriguez MD    benzonatate (TESSALON) capsule 100 mg, 100 mg, Oral, TID PRN, John Rodriguez MD    atorvastatin (LIPITOR) tablet 40 mg, 40 mg, Oral, Daily, Cass Cisneros MD, 40 mg at 11/14/22 1028    ramipril (ALTACE) capsule 10 mg, 10 mg, Oral, Daily, John Rodriguez MD, 10 mg at 11/14/22 1028    mirtazapine (REMERON) tablet 30 mg, 30 mg, Oral, Nightly, Cass Cisneros MD, 30 mg at 11/13/22 2256    metoprolol succinate (TOPROL XL) extended release tablet 25 mg, 25 mg, Oral, Daily, John Rodriguez MD, 25 mg at 11/14/22 1028    vitamin D (CHOLECALCIFEROL) tablet 2,000 Units, 2,000 Units, Oral, Daily, Cass Cisneros MD, 2,000 Units at 11/14/22 1027    aspirin chewable tablet 81 mg, 81 mg, Oral, Daily, Cass Cisneros MD, 81 mg at 11/13/22 0839    enoxaparin (LOVENOX) injection 40 mg, 40 mg, SubCUTAneous, Daily, Cass Cisneros MD, 40 mg at 11/13/22 0839    ondansetron (ZOFRAN-ODT) disintegrating tablet 4 mg, 4 mg, Oral, Q8H PRN **OR** ondansetron (ZOFRAN) injection 4 mg, 4 mg, IntraVENous, Q6H PRN, Cass Cisneros MD    polyethylene glycol (GLYCOLAX) packet 17 g, 17 g, Oral, Daily PRN, Cass Cisneros MD    morphine (PF) injection 2 mg, 2 mg, IntraVENous, Q4H PRN, Cass Cisneros MD, 2 mg at 11/12/22 2047    insulin lispro (HUMALOG) injection vial 0-8 Units, 0-8 Units, SubCUTAneous, TID Deric LNI Smitha Sellers MD    insulin lispro (HUMALOG) injection vial 0-4 Units, 0-4 Units, SubCUTAneous, Nightly, Jose M Kirby MD    0.9% NaCl with KCl 20 mEq infusion, , IntraVENous, Continuous, Jose M Kirby MD, Last Rate: 75 mL/hr at 11/14/22 0541, Rate Verify at 11/14/22 0541    meclizine (ANTIVERT) tablet 25 mg, 25 mg, Oral, TID PRN, Jose M Kirby MD    sodium chloride flush 0.9 % injection 5-40 mL, 5-40 mL, IntraVENous, 2 times per day, Jose M Kirby MD, 10 mL at 11/14/22 1037    sodium chloride flush 0.9 % injection 5-40 mL, 5-40 mL, IntraVENous, PRN, Jose M Kirby MD    0.9 % sodium chloride infusion, , IntraVENous, PRN, Jose M Kirby MD    acetaminophen (TYLENOL) tablet 650 mg, 650 mg, Oral, Q6H PRN, 650 mg at 11/13/22 2256 **OR** acetaminophen (TYLENOL) suppository 650 mg, 650 mg, Rectal, Q6H PRN, Jose M Kirby MD    levothyroxine (SYNTHROID) tablet 175 mcg, 175 mcg, Oral, Once per day on Mon Tue Wed Thu Fri, Jose M Kirby MD, 175 mcg at 11/14/22 0534    levothyroxine (SYNTHROID) tablet 350 mcg, 350 mcg, Oral, Once per day on Sun Sat, Jose M Kirby MD, 350 mcg at 11/13/22 0625  /62   Pulse 83   Temp 98 °F (36.7 °C) (Oral)   Resp 18   Ht 5' 4\" (1.626 m)   Wt 175 lb 6.4 oz (79.6 kg)   LMP  (LMP Unknown)   SpO2 100%   BMI 30.11 kg/m²     General: No distress  Chest: Symmetric, no accessory use  Heart: RRR  Lungs: CTA  Abdomen: Soft, nt  Extremities: 2+ ble edema    Intake/Output Summary (Last 24 hours) at 11/14/2022 1327  Last data filed at 11/14/2022 0541  Gross per 24 hour   Intake 1429.67 ml   Output --   Net 1429.67 ml     CBC with Differential:    Lab Results   Component Value Date/Time    WBC 6.3 11/14/2022 05:02 AM    RBC 3.37 11/14/2022 05:02 AM    HGB 10.7 11/14/2022 05:02 AM    HCT 31.7 11/14/2022 05:02 AM     11/14/2022 05:02 AM    MCV 94.1 11/14/2022 05:02 AM    MCH 31.8 11/14/2022 05:02 AM    MCHC 33.8 11/14/2022 05:02 AM    RDW 15.4 11/14/2022 05:02 AM    LYMPHOPCT 35.5 11/14/2022 05:02 AM    MONOPCT 5.4 11/14/2022 05:02 AM    BASOPCT 1.0 11/14/2022 05:02 AM    MONOSABS 0.34 11/14/2022 05:02 AM    LYMPHSABS 2.22 11/14/2022 05:02 AM    EOSABS 0.42 11/14/2022 05:02 AM    BASOSABS 0.06 11/14/2022 05:02 AM     BMP:    Lab Results   Component Value Date/Time     11/14/2022 05:02 AM    K 4.0 11/14/2022 05:02 AM    K 3.7 11/12/2022 06:51 AM     11/14/2022 05:02 AM    CO2 18 11/14/2022 05:02 AM    BUN 8 11/14/2022 05:02 AM    LABALBU 3.3 11/12/2022 06:51 AM    CREATININE 1.7 11/14/2022 05:02 AM    CALCIUM 8.4 11/14/2022 05:02 AM    GFRAA 52 06/10/2022 06:02 AM    LABGLOM 30 11/14/2022 05:02 AM    GLUCOSE 76 11/14/2022 05:02 AM       IMPRESSION:   Lung nodules will get op biopsy and pet ct. Back pain and await bone scan.   Hyacinth Benavidez MD  11/14/2022  1:27 PM

## 2022-11-14 NOTE — PROGRESS NOTES
[address]          [facility]:                                                   Patient Name: Sita Azevedo     MRN: [id number]     : 1940     Room: [address] [place] [address]       Evaluating OT: Presley Hatchet OTD, OTR/L, [id number]      Referring Provider: John Rodriguez MD    Specific Provider Orders/Date: OT eval and treat (22)    Diagnosis: Right lower lobe lung mass [R91.8]    Surgeries/Procedures: None this admission       Pt admitted with lung mass, nausea/vomitting. Pertinent Medical History:       has a past medical history of Asthma, Blood transfusion, Breast cancer ([facility] [address] COPD (chronic obstructive pulmonary disease) ([facility] [address] Diabetes mellitus ([facility] [address] Diabetic peripheral neuropathy ([facility] [address] DM type 2 (diabetes mellitus, type 2) ([facility] [address] Hypertension, Hypothyroidism, Irritable bowel syndrome, Mixed hyperlipidemia, Obesity (BMI 30-39.9), Osteoarthritis, PVD (peripheral vascular disease) ([facility] [address] RA (rheumatoid arthritis) ([facility] [address] RBBB, Recurrent breast cancer, right ([address] and Vitamin B12 deficiency.          Precautions:  Fall Risk, [place], cognitive impairments at baseline, hx falls     Assessment of current deficits    [x] Functional mobility  [x]ADLs  [x] Strength               [x]Cognition    [x] Functional transfers   [x] IADLs         [x] Safety Awareness   [x]Endurance    [x] Fine Coordination              [x] Balance      [] Vision/perception   []Sensation     []Gross Motor Coordination  [] ROM  [] Delirium                   [] Motor Control     OT PLAN OF CARE   OT POC based on physician orders, patient diagnosis and results of clinical assessment    Frequency/Duration 1-3 days/wk for 2 weeks PRN   Specific OT Treatment Interventions to include:   * Instruction/training on adapted ADL techniques and AE recommendations to increase functional independence within precautions       * Training on energy conservation strategies, correct breathing pattern and techniques to improve independence/tolerance for self-care routine  * Functional transfer/mobility training/DME recommendations for increased independence, safety, and fall prevention  * Patient/Family education to increase follow through with safety techniques and functional independence  * Recommendation of environmental modifications for increased safety with functional transfers/mobility and ADLs  * Cognitive retraining/development of therapeutic activities to improve problem solving, judgement, memory, and attention for increased safety/participation in ADL/IADL tasks  * Sensory re-education to improve body/limb awareness, maintain/improve skin integrity, and improve hand/UE motor function  * Visual-perceptual training to improve environmental scanning, visual attention/focus, and oculomotor skills for increased safety/independence with functional transfers/mobility and ADLs  * Splinting/positioning for increased function, prevention of contractures, and improve skin integrity  * Therapeutic exercise to improve motor endurance, ROM, and functional strength for ADLs/functional transfers  * Therapeutic activities to facilitate/challenge dynamic balance, stand tolerance for increased safety and independence with ADLs  * Therapeutic activities to facilitate gross/fine motor skills for increased independence with ADLs  * Neuro-muscular re-education: facilitation of righting/equilibrium reactions, midline orientation, scapular stability/mobility, normalization of muscle tone, and facilitation of volitional active controled movement  * Positioning to improve skin integrity, interaction with environment and functional independence  * Delirium prevention/treatment  * Manual techniques for edema management    Recommended Adaptive Equipment/DME:  Shower chair, TBD     Home Living: Pt lives with son in 1 story home with ramp to enter and 2 HR and bed and bath on main level. Bathroom setup: walk-in shower   Equipment owned: Grab bar with toilet/in shower    Prior Level of Function: Assist from home health caregivers with ADLs , Dep with assist from son with IADLs; Used WC/3 [employer] for functional mobility. Driving: No  Occupation: Enjoys watching tv. Pain Level: 6-7/10; back pain  Cognition: A&O: 2/4; oriented to self/location, unable to name month, year with cues. Follows 2 step directions with sequencing assist   Memory: F   Sequencing: F   Problem solving:  F-   Judgement/safety:  F    Vitals Assessed: NT  SpO2:      BP:      Functional Assessment:  AM-PAC Daily Activity Raw Score: 11/24   Initial Eval Status  Date: 11/14/22 Treatment Status  Date: STGs = LTGs  Time frame: 10-14 days   Feeding Set-up     Modified [place]    Grooming Set-up  In supported sitting     Mod I    UB Dressing Moderate Assist     SBA   LB Dressing Dependent     Minimal Assist    Bathing Moderate Assist    Minimal Assist    Toileting Maximal Assist     Minimal Assist    Bed Mobility  Supine to sit: Moderate Assist   Sit to supine: Moderate Assist    Verbal cues for sequencing bed mobility. Supine to sit: Stand by Assist   Sit to supine: Stand by Assist    Functional Transfers Sit to stand: Moderate Assist  Stand to sit: Moderate Assist  Stand pivot: NT     Stand by Assist    Functional Mobility Moderate Assist with FWW  For 4-5 side steps at EOB. Pt fatiguing and returning to seated position. Stand by Assist with AD as recommended by PT. Balance Sitting:     Static: SBA<>Min A    Dynamic:Min A  Standing: Mod A    during functional activity  Sitting:     Static:  S    Dynamic:SBA  Standing: SBA   Activity Tolerance Fair tolerance with light activity.   WFL  For full ADL/IADL tasks   Visual/  Perceptual Glasses: Yes        Safety F  F+     Hand Dominance R   AROM (PROM) Strength Additional Info:    RUE  WFL Grossly 3+/5  : Fair wfl FMC/dexterity noted during ADL tasks     LUE WFL Grossly 3+/5  : Fair wfl FMC/dexterity noted during ADL tasks       Hearing: [place]  Sensation:  No c/o numbness or tingling  Tone: WFL  Edema: Unremarkable    Comments: Patient cleared by nursing. Upon arrival patient supine in bed , educated on the role of OT, and agreeable to OT session. No family present for session today. OT facilitated ADLs, bed mobility, functional transfers with focus on safety, body mechanics, and precautions. At end of session, patient semi supine in bed, properly positioned, oriented to call light, with call light to R side and phone within reach, all lines and tubes intact. Nursing notified. Overall patient demonstrated fair reception to education, decreased independence and safety during completion of ADL/functional transfer/mobility tasks. Pt would benefit from continued skilled OT to increase safety and independence with completion of ADL/IADL tasks for functional independence and quality of life. Treatment: OT treatment provided this date includes:   Instruction/training on safety and adapted techniques for completion of ADLs: Sequencing assist and verbal cues to increase independence in self-care. Instruction/training on safe functional mobility/transfer techniques: with focus on safety, body mechanics, and precautions   Proper Positioning/Alignment for optimal healing, skin integrity, to prevent breakdown, decrease edema, and reduce risk of contracture. Therapeutic activity: to challenge dynamic sitting/standing balance and endurance to promote safety during ADL tasks and functional transfers and mobility. Rehab Potential: Good for established goals     Patient / Family Goal: to return to PLOF      Patient and/or family were instructed on functional diagnosis, prognosis/goals and OT plan of care. Demonstrated fair understanding.      Eval Complexity: Low    Time In: 1355  Time Out: 1420  Total Treatment Time: 10 min    Min Units   OT Eval Low 97165 x  1    OT Eval Medium 36996      OT Eval High 40785      OT Re-Eval [id number]       Therapeutic Ex 10220       Therapeutic Activities 75541 10  1    ADL/Self Care 14194      Orthotic Management 92720       Manual 34690     Neuro Re-Ed 39120       Non-Billable Time          Evaluation Time additionally includes thorough review of current medical information, gathering information on past medical history/social history and prior level of function, interpretation of standardized testing/informal observation of tasks, assessment of data and development of plan of care and goals.             CASTRO Baig,  OTR/L; [id number]

## 2022-11-14 NOTE — PROGRESS NOTES
Hospitalist Progress Note      Synopsis: 81-year-old female with past medical history of right breast cancer, type 2 diabetes, rheumatoid arthritis, COPD who presented to the emergency room complaining of nausea and dizziness for the last 2 days. In the emergency room, patient was given Zofran and meclizine without improvement. CT of the head showed no acute intracranial abnormality. CT of the chest showed multiple spiculated soft tissue pulmonary nodules compatible with pulmonary metastatic disease. CT of the abdomen and pelvis was unremarkable. Hospital day 2     Subjective:  Patient assessed at bedside, alert, oriented to person and place, not year, answering questions appropriately  Patient poor historian regarding medical history, unsure if she completed breast cancer treatment or had a follow up with her oncologist.    Patient with Magnesium 1.4 today. Magnesium replacement protocol ordered  Bone scan completed - findings compatible with degenerative changes, findings suspicious for metastatic disease  Pet scan to be completed outpatient  Tissue biopsy to be completed outpatient  Per Hem/Onc note, patient likely needs MRI brain with contrast after improvement of kidney fx, or may complete without contrast  No further medical complaints at time of assessment  Discharge pending status of MRI  Records reviewed. Temp (24hrs), Av °F (36.7 °C), Min:97.7 °F (36.5 °C), Max:98.2 °F (36.8 °C)    DIET: ADULT DIET; Regular; 5 carb choices (75 gm/meal)  CODE: Full Code    Intake/Output Summary (Last 24 hours) at 2022 1700  Last data filed at 2022 1602  Gross per 24 hour   Intake 1429.67 ml   Output 200 ml   Net 1229.67 ml       Review of Systems: All bolded are positive; please see HPI  General:  Fever, chills, diaphoresis, fatigue, malaise, night sweats, weight loss  Psychological:  Anxiety, disorientation to year, hallucinations.   ENT:  Epistaxis, headaches, vertigo, visual changes. Cardiovascular:  Chest pain, irregular heartbeats, palpitations, paroxysmal nocturnal dyspnea. Respiratory:  Shortness of breath, coughing, sputum production, hemoptysis, wheezing, orthopnea. Gastrointestinal:  Nausea, vomiting, diarrhea, heartburn, constipation, abdominal pain, hematemesis, hematochezia, melena, acholic stools  Genito-Urinary:  Dysuria, urgency, frequency, hematuria  Musculoskeletal:  Joint pain, joint stiffness, joint swelling, muscle pain  Neurology:  Headache, focal neurological deficits, weakness, numbness, paresthesia  Derm:  Rashes, ulcers, excoriations, bruising  Extremities:  Decreased ROM, peripheral edema, mottling    Objective:    BP (!) 114/52   Pulse 76   Temp 97.7 °F (36.5 °C) (Oral)   Resp 18   Ht 5' 4\" (1.626 m)   Wt 175 lb 6.4 oz (79.6 kg)   LMP  (LMP Unknown)   SpO2 98%   BMI 30.11 kg/m²     General appearance: No apparent distress, appears stated age and cooperative. HEENT: Conjunctivae/corneas clear. Mucous membranes moist.  Neck: Supple. No JVD. Respiratory:  Diminished to auscultation bilaterally. Normal respiratory effort. Cardiovascular:  RRR. S1, S2 without MRG. PV: Pulses palpable. No edema. Abdomen: Soft, non-tender, non-distended. +BS  Musculoskeletal: No obvious deformities. Bilateral trace edema  Skin: Normal skin color. No rashes or lesions. Good turgor. Neurologic:  Grossly non-focal. Awake, alert, following commands.  Does not move left leg  Psychiatric: Alert and oriented, thought content appropriate, normal insight and judgement    Medications:  REVIEWED DAILY    Infusion Medications    0.9% NaCl with KCl 20 mEq 75 mL/hr at 11/14/22 1559    sodium chloride       Scheduled Medications    anastrozole  1 mg Oral Daily    atorvastatin  40 mg Oral Daily    ramipril  10 mg Oral Daily    mirtazapine  30 mg Oral Nightly    metoprolol succinate  25 mg Oral Daily    vitamin D  2,000 Units Oral Daily    aspirin  81 mg Oral Daily    enoxaparin 40 mg SubCUTAneous Daily    insulin lispro  0-8 Units SubCUTAneous TID     insulin lispro  0-4 Units SubCUTAneous Nightly    sodium chloride flush  5-40 mL IntraVENous 2 times per day    levothyroxine  175 mcg Oral Once per day on Mon Tue Wed Thu Fri    levothyroxine  350 mcg Oral Once per day on Sun Sat     PRN Meds: magnesium sulfate, albuterol, ipratropium-albuterol, benzonatate, ondansetron **OR** ondansetron, polyethylene glycol, morphine, meclizine, sodium chloride flush, sodium chloride, acetaminophen **OR** acetaminophen    Labs:     Recent Labs     11/12/22  0651 11/13/22  0443 11/14/22  0502   WBC 7.2 5.4 6.3   HGB 13.3 11.9 10.7*   HCT 38.1 33.8* 31.7*    156 152       Recent Labs     11/12/22  0651 11/13/22  0443 11/14/22  0502    140 141   K 3.7 3.5 4.0    107 113*   CO2 19* 20* 18*   BUN 9 7 8   CREATININE 1.3* 1.3* 1.7*   CALCIUM 9.8 8.9 8.4*   PHOS  --   --  2.8       Recent Labs     11/12/22  0651   PROT 6.9   ALKPHOS 88   ALT 5   AST 18   BILITOT 1.1       Recent Labs     11/14/22  0910   INR 1.1       No results for input(s): CKTOTAL, TROPONINI in the last 72 hours. Chronic labs:    Lab Results   Component Value Date    CHOL 180 06/07/2022    TRIG 199 (H) 06/07/2022    HDL 29 06/07/2022    LDLCALC 111 (H) 06/07/2022    TSH 10.330 (H) 11/14/2022    INR 1.1 11/14/2022    LABA1C 6.0 (H) 06/07/2022       Radiology: REVIEWED DAILY    Assessment & Plan:    1.) Dizziness with nausea:   CT head: No acute abnormality. CT abd/pelvis: No indication for acute intraperitoneal or retroperitoneal process in the abdomen or in the pelvis. Zofran PRN. Lfts unremarkable. MRI as per hematology. 2.) Multiple right lower lobe lung nodules: Oncology and pulmonology are following. Likely mets. Bone scan- findings compatible with degenerative changes, findings suspicious for metastatic disease. CA 27.29 pending. Arimidex started.  Plan on MRI- will be ordered by Hematology- depending on the type by the creatinine in the am.      3.) Suspect metastatic breast cancer.   -female with a history of stage IIa (pT2N0) ER/PA positive, HER-2/anni invasive ductal  carcinoma right breast status post right lumpectomy and sentinel lymph node biopsy October 24, 2012     -History of recurrent stage II (PT2 pN0) ER/PA positive, HER-2/anni negative poorly differentiated invasive ductal carcinoma upper outer quadrant right breast cancer. She had a right mastectomy and axillary lymph node dissection November 3, 2020.     -diagnosis of recurrence. Treatment recommendations were arimidex and surveillance. Patient did not follow-up after that appointment. 4.) Suspect T9 lytic metastasis    5.) Elevated troponin without chest pain; CKD.     6.) Asymptomatic bacteriuria; Urine cx 11/12/22 is negative.     7.) Type 2 diabetes: SSI     8.) Hypothyroidism:   TSH 10.330. Currently on: 350 mcg on Sunday/Sat and 175 mcg On Mon-Fri.     9.) Hypertension:   On lisinopril and metoprolol with holding parameters. Pressures in the 110's this morning. On IVF.     10.) History of CVA   on SA and Lipitor     11.) COPD:   No acute exacerbation. Duonebs prn.     12.) Chronic kidney disease stage III:   appears stable. Patient is on ramipril at this time. 13.) Dementia, Alzheimer type     14.) Occasional Cough:   CT chest moderate mediastinal adenopathy and pulm nodules. Supportive therapy at this time. 15.) Hypomagnesemia:  Magnesium replacement protocol in place     DVT Prophylaxis [] Lovenox  []  Heparin [] DOAC [] PCDs [] Ambulation    GI Prophylaxis [] PPI  [] H2 Blocker   [] Carafate  [] Diet/Tube Feeds   Level of care [] Med/Surg  [] Intermediate  []  ICU   Diet ADULT DIET; Regular; 5 carb choices (75 gm/meal)    Family contact []  N/A    [] At bedside  [] Phone call     Discharge Plan:  To [facility] once medically stable    +++++++++++++++++++++++++++++++++++++++++++++++++  Meeta Wise, [address] [place] [address] [address] [facility] Physician - [address] [facility], [place]  +++++++++++++++++++++++++++++++++++++++++++++++++  NOTE: This report was transcribed using voice recognition software. Every effort was made to ensure accuracy; however, inadvertent computerized transcription errors may be present.

## 2022-11-14 NOTE — ACP (ADVANCE CARE PLANNING)
Advance Care Planning   The patient has the following advanced directives on file:  Advance Directives       Power of [address] ACP-Advance Directive ACP-Power of     Not on File Not on File Not on File Not on File            The patient has appointed the following active healthcare agents:    Primary Decision Maker: Faraz Becerra - [phone]    The Patient has the following current code status:    Code Status: Full Code    Vivian Alexandre, LSW  11/14/2022

## 2022-11-14 NOTE — PROGRESS NOTES
Subjective:  Chart reviewed  IR consulted for biopsy of lung nodule but patient on ASA, take 11/13 so biopsy delayed  TSH elevated 10.330  She ate and denies current N/V but still slow to respond to questions    Objective:    /62   Pulse 83   Temp 98 °F (36.7 °C) (Oral)   Resp 18   Ht 5' 4\" (1.626 m)   Wt 175 lb 6.4 oz (79.6 kg)   LMP  (LMP Unknown)   SpO2 100%   BMI 30.11 kg/m²     General: NAD, alert  HEENT: normocephalic/atraumatic, mucosa dry, EOMI, sclera anicteric, conjuntiva pink  NECK: supple, trachea midline  Heart:  RRR, no murmurs, gallops, or rubs.   Lungs:  wheezing right anterior lung, no wheeze, rales or rhonchi on left  Abd: BS present, nontender, nondistended, no masses  Extrem:  trace edema of LE bilaterally, No clubbing, cyanosis  : deferred  Skin: Intact, no petechia or purpura    CBC with Differential:    Lab Results   Component Value Date/Time    WBC 6.3 11/14/2022 05:02 AM    RBC 3.37 11/14/2022 05:02 AM    HGB 10.7 11/14/2022 05:02 AM    HCT 31.7 11/14/2022 05:02 AM     11/14/2022 05:02 AM    MCV 94.1 11/14/2022 05:02 AM    MCH 31.8 11/14/2022 05:02 AM    MCHC 33.8 11/14/2022 05:02 AM    RDW 15.4 11/14/2022 05:02 AM    LYMPHOPCT 35.5 11/14/2022 05:02 AM    MONOPCT 5.4 11/14/2022 05:02 AM    BASOPCT 1.0 11/14/2022 05:02 AM    MONOSABS 0.34 11/14/2022 05:02 AM    LYMPHSABS 2.22 11/14/2022 05:02 AM    EOSABS 0.42 11/14/2022 05:02 AM    BASOSABS 0.06 11/14/2022 05:02 AM     CMP:    Lab Results   Component Value Date/Time     11/14/2022 05:02 AM    K 4.0 11/14/2022 05:02 AM    K 3.7 11/12/2022 06:51 AM     11/14/2022 05:02 AM    CO2 18 11/14/2022 05:02 AM    BUN 8 11/14/2022 05:02 AM    CREATININE 1.7 11/14/2022 05:02 AM    GFRAA 52 06/10/2022 06:02 AM    LABGLOM 30 11/14/2022 05:02 AM    GLUCOSE 76 11/14/2022 05:02 AM    PROT 6.9 11/12/2022 06:51 AM    LABALBU 3.3 11/12/2022 06:51 AM    CALCIUM 8.4 11/14/2022 05:02 AM    BILITOT 1.1 11/12/2022 06:51 AM ALKPHOS 88 11/12/2022 06:51 AM    AST 18 11/12/2022 06:51 AM    ALT 5 11/12/2022 06:51 AM          Current Facility-Administered Medications:     magnesium sulfate 1000 mg in dextrose 5% 100 mL IVPB, 1,000 mg, IntraVENous, PRN, Jessenia Purdy APRN - CNP    anastrozole (ARIMIDEX) tablet 1 mg, 1 mg, Oral, Daily, MELINA Veras, 1 mg at 11/14/22 1028    albuterol (PROVENTIL) nebulizer solution 2.5 mg, 2.5 mg, Nebulization, Q4H PRN, Tory Harris APRN - CNS    ipratropium-albuterol (DUONEB) nebulizer solution 1 ampule, 1 ampule, Inhalation, Q4H PRN, Tom Manzo MD    benzonatate (TESSALON) capsule 100 mg, 100 mg, Oral, TID PRN, Tom Manzo MD    atorvastatin (LIPITOR) tablet 40 mg, 40 mg, Oral, Daily, Rafael Martinez MD, 40 mg at 11/14/22 1028    ramipril (ALTACE) capsule 10 mg, 10 mg, Oral, Daily, Tom Manzo MD, 10 mg at 11/14/22 1028    mirtazapine (REMERON) tablet 30 mg, 30 mg, Oral, Nightly, Rafael Martinez MD, 30 mg at 11/13/22 2256    metoprolol succinate (TOPROL XL) extended release tablet 25 mg, 25 mg, Oral, Daily, Tom Manzo MD, 25 mg at 11/14/22 1028    vitamin D (CHOLECALCIFEROL) tablet 2,000 Units, 2,000 Units, Oral, Daily, Rafael Martinez MD, 2,000 Units at 11/14/22 1027    aspirin chewable tablet 81 mg, 81 mg, Oral, Daily, Rafael Martinez MD, 81 mg at 11/13/22 0839    enoxaparin (LOVENOX) injection 40 mg, 40 mg, SubCUTAneous, Daily, Rafael Martinez MD, 40 mg at 11/13/22 0839    ondansetron (ZOFRAN-ODT) disintegrating tablet 4 mg, 4 mg, Oral, Q8H PRN **OR** ondansetron (ZOFRAN) injection 4 mg, 4 mg, IntraVENous, Q6H PRN, Rafael Martinez MD    polyethylene glycol (GLYCOLAX) packet 17 g, 17 g, Oral, Daily PRN, Rafael Martinez MD    morphine (PF) injection 2 mg, 2 mg, IntraVENous, Q4H PRN, Rafael Martinez MD, 2 mg at 11/12/22 2047    insulin lispro (HUMALOG) injection vial 0-8 Units, 0-8 Units, SubCUTAneous, TID Deric LIN MD    insulin lispro (HUMALOG) injection vial 0-4 Units, 0-4 Units, SubCUTAneous, Nightly, Dennise Roldan MD    0.9% NaCl with KCl 20 mEq infusion, , IntraVENous, Continuous, Dennise Roldan MD, Last Rate: 75 mL/hr at 11/14/22 0541, Rate Verify at 11/14/22 0541    meclizine (ANTIVERT) tablet 25 mg, 25 mg, Oral, TID PRN, Dennise Roldan MD    sodium chloride flush 0.9 % injection 5-40 mL, 5-40 mL, IntraVENous, 2 times per day, Dennise Roldan MD, 10 mL at 11/14/22 1037    sodium chloride flush 0.9 % injection 5-40 mL, 5-40 mL, IntraVENous, PRN, Dennise Roldan MD    0.9 % sodium chloride infusion, , IntraVENous, PRN, Dennise Roldan MD    acetaminophen (TYLENOL) tablet 650 mg, 650 mg, Oral, Q6H PRN, 650 mg at 11/13/22 2256 **OR** acetaminophen (TYLENOL) suppository 650 mg, 650 mg, Rectal, Q6H PRN, Dennise Roldan MD    levothyroxine (SYNTHROID) tablet 175 mcg, 175 mcg, Oral, Once per day on Mon Tue Wed Thu Fri, Dennise Roldan MD, 175 mcg at 11/14/22 0534    levothyroxine (SYNTHROID) tablet 350 mcg, 350 mcg, Oral, Once per day on Sun Sat, Dennise Roldan MD, 350 mcg at 11/13/22 0670    CT CHEST WO CONTRAST   Final Result   1. Multiple spiculated soft tissue pulmonary nodules, largest in the   posterior costophrenic angle of the right lower lobe measuring 17 mm. Findings are compatible with pulmonary metastatic disease. Consider PET-CT   examination. 2.  Mild mediastinal and left axillary lymphadenopathy. 3.  Subtle sclerosis involving the superior endplate of T9, no other   definitive sclerotic lytic metastases. CT HEAD WO CONTRAST   Final Result   No acute intracranial abnormality. No significant interval changes since November 10. CT ABDOMEN PELVIS W IV CONTRAST Additional Contrast? None   Final Result   1. No indication for acute intraperitoneal or retroperitoneal process in the   abdomen or in the pelvis.       2.  Presence of growing nodule in the right lower lobe since the study of a   June 2022, presently measuring 13 x 14 mm, on the previous study of a June 2022 it measures 8 x 10 mm. 3.  Additional multiple pulmonary nodules seen lower lung bases which were   not covered on the previous study. 4.  Further evaluation with CT abdomen pelvis. The presence of multiple   pulmonary nodules can indicate hematogenous metastatic disease. Preliminary report given to Dr. Cj Black, ER Physician. XR CHEST PORTABLE   Final Result   1. There are no findings of failure or pneumonia. NM BONE SCAN WHOLE BODY    (Results Pending)   IR INTERVENTIONAL RADIOLOGY PROCEDURE REQUEST    (Results Pending)     80 yo female with a history of stage IIa (pT2N0) ER/NH positive, HER-2/anni invasive ductal  carcinoma right breast status post right lumpectomy and sentinel lymph node biopsy October 24, 2012. She was treated with adjuvant radiation therapy followed by anastrozole. Unsure how long she took the anastrozole. 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 She had a right mastectomy and axillary lymph node dissection November 3, 2020. She was seen outpatient by Dr. Corina Nassar 1/21/2021 after diagnosis of recurrence. Treatment recommendations were arimidex and surveillance. Patient did not follow-up after that appointment. Patient poor historian currently, uncertain of baseline. I attempted to call her son but there was no answer on his cell phone or home phone.   Attempting to determine if patient took her arimidex, not on outpatient medication list.    A/P  -clinical appearance of metastatic cancer  -bone scan, results pending  -CA 27.29, results pending  -continue arimidex 1 mg daily  -MRI brain likely needed and would be best to complete with contrast, creatinine elevated and patient just received IV contrast with CT abdomen, will check creatinine tomorrow and see if improves. Otherwise may complete without contrast.    We will follow and provide further treatment recommendations based on work-up. Thank you for allowing us to participate in the care of Emely Miranda. Sohla Murillo PA-C  [phone]    Electronically signed by MELINA Bal on 11/14/2022 at 2:57 PM    Note: This report was completed using [email or web address] voiced recognition software. Every effort has been made to ensure accuracy; however, inadvertent computerized transcription errors may be present.

## 2022-11-14 NOTE — CARE COORDINATION
Social Work/Case Management Transition of Care Planning (Vaishali Dupree [place] [phone]): Patient presented to the hospital with concerns of nausea and dizziness. CT of the head showed no acute intracranial abnormality. CT of the chest showed multiple spiculated soft tissue pulmonary nodules compatible with pulmonary metastatic disease. CT of the abdomen and pelvis was unremarkable. Per report the plan is for a lung biopsy but she must be off the Asprin x7 days prior to the biopsy. Her most recent dose was 11/13. Bone scan is pending. Met with patient at bedside. She reports she resides in a 1 story home with a 7 MICHAELA but there is also a ramp for entry. Her son, Reji Orellana, resides with her. Patient also has 2 daughters but reports she does not have contact with them. Her son assists with transportation, housekeeping, laundry, and meals. Patient reports she has aides to assist with bathing but is uncertain if she has Passport services. She has a FWW and a wheelchair in the home. PCP is Dr. Franca Dan. Pharmacy is [email or web address] on [facility]. No HHC or WALLY history. Patient reports she cannot take care of herself. She is agreeable to WALLY placement and choiced [facility] [place]. Referral sent to [facility]. CM/SW will follow. Vaishali Dupree, LSW  11/14/2022      The Plan for Transition of Care is related to the following treatment goals: WALLY    The Patient and/or patient representative  was provided with a choice of provider and agrees with the discharge plan. [x] Yes [] No    Freedom of choice list was provided with basic dialogue that supports the patient's individualized plan of care/goals, treatment preferences and shares the quality data associated with the providers.  [] Yes [x] No  Patient had choice without a list

## 2022-11-15 ENCOUNTER — APPOINTMENT (OUTPATIENT)
Dept: GENERAL RADIOLOGY | Age: 82
DRG: 181 | End: 2022-11-15
Payer: MEDICARE

## 2022-11-15 LAB
ANION GAP SERPL CALCULATED.3IONS-SCNC: 11 MMOL/L (ref 7–16)
BASOPHILS ABSOLUTE: 0.06 E9/L (ref 0–0.2)
BASOPHILS RELATIVE PERCENT: 0.9 % (ref 0–2)
BUN BLDV-MCNC: 8 MG/DL (ref 6–23)
CALCIUM SERPL-MCNC: 8.8 MG/DL (ref 8.6–10.2)
CHLORIDE BLD-SCNC: 118 MMOL/L (ref 98–107)
CO2: 18 MMOL/L (ref 22–29)
CREAT SERPL-MCNC: 1.3 MG/DL (ref 0.5–1)
EOSINOPHILS ABSOLUTE: 0.6 E9/L (ref 0.05–0.5)
EOSINOPHILS RELATIVE PERCENT: 8.8 % (ref 0–6)
GFR SERPL CREATININE-BSD FRML MDRD: 41 ML/MIN/1.73
GLUCOSE BLD-MCNC: 87 MG/DL (ref 74–99)
HCT VFR BLD CALC: 37.2 % (ref 34–48)
HEMOGLOBIN: 12.6 G/DL (ref 11.5–15.5)
IMMATURE GRANULOCYTES #: 0.02 E9/L
IMMATURE GRANULOCYTES %: 0.3 % (ref 0–5)
LYMPHOCYTES ABSOLUTE: 1.49 E9/L (ref 1.5–4)
LYMPHOCYTES RELATIVE PERCENT: 21.8 % (ref 20–42)
MCH RBC QN AUTO: 31.7 PG (ref 26–35)
MCHC RBC AUTO-ENTMCNC: 33.9 % (ref 32–34.5)
MCV RBC AUTO: 93.7 FL (ref 80–99.9)
METER GLUCOSE: 106 MG/DL (ref 74–99)
METER GLUCOSE: 118 MG/DL (ref 74–99)
METER GLUCOSE: 127 MG/DL (ref 74–99)
METER GLUCOSE: 83 MG/DL (ref 74–99)
MONOCYTES ABSOLUTE: 0.46 E9/L (ref 0.1–0.95)
MONOCYTES RELATIVE PERCENT: 6.7 % (ref 2–12)
NEUTROPHILS ABSOLUTE: 4.19 E9/L (ref 1.8–7.3)
NEUTROPHILS RELATIVE PERCENT: 61.5 % (ref 43–80)
PDW BLD-RTO: 15.2 FL (ref 11.5–15)
PLATELET # BLD: 153 E9/L (ref 130–450)
PMV BLD AUTO: 10 FL (ref 7–12)
POTASSIUM SERPL-SCNC: 4.5 MMOL/L (ref 3.5–5)
RBC # BLD: 3.97 E12/L (ref 3.5–5.5)
SODIUM BLD-SCNC: 147 MMOL/L (ref 132–146)
WBC # BLD: 6.8 E9/L (ref 4.5–11.5)

## 2022-11-15 PROCEDURE — 80048 BASIC METABOLIC PNL TOTAL CA: CPT

## 2022-11-15 PROCEDURE — 2580000003 HC RX 258: Performed by: FAMILY MEDICINE

## 2022-11-15 PROCEDURE — 1200000000 HC SEMI PRIVATE

## 2022-11-15 PROCEDURE — 74018 RADEX ABDOMEN 1 VIEW: CPT

## 2022-11-15 PROCEDURE — 85025 COMPLETE CBC W/AUTO DIFF WBC: CPT

## 2022-11-15 PROCEDURE — 6370000000 HC RX 637 (ALT 250 FOR IP): Performed by: INTERNAL MEDICINE

## 2022-11-15 PROCEDURE — 82962 GLUCOSE BLOOD TEST: CPT

## 2022-11-15 PROCEDURE — 97161 PT EVAL LOW COMPLEX 20 MIN: CPT

## 2022-11-15 PROCEDURE — 82330 ASSAY OF CALCIUM: CPT

## 2022-11-15 PROCEDURE — 6370000000 HC RX 637 (ALT 250 FOR IP): Performed by: PHYSICIAN ASSISTANT

## 2022-11-15 PROCEDURE — 97530 THERAPEUTIC ACTIVITIES: CPT

## 2022-11-15 PROCEDURE — 6370000000 HC RX 637 (ALT 250 FOR IP): Performed by: FAMILY MEDICINE

## 2022-11-15 PROCEDURE — 6360000002 HC RX W HCPCS: Performed by: FAMILY MEDICINE

## 2022-11-15 PROCEDURE — 36415 COLL VENOUS BLD VENIPUNCTURE: CPT

## 2022-11-15 PROCEDURE — 51798 US URINE CAPACITY MEASURE: CPT

## 2022-11-15 PROCEDURE — 2580000003 HC RX 258: Performed by: HOSPITALIST

## 2022-11-15 RX ORDER — METOCLOPRAMIDE HYDROCHLORIDE 5 MG/ML
5 INJECTION INTRAMUSCULAR; INTRAVENOUS EVERY 6 HOURS PRN
Status: DISCONTINUED | OUTPATIENT
Start: 2022-11-15 | End: 2022-11-15

## 2022-11-15 RX ORDER — SODIUM CHLORIDE 450 MG/100ML
INJECTION, SOLUTION INTRAVENOUS CONTINUOUS
Status: DISCONTINUED | OUTPATIENT
Start: 2022-11-15 | End: 2022-11-19 | Stop reason: HOSPADM

## 2022-11-15 RX ORDER — ONDANSETRON 2 MG/ML
4 INJECTION INTRAMUSCULAR; INTRAVENOUS EVERY 4 HOURS PRN
Status: DISCONTINUED | OUTPATIENT
Start: 2022-11-15 | End: 2022-11-19 | Stop reason: HOSPADM

## 2022-11-15 RX ORDER — METOCLOPRAMIDE HYDROCHLORIDE 5 MG/ML
5 INJECTION INTRAMUSCULAR; INTRAVENOUS EVERY 8 HOURS PRN
Status: DISCONTINUED | OUTPATIENT
Start: 2022-11-15 | End: 2022-11-19 | Stop reason: HOSPADM

## 2022-11-15 RX ORDER — ONDANSETRON 4 MG/1
4 TABLET, ORALLY DISINTEGRATING ORAL EVERY 8 HOURS PRN
Status: DISCONTINUED | OUTPATIENT
Start: 2022-11-15 | End: 2022-11-19 | Stop reason: HOSPADM

## 2022-11-15 RX ADMIN — LEVOTHYROXINE SODIUM 175 MCG: 0.05 TABLET ORAL at 05:25

## 2022-11-15 RX ADMIN — SODIUM CHLORIDE, PRESERVATIVE FREE 10 ML: 5 INJECTION INTRAVENOUS at 20:57

## 2022-11-15 RX ADMIN — ENOXAPARIN SODIUM 40 MG: 100 INJECTION SUBCUTANEOUS at 11:31

## 2022-11-15 RX ADMIN — ANASTROZOLE 1 MG: 1 TABLET ORAL at 13:33

## 2022-11-15 RX ADMIN — MIRTAZAPINE 30 MG: 15 TABLET, FILM COATED ORAL at 20:56

## 2022-11-15 RX ADMIN — METOPROLOL SUCCINATE 25 MG: 25 TABLET, EXTENDED RELEASE ORAL at 11:33

## 2022-11-15 RX ADMIN — Medication 2000 UNITS: at 13:34

## 2022-11-15 RX ADMIN — RAMIPRIL 10 MG: 5 CAPSULE ORAL at 13:33

## 2022-11-15 RX ADMIN — ONDANSETRON 4 MG: 4 TABLET, ORALLY DISINTEGRATING ORAL at 08:49

## 2022-11-15 RX ADMIN — ATORVASTATIN CALCIUM 40 MG: 40 TABLET, FILM COATED ORAL at 13:33

## 2022-11-15 RX ADMIN — SODIUM CHLORIDE: 4.5 INJECTION, SOLUTION INTRAVENOUS at 13:40

## 2022-11-15 RX ADMIN — ASPIRIN 81 MG 81 MG: 81 TABLET ORAL at 13:33

## 2022-11-15 RX ADMIN — SODIUM CHLORIDE, PRESERVATIVE FREE 10 ML: 5 INJECTION INTRAVENOUS at 11:33

## 2022-11-15 ASSESSMENT — PAIN SCALES - GENERAL: PAINLEVEL_OUTOF10: 0

## 2022-11-15 NOTE — PROGRESS NOTES
Subjective:  Chart reviewed  Emesis today per RN  Patient denies, confused and poor historian    Objective:    /79   Pulse 84   Temp 97.8 °F (36.6 °C) (Oral)   Resp 20   Ht 5' 4\" (1.626 m)   Wt 175 lb 6.4 oz (79.6 kg)   LMP  (LMP Unknown)   SpO2 96%   BMI 30.11 kg/m²     General: NAD, alert, confused  HEENT: normocephalic/atraumatic, mucosa dry, EOMI, sclera anicteric, conjuntiva pink  NECK: supple, trachea midline  Heart:  RRR, no murmurs, gallops, or rubs.   Lungs:  wheezing right anterior lung, no wheeze, rales or rhonchi on left  Abd: BS present, mildly tender right abdomen, nondistended, no masses  Extrem:  trace edema of LE bilaterally, No clubbing, cyanosis  : deferred  Skin: Intact, no petechia or purpura    CBC with Differential:    Lab Results   Component Value Date/Time    WBC 6.8 11/15/2022 05:08 AM    RBC 3.97 11/15/2022 05:08 AM    HGB 12.6 11/15/2022 05:08 AM    HCT 37.2 11/15/2022 05:08 AM     11/15/2022 05:08 AM    MCV 93.7 11/15/2022 05:08 AM    MCH 31.7 11/15/2022 05:08 AM    MCHC 33.9 11/15/2022 05:08 AM    RDW 15.2 11/15/2022 05:08 AM    LYMPHOPCT 21.8 11/15/2022 05:08 AM    MONOPCT 6.7 11/15/2022 05:08 AM    BASOPCT 0.9 11/15/2022 05:08 AM    MONOSABS 0.46 11/15/2022 05:08 AM    LYMPHSABS 1.49 11/15/2022 05:08 AM    EOSABS 0.60 11/15/2022 05:08 AM    BASOSABS 0.06 11/15/2022 05:08 AM     CMP:    Lab Results   Component Value Date/Time     11/15/2022 05:08 AM    K 4.5 11/15/2022 05:08 AM    K 3.7 11/12/2022 06:51 AM     11/15/2022 05:08 AM    CO2 18 11/15/2022 05:08 AM    BUN 8 11/15/2022 05:08 AM    CREATININE 1.3 11/15/2022 05:08 AM    GFRAA 52 06/10/2022 06:02 AM    LABGLOM 41 11/15/2022 05:08 AM    GLUCOSE 87 11/15/2022 05:08 AM    PROT 6.9 11/12/2022 06:51 AM    LABALBU 3.3 11/12/2022 06:51 AM    CALCIUM 8.8 11/15/2022 05:08 AM    BILITOT 1.1 11/12/2022 06:51 AM    ALKPHOS 88 11/12/2022 06:51 AM    AST 18 11/12/2022 06:51 AM    ALT 5 11/12/2022 06:51 AM Current Facility-Administered Medications:     0.45 % sodium chloride infusion, , IntraVENous, Continuous, Lynette Perla MD, Last Rate: 100 mL/hr at 11/15/22 1340, New Bag at 11/15/22 1340    ondansetron (ZOFRAN-ODT) disintegrating tablet 4 mg, 4 mg, Oral, Q8H PRN **OR** ondansetron (ZOFRAN) injection 4 mg, 4 mg, IntraVENous, Q4H PRN, Lynette Perla MD    promethazine (PHENERGAN) 12.5mg in sodium chloride 0.9% 50 mL IVPB 12.5 mg, 12.5 mg, IntraVENous, Q4H PRN, Lynette Perla MD    magnesium sulfate 1000 mg in dextrose 5% 100 mL IVPB, 1,000 mg, IntraVENous, PRN, PABLO Newby - CNP, Stopped at 11/14/22 1814    anastrozole (ARIMIDEX) tablet 1 mg, 1 mg, Oral, Daily, MELINA Solomon, 1 mg at 11/15/22 1333    albuterol (PROVENTIL) nebulizer solution 2.5 mg, 2.5 mg, Nebulization, Q4H PRN, PABLO Green - CNS    ipratropium-albuterol (DUONEB) nebulizer solution 1 ampule, 1 ampule, Inhalation, Q4H PRN, Bernard Lewis MD    benzonatate (TESSALON) capsule 100 mg, 100 mg, Oral, TID PRN, Bernard Lewis MD    atorvastatin (LIPITOR) tablet 40 mg, 40 mg, Oral, Daily, Nash Becker MD, 40 mg at 11/15/22 1333    ramipril (ALTACE) capsule 10 mg, 10 mg, Oral, Daily, Bernard Lewis MD, 10 mg at 11/15/22 1333    mirtazapine (REMERON) tablet 30 mg, 30 mg, Oral, Nightly, Nash Becker MD, 30 mg at 11/14/22 2017    metoprolol succinate (TOPROL XL) extended release tablet 25 mg, 25 mg, Oral, Daily, Bernard Lewis MD, 25 mg at 11/15/22 1133    vitamin D (CHOLECALCIFEROL) tablet 2,000 Units, 2,000 Units, Oral, Daily, Nash Becker MD, 2,000 Units at 11/15/22 1334    aspirin chewable tablet 81 mg, 81 mg, Oral, Daily, Nash Becker MD, 81 mg at 11/15/22 1333    enoxaparin (LOVENOX) injection 40 mg, 40 mg, SubCUTAneous, Daily, Nash Becker MD, 40 mg at 11/15/22 1131    polyethylene glycol (GLYCOLAX) packet 17 g, 17 g, Oral, Daily PRN, Nash Becker MD    morphine (PF) injection 2 mg, 2 mg, IntraVENous, Q4H PRN, Nikita Hawthorne MD, 2 mg at 11/12/22 2047    insulin lispro (HUMALOG) injection vial 0-8 Units, 0-8 Units, SubCUTAneous, TID WC, Deric Wilkins MD    insulin lispro (HUMALOG) injection vial 0-4 Units, 0-4 Units, SubCUTAneous, Nightly, Deric Rayo MD    meclizine (ANTIVERT) tablet 25 mg, 25 mg, Oral, TID PRN, Nikita Hawthorne MD    sodium chloride flush 0.9 % injection 5-40 mL, 5-40 mL, IntraVENous, 2 times per day, Nikita Hawthorne MD, 10 mL at 11/15/22 1133    sodium chloride flush 0.9 % injection 5-40 mL, 5-40 mL, IntraVENous, PRN, Deric Wilkins MD    0.9 % sodium chloride infusion, , IntraVENous, PRN, Nikita Hawthorne MD    acetaminophen (TYLENOL) tablet 650 mg, 650 mg, Oral, Q6H PRN, 650 mg at 11/14/22 2017 **OR** acetaminophen (TYLENOL) suppository 650 mg, 650 mg, Rectal, Q6H PRN, Nikita Hawthorne MD    levothyroxine (SYNTHROID) tablet 175 mcg, 175 mcg, Oral, Once per day on Mon Tue Wed Thu Fri, Nikita Hawthorne MD, 175 mcg at 11/15/22 2530    levothyroxine (SYNTHROID) tablet 350 mcg, 350 mcg, Oral, Once per day on Sun Sat, Nikita Hawthorne MD, 350 mcg at 11/13/22 0625    NM BONE SCAN WHOLE BODY   Final Result   Findings compatible with degenerative changes    Findings suspicious for metastatic disease. CT CHEST WO CONTRAST   Final Result   1. Multiple spiculated soft tissue pulmonary nodules, largest in the   posterior costophrenic angle of the right lower lobe measuring 17 mm. Findings are compatible with pulmonary metastatic disease. Consider PET-CT   examination. 2.  Mild mediastinal and left axillary lymphadenopathy. 3.  Subtle sclerosis involving the superior endplate of T9, no other   definitive sclerotic lytic metastases. CT HEAD WO CONTRAST   Final Result   No acute intracranial abnormality. No significant interval changes since November 10.          CT ABDOMEN PELVIS W IV CONTRAST Additional Contrast? None   Final Result   1. No indication for acute intraperitoneal or retroperitoneal process in the   abdomen or in the pelvis. 2.  Presence of growing nodule in the right lower lobe since the study of a   June 2022, presently measuring 13 x 14 mm, on the previous study of a June 2022 it measures 8 x 10 mm. 3.  Additional multiple pulmonary nodules seen lower lung bases which were   not covered on the previous study. 4.  Further evaluation with CT abdomen pelvis. The presence of multiple   pulmonary nodules can indicate hematogenous metastatic disease. Preliminary report given to Dr. Doris King, ER Physician. XR CHEST PORTABLE   Final Result   1. There are no findings of failure or pneumonia. IR INTERVENTIONAL RADIOLOGY PROCEDURE REQUEST    (Results Pending)     82 yo female with a history of stage IIa (pT2N0) ER/PA positive, HER-2/anni invasive ductal  carcinoma right breast status post right lumpectomy and sentinel lymph node biopsy October 24, 2012. She was treated with adjuvant radiation therapy followed by anastrozole. Unsure how long she took the anastrozole. 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 She had a right mastectomy and axillary lymph node dissection November 3, 2020. She was seen outpatient by Dr. Racheal Uribe 1/21/2021 after diagnosis of recurrence. Treatment recommendations were arimidex and surveillance. Patient did not follow-up after that appointment.     A/P  -clinical appearance of metastatic cancer  -bone scan, several areas concerning for bone metastasis  -CA 27.29, results pending  -continue arimidex 1 mg daily  -nausea, check KUB  -bladder scan, bladder appeared distended on CT abd/pelvis  -phenergan prn ordered, added reglan IV also if needed  -MRI brain likely needed and would be best to complete with contrast, creatinine elevated and patient just received IV contrast with CT abdomen, will check creatinine tomorrow and see if improves. Otherwise may complete without contrast.    We will follow and provide further treatment recommendations based on work-up. Thank you for allowing us to participate in the care of Cesar Valencia. Annabella Walker PA-C  [phone]    Electronically signed by MELINA Glez on 11/15/2022 at 2:28 PM    Note: This report was completed using [employer] voiced recognition software. Every effort has been made to ensure accuracy; however, inadvertent computerized transcription errors may be present.

## 2022-11-15 NOTE — PROGRESS NOTES
Complains of nausea and vomiting    Additional Respiratory Assessments  Heart Rate: 84  Resp: 20  SpO2: 96 %      Current Facility-Administered Medications:     0.45 % sodium chloride infusion, , IntraVENous, Continuous, Lynette Perla MD    ondansetron (ZOFRAN-ODT) disintegrating tablet 4 mg, 4 mg, Oral, Q8H PRN **OR** ondansetron (ZOFRAN) injection 4 mg, 4 mg, IntraVENous, Q4H PRN, Lynette Perla MD    promethazine (PHENERGAN) 12.5mg in sodium chloride 0.9% 50 mL IVPB 12.5 mg, 12.5 mg, IntraVENous, Q4H PRN, Lynette Perla MD    magnesium sulfate 1000 mg in dextrose 5% 100 mL IVPB, 1,000 mg, IntraVENous, PRN, PABLO Fry - CNP, Stopped at 11/14/22 1814    anastrozole (ARIMIDEX) tablet 1 mg, 1 mg, Oral, Daily, MELINA Li, 1 mg at 11/14/22 1028    albuterol (PROVENTIL) nebulizer solution 2.5 mg, 2.5 mg, Nebulization, Q4H PRN, PABLO Smith - CNS    ipratropium-albuterol (DUONEB) nebulizer solution 1 ampule, 1 ampule, Inhalation, Q4H PRN, Марина Souza MD    benzonatate (TESSALON) capsule 100 mg, 100 mg, Oral, TID PRN, Марина Souza MD    atorvastatin (LIPITOR) tablet 40 mg, 40 mg, Oral, Daily, Deric Wilkins MD, 40 mg at 11/14/22 1028    ramipril (ALTACE) capsule 10 mg, 10 mg, Oral, Daily, Марина Souza MD, 10 mg at 11/14/22 1028    mirtazapine (REMERON) tablet 30 mg, 30 mg, Oral, Nightly, Mariela Celis MD, 30 mg at 11/14/22 2017    metoprolol succinate (TOPROL XL) extended release tablet 25 mg, 25 mg, Oral, Daily, Марина Souza MD, 25 mg at 11/15/22 1133    vitamin D (CHOLECALCIFEROL) tablet 2,000 Units, 2,000 Units, Oral, Daily, Mariela Celis MD, 2,000 Units at 11/14/22 1027    aspirin chewable tablet 81 mg, 81 mg, Oral, Daily, Mariela Celis MD, 81 mg at 11/13/22 0839    enoxaparin (LOVENOX) injection 40 mg, 40 mg, SubCUTAneous, Daily, Mariela Celis MD, 40 mg at 11/15/22 1131    polyethylene glycol (GLYCOLAX) packet 17 g, 17 g, Oral, Daily PRN, Imer Frank MD    morphine (PF) injection 2 mg, 2 mg, IntraVENous, Q4H PRN, Imer Frank MD, 2 mg at 11/12/22 2047    insulin lispro (HUMALOG) injection vial 0-8 Units, 0-8 Units, SubCUTAneous, TID WC, Deric Espinosa MD    insulin lispro (HUMALOG) injection vial 0-4 Units, 0-4 Units, SubCUTAneous, Nightly, Deric Espinosa MD    meclizine (ANTIVERT) tablet 25 mg, 25 mg, Oral, TID PRN, Imer Frank MD    sodium chloride flush 0.9 % injection 5-40 mL, 5-40 mL, IntraVENous, 2 times per day, Imer Frank MD, 10 mL at 11/14/22 2018    sodium chloride flush 0.9 % injection 5-40 mL, 5-40 mL, IntraVENous, PRN, Imer Frank MD    0.9 % sodium chloride infusion, , IntraVENous, PRN, Imer Frank MD    acetaminophen (TYLENOL) tablet 650 mg, 650 mg, Oral, Q6H PRN, 650 mg at 11/14/22 2017 **OR** acetaminophen (TYLENOL) suppository 650 mg, 650 mg, Rectal, Q6H PRN, Imer Frank MD    levothyroxine (SYNTHROID) tablet 175 mcg, 175 mcg, Oral, Once per day on Mon Tue Wed Thu Fri, Imer Frank MD, 175 mcg at 11/15/22 7226    levothyroxine (SYNTHROID) tablet 350 mcg, 350 mcg, Oral, Once per day on Sun Sat, Imer Frank MD, 350 mcg at 11/13/22 0625  /79   Pulse 84   Temp 97.8 °F (36.6 °C) (Oral)   Resp 20   Ht 5' 4\" (1.626 m)   Wt 175 lb 6.4 oz (79.6 kg)   LMP  (LMP Unknown)   SpO2 96%   BMI 30.11 kg/m²     General: No distress  Chest: Symmetric, no accessory use  Heart: RRR  Lungs: CTA  Abdomen: Soft, nt  Extremities: 2+ ble edema    Intake/Output Summary (Last 24 hours) at 11/15/2022 1152  Last data filed at 11/15/2022 0540  Gross per 24 hour   Intake 1628.8 ml   Output 200 ml   Net 1428.8 ml     CBC with Differential:    Lab Results   Component Value Date/Time    WBC 6.8 11/15/2022 05:08 AM    RBC 3.97 11/15/2022 05:08 AM    HGB 12.6 11/15/2022 05:08 AM    HCT 37.2 11/15/2022 05:08 AM     11/15/2022 05:08 AM    MCV 93.7 11/15/2022 05:08 AM MCH 31.7 11/15/2022 05:08 AM    MCHC 33.9 11/15/2022 05:08 AM    RDW 15.2 11/15/2022 05:08 AM    LYMPHOPCT 21.8 11/15/2022 05:08 AM    MONOPCT 6.7 11/15/2022 05:08 AM    BASOPCT 0.9 11/15/2022 05:08 AM    MONOSABS 0.46 11/15/2022 05:08 AM    LYMPHSABS 1.49 11/15/2022 05:08 AM    EOSABS 0.60 11/15/2022 05:08 AM    BASOSABS 0.06 11/15/2022 05:08 AM     BMP:    Lab Results   Component Value Date/Time     11/15/2022 05:08 AM    K 4.5 11/15/2022 05:08 AM    K 3.7 11/12/2022 06:51 AM     11/15/2022 05:08 AM    CO2 18 11/15/2022 05:08 AM    BUN 8 11/15/2022 05:08 AM    LABALBU 3.3 11/12/2022 06:51 AM    CREATININE 1.3 11/15/2022 05:08 AM    CALCIUM 8.8 11/15/2022 05:08 AM    GFRAA 52 06/10/2022 06:02 AM    LABGLOM 41 11/15/2022 05:08 AM    GLUCOSE 87 11/15/2022 05:08 AM       IMPRESSION:   Lung nodules will get op biopsy and pet ct.   Not able to be done as an inpatient due to recent use of aspirin  Pulmonary will sign off, follow-up as an outpatient in 2 to 3 weeks  Alejandro Chaudhari MD  11/15/2022  11:52 AM

## 2022-11-15 NOTE — PROGRESS NOTES
Hospitalist Progress Note      PCP: Evette Cassidy MD    Date of Admission: 11/12/2022      Hospital Course: This is an 76-year-old female with past medical history of right breast cancer, COPD, RA, type 2 diabetes mellitus who presented with nausea, dizziness. On arrival to the emergency department, CT head showed no acute intracranial abnormality; CT chest showed multiple spiculated soft tissue pulmonary nodules compatible with metastatic disease. Subjective: Pt was seen and examined at bedside. No acute event overnight; denies any CP, SOB or palpitation. Medications:  Reviewed    Infusion Medications    0.9% NaCl with KCl 20 mEq 75 mL/hr at 11/15/22 0540    sodium chloride       Scheduled Medications    anastrozole  1 mg Oral Daily    atorvastatin  40 mg Oral Daily    ramipril  10 mg Oral Daily    mirtazapine  30 mg Oral Nightly    metoprolol succinate  25 mg Oral Daily    vitamin D  2,000 Units Oral Daily    aspirin  81 mg Oral Daily    enoxaparin  40 mg SubCUTAneous Daily    insulin lispro  0-8 Units SubCUTAneous TID WC    insulin lispro  0-4 Units SubCUTAneous Nightly    sodium chloride flush  5-40 mL IntraVENous 2 times per day    levothyroxine  175 mcg Oral Once per day on Mon Tue Wed Thu Fri    levothyroxine  350 mcg Oral Once per day on Sun Sat     PRN Meds: magnesium sulfate, albuterol, ipratropium-albuterol, benzonatate, ondansetron **OR** ondansetron, polyethylene glycol, morphine, meclizine, sodium chloride flush, sodium chloride, acetaminophen **OR** acetaminophen      Intake/Output Summary (Last 24 hours) at 11/15/2022 1102  Last data filed at 11/15/2022 0540  Gross per 24 hour   Intake 1628.8 ml   Output 200 ml   Net 1428.8 ml       Exam:    /79   Pulse 84   Temp 97.8 °F (36.6 °C) (Oral)   Resp 20   Ht 5' 4\" (1.626 m)   Wt 175 lb 6.4 oz (79.6 kg)   LMP  (LMP Unknown)   SpO2 96%   BMI 30.11 kg/m²     General appearance: Sitting comfortably in bed.  No apparent distress. Respiratory: Clear to auscultation bilaterally. Cardiovascular: Normal S1/S2. Regular rhythm and rate. Abdomen: Soft, non-tender, non-distended with normal bowel sounds. Musculoskeletal: No clubbing, cyanosis or edema bilaterally. Skin: Skin color, texture, turgor normal.  No rashes or lesions. Neurologic:  No focal deficit. Psychiatric: Alert and oriented, thought content appropriate, normal insight  Peripheral Pulses: +2 palpable, equal bilaterally       Labs:   Recent Labs     11/13/22  0443 11/14/22  0502 11/15/22  0508   WBC 5.4 6.3 6.8   HGB 11.9 10.7* 12.6   HCT 33.8* 31.7* 37.2    152 153     Recent Labs     11/13/22  0443 11/14/22  0502 11/15/22  0508    141 147*   K 3.5 4.0 4.5    113* 118*   CO2 20* 18* 18*   BUN 7 8 8   CREATININE 1.3* 1.7* 1.3*   CALCIUM 8.9 8.4* 8.8   PHOS  --  2.8  --      No results for input(s): AST, ALT, BILIDIR, BILITOT, ALKPHOS in the last 72 hours. Recent Labs     11/14/22  0910   INR 1.1     No results for input(s): Hamp Memory in the last 72 hours. Radiology:  NM BONE SCAN WHOLE BODY   Final Result   Findings compatible with degenerative changes    Findings suspicious for metastatic disease. CT CHEST WO CONTRAST   Final Result   1. Multiple spiculated soft tissue pulmonary nodules, largest in the   posterior costophrenic angle of the right lower lobe measuring 17 mm. Findings are compatible with pulmonary metastatic disease. Consider PET-CT   examination. 2.  Mild mediastinal and left axillary lymphadenopathy. 3.  Subtle sclerosis involving the superior endplate of T9, no other   definitive sclerotic lytic metastases. CT HEAD WO CONTRAST   Final Result   No acute intracranial abnormality. No significant interval changes since November 10. CT ABDOMEN PELVIS W IV CONTRAST Additional Contrast? None   Final Result   1.   No indication for acute intraperitoneal or retroperitoneal process in the abdomen or in the pelvis. 2.  Presence of growing nodule in the right lower lobe since the study of a   June 2022, presently measuring 13 x 14 mm, on the previous study of a June 2022 it measures 8 x 10 mm. 3.  Additional multiple pulmonary nodules seen lower lung bases which were   not covered on the previous study. 4.  Further evaluation with CT abdomen pelvis. The presence of multiple   pulmonary nodules can indicate hematogenous metastatic disease. Preliminary report given to Dr. Miguel Jane, ER Physician. XR CHEST PORTABLE   Final Result   1. There are no findings of failure or pneumonia. IR INTERVENTIONAL RADIOLOGY PROCEDURE REQUEST    (Results Pending)             Active Hospital Problems    Diagnosis Date Noted    Right lower lobe lung mass [R91.8] 11/12/2022     Priority: Medium       Assessment  Metastatic multiple right lower lung lobe nodules - suspicious for metastatic disease  History of right breast cancer  Dizziness - CT head was negative for acute intracranial abnormality; needs to rule out brain mets  Suspected T9 lytic lesion  Type 2 diabetes mellitus  Hypertension  Asymptomatic bacteriuria  Stage III CKD - stable creatinine  COPD -no evidence of acute exacerbation  History of CVA  Dementia  Hypernatremia  Hypomagnesemia  Physical deconditioning  Hypothyroidism  Moderate protein calorie malnutrition      Plan:  Continue with PRN analgesia and antiemetics  On Arimidex  MRI brain with and without contrast per hematologist  Changed IV fluid to half-normal saline  Monitor renal function, replete electrolytes as needed  On ASA, statin  Continue with Ramipril, Toprol-XL  Activity as tolerated, fall precautions      DVT Prophylaxis: Lovenox  Diet: ADULT DIET;  Regular; 5 carb choices (75 gm/meal)  Code Status: Full Code    PT/OT Eval Status: Ongoing    Dispo - TBD    [facility] Heron Mares MD 11/15/2022 11:02 AM

## 2022-11-15 NOTE — PROGRESS NOTES
Physical Therapy  Physical Therapy Initial Assessment     Name: Mikaela Singer  : [id number]  MRN: [id number]      Date of Service: 11/15/2022    Evaluating PT:  Eugene Razo, PT, DPT [id number]    Room #:  8484/9445-S  Diagnosis:  Right lower lobe lung mass [R91.8]  PMHx/PSHx:    Past Medical History:   Diagnosis Date    Asthma     Blood transfusion     Breast cancer ([facility]     COPD (chronic obstructive pulmonary disease) ([facility]     Diabetes mellitus ([facility]     Diabetic peripheral neuropathy ([facility]     DM type 2 (diabetes mellitus, type 2) ([facility]     Hypertension     Hypothyroidism     Irritable bowel syndrome     Mixed hyperlipidemia 2020    Obesity (BMI 30-39. 9)     Osteoarthritis     rheumatoid and osteo    PVD (peripheral vascular disease) ([facility] 2022    RA (rheumatoid arthritis) ([facility])     RBBB     Recurrent breast cancer, right ([facility] 2022    Vitamin B12 deficiency 2022     Precautions:  Fall risk, Alarms, Cognition, Pure Wick, IV  Equipment Needs:  TBD    SUBJECTIVE:    Pt lives with son in a one story home with ramp to enter. Per patient, she ambulated very short distances and pivots with FWW PTA. She self propels in [facility] within home and community. Equipment Owned: FWW, WC, Shower Chair    OBJECTIVE:   Initial Evaluation  Date: 11/15/22 Treatment Short Term/ Long Term   Goals   AM-PAC 6 Clicks 68/74     Was pt agreeable to Eval/treatment? Yes     Does pt have pain? No complaints     Bed Mobility  Rolling: SBA  Supine to sit: Min A  Sit to supine: Min A  Scooting: Min A  Supervision   Transfers Sit to stand: Mod A  Stand to sit: Min A  Stand pivot: NT  SBA to/with AAD   Ambulation    2 feet side stepping with HHA Mod A  >10 feet with FWW SBA   Stair negotiation: ascended and descended  NT  No functional goal needed   ROM BUE:  WFL  BLE:  WFL     Strength BUE:  Refer to OT  BLE:  4+/5  4+/5   Balance Sitting EOB:  CGA static, Min A dynamic  Dynamic Standing:   Mod A with HHA   *FWW deferred due to confusion  Sitting EOB:  Supervision  Dynamic Standing:  SBA with AAD     Pt is A & O x 2 (month, self, year) patient did not name of hospital, continued to ask for up to restroom forgetting pure wick was connected, poor awareness to scenario  Sensation:  Pt denies numbness and tingling to extremities  Edema:  unremarkable  Vitals: WNLs    Patient education  Pt educated on role and benefits of physical therapy, safety and technique for mobility    Patient response to education:   Pt verbalized understanding Pt demonstrated skill Pt requires further education in this area   x x x     ASSESSMENT:    Conditions Requiring Skilled Therapeutic Intervention:    [x]Decreased strength     []Decreased ROM  [x]Decreased functional mobility  [x]Decreased balance   [x]Decreased endurance   [x]Decreased posture  []Decreased sensation  []Decreased coordination   []Decreased vision  [x]Decreased safety awareness   []Increased pain       Comments:  Patient deemed medically stable prior to therapy evaluation. Patient was supine upon therapy arrival and provided consent to evaluation. Mild assist for trunk with bed mobility to sitting, LE assist to return to supine. Patient did tolerate upright sitting EOB with initial symptoms of dizziness that resolved with time. Standing performed without FWW, as patient became nauseous, so quick transfer and side step performed to change chucks and optimally align toward [facility]. Due to increased nausea and inability to communicate well throughout session was concluded. Patient left supine with all needs met and call light in reach for safety. Upon leaving continued questions to patient including, \"do you want to sit up higher, how are you feeling, are you dizzy? \" And patient continued to respond \"I don't know\". She would continue to benefit from skilled PT post DC in order to improve safety and mobility.     Treatment:  Patient practiced and was instructed in the following treatment:    Bed mobility training - pt given verbal and tactile cues to facilitate proper sequencing and safety during rolling and supine>sit as well as provided with physical assistance to complete task   Sitting EOB for >5 minutes for upright tolerance, postural awareness and BLE ROM  Transfer training - pt was given verbal and tactile cues to facilitate proper hand placement, technique and safety during sit to stand and stand to sit as well as provided with physical assistance. Gait training- pt was given verbal and tactile cues to facilitate safe side stepping technique during ambulation as well as provided with physical assistance. Pt's/ family goals   1. Return to PLOF    Prognosis is fair+ for reaching above PT goals. Patient and or family understand(s) diagnosis, prognosis, and plan of care.   Yes    PHYSICAL THERAPY PLAN OF CARE:    PT POC is established based on physician order and patient diagnosis     Referring provider/PT Order:    11/14/22 0645  PT eval and treat  Start:  11/14/22 0645,   End:  11/14/22 0645,   ONE TIME,   Standing Count:  1 Occurrences,   R             Diagnosis:  Right lower lobe lung mass [R91.8]  Specific instructions for next treatment:  progress ambulatory distance, up to chair for meals    Current Treatment Recommendations:     [x] Strengthening to improve independence with functional mobility   [] ROM to improve independence with functional mobility   [x] Balance Training to improve static/dynamic balance and to reduce fall risk  [x] Endurance Training to improve activity tolerance during functional mobility   [x] Transfer Training to improve safety and independence with all functional transfers   [x] Gait Training to improve gait mechanics, endurance and assess need for appropriate assistive device  [] Stair Training in preparation for safe discharge home and/or into the community   [] Positioning to prevent skin breakdown and contractures  [x] Safety and Education Training   [x] Patient/Caregiver Education   [] HEP  [] Other     PT long term treatment goals are located in above grid    Frequency of treatments: 2-5x/week x 1-2 weeks. Time in  1418  Time out  1445    Total Treatment Time  13 minutes     Evaluation Time includes thorough review of current medical information, gathering information on past medical history/social history and prior level of function, completion of standardized testing/informal observation of tasks, assessment of data and education on plan of care and goals.     CPT codes:  [x] Low Complexity PT evaluation 20373  [] Moderate Complexity PT evaluation 58533  [] High Complexity PT evaluation 64581  [] PT Re-evaluation 41278  [] Gait training 97475 - minutes  [] Manual therapy 26577 - minutes  [x] Therapeutic activities 30599 13 minutes  [] Therapeutic exercises 21456 - minutes  [] Neuromuscular reeducation 46478 - minutes     Madhu Chang, PT, DPT [id number]

## 2022-11-16 LAB
AMORPHOUS: ABNORMAL
BACTERIA: ABNORMAL /HPF
BILIRUBIN URINE: NEGATIVE
BLOOD, URINE: ABNORMAL
CA 27.29: 40.7 U/ML
CALCIUM IONIZED: 1.18 MMOL/L (ref 1.15–1.33)
CLARITY: CLEAR
COLOR: YELLOW
EPITHELIAL CELLS, UA: ABNORMAL /HPF
GLUCOSE URINE: NEGATIVE MG/DL
KETONES, URINE: NEGATIVE MG/DL
LEUKOCYTE ESTERASE, URINE: ABNORMAL
METER GLUCOSE: 102 MG/DL (ref 74–99)
METER GLUCOSE: 143 MG/DL (ref 74–99)
METER GLUCOSE: 91 MG/DL (ref 74–99)
METER GLUCOSE: 95 MG/DL (ref 74–99)
NITRITE, URINE: NEGATIVE
PH UA: 6 (ref 5–9)
PROTEIN UA: NEGATIVE MG/DL
RBC UA: >20 /HPF (ref 0–2)
SPECIFIC GRAVITY UA: 1.01 (ref 1–1.03)
UROBILINOGEN, URINE: 0.2 E.U./DL
WBC UA: >20 /HPF (ref 0–5)

## 2022-11-16 PROCEDURE — 36415 COLL VENOUS BLD VENIPUNCTURE: CPT

## 2022-11-16 PROCEDURE — 1200000000 HC SEMI PRIVATE

## 2022-11-16 PROCEDURE — 81001 URINALYSIS AUTO W/SCOPE: CPT

## 2022-11-16 PROCEDURE — 87088 URINE BACTERIA CULTURE: CPT

## 2022-11-16 PROCEDURE — 2580000003 HC RX 258: Performed by: FAMILY MEDICINE

## 2022-11-16 PROCEDURE — 87040 BLOOD CULTURE FOR BACTERIA: CPT

## 2022-11-16 PROCEDURE — 6370000000 HC RX 637 (ALT 250 FOR IP): Performed by: FAMILY MEDICINE

## 2022-11-16 PROCEDURE — 6370000000 HC RX 637 (ALT 250 FOR IP): Performed by: PHYSICIAN ASSISTANT

## 2022-11-16 PROCEDURE — 82962 GLUCOSE BLOOD TEST: CPT

## 2022-11-16 PROCEDURE — 87186 SC STD MICRODIL/AGAR DIL: CPT

## 2022-11-16 PROCEDURE — 6360000002 HC RX W HCPCS: Performed by: FAMILY MEDICINE

## 2022-11-16 PROCEDURE — 2580000003 HC RX 258: Performed by: INTERNAL MEDICINE

## 2022-11-16 PROCEDURE — 87077 CULTURE AEROBIC IDENTIFY: CPT

## 2022-11-16 PROCEDURE — 2580000003 HC RX 258: Performed by: HOSPITALIST

## 2022-11-16 RX ORDER — 0.9 % SODIUM CHLORIDE 0.9 %
1000 INTRAVENOUS SOLUTION INTRAVENOUS ONCE
Status: COMPLETED | OUTPATIENT
Start: 2022-11-16 | End: 2022-11-16

## 2022-11-16 RX ORDER — 0.9 % SODIUM CHLORIDE 0.9 %
500 INTRAVENOUS SOLUTION INTRAVENOUS ONCE
Status: COMPLETED | OUTPATIENT
Start: 2022-11-16 | End: 2022-11-16

## 2022-11-16 RX ORDER — SENNA AND DOCUSATE SODIUM 50; 8.6 MG/1; MG/1
2 TABLET, FILM COATED ORAL 2 TIMES DAILY
Status: DISCONTINUED | OUTPATIENT
Start: 2022-11-16 | End: 2022-11-19 | Stop reason: HOSPADM

## 2022-11-16 RX ORDER — BISACODYL 10 MG
10 SUPPOSITORY, RECTAL RECTAL DAILY PRN
Status: DISCONTINUED | OUTPATIENT
Start: 2022-11-16 | End: 2022-11-19 | Stop reason: HOSPADM

## 2022-11-16 RX ADMIN — ACETAMINOPHEN 650 MG: 325 TABLET, FILM COATED ORAL at 10:26

## 2022-11-16 RX ADMIN — SODIUM CHLORIDE, PRESERVATIVE FREE 10 ML: 5 INJECTION INTRAVENOUS at 21:13

## 2022-11-16 RX ADMIN — MIRTAZAPINE 30 MG: 15 TABLET, FILM COATED ORAL at 21:13

## 2022-11-16 RX ADMIN — ANASTROZOLE 1 MG: 1 TABLET ORAL at 10:27

## 2022-11-16 RX ADMIN — SODIUM CHLORIDE 500 ML: 9 INJECTION, SOLUTION INTRAVENOUS at 21:16

## 2022-11-16 RX ADMIN — ATORVASTATIN CALCIUM 40 MG: 40 TABLET, FILM COATED ORAL at 10:27

## 2022-11-16 RX ADMIN — LEVOTHYROXINE SODIUM 175 MCG: 0.05 TABLET ORAL at 05:11

## 2022-11-16 RX ADMIN — SODIUM CHLORIDE, PRESERVATIVE FREE 10 ML: 5 INJECTION INTRAVENOUS at 10:30

## 2022-11-16 RX ADMIN — SODIUM CHLORIDE 1000 ML: 9 INJECTION, SOLUTION INTRAVENOUS at 17:16

## 2022-11-16 RX ADMIN — Medication 2000 UNITS: at 10:26

## 2022-11-16 RX ADMIN — SENNOSIDES AND DOCUSATE SODIUM 2 TABLET: 50; 8.6 TABLET ORAL at 21:13

## 2022-11-16 RX ADMIN — ENOXAPARIN SODIUM 40 MG: 100 INJECTION SUBCUTANEOUS at 10:27

## 2022-11-16 ASSESSMENT — PAIN DESCRIPTION - DESCRIPTORS: DESCRIPTORS: ACHING;TENDER;SORE

## 2022-11-16 ASSESSMENT — PAIN DESCRIPTION - LOCATION: LOCATION: LEG

## 2022-11-16 ASSESSMENT — PAIN SCALES - GENERAL
PAINLEVEL_OUTOF10: 0
PAINLEVEL_OUTOF10: 9

## 2022-11-16 ASSESSMENT — PAIN DESCRIPTION - ORIENTATION: ORIENTATION: RIGHT

## 2022-11-16 NOTE — PLAN OF CARE
Problem: Safety - Adult  Goal: Free from fall injury  Outcome: Progressing     Problem: Skin/Tissue Integrity  Goal: Absence of new skin breakdown  Description: 1. Monitor for areas of redness and/or skin breakdown  2. Assess vascular access sites hourly  3. Every 4-6 hours minimum:  Change oxygen saturation probe site  4. Every 4-6 hours:  If on nasal continuous positive airway pressure, respiratory therapy assess nares and determine need for appliance change or resting period.   Outcome: Progressing     Problem: Pain  Goal: Verbalizes/displays adequate comfort level or baseline comfort level  Outcome: Progressing     Problem: Chronic Conditions and Co-morbidities  Goal: Patient's chronic conditions and co-morbidity symptoms are monitored and maintained or improved  Outcome: Progressing

## 2022-11-16 NOTE — PROGRESS NOTES
Subjective:  Chart reviewed, no overnight events  Bladder scan 563 ml after 400 output before scan yesterday  Mendoza placed  Patient not feeling well but unable to communicate issue  Hypotensive currently, call to attending    Objective:    BP (!) 91/50   Pulse 80   Temp 97.5 °F (36.4 °C) (Oral)   Resp 20   Ht 5' 4\" (1.626 m)   Wt 175 lb 6.4 oz (79.6 kg)   LMP  (LMP Unknown)   SpO2 95%   BMI 30.11 kg/m²     General: NAD, alert, confused  HEENT: normocephalic/atraumatic, mucosa dry, EOMI, sclera anicteric, conjuntiva pink  Heart:  RRR  Lungs:  respirations nonlabored  Abd: BS present, nondistended  Extrem:  trace edema of LE bilaterally, No clubbing, cyanosis  : mendoza placed  Skin: Intact, no petechia or purpura    CBC with Differential:    Lab Results   Component Value Date/Time    WBC 6.8 11/15/2022 05:08 AM    RBC 3.97 11/15/2022 05:08 AM    HGB 12.6 11/15/2022 05:08 AM    HCT 37.2 11/15/2022 05:08 AM     11/15/2022 05:08 AM    MCV 93.7 11/15/2022 05:08 AM    MCH 31.7 11/15/2022 05:08 AM    MCHC 33.9 11/15/2022 05:08 AM    RDW 15.2 11/15/2022 05:08 AM    LYMPHOPCT 21.8 11/15/2022 05:08 AM    MONOPCT 6.7 11/15/2022 05:08 AM    BASOPCT 0.9 11/15/2022 05:08 AM    MONOSABS 0.46 11/15/2022 05:08 AM    LYMPHSABS 1.49 11/15/2022 05:08 AM    EOSABS 0.60 11/15/2022 05:08 AM    BASOSABS 0.06 11/15/2022 05:08 AM     CMP:    Lab Results   Component Value Date/Time     11/15/2022 05:08 AM    K 4.5 11/15/2022 05:08 AM    K 3.7 11/12/2022 06:51 AM     11/15/2022 05:08 AM    CO2 18 11/15/2022 05:08 AM    BUN 8 11/15/2022 05:08 AM    CREATININE 1.3 11/15/2022 05:08 AM    GFRAA 52 06/10/2022 06:02 AM    LABGLOM 41 11/15/2022 05:08 AM    GLUCOSE 87 11/15/2022 05:08 AM    PROT 6.9 11/12/2022 06:51 AM    LABALBU 3.3 11/12/2022 06:51 AM    CALCIUM 8.8 11/15/2022 05:08 AM    BILITOT 1.1 11/12/2022 06:51 AM    ALKPHOS 88 11/12/2022 06:51 AM    AST 18 11/12/2022 06:51 AM    ALT 5 11/12/2022 06:51 AM Current Facility-Administered Medications:     0.45 % sodium chloride infusion, , IntraVENous, Continuous, Lynette Perla MD, Last Rate: 100 mL/hr at 11/16/22 0631, Rate Verify at 11/16/22 0631    ondansetron (ZOFRAN-ODT) disintegrating tablet 4 mg, 4 mg, Oral, Q8H PRN **OR** ondansetron (ZOFRAN) injection 4 mg, 4 mg, IntraVENous, Q4H PRN, Lynette Perla MD    metoclopramide (REGLAN) injection 5 mg, 5 mg, IntraVENous, Q8H PRN, MELINA Ferreira    promethazine (PHENERGAN) 12.5 mg in sodium chloride 0.9 % 50 mL IVPB, 12.5 mg, IntraVENous, Q4H PRN, Lynette Perla MD    magnesium sulfate 1000 mg in dextrose 5% 100 mL IVPB, 1,000 mg, IntraVENous, PRN, PABLO Curry - CNP, Stopped at 11/14/22 1814    anastrozole (ARIMIDEX) tablet 1 mg, 1 mg, Oral, Daily, MELINA Ferreira, 1 mg at 11/16/22 1027    albuterol (PROVENTIL) nebulizer solution 2.5 mg, 2.5 mg, Nebulization, Q4H PRN, PABLO Manning - LACI    ipratropium-albuterol (DUONEB) nebulizer solution 1 ampule, 1 ampule, Inhalation, Q4H PRN, Janee Enciso MD    benzonatate (TESSALON) capsule 100 mg, 100 mg, Oral, TID PRN, Janee Enciso MD    atorvastatin (LIPITOR) tablet 40 mg, 40 mg, Oral, Daily, Ankita Orta MD, 40 mg at 11/16/22 1027    [Held by provider] ramipril (ALTACE) capsule 10 mg, 10 mg, Oral, Daily, Janee Enciso MD, 10 mg at 11/15/22 1333    mirtazapine (REMERON) tablet 30 mg, 30 mg, Oral, Nightly, Ankita Orta MD, 30 mg at 11/15/22 2056    [Held by provider] metoprolol succinate (TOPROL XL) extended release tablet 25 mg, 25 mg, Oral, Daily, Janee Enciso MD, 25 mg at 11/15/22 1133    vitamin D (CHOLECALCIFEROL) tablet 2,000 Units, 2,000 Units, Oral, Daily, Ankita Orta MD, 2,000 Units at 11/16/22 1026    [Held by provider] aspirin chewable tablet 81 mg, 81 mg, Oral, Daily, Ankita Orta MD, 81 mg at 11/15/22 1333    enoxaparin (LOVENOX) injection 40 mg, 40 mg, SubCUTAneous, Daily, Deric Wilkins, MD, 40 mg at 11/16/22 1027    polyethylene glycol (GLYCOLAX) packet 17 g, 17 g, Oral, Daily PRN, Michelle Shaffer MD    morphine (PF) injection 2 mg, 2 mg, IntraVENous, Q4H PRN, Michelle Shaffer MD, 2 mg at 11/12/22 2047    insulin lispro (HUMALOG) injection vial 0-8 Units, 0-8 Units, SubCUTAneous, TID WC, Michelle Shaffer MD    insulin lispro (HUMALOG) injection vial 0-4 Units, 0-4 Units, SubCUTAneous, Nightly, Deric Delgado MD    meclizine (ANTIVERT) tablet 25 mg, 25 mg, Oral, TID PRN, Michelle Shaffer MD    sodium chloride flush 0.9 % injection 5-40 mL, 5-40 mL, IntraVENous, 2 times per day, Michelle Shaffer MD, 10 mL at 11/16/22 1030    sodium chloride flush 0.9 % injection 5-40 mL, 5-40 mL, IntraVENous, PRN, Michelle Shaffer MD    0.9 % sodium chloride infusion, , IntraVENous, PRN, Michelle Shaffer MD    acetaminophen (TYLENOL) tablet 650 mg, 650 mg, Oral, Q6H PRN, 650 mg at 11/16/22 1026 **OR** acetaminophen (TYLENOL) suppository 650 mg, 650 mg, Rectal, Q6H PRN, Michelle Shaffer MD    levothyroxine (SYNTHROID) tablet 175 mcg, 175 mcg, Oral, Once per day on Mon Tue Wed Thu Fri, Michelle Shaffer MD, 175 mcg at 11/16/22 3121    levothyroxine (SYNTHROID) tablet 350 mcg, 350 mcg, Oral, Once per day on Sun Sat, Michelle Shaffer MD, 350 mcg at 11/13/22 0625    XR ABDOMEN (KUB) (SINGLE AP VIEW)   Final Result   Nonobstructive bowel gas pattern. Mild-to-moderate colonic stool burden. NM BONE SCAN WHOLE BODY   Final Result   Findings compatible with degenerative changes    Findings suspicious for metastatic disease. CT CHEST WO CONTRAST   Final Result   1. Multiple spiculated soft tissue pulmonary nodules, largest in the   posterior costophrenic angle of the right lower lobe measuring 17 mm. Findings are compatible with pulmonary metastatic disease. Consider PET-CT   examination. 2.  Mild mediastinal and left axillary lymphadenopathy.       3.  Subtle sclerosis involving the superior endplate of T9, no other   definitive sclerotic lytic metastases. CT HEAD WO CONTRAST   Final Result   No acute intracranial abnormality. No significant interval changes since November 10. CT ABDOMEN PELVIS W IV CONTRAST Additional Contrast? None   Final Result   1. No indication for acute intraperitoneal or retroperitoneal process in the   abdomen or in the pelvis. 2.  Presence of growing nodule in the right lower lobe since the study of a   June 2022, presently measuring 13 x 14 mm, on the previous study of a June 2022 it measures 8 x 10 mm. 3.  Additional multiple pulmonary nodules seen lower lung bases which were   not covered on the previous study. 4.  Further evaluation with CT abdomen pelvis. The presence of multiple   pulmonary nodules can indicate hematogenous metastatic disease. Preliminary report given to Dr. Charissa Flynn, ER Physician. XR CHEST PORTABLE   Final Result   1. There are no findings of failure or pneumonia. IR INTERVENTIONAL RADIOLOGY PROCEDURE REQUEST    (Results Pending)   MRI BRAIN W WO CONTRAST    (Results Pending)     80 yo female with a history of stage IIa (pT2N0) ER/AK positive, HER-2/anni invasive ductal  carcinoma right breast status post right lumpectomy and sentinel lymph node biopsy October 24, 2012. She was treated with adjuvant radiation therapy followed by anastrozole. Unsure how long she took the anastrozole. History of recurrent stage II (PT2 pN0) ER/AK positive, HER-2/anni negative poorly differentiated invasive ductal carcinoma upper outer quadrant right breast cancer. She had a right mastectomy and axillary lymph node dissection November 3, 2020. She was seen outpatient by Dr. Merlinda Nakayama 1/21/2021 after diagnosis of recurrence. Treatment recommendations were arimidex and surveillance. Patient did not follow-up after that appointment.     A/P  -clinical appearance of metastatic cancer  -bone scan, several areas concerning for bone metastasis  -CA 27.29, 40.7  -continue arimidex 1 mg daily  -phenergan prn ordered, added reglan IV also if needed  -MRI brain  -urinary retention, ordered urine culture  -constipation, start bowel regimen and patient likely need suppository will order prn with current hypotension    We will follow and provide further treatment recommendations based on work-up. Thank you for allowing us to participate in the care of Mahin Montoya. Yana Barrow PA-C  [phone]    Electronically signed by MELINA Jesus on 11/16/2022 at 4:15 PM    Note: This report was completed using [employer] voiced recognition software. Every effort has been made to ensure accuracy; however, inadvertent computerized transcription errors may be present.

## 2022-11-16 NOTE — PROGRESS NOTES
Hospitalist Progress Note      PCP: Chelsey Chand MD    Date of Admission: 11/12/2022      Hospital Course: This is an 81-year-old female with past medical history of right breast cancer, COPD, RA, type 2 diabetes mellitus who presented with nausea, dizziness. On arrival to the emergency department, CT head showed no acute intracranial abnormality; CT chest showed multiple spiculated soft tissue pulmonary nodules compatible with metastatic disease. Subjective: Pt was seen and examined at bedside. No acute event overnight; denies any chest pain, shortness of breath, palpitation.      Medications:  Reviewed    Infusion Medications    sodium chloride 100 mL/hr at 11/16/22 0631    sodium chloride       Scheduled Medications    anastrozole  1 mg Oral Daily    atorvastatin  40 mg Oral Daily    ramipril  10 mg Oral Daily    mirtazapine  30 mg Oral Nightly    metoprolol succinate  25 mg Oral Daily    vitamin D  2,000 Units Oral Daily    aspirin  81 mg Oral Daily    enoxaparin  40 mg SubCUTAneous Daily    insulin lispro  0-8 Units SubCUTAneous TID WC    insulin lispro  0-4 Units SubCUTAneous Nightly    sodium chloride flush  5-40 mL IntraVENous 2 times per day    levothyroxine  175 mcg Oral Once per day on Mon Tue Wed Thu Fri    levothyroxine  350 mcg Oral Once per day on Sun Sat     PRN Meds: ondansetron **OR** ondansetron, metoclopramide, IVPB builder, magnesium sulfate, albuterol, ipratropium-albuterol, benzonatate, polyethylene glycol, morphine, meclizine, sodium chloride flush, sodium chloride, acetaminophen **OR** acetaminophen      Intake/Output Summary (Last 24 hours) at 11/16/2022 1035  Last data filed at 11/16/2022 0815  Gross per 24 hour   Intake 1211.3 ml   Output 2450 ml   Net -1238.7 ml         Exam:    BP (!) 96/58   Pulse 79   Temp 97.5 °F (36.4 °C) (Oral)   Resp 20   Ht 5' 4\" (1.626 m)   Wt 175 lb 6.4 oz (79.6 kg)   LMP  (LMP Unknown)   SpO2 95%   BMI 30.11 kg/m²     General appearance: Sitting comfortably in bed. No apparent distress. Respiratory: Clear to auscultation bilaterally. Cardiovascular: Normal S1/S2. Regular rhythm and rate. Abdomen: Soft, non-tender, non-distended with normal bowel sounds. Musculoskeletal: No clubbing, cyanosis or edema bilaterally. Skin: Skin color, texture, turgor normal.  No rashes or lesions. Neurologic:  No focal deficit. Psychiatric: Alert and oriented, thought content appropriate, normal insight  Peripheral Pulses: +2 palpable, equal bilaterally       Labs:   Recent Labs     11/14/22  0502 11/15/22  0508   WBC 6.3 6.8   HGB 10.7* 12.6   HCT 31.7* 37.2    153       Recent Labs     11/14/22  0502 11/15/22  0508    147*   K 4.0 4.5   * 118*   CO2 18* 18*   BUN 8 8   CREATININE 1.7* 1.3*   CALCIUM 8.4* 8.8   PHOS 2.8  --        No results for input(s): AST, ALT, BILIDIR, BILITOT, ALKPHOS in the last 72 hours. Recent Labs     11/14/22  0910   INR 1.1       No results for input(s): Pamla Clines in the last 72 hours. Radiology:  XR ABDOMEN (KUB) (SINGLE AP VIEW)   Final Result   Nonobstructive bowel gas pattern. Mild-to-moderate colonic stool burden. NM BONE SCAN WHOLE BODY   Final Result   Findings compatible with degenerative changes    Findings suspicious for metastatic disease. CT CHEST WO CONTRAST   Final Result   1. Multiple spiculated soft tissue pulmonary nodules, largest in the   posterior costophrenic angle of the right lower lobe measuring 17 mm. Findings are compatible with pulmonary metastatic disease. Consider PET-CT   examination. 2.  Mild mediastinal and left axillary lymphadenopathy. 3.  Subtle sclerosis involving the superior endplate of T9, no other   definitive sclerotic lytic metastases. CT HEAD WO CONTRAST   Final Result   No acute intracranial abnormality. No significant interval changes since November 10.          CT ABDOMEN PELVIS W IV CONTRAST Additional Contrast? None   Final Result   1. No indication for acute intraperitoneal or retroperitoneal process in the   abdomen or in the pelvis. 2.  Presence of growing nodule in the right lower lobe since the study of a   June 2022, presently measuring 13 x 14 mm, on the previous study of a June 2022 it measures 8 x 10 mm. 3.  Additional multiple pulmonary nodules seen lower lung bases which were   not covered on the previous study. 4.  Further evaluation with CT abdomen pelvis. The presence of multiple   pulmonary nodules can indicate hematogenous metastatic disease. Preliminary report given to Dr. Latrice Nava, ER Physician. XR CHEST PORTABLE   Final Result   1. There are no findings of failure or pneumonia.          IR INTERVENTIONAL RADIOLOGY PROCEDURE REQUEST    (Results Pending)             Active Hospital Problems    Diagnosis Date Noted    Right lower lobe lung mass [R91.8] 11/12/2022     Priority: Medium       Assessment  Metastatic multiple right lower lung lobe nodules - suspicious for metastatic disease  History of right breast cancer  Dizziness - CT head was negative for acute intracranial abnormality; needs to rule out brain mets  Suspected T9 lytic lesion  Type 2 diabetes mellitus  History of Hypertension - now with marginal hypotension-symptomatic  Asymptomatic bacteriuria  Acute on chronic renal failure - baseline stage III CKD, creatinine improved  COPD -no evidence of acute exacerbation  History of CVA  Urinary retention  Dementia  Hypernatremia  Hypomagnesemia  Physical deconditioning  Hypothyroidism  Moderate protein calorie malnutrition      Plan:  Continue with PRN analgesia and antiemetics  On Arimidex  Will obtain MRI brain with and without contrast per hematologist's recommendation  Gentle IV hydration  Monitor renal function, replete electrolytes as needed  Hold Ramipril, Toprol-XL  Vick had been inserted  Activity as tolerated, fall precautions  Discussed with  Isidro Krueger, patient can follow-up in outpatient setting after biopsy of pulmonary nodule  Will continue to hold ASA until biopsy is completed. DVT Prophylaxis: Lovenox  Diet: ADULT DIET;  Regular; 5 carb choices (75 gm/meal)  Code Status: Full Code    PT/OT Eval Status: Ongoing    Dispo - TBD    Lynette Soto MD 11/16/2022 10:35 AM

## 2022-11-16 NOTE — CARE COORDINATION
Social Work/Case Management Transition of Care Planning ([employer] [phone]): Per pulmonary, patient can follow up as an outpatient for biopsy of the pulmonary nodule. MRI of brain was ordered today. PT score noted - 13, side steps with mod assist.  Phone call to patient's son, Salvador Patel, to discuss discharge plan. He indicated he does have caregivers in the home 3 days per week/3 hours per day. Discussed limited mobility. Per son, patient was ambulating previously. Discussed need for short term rehab. Son is in agreement. Explained to him [facility] will not accept patient due to the fact she has an outstanding bill to another facility. He stated she was a [facility]. He stated he will pay the bill off tomorrow. Notified Gume Figueroa of [facility] and requested she reconsider the patient for admission if son follows through with paying off the previous facility balance. CM/SW will follow.    JOAQUÍN Soto  11/16/2022

## 2022-11-17 ENCOUNTER — APPOINTMENT (OUTPATIENT)
Dept: MRI IMAGING | Age: 82
DRG: 181 | End: 2022-11-17
Payer: MEDICARE

## 2022-11-17 LAB
ALBUMIN SERPL-MCNC: 2.4 G/DL (ref 3.5–5.2)
ANION GAP SERPL CALCULATED.3IONS-SCNC: 11 MMOL/L (ref 7–16)
BASOPHILS ABSOLUTE: 0.04 E9/L (ref 0–0.2)
BASOPHILS RELATIVE PERCENT: 0.5 % (ref 0–2)
BUN BLDV-MCNC: 11 MG/DL (ref 6–23)
CALCIUM SERPL-MCNC: 7.9 MG/DL (ref 8.6–10.2)
CHLORIDE BLD-SCNC: 110 MMOL/L (ref 98–107)
CO2: 19 MMOL/L (ref 22–29)
CREAT SERPL-MCNC: 1.3 MG/DL (ref 0.5–1)
EOSINOPHILS ABSOLUTE: 0.54 E9/L (ref 0.05–0.5)
EOSINOPHILS RELATIVE PERCENT: 7.3 % (ref 0–6)
GFR SERPL CREATININE-BSD FRML MDRD: 41 ML/MIN/1.73
GLUCOSE BLD-MCNC: 102 MG/DL (ref 74–99)
HCT VFR BLD CALC: 34.5 % (ref 34–48)
HEMOGLOBIN: 11.6 G/DL (ref 11.5–15.5)
IMMATURE GRANULOCYTES #: 0.02 E9/L
IMMATURE GRANULOCYTES %: 0.3 % (ref 0–5)
LYMPHOCYTES ABSOLUTE: 1.57 E9/L (ref 1.5–4)
LYMPHOCYTES RELATIVE PERCENT: 21.3 % (ref 20–42)
MCH RBC QN AUTO: 32 PG (ref 26–35)
MCHC RBC AUTO-ENTMCNC: 33.6 % (ref 32–34.5)
MCV RBC AUTO: 95.3 FL (ref 80–99.9)
METER GLUCOSE: 100 MG/DL (ref 74–99)
METER GLUCOSE: 90 MG/DL (ref 74–99)
METER GLUCOSE: 98 MG/DL (ref 74–99)
METER GLUCOSE: 99 MG/DL (ref 74–99)
MONOCYTES ABSOLUTE: 0.55 E9/L (ref 0.1–0.95)
MONOCYTES RELATIVE PERCENT: 7.5 % (ref 2–12)
NEUTROPHILS ABSOLUTE: 4.64 E9/L (ref 1.8–7.3)
NEUTROPHILS RELATIVE PERCENT: 63.1 % (ref 43–80)
PDW BLD-RTO: 15 FL (ref 11.5–15)
PHOSPHORUS: 2.2 MG/DL (ref 2.5–4.5)
PLATELET # BLD: 153 E9/L (ref 130–450)
PMV BLD AUTO: 10.2 FL (ref 7–12)
POTASSIUM SERPL-SCNC: 3.9 MMOL/L (ref 3.5–5)
RBC # BLD: 3.62 E12/L (ref 3.5–5.5)
SODIUM BLD-SCNC: 140 MMOL/L (ref 132–146)
WBC # BLD: 7.4 E9/L (ref 4.5–11.5)

## 2022-11-17 PROCEDURE — 80069 RENAL FUNCTION PANEL: CPT

## 2022-11-17 PROCEDURE — 70553 MRI BRAIN STEM W/O & W/DYE: CPT

## 2022-11-17 PROCEDURE — 6360000004 HC RX CONTRAST MEDICATION: Performed by: RADIOLOGY

## 2022-11-17 PROCEDURE — 2580000003 HC RX 258: Performed by: FAMILY MEDICINE

## 2022-11-17 PROCEDURE — 6370000000 HC RX 637 (ALT 250 FOR IP): Performed by: FAMILY MEDICINE

## 2022-11-17 PROCEDURE — 36415 COLL VENOUS BLD VENIPUNCTURE: CPT

## 2022-11-17 PROCEDURE — 6360000002 HC RX W HCPCS: Performed by: FAMILY MEDICINE

## 2022-11-17 PROCEDURE — 1200000000 HC SEMI PRIVATE

## 2022-11-17 PROCEDURE — A9577 INJ MULTIHANCE: HCPCS | Performed by: RADIOLOGY

## 2022-11-17 PROCEDURE — 82962 GLUCOSE BLOOD TEST: CPT

## 2022-11-17 PROCEDURE — 6370000000 HC RX 637 (ALT 250 FOR IP): Performed by: PHYSICIAN ASSISTANT

## 2022-11-17 PROCEDURE — 85025 COMPLETE CBC W/AUTO DIFF WBC: CPT

## 2022-11-17 RX ADMIN — ANASTROZOLE 1 MG: 1 TABLET ORAL at 08:24

## 2022-11-17 RX ADMIN — Medication 2000 UNITS: at 08:24

## 2022-11-17 RX ADMIN — GADOBENATE DIMEGLUMINE 16 ML: 529 INJECTION, SOLUTION INTRAVENOUS at 10:07

## 2022-11-17 RX ADMIN — ENOXAPARIN SODIUM 40 MG: 100 INJECTION SUBCUTANEOUS at 08:24

## 2022-11-17 RX ADMIN — SODIUM CHLORIDE, PRESERVATIVE FREE 10 ML: 5 INJECTION INTRAVENOUS at 08:24

## 2022-11-17 RX ADMIN — ACETAMINOPHEN 650 MG: 325 TABLET, FILM COATED ORAL at 15:04

## 2022-11-17 RX ADMIN — LEVOTHYROXINE SODIUM 175 MCG: 0.05 TABLET ORAL at 05:34

## 2022-11-17 RX ADMIN — MIRTAZAPINE 30 MG: 15 TABLET, FILM COATED ORAL at 20:27

## 2022-11-17 RX ADMIN — ATORVASTATIN CALCIUM 40 MG: 40 TABLET, FILM COATED ORAL at 08:24

## 2022-11-17 RX ADMIN — SENNOSIDES AND DOCUSATE SODIUM 2 TABLET: 50; 8.6 TABLET ORAL at 08:23

## 2022-11-17 ASSESSMENT — PAIN SCALES - GENERAL
PAINLEVEL_OUTOF10: 0

## 2022-11-17 NOTE — PROGRESS NOTES
Hospitalist Progress Note      PCP: Kathy Pacheco MD    Date of Admission: 11/12/2022      Hospital Course: This is an 71-year-old female with past medical history of right breast cancer, COPD, RA, type 2 diabetes mellitus who presented with nausea, dizziness. On arrival to the emergency department, CT head showed no acute intracranial abnormality; CT chest showed multiple spiculated soft tissue pulmonary nodules compatible with metastatic disease. Subjective: Pt was seen and examined at bedside. No acute event overnight; denies any chest pain, shortness of breath, palpitation.      Medications:  Reviewed    Infusion Medications    sodium chloride 100 mL/hr at 11/17/22 0537    sodium chloride       Scheduled Medications    sennosides-docusate sodium  2 tablet Oral BID    anastrozole  1 mg Oral Daily    atorvastatin  40 mg Oral Daily    [Held by provider] ramipril  10 mg Oral Daily    mirtazapine  30 mg Oral Nightly    [Held by provider] metoprolol succinate  25 mg Oral Daily    vitamin D  2,000 Units Oral Daily    [Held by provider] aspirin  81 mg Oral Daily    enoxaparin  40 mg SubCUTAneous Daily    insulin lispro  0-8 Units SubCUTAneous TID WC    insulin lispro  0-4 Units SubCUTAneous Nightly    sodium chloride flush  5-40 mL IntraVENous 2 times per day    levothyroxine  175 mcg Oral Once per day on Mon Tue Wed Thu Fri    levothyroxine  350 mcg Oral Once per day on Sun Sat     PRN Meds: bisacodyl, ondansetron **OR** ondansetron, metoclopramide, IVPB builder, magnesium sulfate, albuterol, ipratropium-albuterol, benzonatate, polyethylene glycol, morphine, meclizine, sodium chloride flush, sodium chloride, acetaminophen **OR** acetaminophen      Intake/Output Summary (Last 24 hours) at 11/17/2022 0904  Last data filed at 11/17/2022 0805  Gross per 24 hour   Intake 3618.92 ml   Output 1450 ml   Net 2168.92 ml         Exam:    /76   Pulse 90   Temp 97.9 °F (36.6 °C) (Oral)   Resp 24   Ht 5' 4\" (1.626 m)   Wt 175 lb 6.4 oz (79.6 kg)   LMP  (LMP Unknown)   SpO2 98%   BMI 30.11 kg/m²     General appearance: Sitting comfortably in bed. No apparent distress. Respiratory: Clear to auscultation bilaterally. Cardiovascular: Normal S1/S2. Regular rhythm and rate. Abdomen: Soft, non-tender, non-distended with normal bowel sounds. Musculoskeletal: No clubbing, cyanosis or edema bilaterally. Skin: Skin color, texture, turgor normal.  No rashes or lesions. Neurologic:  No focal deficit. Psychiatric: Alert and oriented, thought content appropriate, normal insight  Peripheral Pulses: +2 palpable, equal bilaterally       Labs:   Recent Labs     11/15/22  0508 11/17/22  0441   WBC 6.8 7.4   HGB 12.6 11.6   HCT 37.2 34.5    153       Recent Labs     11/15/22  0508 11/17/22  0441   * 140   K 4.5 3.9   * 110*   CO2 18* 19*   BUN 8 11   CREATININE 1.3* 1.3*   CALCIUM 8.8 7.9*   PHOS  --  2.2*       No results for input(s): AST, ALT, BILIDIR, BILITOT, ALKPHOS in the last 72 hours. Recent Labs     11/14/22  0910   INR 1.1       No results for input(s): Othelia Nutley in the last 72 hours. Radiology:  XR ABDOMEN (KUB) (SINGLE AP VIEW)   Final Result   Nonobstructive bowel gas pattern. Mild-to-moderate colonic stool burden. NM BONE SCAN WHOLE BODY   Final Result   Findings compatible with degenerative changes    Findings suspicious for metastatic disease. CT CHEST WO CONTRAST   Final Result   1. Multiple spiculated soft tissue pulmonary nodules, largest in the   posterior costophrenic angle of the right lower lobe measuring 17 mm. Findings are compatible with pulmonary metastatic disease. Consider PET-CT   examination. 2.  Mild mediastinal and left axillary lymphadenopathy. 3.  Subtle sclerosis involving the superior endplate of T9, no other   definitive sclerotic lytic metastases.          CT HEAD WO CONTRAST   Final Result   No acute intracranial abnormality. No significant interval changes since November 10. CT ABDOMEN PELVIS W IV CONTRAST Additional Contrast? None   Final Result   1. No indication for acute intraperitoneal or retroperitoneal process in the   abdomen or in the pelvis. 2.  Presence of growing nodule in the right lower lobe since the study of a   June 2022, presently measuring 13 x 14 mm, on the previous study of a June 2022 it measures 8 x 10 mm. 3.  Additional multiple pulmonary nodules seen lower lung bases which were   not covered on the previous study. 4.  Further evaluation with CT abdomen pelvis. The presence of multiple   pulmonary nodules can indicate hematogenous metastatic disease. Preliminary report given to Dr. Glenna Willett, ER Physician. XR CHEST PORTABLE   Final Result   1. There are no findings of failure or pneumonia.          IR INTERVENTIONAL RADIOLOGY PROCEDURE REQUEST    (Results Pending)   MRI BRAIN W WO CONTRAST    (Results Pending)             Active Hospital Problems    Diagnosis Date Noted    Right lower lobe lung mass [R91.8] 11/12/2022     Priority: Medium       Assessment  Metastatic multiple right lower lung lobe nodules - suspicious for metastatic disease  History of right breast cancer  Dizziness - CT head was negative for acute intracranial abnormality; needs to rule out brain mets  Suspected T9 lytic lesion  Type 2 diabetes mellitus  History of Hypertension - now with marginal hypotension-symptomatic  Asymptomatic bacteriuria  Acute on chronic renal failure - baseline stage III CKD, creatinine improved  COPD - no evidence of acute exacerbation  History of CVA  Urinary retention  Dementia  Hypernatremia, reolved  Hypomagnesemia  Physical deconditioning  Hypothyroidism  Moderate protein calorie malnutrition      Plan:  Continue with PRN analgesia and antiemetics  On Arimidex  MRI brain with and without contrast pending  Gentle IV hydration  Monitor renal function, replete electrolytes as needed  Holding Ramipril, Toprol-XL for hypotension  Keep Vick in place  Activity as tolerated, fall precautions  Outpatient follow-up with pulmonologist  Will continue to hold ASA until biopsy is completed. DVT Prophylaxis: Lovenox  Diet: ADULT DIET;  Regular; 5 carb choices (75 gm/meal)  Code Status: Full Code    PT/OT Eval Status: Ongoing    Dispo - SNF    Royanne Kussmaul, MD 11/17/2022 9:04 AM

## 2022-11-17 NOTE — PROGRESS NOTES
Subjective:  Chart reviewed, no overnight events  Patient not feeling well but unable to communicate issue  Hypotensive currently, call to attending    Objective:    /76   Pulse 90   Temp 97.9 °F (36.6 °C) (Oral)   Resp 24   Ht 5' 4\" (1.626 m)   Wt 175 lb 6.4 oz (79.6 kg)   LMP  (LMP Unknown)   SpO2 98%   BMI 30.11 kg/m²     General: NAD, alert, confused  HEENT: normocephalic/atraumatic, mucosa dry, EOMI, sclera anicteric, conjuntiva pink  Heart:  RRR  Lungs:  respirations nonlabored  Abd: BS present, nondistended  Extrem:  trace edema of LE bilaterally, No clubbing, cyanosis  : mendoza placed  Skin: Intact, no petechia or purpura    CBC with Differential:    Lab Results   Component Value Date/Time    WBC 7.4 11/17/2022 04:41 AM    RBC 3.62 11/17/2022 04:41 AM    HGB 11.6 11/17/2022 04:41 AM    HCT 34.5 11/17/2022 04:41 AM     11/17/2022 04:41 AM    MCV 95.3 11/17/2022 04:41 AM    MCH 32.0 11/17/2022 04:41 AM    MCHC 33.6 11/17/2022 04:41 AM    RDW 15.0 11/17/2022 04:41 AM    LYMPHOPCT 21.3 11/17/2022 04:41 AM    MONOPCT 7.5 11/17/2022 04:41 AM    BASOPCT 0.5 11/17/2022 04:41 AM    MONOSABS 0.55 11/17/2022 04:41 AM    LYMPHSABS 1.57 11/17/2022 04:41 AM    EOSABS 0.54 11/17/2022 04:41 AM    BASOSABS 0.04 11/17/2022 04:41 AM     CMP:    Lab Results   Component Value Date/Time     11/17/2022 04:41 AM    K 3.9 11/17/2022 04:41 AM    K 3.7 11/12/2022 06:51 AM     11/17/2022 04:41 AM    CO2 19 11/17/2022 04:41 AM    BUN 11 11/17/2022 04:41 AM    CREATININE 1.3 11/17/2022 04:41 AM    GFRAA 52 06/10/2022 06:02 AM    LABGLOM 41 11/17/2022 04:41 AM    GLUCOSE 102 11/17/2022 04:41 AM    PROT 6.9 11/12/2022 06:51 AM    LABALBU 2.4 11/17/2022 04:41 AM    CALCIUM 7.9 11/17/2022 04:41 AM    BILITOT 1.1 11/12/2022 06:51 AM    ALKPHOS 88 11/12/2022 06:51 AM    AST 18 11/12/2022 06:51 AM    ALT 5 11/12/2022 06:51 AM          Current Facility-Administered Medications:     sennosides-docusate sodium (SENOKOT-S) 8.6-50 MG tablet 2 tablet, 2 tablet, Oral, BID, MELINA Vargas, 2 tablet at 11/17/22 4330    bisacodyl (DULCOLAX) suppository 10 mg, 10 mg, Rectal, Daily PRN, MELINA Vargas    0.45 % sodium chloride infusion, , IntraVENous, Continuous, Lynette Perla MD, Last Rate: 100 mL/hr at 11/17/22 0537, Rate Verify at 11/17/22 0537    ondansetron (ZOFRAN-ODT) disintegrating tablet 4 mg, 4 mg, Oral, Q8H PRN **OR** ondansetron (ZOFRAN) injection 4 mg, 4 mg, IntraVENous, Q4H PRN, Lynette Perla MD    metoclopramide (REGLAN) injection 5 mg, 5 mg, IntraVENous, Q8H PRN, MELINA Vargas    promethazine (PHENERGAN) 12.5 mg in sodium chloride 0.9 % 50 mL IVPB, 12.5 mg, IntraVENous, Q4H PRN, Lynette Perla MD    magnesium sulfate 1000 mg in dextrose 5% 100 mL IVPB, 1,000 mg, IntraVENous, PRN, Rollo Bumpers, APRN - CNP, Stopped at 11/14/22 1814    anastrozole (ARIMIDEX) tablet 1 mg, 1 mg, Oral, Daily, MELINA Vargas, 1 mg at 11/17/22 0824    albuterol (PROVENTIL) nebulizer solution 2.5 mg, 2.5 mg, Nebulization, Q4H PRN, PABLO Julian - CNS    ipratropium-albuterol (DUONEB) nebulizer solution 1 ampule, 1 ampule, Inhalation, Q4H PRN, Ross Zepeda MD    benzonatate (TESSALON) capsule 100 mg, 100 mg, Oral, TID PRN, Ross Zepeda MD    atorvastatin (LIPITOR) tablet 40 mg, 40 mg, Oral, Daily, Liat Lao MD, 40 mg at 11/17/22 0824    [Held by provider] ramipril (ALTACE) capsule 10 mg, 10 mg, Oral, Daily, Ross Zepeda MD, 10 mg at 11/15/22 1333    mirtazapine (REMERON) tablet 30 mg, 30 mg, Oral, Nightly, Liat Lao MD, 30 mg at 11/16/22 2113    [Held by provider] metoprolol succinate (TOPROL XL) extended release tablet 25 mg, 25 mg, Oral, Daily, Ross Zepeda MD, 25 mg at 11/15/22 1133    vitamin D (CHOLECALCIFEROL) tablet 2,000 Units, 2,000 Units, Oral, Daily, Liat Lao MD, 2,000 Units at 11/17/22 0824    [Held by provider] aspirin chewable tablet 81 mg, 81 mg, Oral, Daily, Nikita Alberts MD, 81 mg at 11/15/22 1333    enoxaparin (LOVENOX) injection 40 mg, 40 mg, SubCUTAneous, Daily, Nikita Alberts MD, 40 mg at 11/17/22 0824    polyethylene glycol (GLYCOLAX) packet 17 g, 17 g, Oral, Daily PRN, Nikita Alberts MD    morphine (PF) injection 2 mg, 2 mg, IntraVENous, Q4H PRN, Nikita Alberts MD, 2 mg at 11/12/22 2047    insulin lispro (HUMALOG) injection vial 0-8 Units, 0-8 Units, SubCUTAneous, TID WC, Nikita Alberts MD    insulin lispro (HUMALOG) injection vial 0-4 Units, 0-4 Units, SubCUTAneous, Nightly, Deric Sommers Mai, MD    meclizine (ANTIVERT) tablet 25 mg, 25 mg, Oral, TID PRN, Nikita Alberts MD    sodium chloride flush 0.9 % injection 5-40 mL, 5-40 mL, IntraVENous, 2 times per day, Nikita Alberts MD, 10 mL at 11/17/22 0824    sodium chloride flush 0.9 % injection 5-40 mL, 5-40 mL, IntraVENous, PRN, Nikita Alberts MD    0.9 % sodium chloride infusion, , IntraVENous, PRN, Nikita Alberts MD    acetaminophen (TYLENOL) tablet 650 mg, 650 mg, Oral, Q6H PRN, 650 mg at 11/16/22 1026 **OR** acetaminophen (TYLENOL) suppository 650 mg, 650 mg, Rectal, Q6H PRN, Nikita Alberts MD    levothyroxine (SYNTHROID) tablet 175 mcg, 175 mcg, Oral, Once per day on Mon Tue Wed Thu Fri, Nikita Alberts MD, 175 mcg at 11/17/22 0534    levothyroxine (SYNTHROID) tablet 350 mcg, 350 mcg, Oral, Once per day on Sun Sat, Nikita Alberts MD, 350 mcg at 11/13/22 0625    XR ABDOMEN (KUB) (SINGLE AP VIEW)   Final Result   Nonobstructive bowel gas pattern. Mild-to-moderate colonic stool burden. NM BONE SCAN WHOLE BODY   Final Result   Findings compatible with degenerative changes    Findings suspicious for metastatic disease. CT CHEST WO CONTRAST   Final Result   1. Multiple spiculated soft tissue pulmonary nodules, largest in the   posterior costophrenic angle of the right lower lobe measuring 17 mm.    Findings are compatible with pulmonary metastatic disease. Consider PET-CT   examination. 2.  Mild mediastinal and left axillary lymphadenopathy. 3.  Subtle sclerosis involving the superior endplate of T9, no other   definitive sclerotic lytic metastases. CT HEAD WO CONTRAST   Final Result   No acute intracranial abnormality. No significant interval changes since November 10. CT ABDOMEN PELVIS W IV CONTRAST Additional Contrast? None   Final Result   1. No indication for acute intraperitoneal or retroperitoneal process in the   abdomen or in the pelvis. 2.  Presence of growing nodule in the right lower lobe since the study of a   June 2022, presently measuring 13 x 14 mm, on the previous study of a June 2022 it measures 8 x 10 mm. 3.  Additional multiple pulmonary nodules seen lower lung bases which were   not covered on the previous study. 4.  Further evaluation with CT abdomen pelvis. The presence of multiple   pulmonary nodules can indicate hematogenous metastatic disease. Preliminary report given to Dr. Sherie Dueñas, ER Physician. XR CHEST PORTABLE   Final Result   1. There are no findings of failure or pneumonia. IR INTERVENTIONAL RADIOLOGY PROCEDURE REQUEST    (Results Pending)   MRI BRAIN W WO CONTRAST    (Results Pending)     80 yo female with a history of stage IIa (pT2N0) ER/MD positive, HER-2/anni invasive ductal  carcinoma right breast status post right lumpectomy and sentinel lymph node biopsy October 24, 2012. She was treated with adjuvant radiation therapy followed by anastrozole. Unsure how long she took the anastrozole. 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 She had a right mastectomy and axillary lymph node dissection November 3, 2020. She was seen outpatient by Dr. Donna Wood 1/21/2021 after diagnosis of recurrence.   Treatment recommendations were arimidex and surveillance. Patient did not follow-up after that appointment. A/P  -clinical appearance of metastatic cancer  -bone scan, several areas concerning for bone metastasis  -CA 27.29, 40.7  -continue arimidex 1 mg daily  -phenergan prn ordered, added reglan IV also if needed  -MRI brain, negative  -urinary retention, ordered urine culture  -constipation, BM+ continue bowel regimen will reduce  -plan for image guided biopsy of lung nodule, delayed due to ASA taken 11/13 last    We will follow and provide further treatment recommendations based on work-up. Thank you for allowing us to participate in the care of Maria G Lopez. Celina Weathers PA-C  [phone]    Electronically signed by MELINA Marsh on 11/17/2022 at 8:40 AM    Note: This report was completed using [employer] voiced recognition software. Every effort has been made to ensure accuracy; however, inadvertent computerized transcription errors may be present.

## 2022-11-17 NOTE — CARE COORDINATION
Social Work/Case Management Transition of Care Planning ([facility] [phone]): Per report, patient had mendoza placed due to urinary retention. MRI of the brain is scheduled for today. Renal function panel was ordered. Patient will need WALLY on discharge. Spoke with her son, Michi Quan, again today. He said he called [facility] twice today about paying the bill but he was disconnected. Discussed him going there in person to pay the bill. Son stated he will do that. Also discussed other options for WALLY. Son choiced [facility] and [address] [place] [address]. Referral information faxed to Mike Branham at [facility]. Await response. CM/SW will follow.   [place], JOAQUÍN  11/17/2022

## 2022-11-18 VITALS
BODY MASS INDEX: 29.94 KG/M2 | RESPIRATION RATE: 22 BRPM | TEMPERATURE: 98.6 F | SYSTOLIC BLOOD PRESSURE: 105 MMHG | OXYGEN SATURATION: 93 % | HEART RATE: 87 BPM | HEIGHT: 64 IN | DIASTOLIC BLOOD PRESSURE: 60 MMHG | WEIGHT: 175.4 LBS

## 2022-11-18 LAB
ALBUMIN SERPL-MCNC: 2.3 G/DL (ref 3.5–5.2)
ANION GAP SERPL CALCULATED.3IONS-SCNC: 9 MMOL/L (ref 7–16)
BASOPHILS ABSOLUTE: 0.06 E9/L (ref 0–0.2)
BASOPHILS RELATIVE PERCENT: 0.8 % (ref 0–2)
BUN BLDV-MCNC: 11 MG/DL (ref 6–23)
CALCIUM SERPL-MCNC: 8.2 MG/DL (ref 8.6–10.2)
CHLORIDE BLD-SCNC: 110 MMOL/L (ref 98–107)
CO2: 19 MMOL/L (ref 22–29)
CREAT SERPL-MCNC: 1.2 MG/DL (ref 0.5–1)
EOSINOPHILS ABSOLUTE: 0.48 E9/L (ref 0.05–0.5)
EOSINOPHILS RELATIVE PERCENT: 6.5 % (ref 0–6)
GFR SERPL CREATININE-BSD FRML MDRD: 45 ML/MIN/1.73
GLUCOSE BLD-MCNC: 82 MG/DL (ref 74–99)
HCT VFR BLD CALC: 34.4 % (ref 34–48)
HEMOGLOBIN: 11.3 G/DL (ref 11.5–15.5)
IMMATURE GRANULOCYTES #: 0.02 E9/L
IMMATURE GRANULOCYTES %: 0.3 % (ref 0–5)
LYMPHOCYTES ABSOLUTE: 1.36 E9/L (ref 1.5–4)
LYMPHOCYTES RELATIVE PERCENT: 18.3 % (ref 20–42)
MCH RBC QN AUTO: 31.3 PG (ref 26–35)
MCHC RBC AUTO-ENTMCNC: 32.8 % (ref 32–34.5)
MCV RBC AUTO: 95.3 FL (ref 80–99.9)
METER GLUCOSE: 105 MG/DL (ref 74–99)
METER GLUCOSE: 84 MG/DL (ref 74–99)
METER GLUCOSE: 87 MG/DL (ref 74–99)
MONOCYTES ABSOLUTE: 0.57 E9/L (ref 0.1–0.95)
MONOCYTES RELATIVE PERCENT: 7.7 % (ref 2–12)
NEUTROPHILS ABSOLUTE: 4.94 E9/L (ref 1.8–7.3)
NEUTROPHILS RELATIVE PERCENT: 66.4 % (ref 43–80)
PDW BLD-RTO: 14.7 FL (ref 11.5–15)
PHOSPHORUS: 2.6 MG/DL (ref 2.5–4.5)
PLATELET # BLD: 161 E9/L (ref 130–450)
PMV BLD AUTO: 10.5 FL (ref 7–12)
POTASSIUM SERPL-SCNC: 3.7 MMOL/L (ref 3.5–5)
RBC # BLD: 3.61 E12/L (ref 3.5–5.5)
SARS-COV-2, NAAT: NOT DETECTED
SODIUM BLD-SCNC: 138 MMOL/L (ref 132–146)
WBC # BLD: 7.4 E9/L (ref 4.5–11.5)

## 2022-11-18 PROCEDURE — 6370000000 HC RX 637 (ALT 250 FOR IP): Performed by: PHYSICIAN ASSISTANT

## 2022-11-18 PROCEDURE — 85025 COMPLETE CBC W/AUTO DIFF WBC: CPT

## 2022-11-18 PROCEDURE — 2580000003 HC RX 258: Performed by: FAMILY MEDICINE

## 2022-11-18 PROCEDURE — 6370000000 HC RX 637 (ALT 250 FOR IP): Performed by: FAMILY MEDICINE

## 2022-11-18 PROCEDURE — 87635 SARS-COV-2 COVID-19 AMP PRB: CPT

## 2022-11-18 PROCEDURE — 2580000003 HC RX 258: Performed by: HOSPITALIST

## 2022-11-18 PROCEDURE — 36415 COLL VENOUS BLD VENIPUNCTURE: CPT

## 2022-11-18 PROCEDURE — 6360000002 HC RX W HCPCS: Performed by: FAMILY MEDICINE

## 2022-11-18 PROCEDURE — 80069 RENAL FUNCTION PANEL: CPT

## 2022-11-18 PROCEDURE — 82962 GLUCOSE BLOOD TEST: CPT

## 2022-11-18 RX ORDER — ANASTROZOLE 1 MG/1
1 TABLET ORAL DAILY
Qty: 30 TABLET | Refills: 3 | DISCHARGE
Start: 2022-11-19

## 2022-11-18 RX ORDER — SULFAMETHOXAZOLE AND TRIMETHOPRIM 400; 80 MG/1; MG/1
1 TABLET ORAL DAILY
Refills: 0 | DISCHARGE
Start: 2022-11-18 | End: 2022-11-21

## 2022-11-18 RX ADMIN — ATORVASTATIN CALCIUM 40 MG: 40 TABLET, FILM COATED ORAL at 09:28

## 2022-11-18 RX ADMIN — Medication 2000 UNITS: at 09:28

## 2022-11-18 RX ADMIN — SODIUM CHLORIDE, PRESERVATIVE FREE 10 ML: 5 INJECTION INTRAVENOUS at 09:30

## 2022-11-18 RX ADMIN — LEVOTHYROXINE SODIUM 175 MCG: 0.05 TABLET ORAL at 06:21

## 2022-11-18 RX ADMIN — SODIUM CHLORIDE: 4.5 INJECTION, SOLUTION INTRAVENOUS at 11:22

## 2022-11-18 RX ADMIN — ANASTROZOLE 1 MG: 1 TABLET ORAL at 09:28

## 2022-11-18 RX ADMIN — ENOXAPARIN SODIUM 40 MG: 100 INJECTION SUBCUTANEOUS at 09:28

## 2022-11-18 NOTE — PROGRESS NOTES
Subjective:  Chart reviewed, no overnight events  Patient more awake today, feeling weak  C/o some pain in her back  Loose BM yesterday but denies today  Denies N/V    Objective:    /60   Pulse 87   Temp 98.6 °F (37 °C) (Temporal)   Resp 22   Ht 5' 4\" (1.626 m)   Wt 175 lb 6.4 oz (79.6 kg)   LMP  (LMP Unknown)   SpO2 93%   BMI 30.11 kg/m²     General: NAD, alert, less confused today  HEENT: normocephalic/atraumatic, mucosa dry, EOMI, sclera anicteric, conjuntiva pink  Heart:  RRR  Lungs:  respirations nonlabored, diminished BS  Abd: BS present, nondistended  Extrem:  trace edema of LE bilaterally, No clubbing, cyanosis  : mendoza placed  Skin: Intact, no petechia or purpura    CBC with Differential:    Lab Results   Component Value Date/Time    WBC 7.4 11/18/2022 04:44 AM    RBC 3.61 11/18/2022 04:44 AM    HGB 11.3 11/18/2022 04:44 AM    HCT 34.4 11/18/2022 04:44 AM     11/18/2022 04:44 AM    MCV 95.3 11/18/2022 04:44 AM    MCH 31.3 11/18/2022 04:44 AM    MCHC 32.8 11/18/2022 04:44 AM    RDW 14.7 11/18/2022 04:44 AM    LYMPHOPCT 18.3 11/18/2022 04:44 AM    MONOPCT 7.7 11/18/2022 04:44 AM    BASOPCT 0.8 11/18/2022 04:44 AM    MONOSABS 0.57 11/18/2022 04:44 AM    LYMPHSABS 1.36 11/18/2022 04:44 AM    EOSABS 0.48 11/18/2022 04:44 AM    BASOSABS 0.06 11/18/2022 04:44 AM     CMP:    Lab Results   Component Value Date/Time     11/18/2022 04:44 AM    K 3.7 11/18/2022 04:44 AM    K 3.7 11/12/2022 06:51 AM     11/18/2022 04:44 AM    CO2 19 11/18/2022 04:44 AM    BUN 11 11/18/2022 04:44 AM    CREATININE 1.2 11/18/2022 04:44 AM    GFRAA 52 06/10/2022 06:02 AM    LABGLOM 45 11/18/2022 04:44 AM    GLUCOSE 82 11/18/2022 04:44 AM    PROT 6.9 11/12/2022 06:51 AM    LABALBU 2.3 11/18/2022 04:44 AM    CALCIUM 8.2 11/18/2022 04:44 AM    BILITOT 1.1 11/12/2022 06:51 AM    ALKPHOS 88 11/12/2022 06:51 AM    AST 18 11/12/2022 06:51 AM    ALT 5 11/12/2022 06:51 AM          Current Facility-Administered Medications:     sennosides-docusate sodium (SENOKOT-S) 8.6-50 MG tablet 2 tablet, 2 tablet, Oral, BID, MELINA aBl, 2 tablet at 11/17/22 0138    bisacodyl (DULCOLAX) suppository 10 mg, 10 mg, Rectal, Daily PRN, MELINA Bal    0.45 % sodium chloride infusion, , IntraVENous, Continuous, Lynette Perla MD, Last Rate: 100 mL/hr at 11/18/22 1122, New Bag at 11/18/22 1122    ondansetron (ZOFRAN-ODT) disintegrating tablet 4 mg, 4 mg, Oral, Q8H PRN **OR** ondansetron (ZOFRAN) injection 4 mg, 4 mg, IntraVENous, Q4H PRN, Lynette Perla MD    metoclopramide (REGLAN) injection 5 mg, 5 mg, IntraVENous, Q8H PRN, MELINA Bal    promethazine (PHENERGAN) 12.5 mg in sodium chloride 0.9 % 50 mL IVPB, 12.5 mg, IntraVENous, Q4H PRN, Lynette Perla MD    magnesium sulfate 1000 mg in dextrose 5% 100 mL IVPB, 1,000 mg, IntraVENous, PRN, PABLO Schroeder - CNP, Stopped at 11/14/22 1814    anastrozole (ARIMIDEX) tablet 1 mg, 1 mg, Oral, Daily, MELINA Bal, 1 mg at 11/18/22 0928    albuterol (PROVENTIL) nebulizer solution 2.5 mg, 2.5 mg, Nebulization, Q4H PRN, PABLO Do - CNS    ipratropium-albuterol (DUONEB) nebulizer solution 1 ampule, 1 ampule, Inhalation, Q4H PRN, Galindo Glasgow MD    benzonatate (TESSALON) capsule 100 mg, 100 mg, Oral, TID PRN, Galindo Glasgow MD    atorvastatin (LIPITOR) tablet 40 mg, 40 mg, Oral, Daily, Ramon Regan MD, 40 mg at 11/18/22 2444    [Held by provider] ramipril (ALTACE) capsule 10 mg, 10 mg, Oral, Daily, Galindo Glasgow MD, 10 mg at 11/15/22 1333    mirtazapine (REMERON) tablet 30 mg, 30 mg, Oral, Nightly, Ramon Regan MD, 30 mg at 11/17/22 2027    [Held by provider] metoprolol succinate (TOPROL XL) extended release tablet 25 mg, 25 mg, Oral, Daily, Galindo Glasgow MD, 25 mg at 11/15/22 1133    vitamin D (CHOLECALCIFEROL) tablet 2,000 Units, 2,000 Units, Oral, Daily, Ramon Regan MD, 2,000 Units at 11/18/22 0928    [Held by provider] aspirin chewable tablet 81 mg, 81 mg, Oral, Daily, Yaron Zaidi MD, 81 mg at 11/15/22 1333    enoxaparin (LOVENOX) injection 40 mg, 40 mg, SubCUTAneous, Daily, Yaron Zaidi MD, 40 mg at 11/18/22 2745    polyethylene glycol (GLYCOLAX) packet 17 g, 17 g, Oral, Daily PRN, Yaron Zaidi MD    morphine (PF) injection 2 mg, 2 mg, IntraVENous, Q4H PRN, Yaron Zaidi MD, 2 mg at 11/12/22 2047    insulin lispro (HUMALOG) injection vial 0-8 Units, 0-8 Units, SubCUTAneous, TID WC, Deric Wilkins MD    insulin lispro (HUMALOG) injection vial 0-4 Units, 0-4 Units, SubCUTAneous, Nightly, Deric Lees MD    meclizine (ANTIVERT) tablet 25 mg, 25 mg, Oral, TID PRN, Yaron Zaidi MD    sodium chloride flush 0.9 % injection 5-40 mL, 5-40 mL, IntraVENous, 2 times per day, Yaron Zaidi MD, 10 mL at 11/18/22 0930    sodium chloride flush 0.9 % injection 5-40 mL, 5-40 mL, IntraVENous, PRN, Yaron Zaidi MD    0.9 % sodium chloride infusion, , IntraVENous, PRN, Yaron Zaidi MD    acetaminophen (TYLENOL) tablet 650 mg, 650 mg, Oral, Q6H PRN, 650 mg at 11/17/22 1504 **OR** acetaminophen (TYLENOL) suppository 650 mg, 650 mg, Rectal, Q6H PRN, Yaron Zaidi MD    levothyroxine (SYNTHROID) tablet 175 mcg, 175 mcg, Oral, Once per day on Mon Tue Wed Thu Fri, Yaron Zaidi MD, 175 mcg at 11/18/22 0263    levothyroxine (SYNTHROID) tablet 350 mcg, 350 mcg, Oral, Once per day on Sun Sat, Yaron Zaidi MD, 350 mcg at 11/13/22 0141    MRI BRAIN W WO CONTRAST   Final Result   Mild thickening of the pituitary stalk measuring 4-5 mm. This finding could   represent a subtle metastasis. However the differential diagnosis does   include an inflammatory process such as granulomatous disease such as from   sarcoidosis or Langerhans cell histiocytosis. A neoplastic process such as   lymphoma is an additional consideration.       Follow-up examination may be considered in 3-4 months as clinically warranted. Stable mild chronic microvascular disease within the periventricular white   matter with associated mild cerebral atrophy. XR ABDOMEN (KUB) (SINGLE AP VIEW)   Final Result   Nonobstructive bowel gas pattern. Mild-to-moderate colonic stool burden. NM BONE SCAN WHOLE BODY   Final Result   Findings compatible with degenerative changes    Findings suspicious for metastatic disease. CT CHEST WO CONTRAST   Final Result   1. Multiple spiculated soft tissue pulmonary nodules, largest in the   posterior costophrenic angle of the right lower lobe measuring 17 mm. Findings are compatible with pulmonary metastatic disease. Consider PET-CT   examination. 2.  Mild mediastinal and left axillary lymphadenopathy. 3.  Subtle sclerosis involving the superior endplate of T9, no other   definitive sclerotic lytic metastases. CT HEAD WO CONTRAST   Final Result   No acute intracranial abnormality. No significant interval changes since November 10. CT ABDOMEN PELVIS W IV CONTRAST Additional Contrast? None   Final Result   1. No indication for acute intraperitoneal or retroperitoneal process in the   abdomen or in the pelvis. 2.  Presence of growing nodule in the right lower lobe since the study of a   June 2022, presently measuring 13 x 14 mm, on the previous study of a June 2022 it measures 8 x 10 mm. 3.  Additional multiple pulmonary nodules seen lower lung bases which were   not covered on the previous study. 4.  Further evaluation with CT abdomen pelvis. The presence of multiple   pulmonary nodules can indicate hematogenous metastatic disease. Preliminary report given to Dr. Elizabeth Rodriguez, ER Physician. XR CHEST PORTABLE   Final Result   1. There are no findings of failure or pneumonia.          IR INTERVENTIONAL RADIOLOGY PROCEDURE REQUEST    (Results Pending)     82 yo female with a history of stage IIa (pT2N0) ER/VA positive, HER-2/anni invasive ductal carcinoma right breast status post right lumpectomy and sentinel lymph node biopsy October 24, 2012. She was treated with adjuvant radiation therapy followed by anastrozole. Unsure how long she took the anastrozole. History of recurrent stage II (PT2 pN0) ER/VA positive, HER-2/anni negative poorly differentiated invasive ductal carcinoma upper outer quadrant right breast cancer. She had a right mastectomy and axillary lymph node dissection November 3, 2020. She was seen outpatient by Dr. Dean Munoz 1/21/2021 after diagnosis of recurrence. Treatment recommendations were arimidex and surveillance. Patient did not follow-up after that appointment. A/P  -clinical appearance of metastatic cancer  -bone scan, several areas concerning for bone metastasis  -CA 27.29, 40.7  -continue arimidex 1 mg daily  -phenergan prn ordered, added reglan IV also if needed  -MRI brain, negative  -urinary retention, ordered urine culture, +gram negative/staph coagulase neg, UTI, d/w RN they will contact primary for antibiotics  -constipation, BM+ continue bowel regimen will reduce  -plan for image guided biopsy of lung nodule, delayed due to ASA taken 11/13 last    We will continue to follow. Uncertain if patient will tolerate or be willing to pursue systemic treatment. If symptoms/pain persist could continue palliative radiation to bone metastasis. We will follow and provide further treatment recommendations based on work-up. Thank you for allowing us to participate in the care of Félix Carnes. Stephanie Murcia PA-C  [phone]    Electronically signed by Stephanie Murcia, [address] on 11/18/2022 at 1:29 PM    Note: This report was completed using [employer] voiced recognition software. Every effort has been made to ensure accuracy; however, inadvertent computerized transcription errors may be present. Attending Addendum:      Patient seen and examined personally.   All pertinent labs and imaging reviewed. Case discussed with PA. Agree with the progress note as above which has been updated to reflect my changes.       Lashell Anderson, [address] for [address]

## 2022-11-18 NOTE — DISCHARGE INSTR - COC
Continuity of Care Form    Patient Name: Dennis Colin   :  2053  MRN:  [id number]    Admit date:  2022  Discharge date:  2022    Code Status Order: Full Code   Advance Directives:     Admitting Physician:  Mariela Celis MD  PCP: Scott Martin MD    Discharging Nurse: [facility] Unit/Room#: 5324/6339-X  Discharging Unit Phone Number: [phone]    Emergency Contact:   Extended Emergency Contact Information  Primary Emergency Contact: Antoinette Hurtado  Address: [place] [address]           Amira THAKUR [address] [place] [address] Phone: [phone]  Mobile Phone: [phone]  Relation: Child  Secondary Emergency Contact: Lincoln Cisneros  Mobile Phone: [phone]  Relation: Grandchild  Preferred language: Yoana Folk   needed?  No    Past Surgical History:  Past Surgical History:   Procedure Laterality Date    BREAST LUMPECTOMY  [id number]    RIGHT    CARDIAC CATHETERIZATION  10/09/2020    Dr. Hailey Bose (CERVIX STATUS UNKNOWN)      partial    JOINT REPLACEMENT Bilateral     bilateral TKR    LUMBAR FUSION      L4L5    MASTECTOMY, MODIFIED RADICAL Right 11/3/2020    MODIFIED RADICAL RIGHT BREAST MASTECTOMY performed by Robinson Agustin MD at [address]      thumb joint replacement rt     THYROID SURGERY      US BREAST NEEDLE BIOPSY RIGHT  2020    US BREAST NEEDLE BIOPSY RIGHT 2020 [facility]       Immunization History:   Immunization History   Administered Date(s) Administered    Influenza Vaccine, unspecified formulation 10/07/2014, 10/07/2015    Influenza Virus Vaccine 10/17/2013, 10/07/2014, 10/07/2015    Influenza, High Dose (Fluzone 65 yrs and older) 10/24/2017    Pneumococcal Polysaccharide ([id number]) 2022       Active Problems:  Patient Active Problem List   Diagnosis Code    Breast cancer ([facility] C50.919    Psoriatic arthritis ([facility] L40.50    RA (rheumatoid arthritis) ([facility] M06.9    BMI 35.0-35.9,adult Z68.35    Hypothyroidism E03.9    Irritable bowel syndrome K58.9    HTN (hypertension), benign I10    Diabetes mellitus type 2, uncontrolled [id number]    Acute cerebrovascular accident (CVA) ([facility] I63.9    Multifactorial gait disorder R26.89    Bilateral leg weakness R29.898    Venous stasis I87.8    History of breast cancer Z85.3    Mixed hyperlipidemia E78.2    Arthritis M19.90    Type 2 diabetes mellitus with renal complication ([facility]) T96.93    Essential hypertension I10    Vitamin D deficiency E55.9    Morbidly obese ([facility]) E66.01    Right bundle branch block I45.10    Coronary artery calcification seen on CT scan I25.10    Acute pain after mastectomy G89.18, Z90.10    S/P mastectomy, right Z90.11    Acute kidney injury superimposed on CKD ([facility]) N17.9, N18.9    COPD (chronic obstructive pulmonary disease) ([facility]) J44.9    Extrinsic asthma without status asthmaticus J45.909    Generalized osteoarthritis M15.9    Mild cognitive impairment G31.84    Lumbar post-laminectomy syndrome M96.1    Stage 3 chronic kidney disease ([facility]) N18.30    Weakness generalized R53.1    Dementia without behavioral disturbance ([facility]) F03.90    Vitamin B12 deficiency E53.8    Recurrent breast cancer, right ([facility] C50.911    PVD (peripheral vascular disease) ([facility]) I73.9    Right lower lobe lung mass R91.8       Isolation/Infection:   Isolation            No Isolation          Patient Infection Status       Infection Onset Added Last Indicated Last Indicated By Review Planned Expiration Resolved Resolved By    None active    Resolved    COVID-19 (Rule Out) 11/14/22 11/14/22 11/14/22 Respiratory Panel, Molecular, with COVID-19 (Restricted: peds pts or suitable admitted adults) (Ordered)   11/14/22 Rule-Out Test Resulted    COVID-19 (Rule Out) 11/12/22 11/12/22 11/12/22 COVID-19, Rapid (Ordered)   11/12/22 Rule-Out Test Resulted    COVID-19 06/10/22 06/10/22 06/10/22 COVID-19, Rapid   22     COVID-19 (Rule Out) 10/29/20 10/29/20 10/29/20 Covid-19 Ambulatory (Ordered)   10/31/20 Rule-Out Test Resulted    COVID-19 (Rule Out) 20 Covid-19 Ambulatory (Ordered)   20 Rule-Out Test Resulted            Nurse Assessment:  Last Vital Signs: /60   Pulse 87   Temp 98.6 °F (37 °C) (Temporal)   Resp 22   Ht 5' 4\" (1.626 m)   Wt 175 lb 6.4 oz (79.6 kg)   LMP  (LMP Unknown)   SpO2 93%   BMI 30.11 kg/m²     Last documented pain score (0-10 scale): Pain Level: 0  Last Weight:   Wt Readings from Last 1 Encounters:   22 175 lb 6.4 oz (79.6 kg)     Mental Status:  {IP PT MENTAL STATUS:}    IV Access:  - None    Nursing Mobility/ADLs:  Walking   Assisted  Transfer  Assisted  Bathing  Assisted  Dressing  Assisted  Toileting  Assisted  Feeding  Independent  Med Admin  Independent  Med Delivery   whole    Wound Care Documentation and Therapy:  Incision 20 Breast Right (Active)   Number of days: 744        Elimination:  Continence: Bowel: No  Bladder: No  Urinary Catheter: None   Colostomy/Ileostomy/Ileal Conduit: No       Date of Last BM: 2022    Intake/Output Summary (Last 24 hours) at 2022 1337  Last data filed at 2022 0558  Gross per 24 hour   Intake --   Output 2125 ml   Net -2125 ml     I/O last 3 completed shifts: In: 3618.9 [P.O.:240; I.V.:1843.4; IV Piggyback:1535.5]  Out: 0772 [Urine:3625]    Safety Concerns:     None    Impairments/Disabilities:      None    Nutrition Therapy:  Current Nutrition Therapy:   - Oral Diet:  General    Routes of Feeding: Oral  Liquids: No Restrictions  Daily Fluid Restriction: no  Last Modified Barium Swallow with Video (Video Swallowing Test): not done    Treatments at the Time of Hospital Discharge:   Respiratory Treatments:   Oxygen Therapy:  is not on home oxygen therapy.   Ventilator:    - No ventilator support    Rehab Therapies: Physical Therapy and Occupational Therapy  Weight Bearing Status/Restrictions: No weight bearing restrictions  Other Medical Equipment (for information only, NOT a DME order): Other Treatments:     Patient's personal belongings (please select all that are sent with patient):  None    RN SIGNATURE:  Electronically signed by Gisela Crowley RN on 11/18/22 at 3:38 PM EST    CASE MANAGEMENT/SOCIAL WORK SECTION    Inpatient Status Date: 11/12/22    Readmission Risk Assessment Score:  Readmission Risk              Risk of Unplanned Readmission:  33           Discharging to Facility/ Agency   Name: [facility]  Address:  [address] [place], [address]  Phone:  [phone]  Fax:  [phone]    Dialysis Facility (if applicable)   Name:  Address:  Dialysis Schedule:  Phone:  Fax:    / signature: Electronically signed by JOAQUÍN Urrutia on 11/18/2022 at 1:38 PM      PHYSICIAN SECTION    Prognosis: {Prognosis:5973767535}    Condition at Discharge: [facility] Patient Condition:163286247}    Rehab Potential (if transferring to Rehab): {Prognosis:6100454255}    Recommended Labs or Other Treatments After Discharge: ***    Physician Certification: I certify the above information and transfer of Bari Alvarez  is necessary for the continuing treatment of the diagnosis listed and that she requires {Admit to Appropriate Level of Care:12222} for {GREATER/LESS:372523767} 30 days.      Update Admission H&P: {CHP DME Changes in BNSLF:213461091}    PHYSICIAN SIGNATURE:  Electronically signed by Ameya Roberts MD on 11/18/22 at 2:36 PM EST

## 2022-11-18 NOTE — DISCHARGE SUMMARY
Hospital Medicine Discharge Summary    Patient ID: Bari Alvarez      Patient's PCP: Neda Dick MD    Admit Date: 11/12/2022     Discharge Date:   11/18/2022     Admitting Physician: Christina Maldonado MD     Discharge Physician: Ameya Roberts MD     Discharge Diagnoses: Active Hospital Problems    Diagnosis Date Noted    Right lower lobe lung mass [R91.8] 11/12/2022     Priority: Medium     Metastatic multiple right lower lung lobe nodules   History of right breast cancer  Dizziness   Suspected T9 lytic lesion  Type 2 diabetes mellitus  History of Hypertension   Asymptomatic bacteriuria  Acute on chronic renal failure   COPD   History of CVA  Urinary retention  Dementia  Hypernatremia, reolved  Hypomagnesemia  Physical deconditioning  Hypothyroidism  Moderate protein calorie malnutrition        The patient was seen and examined on day of discharge and this discharge summary is in conjunction with any daily progress note from day of discharge. Hospital Course: This is an 81-year-old female with past medical history of right breast cancer, COPD, RA, type 2 diabetes mellitus who presented with nausea, dizziness. On arrival to the emergency department, CT head showed no acute intracranial abnormality; CT chest showed multiple spiculated soft tissue pulmonary nodules compatible with metastatic disease. Plan for pulmonary nodule was deferred due to current ASA therapy. MRI was completed was also negative for acute abnormality. She was evaluated by oncologist who started patient on Arimidex. Patient is being discharge home in good condition to follow up with oncologist, pulmonologist for further outpatient workup and management. Exam:     /60   Pulse 87   Temp 98.6 °F (37 °C) (Temporal)   Resp 22   Ht 5' 4\" (1.626 m)   Wt 175 lb 6.4 oz (79.6 kg)   LMP  (LMP Unknown)   SpO2 93%   BMI 30.11 kg/m²     General appearance: Sitting comfortably in bed.  No apparent distress. Respiratory: Clear to auscultation bilaterally. Cardiovascular: Normal S1/S2. Regular rhythm and rate. Abdomen: Soft, non-tender, non-distended with normal bowel sounds. Musculoskeletal: No clubbing, cyanosis or edema bilaterally. Skin: Skin color, texture, turgor normal.  No rashes or lesions. Neurologic:  No focal deficit. Consults:     IP CONSULT TO INTERNAL MEDICINE  IP CONSULT TO PULMONOLOGY  IP CONSULT TO ONCOLOGY    Disposition:  SNF    Condition at Discharge: Stable    Discharge Instructions/Follow-up:      Code Status:  Full Code     Activity: activity as tolerated    Diet: regular diet    Labs:  For convenience and continuity at follow-up the following most recent labs are provided:      CBC:    Lab Results   Component Value Date/Time    WBC 7.4 11/18/2022 04:44 AM    HGB 11.3 11/18/2022 04:44 AM    HCT 34.4 11/18/2022 04:44 AM     11/18/2022 04:44 AM       Renal:    Lab Results   Component Value Date/Time     11/18/2022 04:44 AM    K 3.7 11/18/2022 04:44 AM    K 3.7 11/12/2022 06:51 AM     11/18/2022 04:44 AM    CO2 19 11/18/2022 04:44 AM    BUN 11 11/18/2022 04:44 AM    CREATININE 1.2 11/18/2022 04:44 AM    CALCIUM 8.2 11/18/2022 04:44 AM    PHOS 2.6 11/18/2022 04:44 AM       Discharge Medications:     Current Discharge Medication List        START taking these medications    Details   anastrozole (ARIMIDEX) 1 MG tablet Take 1 tablet by mouth daily  Qty: 30 tablet, Refills: 3           CONTINUE these medications which have NOT CHANGED    Details   vitamin D3 (CHOLECALCIFEROL) 25 MCG (1000 UT) TABS tablet Take 2 tablets by mouth daily  Qty: 30 tablet, Refills: 1      atorvastatin (LIPITOR) 20 MG tablet Take 2 tablets by mouth daily  Qty: 180 tablet, Refills: 1    Associated Diagnoses: Mixed hyperlipidemia      metFORMIN (GLUCOPHAGE) 500 MG tablet Take 1 tablet by mouth 2 times daily (with meals)  Qty: 180 tablet, Refills: 1      levothyroxine (SYNTHROID) 175 MCG tablet Take one tablet Mon-Fri and 2 tabs Sat/Sun. Qty: 108 tablet, Refills: 1    Associated Diagnoses: Hypothyroidism, unspecified type      mirtazapine (REMERON) 30 MG tablet Take 1 tablet by mouth nightly  Qty: 90 tablet, Refills: 1      acetaminophen (TYLENOL) 325 MG tablet Take 650 mg by mouth every 6 hours as needed for Pain      nystatin (MYCOSTATIN) 750785 UNIT/GM powder Apply 3 times daily. Qty: 60 g, Refills: 3           STOP taking these medications       aspirin 81 MG chewable tablet Comments:   Reason for Stopping:         ramipril (ALTACE) 10 MG capsule Comments:   Reason for Stopping:         metoprolol succinate (TOPROL XL) 25 MG extended release tablet Comments:   Reason for Stopping:               Time Spent on discharge is more than 30 minutes in the examination, evaluation, counseling and review of medications and discharge plan. Signed:    Fabian Blanco MD   11/18/2022      Thank you Chilo Martinez MD for the opportunity to be involved in this patient's care. If you have any questions or concerns please feel free to contact me at [phone].

## 2022-11-19 LAB
ORGANISM: ABNORMAL
ORGANISM: ABNORMAL
URINE CULTURE, ROUTINE: ABNORMAL
URINE CULTURE, ROUTINE: ABNORMAL

## 2022-11-21 LAB — CULTURE, BLOOD 2: NORMAL

## 2022-11-30 ENCOUNTER — OUTSIDE SERVICES (OUTPATIENT)
Dept: INTERNAL MEDICINE CLINIC | Age: 82
End: 2022-11-30

## 2022-11-30 DIAGNOSIS — I10 ESSENTIAL HYPERTENSION: ICD-10-CM

## 2022-11-30 DIAGNOSIS — F02.80 ALZHEIMER DISEASE (HCC): ICD-10-CM

## 2022-11-30 DIAGNOSIS — C50.911 MALIGNANT NEOPLASM OF RIGHT FEMALE BREAST, UNSPECIFIED ESTROGEN RECEPTOR STATUS, UNSPECIFIED SITE OF BREAST (HCC): Primary | ICD-10-CM

## 2022-11-30 DIAGNOSIS — E53.8 VITAMIN B 12 DEFICIENCY: ICD-10-CM

## 2022-11-30 DIAGNOSIS — E55.9 VITAMIN D DEFICIENCY: ICD-10-CM

## 2022-11-30 DIAGNOSIS — I25.10 CORONARY ARTERY DISEASE DUE TO LIPID RICH PLAQUE: ICD-10-CM

## 2022-11-30 DIAGNOSIS — E03.9 HYPOTHYROIDISM, UNSPECIFIED TYPE: ICD-10-CM

## 2022-11-30 DIAGNOSIS — H91.93 BILATERAL HEARING LOSS, UNSPECIFIED HEARING LOSS TYPE: ICD-10-CM

## 2022-11-30 DIAGNOSIS — I25.83 CORONARY ARTERY DISEASE DUE TO LIPID RICH PLAQUE: ICD-10-CM

## 2022-11-30 DIAGNOSIS — E11.22 TYPE 2 DIABETES MELLITUS WITH DIABETIC CHRONIC KIDNEY DISEASE, UNSPECIFIED CKD STAGE, UNSPECIFIED WHETHER LONG TERM INSULIN USE (HCC): ICD-10-CM

## 2022-11-30 DIAGNOSIS — M54.50 LOW BACK PAIN, UNSPECIFIED BACK PAIN LATERALITY, UNSPECIFIED CHRONICITY, UNSPECIFIED WHETHER SCIATICA PRESENT: ICD-10-CM

## 2022-11-30 DIAGNOSIS — R26.2 DIFFICULTY WALKING: ICD-10-CM

## 2022-11-30 DIAGNOSIS — M06.9 RHEUMATOID ARTHRITIS, INVOLVING UNSPECIFIED SITE, UNSPECIFIED WHETHER RHEUMATOID FACTOR PRESENT (HCC): ICD-10-CM

## 2022-11-30 DIAGNOSIS — J44.9 CHRONIC OBSTRUCTIVE PULMONARY DISEASE, UNSPECIFIED COPD TYPE (HCC): ICD-10-CM

## 2022-11-30 DIAGNOSIS — J45.909 UNCOMPLICATED ASTHMA, UNSPECIFIED ASTHMA SEVERITY, UNSPECIFIED WHETHER PERSISTENT: ICD-10-CM

## 2022-11-30 DIAGNOSIS — E11.65 TYPE 2 DIABETES MELLITUS WITH HYPERGLYCEMIA, UNSPECIFIED WHETHER LONG TERM INSULIN USE (HCC): ICD-10-CM

## 2022-11-30 DIAGNOSIS — E46 PROTEIN-CALORIE MALNUTRITION, UNSPECIFIED SEVERITY (HCC): ICD-10-CM

## 2022-11-30 DIAGNOSIS — L40.52 PSORIATIC ARTHRITIS MUTILANS (HCC): ICD-10-CM

## 2022-11-30 DIAGNOSIS — K59.00 CONSTIPATION, UNSPECIFIED CONSTIPATION TYPE: ICD-10-CM

## 2022-11-30 DIAGNOSIS — K58.9 IRRITABLE BOWEL SYNDROME WITHOUT DIARRHEA: ICD-10-CM

## 2022-11-30 DIAGNOSIS — M19.90 OSTEOARTHRITIS, UNSPECIFIED OSTEOARTHRITIS TYPE, UNSPECIFIED SITE: ICD-10-CM

## 2022-11-30 DIAGNOSIS — E11.40 TYPE 2 DIABETES MELLITUS WITH DIABETIC NEUROPATHY, UNSPECIFIED WHETHER LONG TERM INSULIN USE (HCC): ICD-10-CM

## 2022-11-30 DIAGNOSIS — N17.9 ACUTE RENAL FAILURE, UNSPECIFIED ACUTE RENAL FAILURE TYPE (HCC): ICD-10-CM

## 2022-11-30 DIAGNOSIS — N18.30 STAGE 3 CHRONIC KIDNEY DISEASE, UNSPECIFIED WHETHER STAGE 3A OR 3B CKD (HCC): ICD-10-CM

## 2022-11-30 DIAGNOSIS — G30.9 ALZHEIMER DISEASE (HCC): ICD-10-CM

## 2022-12-28 ENCOUNTER — CARE COORDINATION (OUTPATIENT)
Dept: CASE MANAGEMENT | Age: 82
End: 2022-12-28

## 2022-12-28 NOTE — CARE COORDINATION
[facility] Discharge Call    2022    Patient: Jimmy Campbell Patient : 1940   MRN: [id number]  Reason for Admission: Lung mets  Discharge Date: 22 RARS: Readmission Risk Score: 17.9         Discharge Facility: [address] Course:    to  [facility] with R lower lobe lung  mass. Suspected CA mets   to  [facility] with d/c home after 39 days    HFU made:      Sig Hx:   R breast CA, DMII, CKD, COPD, CVA hx, dementia, depression, IBS, s/p mastectomy    DME:     Conversation:   Left HIPPA compliant message regarding the nature of the call and a request for a return call with my contact information      JOSE GUADALUPE Wang, [phone]  [facility] [place] [address] Transition Nurse         Follow up plan: Will re-attempt               Care Transitions Post Acute Facility Transition      Do you have all of your prescriptions and are they filled?: Yes         Care Transitions Interventions         No future appointments.     JOSE GUADALUPE Wang, RN   [phone]  [facility] Transition Nurse  [phone]

## 2023-01-04 ENCOUNTER — CARE COORDINATION (OUTPATIENT)
Dept: CASE MANAGEMENT | Age: 83
End: 2023-01-04

## 2023-01-04 NOTE — CARE COORDINATION
[facility] Discharge Call    2023    Patient: Jessi Krishnamurthy Patient : 1940   MRN: [id number]  Reason for Admission: lung mets  Discharge Date: 22 RARS: Readmission Risk Score: 17.9       Discharge Facility: [address] Course:    to  [facility] with R lower lobe lung  mass. Suspected CA mets   to  [facility] with d/c home after 39 days     HFU made:        Sig Hx:   R breast CA, DMII, CKD, COPD, CVA hx, dementia, depression, IBS, s/p mastectomy     DME:      Conversation:   Left HIPPA compliant message regarding the nature of the call and a request for a return call with my contact information    Will close as unable to reach pt and more than 30 days out       [address] [place] [address] Transition      Do you have all of your prescriptions and are they filled?: Yes         Care Transitions Interventions         No future appointments.       JOSE OdellN, RN   [address] [place] [address] Transition Nurse  [phone]

## 2023-03-15 ENCOUNTER — CARE COORDINATION (OUTPATIENT)
Dept: CARE COORDINATION | Age: 83
End: 2023-03-15

## 2023-03-17 ENCOUNTER — CARE COORDINATION (OUTPATIENT)
Dept: CARE COORDINATION | Age: 83
End: 2023-03-17

## 2023-03-17 NOTE — CARE COORDINATION
-Attempted to reach pt to offer enrollment into care coordination program, however no answer.   LVM on Emergency contact number  -Detailed HIPAA compliant VM left introducing self, reason for call, and brief explanation of program.  -Left ACM's contact information, requesting call back.  -Noted pt has no notes in Epic for visits with any provider since November 2022

## 2023-03-24 ENCOUNTER — CARE COORDINATION (OUTPATIENT)
Dept: CARE COORDINATION | Age: 83
End: 2023-03-24

## 2023-06-20 NOTE — PROGRESS NOTES
HOSPITALIST PROGRESS NOTE  Date: 6/13/2021   Name: Vale Herrera   MRN: [id number]   YOB: 1940        Hospital Course:   [de-identified] y.o. female who presented to [facility] with medical history of breast cancer status post mastectomy in November 2020, COPD, diabetes with peripheral neuropathy, asthma, hypertension, obesity, hypothyroidism, hyperlipidemia, rheumatoid osteoarthritis.  Patient is a poor historian, started having gradual onset of redness involving the right arm.  This has been getting progressively worse, described pain that is achy, constant, moderate in intensity and associated with swelling and redness.  No fever.  No chest pain or abdominal pain.  No nausea or vomiting.  She has leg swelling but no worse than usual.  No change in bowel habits or urinary complaints.   Subjective/Interval Hx:   Patient complained of right arm redness and swelling ultrasound was done showed no clot she does walk with a walker will have physical therapy evaluate her mobility for safe discharge when stable  Objective:   Physical Exam:   BP (!) 150/76   Pulse 64   Temp 98 °F (36.7 °C) (Temporal)   Resp 16   Ht 5' 4\" (1.626 m)   Wt 217 lb 12.8 oz (98.8 kg)   SpO2 97%   BMI 37.39 kg/m²   General: no acute distress, well nourished and well hydrated  HEENT: NCAT  Heart: S1S2 RRR  Lungs: Clear to ascultation bilaterally, respiratory effort normal  Abdomen: soft, NT/ND, positive bowel sounds  Extremities: no pitting edema, nontender   Neuro: patient is awake, alert and orientated times 3, no gross deficits  Skin: no rashes or ecchymosis        Meds:   Meds:    metoprolol succinate  25 mg Oral Daily    mirtazapine  30 mg Oral Nightly    insulin lispro  0-10 Units Subcutaneous 4x Daily AC & HS    cefTRIAXone (ROCEPHIN) IV  1,000 mg Intravenous Q24H    doxycycline (VIBRAMYCIN) IV  100 mg Intravenous Q12H    sodium chloride flush  5-40 mL Intravenous 2 times per day    heparin (porcine)  5,000 Units Pt states he received message notification several days back stating victoza was not approved. Pt states he has not contacted pharmacy directly. Pt informed per pharm, last prescription filled by PCP 3/2023 canceled in system. New rx sent by Dr Garcia 6/19/23. Advised to contact pharmacy.     MA contacted pharm, long hold time over 30min. Will try again at later time.

## 2023-07-31 NOTE — PROGRESS NOTES
Report called to [phone] all questions answered at this time report also faxed patient son called from waiting room to come see patient before patient heads to room Strain of rotator cuff of right shoulder

## 2024-02-29 NOTE — CARE COORDINATION
Goal Outcome Evaluation:            Patient alert and oriented with intermittent confusion. Very pleasant. Ambulating in hallway with standby assist.                                    Social Work/Case Management Transition of Care Planning (Oanh Nina [place] [phone]): Per report, clinical appearance of metastatic cancer. Bone scan showed several areas concerning for bone metastasis. MRI brain was completed yesterday and is negative. Plan for image guided biopsy of lung nodule, delayed due to ASA (taken 11/13 last) as an outpatient. Per Yovani Franklin at [facility], the cannot accept the patient. Spoke with patient's son, Elisa Blakely. He indicated he did pay the balance due at [facility]. Discussed [facility] options again. He choiced 1. [facility] 2. [facility] at the [facility]. Referral information sent to Emeli Bradford for review. JOAQUÍN Callahan  11/18/2022    Update:  Patient has been accepted at [place] [address]. ARSENIO/destination completed. PAS/RR completed. Discharge envelope with completed ambulance form is in the soft chart. Transportation via [facility] ambulance with a 4:30 . Notified patient, her son - Elisa Blakely, floor nurse and Nelsy of [facility]. Charge nurse requested physician review for discharge.     JOAQUÍN Callahan  11/18/2022

## (undated) DEVICE — STRIP,CLOSURE,WOUND,MEDI-STRIP,1/2X4: Brand: MEDLINE

## (undated) DEVICE — SYRINGE MED 10ML TRNSLUC BRL PLUNG BLK MRK POLYPR CTRL

## (undated) DEVICE — PACK,UNIV, II AURORA: Brand: MEDLINE

## (undated) DEVICE — ADHESIVE SKIN CLSR 0.7ML TOP DERMBND ADV

## (undated) DEVICE — SPONGE LAP W18XL18IN WHT COT 4 PLY FLD STRUNG RADPQ DISP ST

## (undated) DEVICE — MARKER SURG MARGIN STD 6 CLR INK ASST CORR CLP

## (undated) DEVICE — GOWN,SIRUS,FABRNF,XL,20/CS: Brand: MEDLINE

## (undated) DEVICE — DRAIN SURG 15FR L3/16IN DIA4.7MM SIL CHN RND HUBLESS FULL

## (undated) DEVICE — SURGICAL PROCEDURE PACK BASIC

## (undated) DEVICE — SET INST MINOR PROCEDURE

## (undated) DEVICE — TOWEL,OR,DSP,ST,BLUE,STD,6/PK,12PK/CS: Brand: MEDLINE

## (undated) DEVICE — DRIP REDUCTION MANIFOLD

## (undated) DEVICE — DRAPE THER FLUID WARMING 66X44 IN FLAT SLUSH DBL DISC ORS

## (undated) DEVICE — GARMENT,MEDLINE,DVT,INT,CALF,MED, GEN2: Brand: MEDLINE

## (undated) DEVICE — STAPLER SKIN L39MM DIA0.53MM CRWN 5.7MM S STL FIX HD PROX

## (undated) DEVICE — Device

## (undated) DEVICE — MAGNETIC INSTR DRAPE 20X16: Brand: MEDLINE INDUSTRIES, INC.

## (undated) DEVICE — PATIENT RETURN ELECTRODE, SINGLE-USE, CONTACT QUALITY MONITORING, ADULT, WITH 9FT CORD, FOR PATIENTS WEIGING OVER 33LBS. (15KG): Brand: MEGADYNE

## (undated) DEVICE — GLOVE SURG SZ 65 THK91MIL LTX FREE SYN POLYISOPRENE

## (undated) DEVICE — GAUZE,SPONGE,AVANT,4"X4",4PLY,STRL,10/TR: Brand: MEDLINE

## (undated) DEVICE — BANDAGE,GAUZE,4.5"X4.1YD,STERILE,LF: Brand: MEDLINE

## (undated) DEVICE — APPLICATOR MEDICATED 26 CC SOLUTION HI LT ORNG CHLORAPREP

## (undated) DEVICE — AGENT HEMSTAT 5GM ARISTA AH

## (undated) DEVICE — SHEET,DRAPE,53X77,STERILE: Brand: MEDLINE

## (undated) DEVICE — SHEARS ENDOSCP L9CM CRV HARM FOCS +

## (undated) DEVICE — EXTRAS MASTECTOMY

## (undated) DEVICE — PROBE GAM TRUNODE DISP EACH

## (undated) DEVICE — BANDAGE COMPR W6INXL10YD ST M E WHITE/BEIGE

## (undated) DEVICE — Z DUP USE 2257490 ADHESIVE SKIN CLSRE 036ML TPCL 2CTL CNCRLTE HIGH VSCSTY DRMB

## (undated) DEVICE — STANDARD HYPODERMIC NEEDLE,ALUMINUM HUB: Brand: MONOJECT

## (undated) DEVICE — PLASMABLADE PS210-030S 3.0S LOCK: Brand: PLASMABLADE™

## (undated) DEVICE — APPLIER LIG CLP M L11IN TI STR RNG HNDL FOR 20 CLP DISP

## (undated) DEVICE — GLOVE ORANGE PI 7   MSG9070

## (undated) DEVICE — TUBING, SUCTION, 3/16" X 12', STRAIGHT: Brand: MEDLINE